# Patient Record
Sex: MALE | Race: WHITE | NOT HISPANIC OR LATINO | Employment: FULL TIME | URBAN - METROPOLITAN AREA
[De-identification: names, ages, dates, MRNs, and addresses within clinical notes are randomized per-mention and may not be internally consistent; named-entity substitution may affect disease eponyms.]

---

## 2017-02-20 ENCOUNTER — MYC MEDICAL ADVICE (OUTPATIENT)
Dept: FAMILY MEDICINE | Facility: CLINIC | Age: 42
End: 2017-02-20

## 2017-02-20 DIAGNOSIS — J45.20 INTERMITTENT ASTHMA, UNCOMPLICATED: ICD-10-CM

## 2017-02-20 RX ORDER — ALBUTEROL SULFATE 90 UG/1
2 AEROSOL, METERED RESPIRATORY (INHALATION) EVERY 4 HOURS PRN
Qty: 3 INHALER | Refills: 3 | Status: SHIPPED | OUTPATIENT
Start: 2017-02-20 | End: 2017-02-27

## 2017-02-20 NOTE — TELEPHONE ENCOUNTER
albuterol (PROAIR HFA, PROVENTIL HFA, VENTOLIN HFA) 108 (90 BASE) MCG/ACT inhaler       Last Written Prescription Date: 6-10-17  Last Fill Quantity: 3, # refills: 3    Last Office Visit with [facility], [facility] or [facility] prescribing provider:  6-10-16   Future Office Visit:   none    Date of Last Asthma Action Plan Letter:   Asthma Action Plan Q1 Year    Topic Date Due     Asthma Action Plan - yearly  06/09/2016      Asthma Control Test:   ACT Total Scores 6/9/2015   ACT TOTAL SCORE 14   ASTHMA ER VISITS 0 = None   ASTHMA HOSPITALIZATIONS 0 = None       Date of Last Spirometry Test:   No results found for this or any previous visit.

## 2017-02-27 ENCOUNTER — MYC MEDICAL ADVICE (OUTPATIENT)
Dept: FAMILY MEDICINE | Facility: CLINIC | Age: 42
End: 2017-02-27

## 2017-02-27 RX ORDER — ALBUTEROL SULFATE 90 UG/1
2 AEROSOL, METERED RESPIRATORY (INHALATION) EVERY 4 HOURS PRN
Qty: 3 INHALER | Refills: 3 | Status: SHIPPED | OUTPATIENT
Start: 2017-02-27 | End: 2018-05-01

## 2017-02-27 NOTE — TELEPHONE ENCOUNTER
Please see MyChart message below, pharmacy will be contacting us with paperwork.    Routed to PCP for FYI.    Ratna Nelson RN  [facility]

## 2017-02-27 NOTE — TELEPHONE ENCOUNTER
Rx for the albuterol inhaler was sent to [facility] pharmacy. Patient is needing a paper copy of it mailed to his home address instead. He will then be sending it to a mail order pharmacy.    Routed to provider for paper copy of Albuterol Rx.  Stephanie Esquivel RN  [facility] [place]

## 2017-02-27 NOTE — TELEPHONE ENCOUNTER
Reason for Call:  Other prescription    Detailed comments: patient is calling back wondering where script is?    Phone Number Patient can be reached at: Home number on file [phone] (home)    Best Time: any    Can we leave a detailed message on this number? YES    Call taken on 2/27/2017 at 10:22 AM by Sanaz Decker

## 2017-04-18 ENCOUNTER — OFFICE VISIT (OUTPATIENT)
Dept: FAMILY MEDICINE | Facility: CLINIC | Age: 42
End: 2017-04-18
Payer: COMMERCIAL

## 2017-04-18 VITALS
SYSTOLIC BLOOD PRESSURE: 138 MMHG | WEIGHT: 192 LBS | HEART RATE: 66 BPM | DIASTOLIC BLOOD PRESSURE: 85 MMHG | HEIGHT: 73 IN | OXYGEN SATURATION: 98 % | TEMPERATURE: 98.7 F | BODY MASS INDEX: 25.45 KG/M2

## 2017-04-18 DIAGNOSIS — J45.20 INTERMITTENT ASTHMA, UNCOMPLICATED: ICD-10-CM

## 2017-04-18 DIAGNOSIS — E78.5 HYPERLIPIDEMIA LDL GOAL <100: ICD-10-CM

## 2017-04-18 DIAGNOSIS — Z91.09 ENVIRONMENTAL ALLERGIES: ICD-10-CM

## 2017-04-18 DIAGNOSIS — Z00.00 ROUTINE GENERAL MEDICAL EXAMINATION AT A HEALTH CARE FACILITY: Primary | ICD-10-CM

## 2017-04-18 DIAGNOSIS — R06.83 SNORING: ICD-10-CM

## 2017-04-18 DIAGNOSIS — R53.83 FATIGUE, UNSPECIFIED TYPE: ICD-10-CM

## 2017-04-18 LAB
ALBUMIN SERPL-MCNC: 4.4 G/DL (ref 3.4–5)
ALP SERPL-CCNC: 82 U/L (ref 40–150)
ALT SERPL W P-5'-P-CCNC: 44 U/L (ref 0–70)
ANION GAP SERPL CALCULATED.3IONS-SCNC: 7 MMOL/L (ref 3–14)
AST SERPL W P-5'-P-CCNC: 23 U/L (ref 0–45)
BASOPHILS # BLD AUTO: 0 10E9/L (ref 0–0.2)
BASOPHILS NFR BLD AUTO: 0.8 %
BILIRUB SERPL-MCNC: 0.8 MG/DL (ref 0.2–1.3)
BUN SERPL-MCNC: 10 MG/DL (ref 7–30)
CALCIUM SERPL-MCNC: 9.2 MG/DL (ref 8.5–10.1)
CHLORIDE SERPL-SCNC: 105 MMOL/L (ref 94–109)
CHOLEST SERPL-MCNC: 250 MG/DL
CO2 SERPL-SCNC: 28 MMOL/L (ref 20–32)
CREAT SERPL-MCNC: 0.92 MG/DL (ref 0.66–1.25)
DIFFERENTIAL METHOD BLD: ABNORMAL
EOSINOPHIL # BLD AUTO: 0.8 10E9/L (ref 0–0.7)
EOSINOPHIL NFR BLD AUTO: 15.6 %
ERYTHROCYTE [DISTWIDTH] IN BLOOD BY AUTOMATED COUNT: 11.9 % (ref 10–15)
GFR SERPL CREATININE-BSD FRML MDRD: NORMAL ML/MIN/1.7M2
GLUCOSE SERPL-MCNC: 93 MG/DL (ref 70–99)
HCT VFR BLD AUTO: 46.5 % (ref 40–53)
HDLC SERPL-MCNC: 61 MG/DL
HGB BLD-MCNC: 16.5 G/DL (ref 13.3–17.7)
LDLC SERPL CALC-MCNC: 160 MG/DL
LYMPHOCYTES # BLD AUTO: 1.3 10E9/L (ref 0.8–5.3)
LYMPHOCYTES NFR BLD AUTO: 26.4 %
MCH RBC QN AUTO: 33.7 PG (ref 26.5–33)
MCHC RBC AUTO-ENTMCNC: 35.5 G/DL (ref 31.5–36.5)
MCV RBC AUTO: 95 FL (ref 78–100)
MONOCYTES # BLD AUTO: 0.5 10E9/L (ref 0–1.3)
MONOCYTES NFR BLD AUTO: 10.4 %
NEUTROPHILS # BLD AUTO: 2.3 10E9/L (ref 1.6–8.3)
NEUTROPHILS NFR BLD AUTO: 46.8 %
NONHDLC SERPL-MCNC: 189 MG/DL
PLATELET # BLD AUTO: 278 10E9/L (ref 150–450)
POTASSIUM SERPL-SCNC: 4.1 MMOL/L (ref 3.4–5.3)
PROT SERPL-MCNC: 7.7 G/DL (ref 6.8–8.8)
RBC # BLD AUTO: 4.9 10E12/L (ref 4.4–5.9)
SODIUM SERPL-SCNC: 140 MMOL/L (ref 133–144)
TRIGL SERPL-MCNC: 145 MG/DL
TSH SERPL DL<=0.005 MIU/L-ACNC: 1.68 MU/L (ref 0.4–4)
WBC # BLD AUTO: 5 10E9/L (ref 4–11)

## 2017-04-18 PROCEDURE — 99396 PREV VISIT EST AGE 40-64: CPT | Performed by: FAMILY MEDICINE

## 2017-04-18 PROCEDURE — 36415 COLL VENOUS BLD VENIPUNCTURE: CPT | Performed by: FAMILY MEDICINE

## 2017-04-18 PROCEDURE — 80061 LIPID PANEL: CPT | Performed by: FAMILY MEDICINE

## 2017-04-18 PROCEDURE — 80050 GENERAL HEALTH PANEL: CPT | Performed by: FAMILY MEDICINE

## 2017-04-18 RX ORDER — MONTELUKAST SODIUM 10 MG/1
10 TABLET ORAL AT BEDTIME
Qty: 90 TABLET | Refills: 3 | Status: SHIPPED | OUTPATIENT
Start: 2017-04-18 | End: 2018-05-01

## 2017-04-18 ASSESSMENT — PAIN SCALES - GENERAL: PAINLEVEL: NO PAIN (0)

## 2017-04-18 NOTE — NURSING NOTE
"Chief Complaint   Patient presents with     Physical     Health Maintenance     ACT       Initial BP (!) 138/94 (BP Location: Left arm, Patient Position: Chair, Cuff Size: Adult Regular)  Pulse 66  Temp 98.7  F (37.1  C) (Oral)  Ht 6' 0.75\" (1.848 m)  Wt 192 lb (87.1 kg)  SpO2 98%  BMI 25.51 kg/m2 Estimated body mass index is 25.51 kg/(m^2) as calculated from the following:    Height as of this encounter: 6' 0.75\" (1.848 m).    Weight as of this encounter: 192 lb (87.1 kg).  Medication Reconciliation: complete.  Roseann Garber CMA      "

## 2017-04-18 NOTE — LETTER
My Asthma Action Plan  Name: Reji Carpenter   YOB: 1975  Date: 4/18/2017   My doctor: Wili Kaplan MD   My clinic: [facility]        My Control Medicine: Qvar  My Rescue Medicine: albuterol   My Asthma Severity: intermittent  Avoid your asthma triggers: see list               GREEN ZONE     Good Control    I feel good    No cough or wheeze    Can work, sleep and play without asthma symptoms       Take your asthma control medicine every day.     1. If exercise triggers your asthma, take your rescue medication    15 minutes before exercise or sports, and    During exercise if you have asthma symptoms  2. Spacer to use with inhaler: If you have a spacer, make sure to use it with your inhaler             YELLOW ZONE     Getting Worse  I have ANY of these:    I do not feel good    Cough or wheeze    Chest feels tight    Wake up at night   1. Keep taking your Green Zone medications  2. Start taking your rescue medicine:    every 20 minutes for up to 1 hour. Then every 4 hours for 24-48 hours.  3. If you stay in the Yellow Zone for more than 12-24 hours, contact your doctor.  4. If you do not return to the Green Zone in 12-24 hours or you get worse, start taking your oral steroid medicine if prescribed by your provider.           RED ZONE     Medical Alert - Get Help  I have ANY of these:    I feel awful    Medicine is not helping    Breathing getting harder    Trouble walking or talking    Nose opens wide to breathe       1. Take your rescue medicine NOW  2. If your provider has prescribed an oral steroid medicine, start taking it NOW  3. Call your doctor NOW  4. If you are still in the Red Zone after 20 minutes and you have not reached your doctor:    Take your rescue medicine again and    Call 911 or go to the emergency room right away    See your regular doctor within 2 weeks of an Emergency Room or Urgent Care visit for follow-up treatment.        Electronically signed  by: Wili Kaplan, April 18, 2017    Annual Reminders:  Meet with Asthma Educator,  Flu Shot in the Fall, consider Pneumonia Vaccination for patients with asthma (aged 19 and older).    Pharmacy:    [facility] - [place], [place] - [address] [place] [address]/[facility]ARMACY #[id number] - [place], [place] - [address]  [facility] - [place], [place] - [address]  WRITTEN PRESCRIPTION REQUESTED                    Asthma Triggers  How To Control Things That Make Your Asthma Worse    Triggers are things that make your asthma worse.  Look at the list below to help you find your triggers and what you can do about them.  You can help prevent asthma flare-ups by staying away from your triggers.      Trigger                                                          What you can do   Cigarette Smoke  Tobacco smoke can make asthma worse. Do not allow smoking in your home, car or around you.  Be sure no one smokes at a child s day care or school.  If you smoke, ask your health care provider for ways to help you quit.  Ask family members to quit too.  Ask your health care provider for a referral to Quit Plan to help you quit smoking, or call 4-584-285-PLAN.     Colds, Flu, Bronchitis  These are common triggers of asthma. Wash your hands often.  Don t touch your eyes, nose or mouth.  Get a flu shot every year.     Dust Mites  These are tiny bugs that live in cloth or carpet. They are too small to see. Wash sheets and blankets in hot water every week.   Encase pillows and mattress in dust mite proof covers.  Avoid having carpet if you can. If you have carpet, vacuum weekly.   Use a dust mask and HEPA vacuum.   Pollen and Outdoor Mold  Some people are allergic to trees, grass, or weed pollen, or molds. Try to keep your windows closed.  Limit time out doors when pollen count is high.   Ask you health care provider about taking medicine during allergy season.     Animal  Dander  Some people are allergic to skin flakes, urine or saliva from pets with fur or feathers. Keep pets with fur or feathers out of your home.    If you can t keep the pet outdoors, then keep the pet out of your bedroom.  Keep the bedroom door closed.  Keep pets off cloth furniture and away from stuffed toys.     Mice, Rats, and Cockroaches  Some people are allergic to the waste from these pests.   Cover food and garbage.  Clean up spills and food crumbs.  Store grease in the refrigerator.   Keep food out of the bedroom.   Indoor Mold  This can be a trigger if your home has high moisture. Fix leaking faucets, pipes, or other sources of water.   Clean moldy surfaces.  Dehumidify basement if it is damp and smelly.   Smoke, Strong Odors, and Sprays  These can reduce air quality. Stay away from strong odors and sprays, such as perfume, powder, hair spray, paints, smoke incense, paint, cleaning products, candles and new carpet.   Exercise or Sports  Some people with asthma have this trigger. Be active!  Ask your doctor about taking medicine before sports or exercise to prevent symptoms.    Warm up for 5-10 minutes before and after sports or exercise.     Other Triggers of Asthma  Cold air:  Cover your nose and mouth with a scarf.  Sometimes laughing or crying can be a trigger.  Some medicines and food can trigger asthma.

## 2017-04-18 NOTE — PROGRESS NOTES
SUBJECTIVE:     CC: Reji Carpenter is an 41 year old male who presents for preventative health visit.     Healthy Habits:    Do you get at least three servings of calcium containing foods daily (dairy, green leafy vegetables, etc.)? No    Amount of exercise or daily activities, outside of work: 1 day(s) per week    Problems taking medications regularly No    Medication side effects: No    Have you had an eye exam in the past two years? no    Do you see a dentist twice per year? yes    Do you have sleep apnea, excessive snoring or daytime drowsiness?yes snoring        Allergies and asthma    Lots of problems    Especially with sleeping     Wheezing, coughing     Snoring a lot worse    Stops breathing sometimes    Worse for 6 months or so    Sinus procedure in past    Hard to breathe through nose at night    Using nasonex    neti pot    Distilled water    Using albuterol at least 3x per day    No fever/ chills     Has dogs        Today's PHQ-2 Score:   PHQ-2 ( 1999 Pfizer) 4/18/2017 6/10/2016   Q1: Little interest or pleasure in doing things 0 0   Q2: Feeling down, depressed or hopeless 0 0   PHQ-2 Score 0 0   Little interest or pleasure in doing things - -   Feeling down, depressed or hopeless - -   PHQ-2 Score - -       Abuse: Current or Past(Physical, Sexual or Emotional)- No  Do you feel safe in your environment - Yes    Social History   Substance Use Topics     Smoking status: Never Smoker     Smokeless tobacco: Never Used     Alcohol use No      Comment: occasional     The patient does not drink >3 drinks per day nor >7 drinks per week.    Last PSA:   PSA   Date Value Ref Range Status   06/10/2016 1.31 0 - 4 ug/L Final       Recent Labs   Lab Test  06/10/16   1115  06/09/15   1001  04/09/14   1609   CHOL  223*  198  217*   HDL  53  59  49   LDL  154*  127  150*   TRIG  82  62  88   CHOLHDLRATIO   --   3.4  4.4   NHDL  170*   --    --        Reviewed orders with patient. Reviewed health maintenance and  "updated orders accordingly - Yes    Reviewed and updated as needed this visit by clinical staff  Tobacco  Allergies  Meds  Problems  Med Hx  Surg Hx  Fam Hx  Soc Hx          Reviewed and updated as needed this visit by Provider            ROS:  C: NEGATIVE for fever, chills, change in weight  I: NEGATIVE for worrisome rashes, moles or lesions  E: NEGATIVE for vision changes or irritation  ENT: NEGATIVE for ear, mouth and throat problems  RESP:see above; some wheezing.  White phlegm  CV: NEGATIVE for chest pain, palpitations or peripheral edema  GI: NEGATIVE for nausea, abdominal pain, heartburn, or change in bowel habits   male: negative for dysuria, hematuria, decreased urinary stream, erectile dysfunction, urethral discharge  M: NEGATIVE for significant arthralgias or myalgia  N: NEGATIVE for weakness, dizziness or paresthesias  P: NEGATIVE for changes in mood or affect    Boxing on heavy bag once per week  Uses inhaler before that     A little tired during day but not real bad           OBJECTIVE:     BP (!) 138/94 (BP Location: Left arm, Patient Position: Chair, Cuff Size: Adult Regular)  Pulse 66  Temp 98.7  F (37.1  C) (Oral)  Ht 6' 0.75\" (1.848 m)  Wt 192 lb (87.1 kg)  SpO2 98%  BMI 25.51 kg/m2  EXAM:  GENERAL: healthy, alert and no distress  HENT: ear canals and TM's normal, nose and mouth without ulcers or lesions  NECK: no adenopathy, no asymmetry, masses, or scars and thyroid normal to palpation  RESP: lungs clear to auscultation - no rales, rhonchi or wheezes  CV: regular rate and rhythm, normal S1 S2, no S3 or S4, no murmur, click or rub, no peripheral edema and peripheral pulses strong  ABDOMEN: soft, nontender, no hepatosplenomegaly, no masses and bowel sounds normal   (male): normal male genitalia without lesions or urethral discharge, no hernia  MS: no gross musculoskeletal defects noted, no edema  SKIN: no suspicious lesions or rashes  NEURO: Normal strength and tone, mentation " intact and speech normal  PSYCH: mentation appears normal, affect normal/bright    ASSESSMENT/PLAN:     Reji was seen today for physical and health maintenance.    Diagnoses and all orders for this visit:    Routine general medical examination at a health care facility    Intermittent asthma, uncomplicated  -     beclomethasone (QVAR) 80 MCG/ACT Inhaler; Inhale 2 puffs into the lungs 2 times daily  -     montelukast (SINGULAIR) 10 MG tablet; Take 1 tablet (10 mg) by mouth At Bedtime  -     ALLERGY/ASTHMA ADULT REFERRAL    Environmental allergies  -     montelukast (SINGULAIR) 10 MG tablet; Take 1 tablet (10 mg) by mouth At Bedtime  -     ALLERGY/ASTHMA ADULT REFERRAL    Snoring  -     SLEEP EVALUATION & MANAGEMENT REFERRAL - ADULT; Future    Fatigue, unspecified type  -     SLEEP EVALUATION & MANAGEMENT REFERRAL - ADULT; Future  -     CBC with platelets differential  -     Comprehensive metabolic panel  -     TSH with free T4 reflex    Hyperlipidemia LDL goal <100  -     Lipid panel reflex to direct LDL    Discussed multiple issues with patient.  His wife was here for 1st part of visit also  Possible witnessed apnea at night, along with bad snoring.  Prudent to have sleep specialist consult. He will schedule.   Add inhaled steroid for asthma.  Hopefully this will help reduce need for albuterol.    Continue other allergy/ asthma meds.    Prudent to have him see allergist also.  Possible allergy testing?  They agreed with plan.  Check labs.  Increase exercise.   No std concern.      COUNSELING:  Reviewed preventive health counseling, as reflected in patient instructions       Regular exercise       Healthy diet/nutrition       Vision screening       Family planning       Safe sex practices/STD prevention       Prostate cancer screening    BP Screening:   Last 3 BP Readings:    BP Readings from Last 3 Encounters:   04/18/17 (!) 138/94   06/10/16 119/75   06/09/15 132/86         The following was recommended to the  "patient:  Re-screen within 4 weeks and recommend lifestyle modifications     reports that he has never smoked. He has never used smokeless tobacco.    Estimated body mass index is 25.51 kg/(m^2) as calculated from the following:    Height as of this encounter: 6' 0.75\" (1.848 m).    Weight as of this encounter: 192 lb (87.1 kg).   Weight management plan: Discussed healthy diet and exercise guidelines and patient will follow up in 12 months in clinic to re-evaluate.patient't bmi only 25.5.  Not worrisome.    Counseling Resources:  ATP IV Guidelines  Pooled Cohorts Equation Calculator  FRAX Risk Assessment  ICSI Preventive Guidelines  Dietary Guidelines for Americans, 2010  USDA's MyPlate  ASA Prophylaxis  Lung CA Screening    Wili Kaplan MD  [facility]  "

## 2017-04-18 NOTE — PATIENT INSTRUCTIONS
Preventive Health Recommendations  Male Ages 40 to 49    Yearly exam:             See your health care provider every year in order to  o   Review health changes.   o   Discuss preventive care.    o   Review your medicines if your doctor has prescribed any.    You should be tested each year for STDs (sexually transmitted diseases) if you re at risk.     Have a cholesterol test every 5 years.     Have a colonoscopy (test for colon cancer) if someone in your family has had colon cancer or polyps before age 50.     After age 45, have a diabetes test (fasting glucose). If you are at risk for diabetes, you should have this test every 3 years.      Talk with your health care provider about whether or not a prostate cancer screening test (PSA) is right for you.    Shots: Get a flu shot each year. Get a tetanus shot every 10 years.     Nutrition:    Eat at least 5 servings of fruits and vegetables daily.     Eat whole-grain bread, whole-wheat pasta and brown rice instead of white grains and rice.     Talk to your provider about Calcium and Vitamin D.     Lifestyle    Exercise for at least 150 minutes a week (30 minutes a day, 5 days a week). This will help you control your weight and prevent disease.     Limit alcohol to one drink per day.     No smoking.     Wear sunscreen to prevent skin cancer.     See your dentist every six months for an exam and cleaning.        Increase exercise frequency    We will send you lab results    Schedule consult with sleep specialist    Allergy consult ( hold claritin for a week prior )    Start qvar ( inhaled steroid )     Continue other allergy meds

## 2017-04-18 NOTE — MR AVS SNAPSHOT
After Visit Summary   4/18/2017    Reji Carpenter    MRN: [id number]           Patient Information     Date Of Birth          1975        Visit Information        Provider Department      4/18/2017 8:20 AM Wili Kaplan MD [facility]        Today's Diagnoses     Intermittent asthma, uncomplicated    -  1    Environmental allergies        Snoring        Fatigue, unspecified type        Hyperlipidemia LDL goal <100          Care Instructions      Preventive Health Recommendations  Male Ages 40 to 49    Yearly exam:             See your health care provider every year in order to  o   Review health changes.   o   Discuss preventive care.    o   Review your medicines if your doctor has prescribed any.    You should be tested each year for STDs (sexually transmitted diseases) if you re at risk.     Have a cholesterol test every 5 years.     Have a colonoscopy (test for colon cancer) if someone in your family has had colon cancer or polyps before age 50.     After age 45, have a diabetes test (fasting glucose). If you are at risk for diabetes, you should have this test every 3 years.      Talk with your health care provider about whether or not a prostate cancer screening test (PSA) is right for you.    Shots: Get a flu shot each year. Get a tetanus shot every 10 years.     Nutrition:    Eat at least 5 servings of fruits and vegetables daily.     Eat whole-grain bread, whole-wheat pasta and brown rice instead of white grains and rice.     Talk to your provider about Calcium and Vitamin D.     Lifestyle    Exercise for at least 150 minutes a week (30 minutes a day, 5 days a week). This will help you control your weight and prevent disease.     Limit alcohol to one drink per day.     No smoking.     Wear sunscreen to prevent skin cancer.     See your dentist every six months for an exam and cleaning.        Increase exercise frequency    We will send you lab  results    Schedule consult with sleep specialist    Allergy consult ( hold claritin for a week prior )    Start qvar ( inhaled steroid )     Continue other allergy meds         Follow-ups after your visit        Additional Services     ALLERGY/ASTHMA ADULT REFERRAL       Your provider has referred you to: [facility]: [facility] [phone]  [email or web address]    Please be aware that coverage of these services is subject to the terms and limitations of your health insurance plan.  Call member services at your health plan with any benefit or coverage questions.      Please bring the following with you to your appointment:    (1) Any X-Rays, CTs or MRIs which have been performed.  Contact the facility where they were done to arrange for  prior to your scheduled appointment.    (2) List of current medications  (3) This referral request   (4) Any documents/labs given to you for this referral            SLEEP EVALUATION & MANAGEMENT REFERRAL - ADULT       Please be aware that coverage of these services is subject to the terms and limitations of your health insurance plan.  Call member services at your health plan with any benefit or coverage questions.      Please bring the following to your appointment:    >>   List of current medications   >>   This referral request   >>   Any documents/labs given to you for this referral    [facility] - [employer] [facility] [phone] (Age 15 and up)                  Future tests that were ordered for you today     Open Future Orders        Priority Expected Expires Ordered    SLEEP EVALUATION & MANAGEMENT REFERRAL - ADULT Routine  4/18/2018 4/18/2017            Who to contact     If you have questions or need follow up information about today's clinic visit or your schedule please contact [facility] directly at [phone].  Normal or non-critical lab and imaging results will be communicated to you by  "MyChart, letter or phone within 4 business days after the clinic has received the results. If you do not hear from us within 7 days, please contact the clinic through [employer] or phone. If you have a critical or abnormal lab result, we will notify you by phone as soon as possible.  Submit refill requests through [employer] or call your pharmacy and they will forward the refill request to us. Please allow 3 business days for your refill to be completed.          Additional Information About Your Visit        [employer] Information     [employer] gives you secure access to your electronic health record. If you see a primary care provider, you can also send messages to your care team and make appointments. If you have questions, please call your primary care clinic.  If you do not have a primary care provider, please call [phone] and they will assist you.        Care EveryWhere ID     This is your Care EveryWhere ID. This could be used by other organizations to access your [facility] medical records  LID-[id number]        Your Vitals Were     Pulse Temperature Height Pulse Oximetry BMI (Body Mass Index)       66 98.7  F (37.1  C) (Oral) 6' 0.75\" (1.848 m) 98% 25.51 kg/m2        Blood Pressure from Last 3 Encounters:   04/18/17 138/85   06/10/16 119/75   06/09/15 132/86    Weight from Last 3 Encounters:   04/18/17 192 lb (87.1 kg)   06/10/16 183 lb (83 kg)   06/09/15 184 lb (83.5 kg)              We Performed the Following     ALLERGY/ASTHMA ADULT REFERRAL     Asthma Action Plan (AAP)     CBC with platelets differential     Comprehensive metabolic panel     Lipid panel reflex to direct LDL     TSH with free T4 reflex          Today's Medication Changes          These changes are accurate as of: 4/18/17  9:10 AM.  If you have any questions, ask your nurse or doctor.               Start taking these medicines.        Dose/Directions    beclomethasone 80 MCG/ACT Inhaler   Commonly known as:  QVAR   Used for:  Intermittent asthma, " uncomplicated   Started by:  Wili Kaplan MD        Dose:  2 puff   Inhale 2 puffs into the lungs 2 times daily   Quantity:  1 Inhaler   Refills:  11         Stop taking these medicines if you haven't already. Please contact your care team if you have questions.     doxycycline 50 MG capsule   Commonly known as:  VIBRAMYCIN   Stopped by:  Wili Kaplan MD                Where to get your medicines      These medications were sent to [facility]/pharmacy #[id number] - [facility] [address]  [address] [facility] [address]     Phone:  [phone]     beclomethasone 80 MCG/ACT Inhaler    montelukast 10 MG tablet                Primary Care Provider Office Phone # Fax #    Wili Kaplan [phone]       [facility] [address] [facility] [address]        Thank you!     Thank you for choosing [facility]  for your care. Our goal is always to provide you with excellent care. Hearing back from our patients is one way we can continue to improve our services. Please take a few minutes to complete the written survey that you may receive in the mail after your visit with us. Thank you!             Your Updated Medication List - Protect others around you: Learn how to safely use, store and throw away your medicines at www.disposemymeds.org.          This list is accurate as of: 4/18/17  9:10 AM.  Always use your most recent med list.                   Brand Name Dispense Instructions for use    albuterol 108 (90 BASE) MCG/ACT Inhaler    PROAIR HFA/PROVENTIL HFA/VENTOLIN HFA    3 Inhaler    Inhale 2 puffs into the lungs every 4 hours as needed for shortness of breath / dyspnea       beclomethasone 80 MCG/ACT Inhaler    QVAR    1 Inhaler    Inhale 2 puffs into the lungs 2 times daily       fluticasone 50 MCG/ACT spray    FLONASE         loratadine 10 MG tablet    CLARITIN    90 tablet    Take 1 tablet (10 mg) by mouth daily        montelukast 10 MG tablet    SINGULAIR    90 tablet    Take 1 tablet (10 mg) by mouth At Bedtime

## 2017-04-19 ENCOUNTER — MYC MEDICAL ADVICE (OUTPATIENT)
Dept: FAMILY MEDICINE | Facility: CLINIC | Age: 42
End: 2017-04-19

## 2017-04-19 DIAGNOSIS — J45.20 INTERMITTENT ASTHMA, UNCOMPLICATED: ICD-10-CM

## 2017-04-19 ASSESSMENT — ASTHMA QUESTIONNAIRES: ACT_TOTALSCORE: 12

## 2017-04-20 NOTE — PROGRESS NOTES
Your cholesterol is high.  Keep working on healthy diet/exercise.    Other labs are all fine.    See the specialists as we discussed.    Wili Kaplan MD

## 2017-04-20 NOTE — TELEPHONE ENCOUNTER
Prescription of beclomethasone (QVAR) 80 MCG/ACT Inhaler was mailed to patient.  Informed patient via [employer].  Stephanie WHITEHEAD

## 2017-05-02 ENCOUNTER — OFFICE VISIT (OUTPATIENT)
Dept: ALLERGY | Facility: CLINIC | Age: 42
End: 2017-05-02
Payer: COMMERCIAL

## 2017-05-02 VITALS
DIASTOLIC BLOOD PRESSURE: 94 MMHG | SYSTOLIC BLOOD PRESSURE: 151 MMHG | BODY MASS INDEX: 25.84 KG/M2 | OXYGEN SATURATION: 99 % | HEART RATE: 65 BPM | HEIGHT: 73 IN | WEIGHT: 195 LBS

## 2017-05-02 DIAGNOSIS — R09.82 POST-NASAL DRAINAGE: ICD-10-CM

## 2017-05-02 DIAGNOSIS — J45.30 MILD PERSISTENT ASTHMA WITHOUT COMPLICATION: Primary | ICD-10-CM

## 2017-05-02 DIAGNOSIS — J34.89 RHINORRHEA: ICD-10-CM

## 2017-05-02 DIAGNOSIS — R09.81 NASAL CONGESTION: ICD-10-CM

## 2017-05-02 DIAGNOSIS — J33.9 NASAL POLYP: ICD-10-CM

## 2017-05-02 LAB
FEF 25/75: NORMAL
FEV-1: NORMAL
FEV1/FVC: NORMAL
FVC: NORMAL

## 2017-05-02 PROCEDURE — A4627 SPACER BAG/RESERVOIR: HCPCS | Performed by: ALLERGY & IMMUNOLOGY

## 2017-05-02 PROCEDURE — 99000 SPECIMEN HANDLING OFFICE-LAB: CPT | Performed by: ALLERGY & IMMUNOLOGY

## 2017-05-02 PROCEDURE — 94010 BREATHING CAPACITY TEST: CPT | Performed by: ALLERGY & IMMUNOLOGY

## 2017-05-02 PROCEDURE — 86003 ALLG SPEC IGE CRUDE XTRC EA: CPT | Performed by: ALLERGY & IMMUNOLOGY

## 2017-05-02 PROCEDURE — 99244 OFF/OP CNSLTJ NEW/EST MOD 40: CPT | Mod: 25 | Performed by: ALLERGY & IMMUNOLOGY

## 2017-05-02 PROCEDURE — 36415 COLL VENOUS BLD VENIPUNCTURE: CPT | Performed by: ALLERGY & IMMUNOLOGY

## 2017-05-02 NOTE — PATIENT INSTRUCTIONS
If you have any questions regarding your allergies, asthma, or what we discussed during your visit today please call the allergy clinic or contact us via [employer].    [facility]: [phone]      Follow-up in 1 month    You may increase the claritin (loratadine) to one tablet twice daily    Continue to use the singulair (montelukast) and nasacort daily.      Using an Inhaler with a Spacer  To control asthma, you need to use your medications the right way. Some medications are inhaled using a device called a metered-dose inhaler (MDI). Metered-dose inhalers deliver medication with a fine spray. You may be asked to use a spacer (holding tube) with your inhaler. The spacer helps make sure all the medication you need goes into your lungs.     Steps for Using an Inhaler with a Spacer  Step 1:    Remove the caps from the inhaler and spacer.    Shake the inhaler well and attach the spacer. If the inhaler is being used for the first time or has not been used for a while, prime it as directed by the .  Step 2:    Breathe out normally.    Put the spacer between your teeth. Close your lips tightly around it.    Keep your chin up.  Step 3:    Spray 1 puff into the spacer by pressing down on the inhaler.    Then breathe in through your mouth as slowly and deeply as you can. This should take about 5-10 seconds. (If you breathe too quickly, you may hear a whistling sound in the spacer.)  Step 4:    Take the spacer out of your mouth.    Hold your breath for a count of 10.    Then hold your lips together and slowly breathe out through your mouth.          If you re prescribed more than 1 puff of medication at a time, wait at least 30 seconds between puffs. This number may be different for different medications. Shake the inhaler again. Then repeat steps 2 to 4.     7415-9867 [employer] [address] [place] [address], [place] [address]. All rights reserved. This information is not intended as a  substitute for professional medical care. Always follow your healthcare professional's instructions.

## 2017-05-02 NOTE — PROGRESS NOTES
Dear Wili Kaplan MD    Thank you for referring your patient Reji Carpenter to the Allergy/Immunology Clinic. Reji Carpenter was seen in the Allergy Clinic at [facility]. The following are my recommendations regarding his Mild Persistent Asthma, Nasal Polyps, Nasal Congestion, Post-Nasal Drainage and Rhinorrhea    1. Will obtain in vitro IgE testing to seasonal and perennial aeroallergens  2. Increase loratadine to 10mg twice daily  3. Continue montelukast 10mg daily  4. Consider allergen immunotherapy treatment pending lab results  5. Continue nasacort nasal spray, 2 sprays in each nostril daily  6. Continue Qvar 80mcg, 2 puffs twice daily  7. Continue albuterol HFA, 2-4 puffs every 4 hours as needed and take 2 puffs 15 minutes prior to exercise or activity  8. Optichamber given in clinic and appropriate inhaler and spacer technique reviewed  9. Follow-up in 1 month    Reji Carpenter is a 41 year old White male being seen today in consultation for possible allergies. He states he has chronic post-nasal drainage, throat clearing, and cough productive of clear to white sputum. Reji also reports some nasal congestion but this mainly occurs at night. He has occasional itchy and watery eyes but his ocular symptoms are not as bothersome as his nasal symptoms. Reji states that his symptoms are present throughout the year but seem to worsen with seasonal changes. Over the last year he feels his symptoms have been getting worse. His current medication regimen consists of loratadine 10mg daily, montelukast 10mg daily, and nasacort nasal spray - 2 sprays in each nostril daily. He also uses a neti pot once daily. Reji reports that he underwent sinus surgery in 2012 for chronic sinus disease and nasal polyps. He feels the surgery was helpful but he is hoping to avoid another surgery in the future. He denies any recent changes in his sense of taste or smell. Reji has never been tested for  allergies but feels that he does have allergies to cats and dogs and may also have seasonal allergies as well. He has not had a cat in his home for a few years but does have pet dogs however they are kept out of the bedroom.    Reji has a history of asthma and was diagnosed around age 28. At that time he was prescribed Flovent but did not use it consistently. He has been managing his symptoms with albuterol only. Over the last year his asthma has seemed to worsen and he had been using his albuterol inhaler at least daily and usually about 3 times per day. He was often waking up in the middle of the night and wheezing. Reji has never been hospitalized or to the ED for acute respiratory symptoms and does not recall ever being on prednisone. He reports triggers for asthma symptoms including cats, mowing the lawn or raking leaves, exercise, and respiratory illness. Reji was evaluated by his PCP 2 weeks ago and started on Qvar. He states that he has noticed significant improvement in his symptoms since beginning this medication.    Reji denies any prior history of reactions to aspirin or other NSAIDs or alcohol. He takes ibuprofen about twice per week and has never experienced flushing, congestion, rhinorrhea, or difficulty breathing after taking this medication. He drinks alcohol about 3 times per week and has never noticed any symptoms after drinking.      Past Medical History:   Diagnosis Date     NONSPECIFIC MEDICAL HISTORY 06/06    ruptured achilles tendon repaired     Family History   Problem Relation Age of Onset     Lipids Father      Hypertension Mother      CANCER Maternal Grandmother      brain     CANCER Paternal Grandmother      DIABETES No family hx of      C.A.D. No family hx of      Past Surgical History:   Procedure Laterality Date     C REPAIR CRUCIATE LIGAMENT,KNEE  about 2002     HC REPAIR ACHILLES TENDON,PRIMARY  06/06     OPTICAL TRACKING SYSTEM ENDOSCOPIC SINUS SURGERY  11/29/2012    sinus  surgery     VASECTOMY  about 2006       ENVIRONMENTAL HISTORY: [place]. The home is heated with a forced air. They does have central air conditioning. The patient's bedroom is furnished with carpeting in bedroom, allergen mattress cover, allergen pillowcase cover and fabric window coverings.  Pets inside the house include 3 dog(s). There is not history of cockroach or mice infestation. There is/are 0 smokers in the house.  The house does not have a damp basement.     SOCIAL HISTORY:   Reji is employed as . He has missed 0 days of school/work . He lives with his spouse.  His spouse works as a Nurse     REVIEW OF SYSTEMS:  General: negative for weight gain. negative for weight loss. positive  for changes in sleep.   Eyes: positive  for itching. positive  for redness. positive  for tearing/watering.  Ears: negative for fullness. negative for hearing loss. negative for dizziness.   Nose: negative for snoring.negative for changes in smell. positive  for drainage.   Throat: negative for hoarseness. negative for sore throat. negative for trouble swallowing.   Lungs: positive  for shortness of breath.negative for wheezing. positive  for sputum production.   Cardiovascular: negative for chest pain. negative for swelling of ankles. negative for fast or irregular heartbeat.   Gastrointestinal: negative for nausea. negative for heartburn. negative for acid reflux.   Musculoskeletal: negative for joint pain. negative for joint stiffness. negative for joint swelling.   Neurologic: negative for seizures. negative for fainting. negative for weakness.   Psychiatric: negative for changes in mood. negative for anxiety.   Endocrine: negative for cold intolerance. negative for heat intolerance. negative for tremors.   Hematologic: negative for easy bruising. negative for easy bleeding.  Integumentary: negative for rash. negative for scaling. negative for nail changes.  "      Current Outpatient Prescriptions:      beclomethasone (QVAR) 80 MCG/ACT Inhaler, Inhale 2 puffs into the lungs 2 times daily, Disp: 3 Inhaler, Rfl: 3     montelukast (SINGULAIR) 10 MG tablet, Take 1 tablet (10 mg) by mouth At Bedtime, Disp: 90 tablet, Rfl: 3     albuterol (PROAIR HFA/PROVENTIL HFA/VENTOLIN HFA) 108 (90 BASE) MCG/ACT Inhaler, Inhale 2 puffs into the lungs every 4 hours as needed for shortness of breath / dyspnea, Disp: 3 Inhaler, Rfl: 3     fluticasone (FLONASE) 50 MCG/ACT nasal spray, , Disp: , Rfl: 10     loratadine (CLARITIN) 10 MG tablet, Take 1 tablet (10 mg) by mouth daily, Disp: 90 tablet, Rfl: 2  Immunization History   Administered Date(s) Administered     TDAP Vaccine (Adacel) 11/19/2009     Allergies   Allergen Reactions     Animal Dander      Seasonal Allergies          EXAM:   BP (!) 151/94  Pulse 65  Ht 1.848 m (6' 0.75\")  Wt 88.5 kg (195 lb)  SpO2 99%  BMI 25.9 kg/m2  GENERAL APPEARANCE: alert, cooperative and not in distress  SKIN: no rashes, no lesions  HEAD: atraumatic, normocephalic  EYES: lids and lashes normal, conjunctivae and sclerae clear, pupils equal, round, reactive to light, EOM full and intact  ENT: no scars or lesions, nasal exam showed clear rhinorrhea and mucosal edema, otoscopy showed external auditory canals clear, tympanic membranes normal, tongue midline and normal, soft palate, uvula, and tonsils normal  NECK: no asymmetry, masses, or scars, supple without significant adenopathy  LUNGS: unlabored respirations, no intercostal retractions or accessory muscle use, clear to auscultation without rales or wheezes  HEART: regular rate and rhythm without murmurs and normal S1 and S2  MUSCULOSKELETAL: no musculoskeletal defects are noted  NEURO: no focal deficits noted, mental status intact  PSYCH: does not appear depressed or anxious and short and long term memory appears intact    WORKUP: Spirometry  SPIROMETRY       FVC 6.68L (116% of predicted).     FEV1 " 5.10L (112% of predicted).     FEV1/FVC 76%     FEF 25%-75%  4.36L/s (104% of predicted).    These values are consistent with normal lung function without evidence of airflow obstruction.    Asthma Control Test (ACT) total score: 11     ASSESSMENT/PLAN:  Reji Carpenter is a 41 year old male here for evaluation of allergies and asthma. Skin prick testing could not be performed today due to recent antihistamine use and further evaluation will be done with in vitro IgE testing. Reji's asthma was previously poorly controlled and he had frequent use of albuterol to manage his symptoms. In the last 2 weeks he has noted significant improvement since beginning the Qvar. He has a prior history of nasal polyps with regrowth of polyps noted on nasal endoscopy from 2014. Rjei has no history of adverse reactions to NSAIDs or alcohol. His lung function is currently normal. We discussed the importance of maintaining control of his asthma symptoms as well as treatments to help prevent regrowth of polyps and manage his rhinitis symptoms.    1. Will obtain in vitro IgE testing to seasonal and perennial aeroallergens  2. Increase loratadine to 10mg twice daily  3. Continue montelukast 10mg daily  4. Consider allergen immunotherapy treatment pending lab results  5. Continue nasacort nasal spray, 2 sprays in each nostril daily  6. Continue Qvar 80mcg, 2 puffs twice daily  7. Continue albuterol HFA, 2-4 puffs every 4 hours as needed and take 2 puffs 15 minutes prior to exercise or activity  8. Optichamber given in clinic and appropriate inhaler and spacer technique reviewed  9. Follow-up in 1 month      Rosemarie Posada MD  Allergy/Immunology  [facility] and [place], [place]      Chart documentation done in part with Dragon Voice Recognition Software. Although reviewed after completion, some word and grammatical errors may remain.

## 2017-05-02 NOTE — NURSING NOTE
"Chief Complaint   Patient presents with     Consult     all year allergies. pt is wondering what else he can do. Drainage and runny nose all the time.        Initial BP (!) 151/94  Pulse 65  Ht 1.848 m (6' 0.75\")  Wt 88.5 kg (195 lb)  SpO2 99%  BMI 25.9 kg/m2 Estimated body mass index is 25.9 kg/(m^2) as calculated from the following:    Height as of this encounter: 1.848 m (6' 0.75\").    Weight as of this encounter: 88.5 kg (195 lb).  Medication Reconciliation: complete   Milka Amador MA      "

## 2017-05-02 NOTE — MR AVS SNAPSHOT
After Visit Summary   5/2/2017    Reji Carpenter    MRN: [id number]           Patient Information     Date Of Birth          1975        Visit Information        Provider Department      5/2/2017 9:00 AM Rosemarie Posada MD [facility]        Today's Diagnoses     Mild persistent asthma without complication    -  1    Nasal polyp        Post-nasal drainage        Nasal congestion        Rhinorrhea          Care Instructions    If you have any questions regarding your allergies, asthma, or what we discussed during your visit today please call the allergy clinic or contact us via [employer].    [facility]: [phone]      Follow-up in 1 month    You may increase the claritin (loratadine) to one tablet twice daily    Continue to use the singulair (montelukast) and nasacort daily.      Using an Inhaler with a Spacer  To control asthma, you need to use your medications the right way. Some medications are inhaled using a device called a metered-dose inhaler (MDI). Metered-dose inhalers deliver medication with a fine spray. You may be asked to use a spacer (holding tube) with your inhaler. The spacer helps make sure all the medication you need goes into your lungs.     Steps for Using an Inhaler with a Spacer  Step 1:    Remove the caps from the inhaler and spacer.    Shake the inhaler well and attach the spacer. If the inhaler is being used for the first time or has not been used for a while, prime it as directed by the .  Step 2:    Breathe out normally.    Put the spacer between your teeth. Close your lips tightly around it.    Keep your chin up.  Step 3:    Spray 1 puff into the spacer by pressing down on the inhaler.    Then breathe in through your mouth as slowly and deeply as you can. This should take about 5-10 seconds. (If you breathe too quickly, you may hear a whistling sound in the spacer.)  Step 4:    Take the spacer out of your mouth.    Hold your  breath for a count of 10.    Then hold your lips together and slowly breathe out through your mouth.          If you re prescribed more than 1 puff of medication at a time, wait at least 30 seconds between puffs. This number may be different for different medications. Shake the inhaler again. Then repeat steps 2 to 4.     0119-0466 [employer]. [address] [place] [address]. All rights reserved. This information is not intended as a substitute for professional medical care. Always follow your healthcare professional's instructions.              Follow-ups after your visit        Your next 10 appointments already scheduled     May 04, 2017  9:30 AM CDT   New Sleep Patient with [facility] [place]   [facility] ([facility]    [address] [place] [id number] [phone]              Who to contact     If you have questions or need follow up information about today's clinic visit or your schedule please contact [facility] directly at [phone].  Normal or non-critical lab and imaging results will be communicated to you by [employer], letter or phone within 4 business days after the clinic has received the results. If you do not hear from us within 7 days, please contact the clinic through "[employer]t or phone. If you have a critical or abnormal lab result, we will notify you by phone as soon as possible.  Submit refill requests through [employer] or call your pharmacy and they will forward the refill request to us. Please allow 3 business days for your refill to be completed.          Additional Information About Your Visit        [employer] Information     [employer] gives you secure access to your electronic health record. If you see a primary care provider, you can also send messages to your care team and make appointments. If you have questions, please call your primary care clinic.  If you do not have a primary  "care provider, please call [phone] and they will assist you.        Care EveryWhere ID     This is your Care EveryWhere ID. This could be used by other organizations to access your [place] medical records  [id number]        Your Vitals Were     Pulse Height Pulse Oximetry BMI (Body Mass Index)          65 1.848 m (6' 0.75\") 99% 25.9 kg/m2         Blood Pressure from Last 3 Encounters:   05/02/17 (!) 151/94   04/18/17 138/85   06/10/16 119/75    Weight from Last 3 Encounters:   05/02/17 88.5 kg (195 lb)   04/18/17 87.1 kg (192 lb)   06/10/16 83 kg (183 lb)              We Performed the Following     Allergen alternaria alternata IgE     Allergen sabas white IgE     Allergen aspergillus fumigatus IgE     Allergen cat epithellium IgE     Allergen Cedar IgE     Allergen cladosporium herbarum IgE     Allergen cottonwood IgE     Allergen D farinae IgE     Allergen D pteronyssinus IgE     Allergen dog epithelium IgE     Allergen elm IgE     Allergen English plantain IgE     Allergen Epicoccum purpurascens IgE     Allergen giant ragweed IgE     Allergen Lester grass IgE     Allergen lamb's quarter IgE     Allergen maple box elder IgE     Allergen Mugwort IgE     Allergen Chester White     Allergen oak white IgE     Allergen orchard grass IgE     Allergen penicillium notatum IgE     Allergen ragweed short IgE     Allergen Red Chester IgE     Allergen Sagebrush Wormwood IgE     Allergen Sheep Sorrel IgE     Allergen silver  birch IgE     Allergen thistle Russian IgE     Allergen crystal IgE     Allergen Marquette Tree     Allergen Weed Nettle IgE     Allergen white pine IgE     Allergen, Kochia/Firebush     Spirometry, Breathing Capacity          Today's Medication Changes          These changes are accurate as of: 5/2/17  9:44 AM.  If you have any questions, ask your nurse or doctor.               Stop taking these medicines if you haven't already. Please contact your care team if you have questions.     fluticasone 50 " MCG/ACT spray   Commonly known as:  FLONASE   Stopped by:  Rosemarie Posada MD                    Primary Care Provider Office Phone # Fax #    Wili Kaplan [phone]       [facility] [address] [facility] [address]        Thank you!     Thank you for choosing [facility]  for your care. Our goal is always to provide you with excellent care. Hearing back from our patients is one way we can continue to improve our services. Please take a few minutes to complete the written survey that you may receive in the mail after your visit with us. Thank you!             Your Updated Medication List - Protect others around you: Learn how to safely use, store and throw away your medicines at www.disposemymeds.org.          This list is accurate as of: 5/2/17  9:44 AM.  Always use your most recent med list.                   Brand Name Dispense Instructions for use    albuterol 108 (90 BASE) MCG/ACT Inhaler    PROAIR HFA/PROVENTIL HFA/VENTOLIN HFA    3 Inhaler    Inhale 2 puffs into the lungs every 4 hours as needed for shortness of breath / dyspnea       beclomethasone 80 MCG/ACT Inhaler    QVAR    3 Inhaler    Inhale 2 puffs into the lungs 2 times daily       loratadine 10 MG tablet    CLARITIN    90 tablet    Take 1 tablet (10 mg) by mouth daily       montelukast 10 MG tablet    SINGULAIR    90 tablet    Take 1 tablet (10 mg) by mouth At Bedtime

## 2017-05-03 LAB
A ALTERNATA IGE QN: 0.73 KU(A)/L
A FUMIGATUS IGE QN: NORMAL KU(A)/L
C HERBARUM IGE QN: NORMAL KU(A)/L
CAT DANDER IGG QN: 13.6 KU(A)/L
CEDAR IGE QN: NORMAL KU(A)/L
COCKSFOOT IGE QN: NORMAL KU(A)/L
COMMON RAGWEED IGE QN: 0.56 KU(A)/L
COTTONWOOD IGE QN: 0.11 KU(A)/L
D FARINAE IGE QN: NORMAL KU(A)/L
D PTERONYSS IGE QN: NORMAL KU(A)/L
DOG DANDER+EPITH IGE QN: 96.4 KU(A)/L
E PURPURASCENS IGE QN: NORMAL KU(A)/L
EAST WHITE PINE IGE QN: NORMAL KU(A)/L
ENGL PLANTAIN IGE QN: 0.11 KU(A)/L
FIREBUSH IGE QN: NORMAL KU(A)/L
GIANT RAGWEED IGE QN: 0.31 KU(A)/L
GOOSEFOOT IGE QN: 0.18 KU(A)/L
JOHNSON GRASS IGE QN: NORMAL KU(A)/L
MAPLE IGE QN: 2.66 KU(A)/L
MUGWORT IGE QN: 0.16 KU(A)/L
NETTLE IGE QN: NORMAL KU(A)/L
P NOTATUM IGE QN: NORMAL KU(A)/L
RED MULBERRY IGE QN: NORMAL KU(A)/L
SALTWORT IGE QN: NORMAL KU(A)/L
SHEEP SORREL IGE QN: NORMAL KU(A)/L
SILVER BIRCH IGE QN: NORMAL KU(A)/L
TIMOTHY IGE QN: NORMAL KU(A)/L
WHITE ASH IGE QN: 0.16 KU(A)/L
WHITE OAK IGE QN: NORMAL KU(A)/L
WORMWOOD IGE QN: 0.22 KU(A)/L

## 2017-05-03 ASSESSMENT — ASTHMA QUESTIONNAIRES: ACT_TOTALSCORE: 11

## 2017-05-04 ENCOUNTER — OFFICE VISIT (OUTPATIENT)
Dept: SLEEP MEDICINE | Facility: CLINIC | Age: 42
End: 2017-05-04
Payer: COMMERCIAL

## 2017-05-04 VITALS
WEIGHT: 191 LBS | BODY MASS INDEX: 26.74 KG/M2 | SYSTOLIC BLOOD PRESSURE: 147 MMHG | HEART RATE: 70 BPM | DIASTOLIC BLOOD PRESSURE: 98 MMHG | HEIGHT: 71 IN | OXYGEN SATURATION: 97 %

## 2017-05-04 DIAGNOSIS — R53.81 MALAISE AND FATIGUE: ICD-10-CM

## 2017-05-04 DIAGNOSIS — R53.83 FATIGUE, UNSPECIFIED TYPE: ICD-10-CM

## 2017-05-04 DIAGNOSIS — R06.83 SNORING: ICD-10-CM

## 2017-05-04 DIAGNOSIS — R06.89 DYSPNEA AND RESPIRATORY ABNORMALITY: Primary | ICD-10-CM

## 2017-05-04 DIAGNOSIS — R06.00 DYSPNEA AND RESPIRATORY ABNORMALITY: Primary | ICD-10-CM

## 2017-05-04 DIAGNOSIS — G47.30 SLEEP APNEA, UNSPECIFIED TYPE: ICD-10-CM

## 2017-05-04 DIAGNOSIS — Z72.820 LACK OF ADEQUATE SLEEP: ICD-10-CM

## 2017-05-04 DIAGNOSIS — R53.83 MALAISE AND FATIGUE: ICD-10-CM

## 2017-05-04 PROBLEM — R03.0 ELEVATED BLOOD PRESSURE (NOT HYPERTENSION): Status: ACTIVE | Noted: 2017-05-04

## 2017-05-04 PROBLEM — E78.5 HYPERLIPIDEMIA LDL GOAL <100: Chronic | Status: ACTIVE | Noted: 2017-04-18

## 2017-05-04 PROCEDURE — 99244 OFF/OP CNSLTJ NEW/EST MOD 40: CPT | Performed by: PHYSICIAN ASSISTANT

## 2017-05-04 NOTE — PATIENT INSTRUCTIONS
Your BMI is Body mass index is 26.64 kg/(m^2).  Weight management is a personal decision.  If you are interested in exploring weight loss strategies, the following discussion covers the approaches that may be successful. Body mass index (BMI) is one way to tell whether you are at a healthy weight, overweight, or obese. It measures your weight in relation to your height.  A BMI of 18.5 to 24.9 is in the healthy range. A person with a BMI of 25 to 29.9 is considered overweight, and someone with a BMI of 30 or greater is considered obese. More than two-thirds of [place] adults are considered overweight or obese.  Being overweight or obese increases the risk for further weight gain. Excess weight may lead to heart disease and diabetes.  Creating and following plans for healthy eating and physical activity may help you improve your health.  Weight control is part of healthy lifestyle and includes exercise, emotional health, and healthy eating habits. Careful eating habits lifelong are the mainstay of weight control. Though there are significant health benefits from weight loss, long-term weight loss with diet alone may be very difficult to achieve- studies show long-term success with dietary management in less than 10% of people. Attaining a healthy weight may be especially difficult to achieve in those with severe obesity. In some cases, medications, devices and surgical management might be considered.  What can you do?  If you are overweight or obese and are interested in methods for weight loss, you should discuss this with your provider.     Consider reducing daily calorie intake by 500 calories.     Keep a food journal.     Avoiding skipping meals, consider cutting portions instead.    Diet combined with exercise helps maintain muscle while optimizing fat loss. Strength training is particularly important for building and maintaining muscle mass. Exercise helps reduce stress, increase energy, and improves fitness.  "Increasing exercise without diet control, however, may not burn enough calories to loose weight.       Start walking three days a week 10-20 minutes at a time    Work towards walking thirty minutes five days a week     Eventually, increase the speed of your walking for 1-2 minutes at time    In addition, we recommend that you review healthy lifestyles and methods for weight loss available through the National Institutes of Health patient information sites:  http://win.niddk.nih.gov/publications/index.htm    And look into health and wellness programs that may be available through your health insurance provider, employer, local community center, or enrike club.    Weight management plan: Patient was referred to their PCP to discuss a diet and exercise plan.   Provider : Vero Gonsalves  Contact Information: [facility] [phone]    Goss Points:  1. What is Obstructive Sleep Apnea (GUILLERMO)? GUILLERMO is the most common type of sleep apnea. Apnea literally means, \"without breath.\" It is characterized by repetitive pauses in breathing, despite continued effort to breathe, and is usually associated with a reduction in blood oxygen saturation. Apneas can last 10 to over 60 seconds. It is caused by narrowing or collapse of the upper airway as muscles relax during sleep. A number of things can make apnea worse, including: sleeping on your back, having alcohol in the evening, smoking, asthma, allergies, nasal congestion, and weight gain.  2. What are the consequences of GUILLERMO? Symptoms include: daytime sleepiness- possibly increasing the risk of falling asleep while driving, unrefreshing/restless sleep, snoring, insomnia, waking frequently to urinate, waking with heartburn or reflux, reduced concentration and memory, and morning headaches. Other health consequences may include development of high blood pressure. Untreated GUILLERMO also can contribute to heart disease, stroke and diabetes.   3. What are " the treatment options? In most situations, sleep apnea is a lifelong disease that must be managed with daily therapy. Continuous Positive Airway (CPAP) is the most reliable treatment. A mouthguard to hold your jaw forward is usually the next most reliable option. Other options include postioning devices (to keep you off your back), nasal valves, tongue retaining device, weight loss, surgery. There is more detail about these options toward the end of this document.  4. What are the most important things to remember about using CPAP?     WHERE CAN I FIND MORE INFORMATION?    American Academy of Sleep Medicine Patient information on sleep disorders:  http://yoursleep.aasmnet.org    CPAP-  WHY AND HOW?                                 Continuous positive airway pressure, or CPAP, is the most effective treatment for obstructive sleep apnea. It works by using air pressure to hold your throat open. A decision to use CPAP is a major step forward in the pursuit of a healthier life. The successful use of CPAP will help you breathe easier, sleep better and live healthier. Using CPAP can be a positive experience if you keep these polanco points in mind:  1. Commitment  CPAP is not a quick fix for your problem. It involves a long-term commitment to improve your sleep and your health.    2. Communication  Stay in close communication with both your sleep doctor and your CPAP supplier. Ask lots of questions and seek help when you need it.    3. Consistency  Use CPAP all night, every night and for every nap. You will receive the maximum health benefits from CPAP when you use it every time that you sleep. This will also make it easier for your body to adjust to the treatment.    4. Correction  The first machine and mask that you try may not be the best ones for you. Work with your sleep doctor and your CPAP supplier to make corrections to your equipment selection. Ask about trying a different type of machine or mask if you have ongoing  "problems. Make sure that your mask is a good fit and learn to use your equipment properly.    5. Challenge  Tell a family member or close friend to ask you each morning if you used your CPAP the previous night. Have someone to challenge you to give it your best effort.    6. Connection   Your adjustment to CPAP will be easier if you are able to connect with others who use the same treatment. Ask your sleep doctor if there is a support group in your area for people who have sleep apnea, or look for one on the Internet.  7. Comfort   Increase your level of comfort by using a saline spray, decongestant or heated humidifier if CPAP irritates your nose, mouth or throat. Use your unit's \"ramp\" setting to slowly get used to the air pressure level. There may be soft pads you can buy that will fit over your mask straps. Look on www.CPAP.com for accessories such as these straps, a pillow contoured for side-sleeping with CPAP, longer hoses, hose covers to reduce condensation, or stands to keep the hose out of your way.                   8. Cleaning   Clean your mask, tubing and headgear on a regular basis. Put this time in your schedule so that you don't forget to do it. Check and replace the filters for your CPAP unit and humidifier.    9. Completion   Although you are never finished with CPAP therapy, you should reward yourself by celebrating the completion of your first month of treatment. Expect this first month to be your hardest period of adjustment. It will involve some trial and error as you find the machine, mask and pressure settings that are right for you.    10. Continuation  After your first month of treatment, continue to make a daily commitment to use your CPAP all night, every night and for every nap.    CPAP-Tips to starting with success:  Begin using your CPAP for short periods of time during the day while you watch TV or read. This eliminates the pressure of trying to fall asleep with it when it is still a " new sensation.    Use CPAP every night and for every nap. Using it less often reduces the health benefits and makes it harder for your body to get used to it.    Newer CPAP models are virtually silent; however, if you find the sound of your CPAP machine to be bothersome, place the unit under your bed to dampen the sound.     Make small adjustments to your mask, tubing, straps and headgear until you get the right fit. Tightening the mask may actually worsen the leak.  If it leaves significant marks on your face or irritates the bridge of your nose, it may not be the best mask for you.  Speak with the person who supplied the mask and consider trying other masks. Insurances will allow you to try different masks during the first month of starting CPAP.  Insurance also covers a new mask, hose and filter about every 3-6 months.    Use a saline nasal spray to ease mild nasal congestion. Neti-Pot or saline nasal rinses may also help. Nasal gel sprays can help reduce nasal dryness.  Biotene mouthwash can be helpful to protect your teeth if you experience frequent dry mouth.  Dry mouth may be a sign of air escaping out of your mouth or out of the mask in the case of a full face mask.  Speak with your provider if you expect that is the case.     Take a nasal decongestant to relieve more severe nasal or sinus congestion.  Do not use Afrin (oxymetazoline) nasal spray more than 3 days in a row.  Speak with your sleep doctor if your nasal congestion is chronic.    Use a heated humidifier that fits your CPAP model to enhance your breathing comfort. Adjust the heat setting up if you get a dry nose or throat, down if you get condensation in the hose or mask.  Position the CPAP lower than you so that any condensation in the hose drains back into the machine rather than towards the mask.    Try a system that uses nasal pillows if traditional masks give you problems.    Clean your mask, tubing and headgear once a week. Make sure the  equipment dries fully.    Regularly check and replace the filters for your CPAP unit and humidifier.    Work closely with your sleep doctor and your CPAP supplier to make sure that you have the machine, mask and air pressure setting that works best for you.    BESIDES CPAP, WHAT OTHER THERAPIES ARE THERE?    Postioning devices if you only have the snoring or apnea while on your back    Dental devices if your condition is mild    Nasal valves may be effective though experience is limited    Weight loss if you are overweight    Surgery in limited cases where devices are not acceptable or there are problems with structures in the nose and throat  If treated with one of these alternative options, further evaluation is necessary to ensure that the therapy is effective. This may require some form of repeat testing.    Healthy Lifestyle:  Healthy diet, exercise and limit alcohol: Not only will excessive alcohol increase your weight over time, but it irritates the throat tissues and make them swell, shrinking the airway and causing snoring. Drinking alcohol should be limited and stopped within 3-4 hours before going to bed.   Stop smoking: (Red swollen throat, heat, nicotine), also irritates and swells the airway, among numerous other negative health consequences.  Positioning Device  This example shows a pillow that straps around the waist. It may be appropriate for those whose sleep study shows milder sleep apnea that occurs primarily when lying flat on one's back. Preliminary studies have shown benefit but effectiveness at home should be verified.                      Oral Appliance  These are examples of two of many custom-made devices that are more likely to work in mild sleep apnea                                                Oral appliances are dental mouth pieces that fit very much like a sports mouth guards or removable orthodontic retainers. They are used to treat snoring and obstructive sleep apnea . The device  prevents the airway from collapsing by either holding the tongue or supporting the jaw in a forward position. Since oral appliances are non-invasive and easy to use, they may be considered as an early treatment option. Oral appliance therapy (OAT) involves the customization, selection, fabrication, fitting, adjustments and long-term follow-up care of specially designed oral devices, worn during sleep, which reposition the lower jaw and tongue base forward to maintain an open airway.  Custom made oral appliances are proven to be more effective than over-the-counter devices. Therefore, the over-the-counter devices are recommended not to be used as a screening tool nor as a therapeutic option.     Who gets a dental device?  Oral appliance therapy can be used as an alternative to CPAP therapy for the treatment of mild to moderate sleep apnea and for those patients who prefer OAT to CPAP. Oral appliance therapy is a first line therapy for the treatment of primary snoring. Additionally, OAT is an option for those that cannot tolerate CPAP as therapy or who have experienced insufficient surgical results.                 Possible side effects?  Frequent but minor side effects include: excessive salivation, dry mouth, discomfort of teeth and jaw and temporary changes in the patient s bite.  Potential complications include: jaw pain, permanent occlusal changes and TMJ symptoms.  The above mentioned side effects and complications can be recognized and managed by dentists trained in dental sleep medicine.   Finding a dentist that practices dental sleep medicine  Specific training is available through the American Academy of Dental Sleep Medicine for dentists interested in working in the field of sleep. To find a dentist who is educated in the field of sleep and the use of oral appliances, near you, visit the Web site of the American Academy of Dental Sleep Medicine; also see  http://www.accpstorage.org/newOrganization/patients/oralAppliances.pdf   To search for a dentist certified in these practices:  Http://aadsm.org/FindADentist.aspx?1  Http://www.accpstorage.org/newOrganization/patients/oralAppliances.pdf    Weight Loss:    Some patients may experience reduction or elimination of sleep apnea with weight loss.  Though there are significant health benefits from weight loss, long-term weight loss is very difficult to achieve- studies show success with dietary management in less that 10% of people.     If you are interested in dietary weight loss, you should review the options discussed at the National Institutes of Health patient information site:     Http:/www.health.nih.gov/topic/WeightLossDieting    Bariatric programs offer counseling in all methods of weight loss:    Http:/www.[facility].org/Specialties/WeightLossSurgeryandMedicalMgmt/htm    Surgery:  There are a number of surgeries that have been attempted to treat apnea. In general, surgical options are usually reserved for cases in which there is a physical abnormality contributing to obstruction or other treatment options are ineffective or not tolerated. Most surgical options are either unreliable or quite invasive. One of the more common procedures is:  Uvulopalatopharyngoplasty: In this procedure, the uvula (the finger-like tissue that hangs in the back of the throat), part of the soft palate (the tissue that the uvula is attached to), and sometimes the tonsils or adenoids are removed. The efficacy of this surgery is around 30-50% .  After surgery, complications may include:  Sleepiness and sleep apnea related to post-surgery medication   Swelling, infection and bleeding   A sore throat and/or difficulty swallowing   Drainage of secretions into the nose and a nasal quality to the voice. English language speech does not seem to be affected by this surgery.   Narrowing of the airway in the nose and throat (hence  "constricting breathing) snoring and even iatrogenically caused sleep apnea. By cutting the tissues, excess scar tissue can \"tighten\" the airway and make it even smaller than it was before UPPP.  Patients who have had the uvula removed will become unable to correctly speak [place] or any other language that has a uvular 'r' phoneme.    Surgeries to help resolve nasal congestion may help reduce the severity of apnea slightly. Nasal congestion does not cause apnea on its own, so these surgeries are usually not performed just for GUILLERMO.  They may be worth considering if the nasal congestion is significantly bothersome independent of apnea.        "

## 2017-05-04 NOTE — MR AVS SNAPSHOT
After Visit Summary   5/4/2017    Reji Carpenter    MRN: [id number]           Patient Information     Date Of Birth          1975        Visit Information        Provider Department      5/4/2017 9:30 AM Vero Gonsalves PA [facility]        Today's Diagnoses     Dyspnea and respiratory abnormality    -  1    Snoring        Fatigue, unspecified type        Lack of adequate sleep        Sleep apnea, unspecified type        Malaise and fatigue          Care Instructions      Your BMI is Body mass index is 26.64 kg/(m^2).  Weight management is a personal decision.  If you are interested in exploring weight loss strategies, the following discussion covers the approaches that may be successful. Body mass index (BMI) is one way to tell whether you are at a healthy weight, overweight, or obese. It measures your weight in relation to your height.  A BMI of 18.5 to 24.9 is in the healthy range. A person with a BMI of 25 to 29.9 is considered overweight, and someone with a BMI of 30 or greater is considered obese. More than two-thirds of American adults are considered overweight or obese.  Being overweight or obese increases the risk for further weight gain. Excess weight may lead to heart disease and diabetes.  Creating and following plans for healthy eating and physical activity may help you improve your health.  Weight control is part of healthy lifestyle and includes exercise, emotional health, and healthy eating habits. Careful eating habits lifelong are the mainstay of weight control. Though there are significant health benefits from weight loss, long-term weight loss with diet alone may be very difficult to achieve- studies show long-term success with dietary management in less than 10% of people. Attaining a healthy weight may be especially difficult to achieve in those with severe obesity. In some cases, medications, devices and surgical management might be considered.  What  "can you do?  If you are overweight or obese and are interested in methods for weight loss, you should discuss this with your provider.     Consider reducing daily calorie intake by 500 calories.     Keep a food journal.     Avoiding skipping meals, consider cutting portions instead.    Diet combined with exercise helps maintain muscle while optimizing fat loss. Strength training is particularly important for building and maintaining muscle mass. Exercise helps reduce stress, increase energy, and improves fitness. Increasing exercise without diet control, however, may not burn enough calories to loose weight.       Start walking three days a week 10-20 minutes at a time    Work towards walking thirty minutes five days a week     Eventually, increase the speed of your walking for 1-2 minutes at time    In addition, we recommend that you review healthy lifestyles and methods for weight loss available through the National Institutes of Health patient information sites:  http://win.niddk.nih.gov/publications/index.htm    And look into health and wellness programs that may be available through your health insurance provider, employer, local community center, or enrike club.    Weight management plan: Patient was referred to their PCP to discuss a diet and exercise plan.   Provider : Vero Gonsalves  Contact Information: [facility] [phone]    Goss Points:  1. What is Obstructive Sleep Apnea (GUILLERMO)? GUILLERMO is the most common type of sleep apnea. Apnea literally means, \"without breath.\" It is characterized by repetitive pauses in breathing, despite continued effort to breathe, and is usually associated with a reduction in blood oxygen saturation. Apneas can last 10 to over 60 seconds. It is caused by narrowing or collapse of the upper airway as muscles relax during sleep. A number of things can make apnea worse, including: sleeping on your back, having alcohol in the evening, smoking, " asthma, allergies, nasal congestion, and weight gain.  2. What are the consequences of GUILLERMO? Symptoms include: daytime sleepiness- possibly increasing the risk of falling asleep while driving, unrefreshing/restless sleep, snoring, insomnia, waking frequently to urinate, waking with heartburn or reflux, reduced concentration and memory, and morning headaches. Other health consequences may include development of high blood pressure. Untreated GUILLERMO also can contribute to heart disease, stroke and diabetes.   3. What are the treatment options? In most situations, sleep apnea is a lifelong disease that must be managed with daily therapy. Continuous Positive Airway (CPAP) is the most reliable treatment. A mouthguard to hold your jaw forward is usually the next most reliable option. Other options include postioning devices (to keep you off your back), nasal valves, tongue retaining device, weight loss, surgery. There is more detail about these options toward the end of this document.  4. What are the most important things to remember about using CPAP?     WHERE CAN I FIND MORE INFORMATION?    American Academy of Sleep Medicine Patient information on sleep disorders:  http://yoursleep.aasmnet.org    CPAP-  WHY AND HOW?                                 Continuous positive airway pressure, or CPAP, is the most effective treatment for obstructive sleep apnea. It works by using air pressure to hold your throat open. A decision to use CPAP is a major step forward in the pursuit of a healthier life. The successful use of CPAP will help you breathe easier, sleep better and live healthier. Using CPAP can be a positive experience if you keep these polanco points in mind:  1. Commitment  CPAP is not a quick fix for your problem. It involves a long-term commitment to improve your sleep and your health.    2. Communication  Stay in close communication with both your sleep doctor and your CPAP supplier. Ask lots of questions and seek help when  "you need it.    3. Consistency  Use CPAP all night, every night and for every nap. You will receive the maximum health benefits from CPAP when you use it every time that you sleep. This will also make it easier for your body to adjust to the treatment.    4. Correction  The first machine and mask that you try may not be the best ones for you. Work with your sleep doctor and your CPAP supplier to make corrections to your equipment selection. Ask about trying a different type of machine or mask if you have ongoing problems. Make sure that your mask is a good fit and learn to use your equipment properly.    5. Challenge  Tell a family member or close friend to ask you each morning if you used your CPAP the previous night. Have someone to challenge you to give it your best effort.    6. Connection   Your adjustment to CPAP will be easier if you are able to connect with others who use the same treatment. Ask your sleep doctor if there is a support group in your area for people who have sleep apnea, or look for one on the Internet.  7. Comfort   Increase your level of comfort by using a saline spray, decongestant or heated humidifier if CPAP irritates your nose, mouth or throat. Use your unit's \"ramp\" setting to slowly get used to the air pressure level. There may be soft pads you can buy that will fit over your mask straps. Look on www.CPAP.com for accessories such as these straps, a pillow contoured for side-sleeping with CPAP, longer hoses, hose covers to reduce condensation, or stands to keep the hose out of your way.                   8. Cleaning   Clean your mask, tubing and headgear on a regular basis. Put this time in your schedule so that you don't forget to do it. Check and replace the filters for your CPAP unit and humidifier.    9. Completion   Although you are never finished with CPAP therapy, you should reward yourself by celebrating the completion of your first month of treatment. Expect this first month to " be your hardest period of adjustment. It will involve some trial and error as you find the machine, mask and pressure settings that are right for you.    10. Continuation  After your first month of treatment, continue to make a daily commitment to use your CPAP all night, every night and for every nap.    CPAP-Tips to starting with success:  Begin using your CPAP for short periods of time during the day while you watch TV or read. This eliminates the pressure of trying to fall asleep with it when it is still a new sensation.    Use CPAP every night and for every nap. Using it less often reduces the health benefits and makes it harder for your body to get used to it.    Newer CPAP models are virtually silent; however, if you find the sound of your CPAP machine to be bothersome, place the unit under your bed to dampen the sound.     Make small adjustments to your mask, tubing, straps and headgear until you get the right fit. Tightening the mask may actually worsen the leak.  If it leaves significant marks on your face or irritates the bridge of your nose, it may not be the best mask for you.  Speak with the person who supplied the mask and consider trying other masks. Insurances will allow you to try different masks during the first month of starting CPAP.  Insurance also covers a new mask, hose and filter about every 3-6 months.    Use a saline nasal spray to ease mild nasal congestion. Neti-Pot or saline nasal rinses may also help. Nasal gel sprays can help reduce nasal dryness.  Biotene mouthwash can be helpful to protect your teeth if you experience frequent dry mouth.  Dry mouth may be a sign of air escaping out of your mouth or out of the mask in the case of a full face mask.  Speak with your provider if you expect that is the case.     Take a nasal decongestant to relieve more severe nasal or sinus congestion.  Do not use Afrin (oxymetazoline) nasal spray more than 3 days in a row.  Speak with your sleep doctor  if your nasal congestion is chronic.    Use a heated humidifier that fits your CPAP model to enhance your breathing comfort. Adjust the heat setting up if you get a dry nose or throat, down if you get condensation in the hose or mask.  Position the CPAP lower than you so that any condensation in the hose drains back into the machine rather than towards the mask.    Try a system that uses nasal pillows if traditional masks give you problems.    Clean your mask, tubing and headgear once a week. Make sure the equipment dries fully.    Regularly check and replace the filters for your CPAP unit and humidifier.    Work closely with your sleep doctor and your CPAP supplier to make sure that you have the machine, mask and air pressure setting that works best for you.    BESIDES CPAP, WHAT OTHER THERAPIES ARE THERE?    Postioning devices if you only have the snoring or apnea while on your back    Dental devices if your condition is mild    Nasal valves may be effective though experience is limited    Weight loss if you are overweight    Surgery in limited cases where devices are not acceptable or there are problems with structures in the nose and throat  If treated with one of these alternative options, further evaluation is necessary to ensure that the therapy is effective. This may require some form of repeat testing.    Healthy Lifestyle:  Healthy diet, exercise and limit alcohol: Not only will excessive alcohol increase your weight over time, but it irritates the throat tissues and make them swell, shrinking the airway and causing snoring. Drinking alcohol should be limited and stopped within 3-4 hours before going to bed.   Stop smoking: (Red swollen throat, heat, nicotine), also irritates and swells the airway, among numerous other negative health consequences.  Positioning Device  This example shows a pillow that straps around the waist. It may be appropriate for those whose sleep study shows milder sleep apnea that  occurs primarily when lying flat on one's back. Preliminary studies have shown benefit but effectiveness at home should be verified.                      Oral Appliance  These are examples of two of many custom-made devices that are more likely to work in mild sleep apnea                                                Oral appliances are dental mouth pieces that fit very much like a sports mouth guards or removable orthodontic retainers. They are used to treat snoring and obstructive sleep apnea . The device prevents the airway from collapsing by either holding the tongue or supporting the jaw in a forward position. Since oral appliances are non-invasive and easy to use, they may be considered as an early treatment option. Oral appliance therapy (OAT) involves the customization, selection, fabrication, fitting, adjustments and long-term follow-up care of specially designed oral devices, worn during sleep, which reposition the lower jaw and tongue base forward to maintain an open airway.  Custom made oral appliances are proven to be more effective than over-the-counter devices. Therefore, the over-the-counter devices are recommended not to be used as a screening tool nor as a therapeutic option.     Who gets a dental device?  Oral appliance therapy can be used as an alternative to CPAP therapy for the treatment of mild to moderate sleep apnea and for those patients who prefer OAT to CPAP. Oral appliance therapy is a first line therapy for the treatment of primary snoring. Additionally, OAT is an option for those that cannot tolerate CPAP as therapy or who have experienced insufficient surgical results.                 Possible side effects?  Frequent but minor side effects include: excessive salivation, dry mouth, discomfort of teeth and jaw and temporary changes in the patient s bite.  Potential complications include: jaw pain, permanent occlusal changes and TMJ symptoms.  The above mentioned side effects and  complications can be recognized and managed by dentists trained in dental sleep medicine.   Finding a dentist that practices dental sleep medicine  Specific training is available through the American Academy of Dental Sleep Medicine for dentists interested in working in the field of sleep. To find a dentist who is educated in the field of sleep and the use of oral appliances, near you, visit the Web site of the American Academy of Dental Sleep Medicine; also see http://www.accpstorage.org/newOrganization/patients/oralAppliances.pdf   To search for a dentist certified in these practices:  Http://aadsm.org/FindADentist.aspx?1  Http://www.accpstorage.org/newOrganization/patients/oralAppliances.pdf    Weight Loss:    Some patients may experience reduction or elimination of sleep apnea with weight loss.  Though there are significant health benefits from weight loss, long-term weight loss is very difficult to achieve- studies show success with dietary management in less that 10% of people.     If you are interested in dietary weight loss, you should review the options discussed at the National Institutes of Health patient information site:     Http:/www.health.nih.gov/topic/WeightLossDieting    Bariatric programs offer counseling in all methods of weight loss:    Http:/www.uofedicalcenter.org/Specialties/WeightLossSurgeryandMedicalMgmt/htm    Surgery:  There are a number of surgeries that have been attempted to treat apnea. In general, surgical options are usually reserved for cases in which there is a physical abnormality contributing to obstruction or other treatment options are ineffective or not tolerated. Most surgical options are either unreliable or quite invasive. One of the more common procedures is:  Uvulopalatopharyngoplasty: In this procedure, the uvula (the finger-like tissue that hangs in the back of the throat), part of the soft palate (the tissue that the uvula is attached to), and sometimes the tonsils  "or adenoids are removed. The efficacy of this surgery is around 30-50% .  After surgery, complications may include:  Sleepiness and sleep apnea related to post-surgery medication   Swelling, infection and bleeding   A sore throat and/or difficulty swallowing   Drainage of secretions into the nose and a nasal quality to the voice. English language speech does not seem to be affected by this surgery.   Narrowing of the airway in the nose and throat (hence constricting breathing) snoring and even iatrogenically caused sleep apnea. By cutting the tissues, excess scar tissue can \"tighten\" the airway and make it even smaller than it was before UPPP.  Patients who have had the uvula removed will become unable to correctly speak [place] or any other language that has a uvular 'r' phoneme.    Surgeries to help resolve nasal congestion may help reduce the severity of apnea slightly. Nasal congestion does not cause apnea on its own, so these surgeries are usually not performed just for GUILLERMO.  They may be worth considering if the nasal congestion is significantly bothersome independent of apnea.              Follow-ups after your visit        Your next 10 appointments already scheduled     May 24, 2017  9:00 AM CDT   HST  with BK BED 5   [facility] ([facility])    [address] [place] [id number]   [phone]            May 25, 2017  9:00 AM CDT   HST Drop Off with JUNIOR CARDOZA   [facility] ([facility])    [address] [place] [address]   [phone]            May 25, 2017  9:30 AM CDT   Return Sleep Patient with SERGE Carolina   [facility] ([facility])    [address] [place] [address]   [phone]            Jun 06, 2017  9:00 AM CDT   Return Visit with Rosemarie Posada MD   [facility] [place] " "([facility])    [address]  [place] [address] [phone]              Future tests that were ordered for you today     Open Future Orders        Priority Expected Expires Ordered    HST-Home Sleep Apnea Test Routine  11/3/2017 5/4/2017            Who to contact     If you have questions or need follow up information about today's clinic visit or your schedule please contact [facility] directly at [phone].  Normal or non-critical lab and imaging results will be communicated to you by [employer], letter or phone within 4 business days after the clinic has received the results. If you do not hear from us within 7 days, please contact the clinic through Vimblyt or phone. If you have a critical or abnormal lab result, we will notify you by phone as soon as possible.  Submit refill requests through Savioke or call your pharmacy and they will forward the refill request to us. Please allow 3 business days for your refill to be completed.          Additional Information About Your Visit        Savioke Information     [employer] gives you secure access to your electronic health record. If you see a primary care provider, you can also send messages to your care team and make appointments. If you have questions, please call your primary care clinic.  If you do not have a primary care provider, please call [phone] and they will assist you.        Care EveryWhere ID     This is your Care EveryWhere ID. This could be used by other organizations to access your [facility] medical records  [id number]        Your Vitals Were     Pulse Height Pulse Oximetry BMI (Body Mass Index)          70 1.803 m (5' 11\") 97% 26.64 kg/m2         Blood Pressure from Last 3 Encounters:   05/04/17 (!) 149/100   05/02/17 (!) 151/94   04/18/17 138/85    Weight from Last 3 Encounters:   05/04/17 86.6 kg (191 lb)   05/02/17 88.5 kg (195 lb)   04/18/17 87.1 kg (192 lb)              We Performed the " Following     SLEEP EVALUATION & MANAGEMENT REFERRAL - ADULT        Primary Care Provider Office Phone # Fax #    Wili Kaplan [phone]       [address] [address]        Thank you!     Thank you for choosing [facility]  for your care. Our goal is always to provide you with excellent care. Hearing back from our patients is one way we can continue to improve our services. Please take a few minutes to complete the written survey that you may receive in the mail after your visit with us. Thank you!             Your Updated Medication List - Protect others around you: Learn how to safely use, store and throw away your medicines at www.disposemymeds.org.          This list is accurate as of: 5/4/17 10:06 AM.  Always use your most recent med list.                   Brand Name Dispense Instructions for use    albuterol 108 (90 BASE) MCG/ACT Inhaler    PROAIR HFA/PROVENTIL HFA/VENTOLIN HFA    3 Inhaler    Inhale 2 puffs into the lungs every 4 hours as needed for shortness of breath / dyspnea       beclomethasone 80 MCG/ACT Inhaler    QVAR    3 Inhaler    Inhale 2 puffs into the lungs 2 times daily       loratadine 10 MG tablet    CLARITIN    90 tablet    Take 1 tablet (10 mg) by mouth daily       montelukast 10 MG tablet    SINGULAIR    90 tablet    Take 1 tablet (10 mg) by mouth At Bedtime

## 2017-05-04 NOTE — NURSING NOTE
"Chief Complaint   Patient presents with     Consult       Initial There were no vitals taken for this visit. Estimated body mass index is 25.9 kg/(m^2) as calculated from the following:    Height as of 5/2/17: 1.848 m (6' 0.75\").    Weight as of 5/2/17: 88.5 kg (195 lb).  Medication Reconciliation: complete   Neck circumference: 15.75 inches/40 cm      "

## 2017-05-04 NOTE — PROGRESS NOTES
Sleep Consultation:    Date on this visit: 5/4/2017    Reji Carpenter is sent by Wili Kaplan for a sleep consultation regarding sleep disordered breathing.    Primary Physician: Wili Kaplan     Chief Complaint   Patient presents with     Consult     Just recently started snoring. Wife is concerned that he stops breathing at night as well.        Reji goes to sleep at 10:30 PM during the week. He wakes up at 7:00 AM without an alarm. He falls asleep in 5 minutes.  Reji denies difficulty falling asleep.  He wakes up ~5 times a night for 5 minutes before falling back to sleep.  Reji wakes up to breathing broblems and uncertain reasons.  On weekends, Reji goes to sleep at 1:00 AM.  He wakes up at 10:00 AM without an alarm. He falls asleep in 5 minutes.  Patient gets an average of 7 hours of sleep per night. He does not feel refreshed.     Patient does use electronics in bed and does not watch TV in bed and read in bed.     Reji does not do shift work.      Reji does snore frequently and snoring is loud. Patient does have a regular bed partner. There is report of snoring.  He does have witnessed apneas. They occasionally sleep separately.  Patient sleeps on his back and side. He denies morning dry mouth, morning headaches, morning confusion and restless legs. Reji denies any bruxism, sleep walking, sleep talking, dream enactment, sleep paralysis, cataplexy and hypnogogic/hypnopompic hallucinations.    Reji has some difficulty breathing through his nose, denies claustrophobia and reflux at night.      Reji has gained 0-5 pounds in the last year.  Patient describes themself as a night person.  He would prefer to go to sleep at 11:00 PM and wake up at 7:00 AM.  Patient's [place] Sleepiness score 3/24 inconsistent with excessive  daytime sleepiness.  He has fatigue.     Reji does not nap.  He takes some inadvertant naps.  He denies falling asleep while driving.   Patient was counseled on the  importance of driving while alert, to pull over if drowsy, or nap before getting into the vehicle if sleepy.  He uses 1-2 cups/day of coffee. Last caffeine intake is usually before 2 pm.    Allergies:    Allergies   Allergen Reactions     Animal Dander      Seasonal Allergies        Medications:    Current Outpatient Prescriptions   Medication Sig Dispense Refill     beclomethasone (QVAR) 80 MCG/ACT Inhaler Inhale 2 puffs into the lungs 2 times daily 3 Inhaler 3     montelukast (SINGULAIR) 10 MG tablet Take 1 tablet (10 mg) by mouth At Bedtime 90 tablet 3     albuterol (PROAIR HFA/PROVENTIL HFA/VENTOLIN HFA) 108 (90 BASE) MCG/ACT Inhaler Inhale 2 puffs into the lungs every 4 hours as needed for shortness of breath / dyspnea 3 Inhaler 3     loratadine (CLARITIN) 10 MG tablet Take 1 tablet (10 mg) by mouth daily 90 tablet 2       Problem List:  Patient Active Problem List    Diagnosis Date Noted     Hyperlipidemia LDL goal <100 04/18/2017     Priority: Medium     Intermittent asthma, uncomplicated 03/17/2016     Priority: Medium     Advanced directives, counseling/discussion 12/05/2012     Priority: Medium     Advance Directive received and scanned. Click on Code in the patient header to view. 12/5/2012          Chronic rhinitis 08/16/2012     Priority: Medium        Past Medical/Surgical History:  Past Medical History:   Diagnosis Date     NONSPECIFIC MEDICAL HISTORY 06/06    ruptured achilles tendon repaired     Past Surgical History:   Procedure Laterality Date     C REPAIR CRUCIATE LIGAMENT,KNEE  about 2002     HC REPAIR ACHILLES TENDON,PRIMARY  06/06     OPTICAL TRACKING SYSTEM ENDOSCOPIC SINUS SURGERY  11/29/2012    sinus surgery     VASECTOMY  about 2006       Social History:  Social History     Social History     Marital status:      Spouse name: N/A     Number of children: 0     Years of education: N/A     Occupational History     finance [employer]     Social History Main Topics      Smoking status: Never Smoker     Smokeless tobacco: Never Used     Alcohol use No      Comment: occasional     Drug use: No     Sexual activity: Yes     Partners: Female     Other Topics Concern     Parent/Sibling W/ Cabg, Mi Or Angioplasty Before 65f 55m? No     Social History Narrative       Family History:  Family History   Problem Relation Age of Onset     Lipids Father      Hypertension Mother      CANCER Maternal Grandmother      brain     CANCER Paternal Grandmother      DIABETES No family hx of      C.A.D. No family hx of        Review of Systems:  A complete review of systems reviewed by me is negative with the exeption of what has been mentioned in the history of present illness.  CONSTITUTIONAL: NEGATIVE for weight gain/loss, fever, chills, sweats or night sweats, drug allergies.  EYES: NEGATIVE for changes in vision, blind spots, double vision.  ENT: NEGATIVE for ear pain, sore throat, sinus pain, post-nasal drip, runny nose, bloody nose  CARDIAC: NEGATIVE for fast heartbeats or fluttering in chest, chest pain or pressure, breathlessness when lying flat, swollen legs or swollen feet.  NEUROLOGIC: NEGATIVE headaches, weakness or numbness in the arms or legs.  DERMATOLOGIC: NEGATIVE for rashes, new moles or change in mole(s)  PULMONARY: NEGATIVE SOB at rest, SOB with activity, dry cough, productive cough, coughing up blood, wheezing or whistling when breathing.    GASTROINTESTINAL: NEGATIVE for nausea or vomitting, loose or watery stools, fat or grease in stools, constipation, abdominal pain, bowel movements black in color or blood noted.  GENITOURINARY: NEGATIVE for pain during urination, blood in urine, urinating more frequently than usual, irregular menstrual periods.  MUSCULOSKELETAL: NEGATIVE for muscle pain, bone or joint pain, swollen joints.  ENDOCRINE: NEGATIVE for increased thirst or urination, diabetes.  LYMPHATIC: NEGATIVE for swollen lymph nodes, lumps or bumps in the breasts or nipple  "discharge.    Physical Examination:  Vitals: BP (!) 147/98  Pulse 70  Ht 1.803 m (5' 11\")  Wt 86.6 kg (191 lb)  SpO2 97%  BMI 26.64 kg/m2  BMI= Body mass index is 26.64 kg/(m^2).         [place] Total Score 5/4/2017   Total score - [place] 3       GENERAL APPEARANCE: alert and no distress  EYES: Eyes grossly normal to inspection, PERRL and conjunctivae and sclerae normal  HENT: ear canals and TM's normal, nose and mouth without ulcers or lesions and oropharynx crowded  NECK: no asymmetry, masses, or scars  RESP: lungs clear to auscultation - no rales, rhonchi or wheezes  CV: normal S1 S2, no S3 or S4  MS: extremities normal- no gross deformities noted  NEURO: Normal strength and tone, mentation intact and speech normal  PSYCH: mentation appears normal and affect normal/bright  Mallampati Class: III.  Tonsillar Stage: 1  hidden by pillars.    Last Basic Metabolic Panel:  Lab Results   Component Value Date     04/18/2017      Lab Results   Component Value Date    POTASSIUM 4.1 04/18/2017     Lab Results   Component Value Date    CHLORIDE 105 04/18/2017     Lab Results   Component Value Date    JUDSON 9.2 04/18/2017     Lab Results   Component Value Date    CO2 28 04/18/2017     Lab Results   Component Value Date    BUN 10 04/18/2017     Lab Results   Component Value Date    CR 0.92 04/18/2017     Lab Results   Component Value Date    GLC 93 04/18/2017     TSH   Date Value Ref Range Status   04/18/2017 1.68 0.40 - 4.00 mU/L Final   ]    Impression:  Patient has features and risk factors for possible obstructive sleep apnea including: loud snoring, witnessed apnea, non-refreshing sleep, daytime fatigue, difficulty maintaining sleep and crowded oropharynx. The STOP-BANG score is 4/8.     Plan:    1. Schedule a Home Sleep Apnea Testing to evaluate for obstructive sleep apnea.    2. Advised him against drowsy driving.    3. Recommend weight management.       Literature provided regarding sleep apnea and sleep " hygiene.      He will follow up with me in approximately one day after his sleep study has been competed to review the results and discuss plan of care.       Polysomnography reviewed.  Obstructive sleep apnea reviewed.  Complications of untreated sleep apnea were reviewed.    Vero Gonsalves PA-C    CC: Wili Kaplan

## 2017-05-05 LAB — WHITE ELM IGE QN: 0.23 KU(A)/L

## 2017-05-05 RX ORDER — TRIAMCINOLONE ACETONIDE 55 UG/1
2 SPRAY, METERED NASAL DAILY
Qty: 1 BOTTLE | Refills: 0 | COMMUNITY
Start: 2017-05-05 | End: 2017-09-25

## 2017-05-07 LAB
CALIF WALNUT IGE QN: NORMAL
DEPRECATED MISC ALLERGEN IGE RAST QL: NORMAL
WHITE MULBERRY IGE QN: NORMAL

## 2017-06-06 ENCOUNTER — OFFICE VISIT (OUTPATIENT)
Dept: ALLERGY | Facility: CLINIC | Age: 42
End: 2017-06-06
Payer: COMMERCIAL

## 2017-06-06 VITALS
WEIGHT: 195.2 LBS | HEART RATE: 67 BPM | SYSTOLIC BLOOD PRESSURE: 146 MMHG | DIASTOLIC BLOOD PRESSURE: 97 MMHG | OXYGEN SATURATION: 99 % | HEIGHT: 71 IN | BODY MASS INDEX: 27.33 KG/M2

## 2017-06-06 DIAGNOSIS — J30.1 SEASONAL ALLERGIC RHINITIS DUE TO POLLEN: ICD-10-CM

## 2017-06-06 DIAGNOSIS — J30.81 NON-SEASONAL ALLERGIC RHINITIS DUE TO ANIMAL HAIR AND DANDER: Primary | ICD-10-CM

## 2017-06-06 DIAGNOSIS — J45.30 MILD PERSISTENT ASTHMA WITHOUT COMPLICATION: Primary | ICD-10-CM

## 2017-06-06 DIAGNOSIS — J30.89 ALLERGIC RHINITIS DUE TO MOLD: ICD-10-CM

## 2017-06-06 DIAGNOSIS — Z51.6 NEED FOR DESENSITIZATION TO ALLERGENS: ICD-10-CM

## 2017-06-06 DIAGNOSIS — J30.81 NON-SEASONAL ALLERGIC RHINITIS DUE TO ANIMAL HAIR AND DANDER: ICD-10-CM

## 2017-06-06 LAB
FEF 25/75: NORMAL
FEV-1: NORMAL
FEV1/FVC: NORMAL
FVC: NORMAL

## 2017-06-06 PROCEDURE — 94010 BREATHING CAPACITY TEST: CPT | Performed by: ALLERGY & IMMUNOLOGY

## 2017-06-06 PROCEDURE — 99213 OFFICE O/P EST LOW 20 MIN: CPT | Mod: 25 | Performed by: ALLERGY & IMMUNOLOGY

## 2017-06-06 RX ORDER — EPINEPHRINE 0.3 MG/.3ML
0.3 INJECTION SUBCUTANEOUS PRN
Qty: 2 ML | Refills: 2 | Status: SHIPPED | OUTPATIENT
Start: 2017-06-06 | End: 2018-07-12

## 2017-06-06 NOTE — PATIENT INSTRUCTIONS
If you have any questions regarding your allergies, asthma, or what we discussed during your visit today please call the allergy clinic or contact us via "[employer]    [facility]: [phone]    You can talk with your insurance company to see if any of the following medications may be cheaper for you: Flovent, Pulmicort, Asmanex, and Arnuity Ellipta    There is also a coupon for the arnuity ellipta that you can find online which may make the medication cheaper. Go to www.arnuity.com      Continue to take your asthma and allergy medications daily. Take an extra claritin about 60 minutes before your scheduled allergy shots

## 2017-06-06 NOTE — PROGRESS NOTES
ALLERGY SOLUTION NEW REQUEST    Reji Carpenter 1975 MRN: [id number]    DATE NEEDED:  1-2 weeks  Vial Color   Content   Top Dose         Vial Size  Green 1:1,000, Blue 1:100, Yellow 1:10 and Red 1:1  Cat, Dog   Red 1:1 0.5   5mL  Green 1:1,000, Blue 1:100, Yellow 1:10 and Red 1:1  Molds    Red 1:1 0.5   5mL  Green 1:1,000, Blue 1:100, Yellow 1:10 and Red 1:1  Trees, Weeds   Red 1:1 0.5   5mL        Shot Clinic Location:  [place]  Ship to Location: [place]  Special Instructions:  Please call patient when serum is ready so he may schedule appointments      Updated Prescription Needed: No      Requester Signature  Rosemarie Posada MD

## 2017-06-06 NOTE — MR AVS SNAPSHOT
After Visit Summary   6/6/2017    Reji Carpenter    MRN: [id number]           Patient Information     Date Of Birth          1975        Visit Information        Provider Department      6/6/2017 9:00 AM Rosemarie Posada MD [facility]        Today's Diagnoses     Mild persistent asthma with acute exacerbation    -  1      Care Instructions    If you have any questions regarding your allergies, asthma, or what we discussed during your visit today please call the allergy clinic or contact us via [employer].    [facility]: [phone]    You can talk with your insurance company to see if any of the following medications may be cheaper for you: Flovent, Pulmicort, Asmanex, and Arnuity Ellipta    There is also a coupon for the arnuity ellipta that you can find online which may make the medication cheaper. Go to www.arnuity.com      Continue to take your asthma and allergy medications daily. Take an extra claritin about 60 minutes before your scheduled allergy shots          Follow-ups after your visit        Who to contact     If you have questions or need follow up information about today's clinic visit or your schedule please contact [facility] directly at [phone].  Normal or non-critical lab and imaging results will be communicated to you by Boutirhart, letter or phone within 4 business days after the clinic has received the results. If you do not hear from us within 7 days, please contact the clinic through [employer] or phone. If you have a critical or abnormal lab result, we will notify you by phone as soon as possible.  Submit refill requests through [employer] or call your pharmacy and they will forward the refill request to us. Please allow 3 business days for your refill to be completed.          Additional Information About Your Visit        Boutirhart Information     [employer] gives you secure access to your electronic health record. If you see a primary  "care provider, you can also send messages to your care team and make appointments. If you have questions, please call your primary care clinic.  If you do not have a primary care provider, please call [phone] and they will assist you.        Care EveryWhere ID     This is your Care EveryWhere ID. This could be used by other organizations to access your [facility] medical records  [id number]        Your Vitals Were     Pulse Height Pulse Oximetry BMI (Body Mass Index)          67 1.803 m (5' 11\") 99% 27.22 kg/m2         Blood Pressure from Last 3 Encounters:   06/06/17 (!) 146/97   05/04/17 (!) 147/98   05/02/17 (!) 151/94    Weight from Last 3 Encounters:   06/06/17 88.5 kg (195 lb 3.2 oz)   05/04/17 86.6 kg (191 lb)   05/02/17 88.5 kg (195 lb)              We Performed the Following     Spirometry, Breathing Capacity        Primary Care Provider Office Phone # Fax #    Wili Kaplan [phone]       [facility] [address] [facility] [address]        Thank you!     Thank you for choosing [facility]  for your care. Our goal is always to provide you with excellent care. Hearing back from our patients is one way we can continue to improve our services. Please take a few minutes to complete the written survey that you may receive in the mail after your visit with us. Thank you!             Your Updated Medication List - Protect others around you: Learn how to safely use, store and throw away your medicines at www.disposemymeds.org.          This list is accurate as of: 6/6/17  9:33 AM.  Always use your most recent med list.                   Brand Name Dispense Instructions for use    albuterol 108 (90 BASE) MCG/ACT Inhaler    PROAIR HFA/PROVENTIL HFA/VENTOLIN HFA    3 Inhaler    Inhale 2 puffs into the lungs every 4 hours as needed for shortness of breath / dyspnea       beclomethasone 80 MCG/ACT Inhaler    QVAR    3 Inhaler    Inhale 2 puffs into the " lungs 2 times daily       loratadine 10 MG tablet    CLARITIN    90 tablet    Take 1 tablet (10 mg) by mouth daily       montelukast 10 MG tablet    SINGULAIR    90 tablet    Take 1 tablet (10 mg) by mouth At Bedtime       NASACORT AQ 55 MCG/ACT Inhaler   Generic drug:  triamcinolone     1 Bottle    Spray 2 sprays into both nostrils daily

## 2017-06-06 NOTE — NURSING NOTE
"Chief Complaint   Patient presents with     RECHECK     1mo follow up. pt states breathing has been much better with new inhaler       Initial BP (!) 146/97  Pulse 67  Ht 1.803 m (5' 11\")  Wt 88.5 kg (195 lb 3.2 oz)  SpO2 99%  BMI 27.22 kg/m2 Estimated body mass index is 27.22 kg/(m^2) as calculated from the following:    Height as of this encounter: 1.803 m (5' 11\").    Weight as of this encounter: 88.5 kg (195 lb 3.2 oz).  Medication Reconciliation: complete   Milka Amador MA.... 9:06 AM....6/6/2017      "

## 2017-06-06 NOTE — PROGRESS NOTES
Reji Carpenter was seen in the Allergy Clinic at [facility]. The following are my recommendations regarding his Mild Persistent Asthma, Allergic Rhinitis Due to Animals, Allergic Rhinitis Due to Pollen and Allergic Rhinitis Due to Mold    1. Plan to initiate allergen immunotherapy treatment - consent form signed in clinic today  2. Epinephrine auto-injector required per injection clinic protocol  3. Continue loratadine 10mg twice daily  4. Continue triamcinolone nasal spray, 2 sprays in each nostril daily  5. Continue Qvar 80mcg, 2 puffs twice daily  6. Continue montelukast 10mg daily  7. Continue albuterol HFA, 2-4 puffs every 4 hours as needed  8. Follow-up in 3 months      Reji Carpenter is a 41 year old American male who is seen today for follow-up of asthma and allergic rhinitis. He states that he has been doing well since he was seen 1 month ago. He continues to take qvar 2 puffs twice daily and singulair daily. He has only needed albuterol about twice per week and is no longer having nocturnal asthma symptoms.    Reji continues to take claritin and nasacort daily for his allergy symptoms. He is interested in considering allergen immunotherapy for long-term management of his allergy and asthma symptoms and would like to discuss this today.      REVIEW OF SYSTEMS:  General: negative for weight gain. negative for weight loss. negative for changes in sleep.   Eyes: negative for itching. negative for redness. negative for tearing/watering.  Ears: negative for fullness. negative for hearing loss. negative for dizziness.   Nose: negative for snoring.negative for changes in smell. negative for drainage.   Throat: negative for hoarseness. negative for sore throat. negative for trouble swallowing.   Lungs: negative for shortness of breath.negative for wheezing. negative for sputum production.   Cardiovascular: negative for chest pain. negative for swelling of ankles. negative for fast or  "irregular heartbeat.   Gastrointestinal: negative for nausea. negative for heartburn. negative for acid reflux.   Musculoskeletal: negative for joint pain. negative for joint stiffness. negative for joint swelling.   Neurologic: negative for seizures. negative for fainting. negative for weakness.   Psychiatric: negative for changes in mood. negative for anxiety.   Endocrine: negative for cold intolerance. negative for heat intolerance. negative for tremors.   Hematologic: negative for easy bruising. negative for easy bleeding.  Integumentary: negative for rash. negative for scaling. negative for nail changes.       Current Outpatient Prescriptions:      triamcinolone (NASACORT AQ) 55 MCG/ACT Inhaler, Spray 2 sprays into both nostrils daily, Disp: 1 Bottle, Rfl: 0     beclomethasone (QVAR) 80 MCG/ACT Inhaler, Inhale 2 puffs into the lungs 2 times daily, Disp: 3 Inhaler, Rfl: 3     montelukast (SINGULAIR) 10 MG tablet, Take 1 tablet (10 mg) by mouth At Bedtime, Disp: 90 tablet, Rfl: 3     albuterol (PROAIR HFA/PROVENTIL HFA/VENTOLIN HFA) 108 (90 BASE) MCG/ACT Inhaler, Inhale 2 puffs into the lungs every 4 hours as needed for shortness of breath / dyspnea, Disp: 3 Inhaler, Rfl: 3     loratadine (CLARITIN) 10 MG tablet, Take 1 tablet (10 mg) by mouth daily, Disp: 90 tablet, Rfl: 2    EXAM:   BP (!) 146/97  Pulse 67  Ht 1.803 m (5' 11\")  Wt 88.5 kg (195 lb 3.2 oz)  SpO2 99%  BMI 27.22 kg/m2  GENERAL APPEARANCE: alert, cooperative and not in distress  SKIN: no rashes, no lesions  HEAD: atraumatic, normocephalic  ENT: no scars or lesions, tongue midline and normal, soft palate, uvula, and tonsils normal  NECK: no asymmetry, masses, or scars, supple without significant adenopathy  LUNGS: unlabored respirations, no intercostal retractions or accessory muscle use, clear to auscultation without rales or wheezes  HEART: regular rate and rhythm without murmurs and normal S1 and S2  MUSCULOSKELETAL: no musculoskeletal " defects are noted  NEURO: no focal deficits noted, mental status intact  PSYCH: does not appear depressed or anxious and short and long term memory appears intact      WORKUP:  Spirometry  SPIROMETRY       FVC 6.09L (112% of predicted).     FEV1 4.22L (98% of predicted).     FEV1/FVC 69%     FEF 25%-75%  3.06L/s (76% of predicted).    These values are consistent with mild airflow obstruction. There has been a decrease in values when compared to those obtained on 5/2/17.    Asthma Control Test (ACT) total score: 21     ASSESSMENT/PLAN:  Reji Carpenter is a 41 year old male here for follow-up of asthma and allergic rhinitis. His asthma control has significantly improved since initiating therapy with inhaled corticosteroids and montelukast. He reports concerns about the cost of the Qvar and we discussed that alternative medications could be tried based on his insurance coverage. He would like to contact his insurance company to see what alternatives may be more cost-effective before making any changes. Reji was counseled today regarding the risks, benefits, and anticipated duration of allergen immunotherapy treatment. He was interested in pursuing this treatment option for management of his allergies.    1. Plan to initiate allergen immunotherapy treatment - consent form signed in clinic today  2. Epinephrine auto-injector required per injection clinic protocol  3. Continue loratadine 10mg twice daily  4. Continue triamcinolone nasal spray, 2 sprays in each nostril daily  5. Continue Qvar 80mcg, 2 puffs twice daily  6. Continue montelukast 10mg daily  7. Continue albuterol HFA, 2-4 puffs every 4 hours as needed  8. Follow-up in 3 months      Rosemarie Posada MD  Allergy/Immunology  [facility] [place], [place]      Chart documentation done in part with Dragon Voice Recognition Software. Although reviewed after completion, some word and grammatical errors may remain.

## 2017-06-07 ASSESSMENT — ASTHMA QUESTIONNAIRES: ACT_TOTALSCORE: 21

## 2017-06-12 ENCOUNTER — TELEPHONE (OUTPATIENT)
Dept: ALLERGY | Facility: CLINIC | Age: 42
End: 2017-06-12

## 2017-06-12 NOTE — TELEPHONE ENCOUNTER
RN called and left detailed message for patient.(ok per patient). Let him know we have not yet received his allergy serums and that we would give him a call as soon as they come in. Did inform patient that they can take up to two weeks to arrive. Let him know to call back sooner with questions.    Iwona Edwards RN

## 2017-06-12 NOTE — TELEPHONE ENCOUNTER
Patient calling into clinic regarding setting up allergy shots. Patient informed computers are down and would look into this.    Iwona Edwards RN

## 2017-06-14 ENCOUNTER — TELEPHONE (OUTPATIENT)
Dept: ALLERGY | Facility: CLINIC | Age: 42
End: 2017-06-14

## 2017-06-14 DIAGNOSIS — J30.2 SEASONAL ALLERGIC RHINITIS: Primary | ICD-10-CM

## 2017-06-14 PROCEDURE — 95165 ANTIGEN THERAPY SERVICES: CPT | Performed by: ALLERGY & IMMUNOLOGY

## 2017-06-14 NOTE — TELEPHONE ENCOUNTER
Patients allergy serums have arrived at the [facility]. Left message that patient may schedule his allergy injection appointments, and that he must bring his epinephrine auto-injector to his appointments. Provided main line for [facility], and [facility] shot room phone number with hours for today if patient were to have questions. Will leave encounter open until patient has scheduled. Rosa Maria Pena RN ............   6/14/2017...3:17 PM

## 2017-06-14 NOTE — PROGRESS NOTES
Allergy serums received at [facility].     Vials received below:    Vial Color Content                      Vial Size Expiration Date  Green 1:1,000 Cat, Dog 5 mL  12/12/2017  Blue 1:100 Cat, Dog 5 mL  6/13/2018  Yellow 1:10 Cat, Dog 5 mL  6/13/2018  Red 1:1 Cat, Dog 5 mL  6/13/2018    Green 1:1,000 Trees, Weeds 5 mL  12/12/2017  Blue 1:100 Trees, Weeds 5 mL  6/13/2018  Yellow 1:10 Trees, Weeds 5 mL  6/13/2018  Red 1:1 Trees, Weeds 5 mL  6/13/2018    Green 1:1,000 Molds 5 mL  12/12/2017  Blue 1:100 Molds 5 mL  6/13/2018  Yellow 1:10 Molds 5 mL  6/13/2018  Red 1:1 Molds 5 mL  6/13/2018      Raegan Pena RN ............   6/14/2017...3:05 PM

## 2017-06-14 NOTE — LETTER
June 16, 2017      Reji Carpenter  [address] [place] [id number]              Dear Reji Carpenter,    Allergy serum has been received at the [facility]. Please call [phone] to schedule allergy injection appointments for every 3 to 14 days. Please bring Epipen to every allergy injection appointment also. Please call [phone] with any further questions.    Sincerely,    Your [facility] Care Team

## 2017-06-14 NOTE — PROGRESS NOTES
Allergy serums billed at [facility].     Vials received below:    Vial Color Content                      Vial Size Expiration Date  Green 1:1,000 Cat, Dog 5 mL  12/12/2017  Blue 1:100 Cat, Dog 5 mL  6/13/2018  Yellow 1:10 Cat, Dog 5 mL  6/13/2018  Red 1:1 Cat, Dog 5 mL  6/13/2018    Green 1:1,000 Trees, Weeds 5 mL  12/12/2017  Blue 1:100 Trees, Weeds 5 mL  6/13/2018  Yellow 1:10 Trees, Weeds 5 mL  6/13/2018  Red 1:1 Trees, Weeds 5 mL  6/13/2018    Green 1:1,000 Molds 5 mL  12/12/2017  Blue 1:100 Molds 5 mL  6/13/2018  Yellow 1:10 Molds 5 mL  6/13/2018  Red 1:1 Molds 5 mL  6/13/2018    Original Refill encounter date: 6/6/2017      Signature  Rosa Maria Pena RN ............   6/14/2017...3:07 PM

## 2017-06-16 NOTE — TELEPHONE ENCOUNTER
L/M for pt stating that allergy serum has been received at the [facility]. Reminded pt to bring epipen with to every allergy injection appointment and advised to call [phone] to schedule appointments for every 3 to 14 days. Letter will be sent to pt also. Closing encounter.    Brandy Burciaga CMA ....... 6/16/2017 9:14 AM

## 2017-06-22 ENCOUNTER — ALLIED HEALTH/NURSE VISIT (OUTPATIENT)
Dept: ALLERGY | Facility: CLINIC | Age: 42
End: 2017-06-22
Payer: COMMERCIAL

## 2017-06-22 DIAGNOSIS — J30.9 ALLERGIC RHINITIS, UNSPECIFIED: Primary | ICD-10-CM

## 2017-06-22 PROCEDURE — 99207 ZZC NO CHARGE LOS: CPT

## 2017-06-22 PROCEDURE — 95117 IMMUNOTHERAPY INJECTIONS: CPT

## 2017-06-22 NOTE — PROGRESS NOTES
Patient started allergy injections today. Reviewed new patient instructions including:     Prior to visit patient should:   - Bring epinephrine auto-injector to every appointment, this is for patient safety  - We reviewed how to use your epinephrine auto-injector if needed   -  If directed by your provider, take an over the counter anti-histamine at least 60 minutes prior to appointment (allegra, claritin, zyrtec are all options)   - This can be in addition to medications patient is already taking for allergy  symptoms    At time of visit:   -Patient will be asked a series of questions every time, reviewed these questions and implications.   -Patient must present epinephrine auto-injector  -Patient must wait 30 minutes post injections, reviewed this is for patient safety. Patient should watch the time/and return to injection room for assessment of sites before leaving.  -If patient were to experience signs of a systemic reaction (reviewed what these are), patient should present to injection room and notify nursing staff immediately   -Patient may not go to other appointments/activities within the clinic during the 30 minute wait time.     Post Visit:   -Reviewed signs of systemic reaction/anaphylaxis and what to do if these were to occur  -Reviewed signs of a normal local reaction and when to call the clinic  -Reviewed dosing schedule, and assisted with/or encouraged patient to schedule additional appointments  -Avoid avid vigorous activity from right before each injection until 2 hours after the injection     *Patient handout given with this information.     Tomeka Felix RN

## 2017-06-22 NOTE — PROGRESS NOTES
Patient presented after waiting 30 minutes with no reaction to allergy injections. Discharged from clinic.    Tomeka Felix RN ....... 6/22/2017 9:28 AM

## 2017-06-22 NOTE — PATIENT INSTRUCTIONS
Patient started allergy injections today. Reviewed new patient instructions including:     Prior to visit patient should:   - Bring epinephrine auto-injector to every appointment, this is for patient safety  - We reviewed how to use your epinephrine auto-injector if needed   -  If directed by your provider, take an over the counter anti-histamine at least 60 minutes prior to appointment (allegra, claritin, zyrtec are all options)  - This can be in addition to medications patient is already taking for allergy  symptoms    At time of visit:   -Patient will be asked a series of questions every time, reviewed these questions and implications.   -Patient must present epinephrine auto-injector  -Patient must wait 30 minutes post injections, reviewed this is for patient safety. Patient should watch the time/and return to injection room for assessment of sites before leaving.  -If patient were to experience signs of a systemic reaction (reviewed what these are), patient should present to injection room and notify nursing staff immediately   -Patient may not go to other appointments/activities within the clinic during the 30 minute wait time.     Post Visit:   -Reviewed signs of systemic reaction/anaphylaxis and what to do if these were to occur  -Reviewed signs of a normal local reaction and when to call the clinic  -Reviewed dosing schedule, and assisted with/or encouraged patient to schedule additional appointments  -Avoid avid vigorous activity from right before each injection until 2 hours after the injection       Anaphylaxis Action Plan for Immunotherapy Patients    There is risk of systemic reactions when receiving immunotherapy injections. Local reactions such as a wheal (hive) smaller than a quarter, redness, swelling, and soreness are common. If the wheal is greater than the size of a half dollar (3.4 cm) please contact our clinic (numbers below), as we will need to adjust the dose that you receive at your next  injection. Waiting until the next appointment to inform us of the reaction could increase the wait time that you experience, because your allergist will need to be contacted for new orders prior to giving the injection.  If you have symptoms not localized to the injection site, please follow the anaphylaxis plan, and contact our office to update after you have received emergency medical treatment. Please ask to speak to an Allergy RN with any questions or updates.     [facility]: [phone]  [facility] ([facility]): [phone]  [facility]: [phone]  [place]: [phone]  [phone]

## 2017-06-22 NOTE — MR AVS SNAPSHOT
After Visit Summary   6/22/2017    Reji Carpenter    MRN: [id number]           Patient Information     Date Of Birth          1975        Visit Information        Provider Department      6/22/2017 8:45 AM  ALLERGY SHOTS [facility] Instructions    Patient started allergy injections today. Reviewed new patient instructions including:     Prior to visit patient should:   - Bring epinephrine auto-injector to every appointment, this is for patient safety  - We reviewed how to use your epinephrine auto-injector if needed   -  If directed by your provider, take an over the counter anti-histamine at least 60 minutes prior to appointment (allegra, claritin, zyrtec are all options)  - This can be in addition to medications patient is already taking for allergy  symptoms    At time of visit:   -Patient will be asked a series of questions every time, reviewed these questions and implications.   -Patient must present epinephrine auto-injector  -Patient must wait 30 minutes post injections, reviewed this is for patient safety. Patient should watch the time/and return to injection room for assessment of sites before leaving.  -If patient were to experience signs of a systemic reaction (reviewed what these are), patient should present to injection room and notify nursing staff immediately   -Patient may not go to other appointments/activities within the clinic during the 30 minute wait time.     Post Visit:   -Reviewed signs of systemic reaction/anaphylaxis and what to do if these were to occur  -Reviewed signs of a normal local reaction and when to call the clinic  -Reviewed dosing schedule, and assisted with/or encouraged patient to schedule additional appointments  -Avoid avid vigorous activity from right before each injection until 2 hours after the injection       Anaphylaxis Action Plan for Immunotherapy Patients    There is risk of systemic reactions when receiving  immunotherapy injections. Local reactions such as a wheal (hive) smaller than a quarter, redness, swelling, and soreness are common. If the wheal is greater than the size of a half dollar (3.4 cm) please contact our clinic (numbers below), as we will need to adjust the dose that you receive at your next injection. Waiting until the next appointment to inform us of the reaction could increase the wait time that you experience, because your allergist will need to be contacted for new orders prior to giving the injection.  If you have symptoms not localized to the injection site, please follow the anaphylaxis plan, and contact our office to update after you have received emergency medical treatment. Please ask to speak to an Allergy RN with any questions or updates.     [facility]: [phone]  [facility]: [phone]  [facility]: [phone]  [place]: [phone]  [phone]                  Follow-ups after your visit        Your next 10 appointments already scheduled     2017  8:45 AM CDT   Nurse Only with FZ ALLERGY SHOTS   [facility] ([facility])    [address] [facility] [address] [address] [phone]            2017  8:15 AM CDT   Nurse Only with FZ ALLERGY SHOTS   [facility] ([facility])    [address] [place] [address] [phone]              Who to contact     If you have questions or need follow up information about today's clinic visit or your schedule please contact [facility] directly at [phone].  Normal or non-critical lab and imaging results will be communicated to you by MyChart, letter or phone within 4 business days after the clinic has received the results. If you do not hear from us within 7 days, please contact the clinic through MyChart or phone. If you have a critical or abnormal lab result, we will notify you by phone as soon as possible.  Submit  refill requests through [employer] or call your pharmacy and they will forward the refill request to us. Please allow 3 business days for your refill to be completed.          Additional Information About Your Visit        [employer] Information     [employer] gives you secure access to your electronic health record. If you see a primary care provider, you can also send messages to your care team and make appointments. If you have questions, please call your primary care clinic.  If you do not have a primary care provider, please call [phone] and they will assist you.        Care EveryWhere ID     This is your Care EveryWhere ID. This could be used by other organizations to access your [place] medical records  [id number]         Blood Pressure from Last 3 Encounters:   06/06/17 (!) 146/97   05/04/17 (!) 147/98   05/02/17 (!) 151/94    Weight from Last 3 Encounters:   06/06/17 88.5 kg (195 lb 3.2 oz)   05/04/17 86.6 kg (191 lb)   05/02/17 88.5 kg (195 lb)              Today, you had the following     No orders found for display       Primary Care Provider Office Phone # Fax #    Wili Kaplan [phone]       [facility] [address] [facility] [address]        Equal Access to Services     DENITA JAVIER : Hadii keily ku hadasho Soomaali, waaxda luqadaha, qaybta kaalmada adeegyada, waxay idiin haymerissan thierno may. So [facility] [place] [phone].    ATENCIÓN: Si habla español, tiene a bolton disposición servicios gratuitos de asistencia lingüística. Llame al [phone].    We comply with applicable federal civil rights laws and [place] laws. We do not discriminate on the basis of race, color, national origin, age, disability sex, sexual orientation or gender identity.            Thank you!     Thank you for choosing [facility]  for your care. Our goal is always to provide you with excellent care. Hearing back from our patients is one way we can continue to  improve our services. Please take a few minutes to complete the written survey that you may receive in the mail after your visit with us. Thank you!             Your Updated Medication List - Protect others around you: Learn how to safely use, store and throw away your medicines at www.disposemymeds.org.          This list is accurate as of: 6/22/17  8:44 AM.  Always use your most recent med list.                   Brand Name Dispense Instructions for use Diagnosis    albuterol 108 (90 BASE) MCG/ACT Inhaler    PROAIR HFA/PROVENTIL HFA/VENTOLIN HFA    3 Inhaler    Inhale 2 puffs into the lungs every 4 hours as needed for shortness of breath / dyspnea    Intermittent asthma, uncomplicated       * ALLERGEN IMMUNOTHERAPY PRESCRIPTION     5 mL    Name of Mix: Mix #1  Cat, Dog Cat Hair, Standardized 10,000 BAU/mL, ALK  2.0 ml Dog Hair Dander, A. P.  1:100 w/v, HS  1.0 ml  Diluent: HSA qs to 5ml    Non-seasonal allergic rhinitis due to animal hair and dander, Seasonal allergic rhinitis due to pollen, Allergic rhinitis due to mold       * ALLERGEN IMMUNOTHERAPY PRESCRIPTION     5 mL    Name of Mix: Mix #2  Mold Alternaria Tenuis GLY 1:10 w/v, HS  0.5 ml Diluent: HSA qs to 5ml    Non-seasonal allergic rhinitis due to animal hair and dander, Seasonal allergic rhinitis due to pollen, Allergic rhinitis due to mold       * ALLERGEN IMMUNOTHERAPY PRESCRIPTION     5 mL    Name of Mix: Mix #3  Tree , Weeds Narayan, White  GLY 1:20 w/v, HS  0.5 ml Boxelder-Maple  Mix BHR (Boxelder Hard Red) 1:20 w/v, HS  0.5 ml [place], Common GLY 1:20 w/v, HS  0.5 ml Elm, American GLY 1:20 w/v, HS  0.5 ml Lamb's Quarters GLY 1:20 w/v, HS 0.5 ml Plantain, English GLY 1:20 w/v, HS 0.5 ml Ragweed Mixed 1:20 w/v ALK  0.5 ml Sagebrush, Mugwort GLY 1:20 w/v, HS 0.5 ml Diluent: HSA qs to 5ml    Non-seasonal allergic rhinitis due to animal hair and dander, Seasonal allergic rhinitis due to pollen, Allergic rhinitis due to mold       beclomethasone 80  MCG/ACT Inhaler    QVAR    3 Inhaler    Inhale 2 puffs into the lungs 2 times daily    Intermittent asthma, uncomplicated       EPINEPHrine 0.3 MG/0.3ML injection    AUVI-Q    2 mL    Inject 0.3 mLs (0.3 mg) into the muscle as needed for anaphylaxis    Need for desensitization to allergens       loratadine 10 MG tablet    CLARITIN    90 tablet    Take 1 tablet (10 mg) by mouth daily    Chronic rhinitis       montelukast 10 MG tablet    SINGULAIR    90 tablet    Take 1 tablet (10 mg) by mouth At Bedtime    Environmental allergies, Intermittent asthma, uncomplicated       NASACORT AQ 55 MCG/ACT Inhaler   Generic drug:  triamcinolone     1 Bottle    Spray 2 sprays into both nostrils daily        * Notice:  This list has 3 medication(s) that are the same as other medications prescribed for you. Read the directions carefully, and ask your doctor or other care provider to review them with you.

## 2017-06-29 ENCOUNTER — ALLIED HEALTH/NURSE VISIT (OUTPATIENT)
Dept: ALLERGY | Facility: CLINIC | Age: 42
End: 2017-06-29
Payer: COMMERCIAL

## 2017-06-29 DIAGNOSIS — J30.9 ALLERGIC RHINITIS, UNSPECIFIED: Primary | ICD-10-CM

## 2017-06-29 PROCEDURE — 99207 ZZC NO CHARGE LOS: CPT

## 2017-06-29 PROCEDURE — 95117 IMMUNOTHERAPY INJECTIONS: CPT

## 2017-06-29 NOTE — PROGRESS NOTES
Patient presented after waiting 30 minutes with no reaction to allergy injections. Discharged from clinic.    Tomeka Felix RN ....... 6/29/2017 8:49 AM

## 2017-06-29 NOTE — MR AVS SNAPSHOT
After Visit Summary   6/29/2017    Reji Carpenter    MRN: [id number]           Patient Information     Date Of Birth          1975        Visit Information        Provider Department      6/29/2017 8:15 AM FZ ALLERGY SHOTS [facility] [place]        Today's Diagnoses     Allergic rhinitis, unspecified    -  1       Follow-ups after your visit        Your next 10 appointments already scheduled     Jul 13, 2017  8:30 AM CDT   Nurse Only with FZ ALLERGY SHOTS   [facility] [place] ([facility] [place])    [address] [facility]  [place] [address]   [phone]            Jul 20, 2017  8:30 AM CDT   Nurse Only with FZ ALLERGY SHOTS   [facility] [place] ([facility] [place])    [address] [facility]  [place] [address]   [phone]            Jul 27, 2017  8:30 AM CDT   Nurse Only with FZ ALLERGY SHOTS   [facility] [place] ([facility] [place])    [address] [facility]  [place] [address]   [phone]            Aug 03, 2017  8:30 AM CDT   Nurse Only with FZ ALLERGY SHOTS   [facility] [place] ([facility] [place])    [address] [facility]  [place] [address]   [phone]            Aug 10, 2017  8:30 AM CDT   Nurse Only with FZ ALLERGY SHOTS   [facility] [place] ([facility] [place])    [address] [facility]  [place] [address]   [phone]            Aug 17, 2017  8:30 AM CDT   Nurse Only with FZ ALLERGY SHOTS   [facility] [place] ([facility] [place])    [address] [facility]  [place] [address]   [phone]            Aug 24, 2017  8:30 AM CDT   Nurse Only with FZ ALLERGY SHOTS   [facility] [place] ([facility] [place])    [address] [facility]  [place] [address]   [phone]            Aug 31, 2017  8:30 AM CDT   Nurse Only with FZ ALLERGY SHOTS   [facility] [place] ([facility] [place])    [address] [facility]  [place] [address]   [phone]              Who  to contact     If you have questions or need follow up information about today's clinic visit or your schedule please contact [facility] directly at [phone].  Normal or non-critical lab and imaging results will be communicated to you by MyChart, letter or phone within 4 business days after the clinic has received the results. If you do not hear from us within 7 days, please contact the clinic through MyChart or phone. If you have a critical or abnormal lab result, we will notify you by phone as soon as possible.  Submit refill requests through [employer] or call your pharmacy and they will forward the refill request to us. Please allow 3 business days for your refill to be completed.          Additional Information About Your Visit        [employer] Information     [employer] gives you secure access to your electronic health record. If you see a primary care provider, you can also send messages to your care team and make appointments. If you have questions, please call your primary care clinic.  If you do not have a primary care provider, please call [phone] and they will assist you.        Care EveryWhere ID     This is your Care EveryWhere ID. This could be used by other organizations to access your [place] medical records  [id number]         Blood Pressure from Last 3 Encounters:   06/06/17 (!) 146/97   05/04/17 (!) 147/98   05/02/17 (!) 151/94    Weight from Last 3 Encounters:   06/06/17 88.5 kg (195 lb 3.2 oz)   05/04/17 86.6 kg (191 lb)   05/02/17 88.5 kg (195 lb)              We Performed the Following     Allergy Shot: Two or more injections        Primary Care Provider Office Phone # Fax #    Wili Kaplan [phone]       [facility] [address]  [facility] [address]        Equal Access to Services     DENITA JAVIER : Lois Servin, karen rodriguez, murray porter. So [facility]  [phone].    ATENCIÓN: Si marii vazquez, tiene a bolton disposición servicios gratuitos de asistencia lingüística. Candice hansen [phone].    We comply with applicable federal civil rights laws and [place] laws. We do not discriminate on the basis of race, color, national origin, age, disability sex, sexual orientation or gender identity.            Thank you!     Thank you for choosing [facility]  for your care. Our goal is always to provide you with excellent care. Hearing back from our patients is one way we can continue to improve our services. Please take a few minutes to complete the written survey that you may receive in the mail after your visit with us. Thank you!             Your Updated Medication List - Protect others around you: Learn how to safely use, store and throw away your medicines at www.disposemymeds.org.          This list is accurate as of: 6/29/17  8:50 AM.  Always use your most recent med list.                   Brand Name Dispense Instructions for use Diagnosis    albuterol 108 (90 BASE) MCG/ACT Inhaler    PROAIR HFA/PROVENTIL HFA/VENTOLIN HFA    3 Inhaler    Inhale 2 puffs into the lungs every 4 hours as needed for shortness of breath / dyspnea    Intermittent asthma, uncomplicated       * ALLERGEN IMMUNOTHERAPY PRESCRIPTION     5 mL    Name of Mix: Mix #1  Cat, Dog Cat Hair, Standardized 10,000 BAU/mL, ALK  2.0 ml Dog Hair Dander, A. P.  1:100 w/v, HS  1.0 ml  Diluent: HSA qs to 5ml    Non-seasonal allergic rhinitis due to animal hair and dander, Seasonal allergic rhinitis due to pollen, Allergic rhinitis due to mold       * ALLERGEN IMMUNOTHERAPY PRESCRIPTION     5 mL    Name of Mix: Mix #2  Mold Alternaria Tenuis GLY 1:10 w/v, HS  0.5 ml Diluent: HSA qs to 5ml    Non-seasonal allergic rhinitis due to animal hair and dander, Seasonal allergic rhinitis due to pollen, Allergic rhinitis due to mold       * ALLERGEN IMMUNOTHERAPY PRESCRIPTION     5 mL    Name of Mix: Mix #3   Tree , Weeds Narayan, White  GLY 1:20 w/v, HS  0.5 ml Boxelder-Maple  Mix BHR (Boxelder Hard Red) 1:20 w/v, HS  0.5 ml [place], Common GLY 1:20 w/v, HS  0.5 ml Elm, American GLY 1:20 w/v, HS  0.5 ml Lamb's Quarters GLY 1:20 w/v, HS 0.5 ml Plantain, English GLY 1:20 w/v, HS 0.5 ml Ragweed Mixed 1:20 w/v ALK  0.5 ml Sagebrush, Mugwort GLY 1:20 w/v, HS 0.5 ml Diluent: HSA qs to 5ml    Non-seasonal allergic rhinitis due to animal hair and dander, Seasonal allergic rhinitis due to pollen, Allergic rhinitis due to mold       beclomethasone 80 MCG/ACT Inhaler    QVAR    3 Inhaler    Inhale 2 puffs into the lungs 2 times daily    Intermittent asthma, uncomplicated       EPINEPHrine 0.3 MG/0.3ML injection    AUVI-Q    2 mL    Inject 0.3 mLs (0.3 mg) into the muscle as needed for anaphylaxis    Need for desensitization to allergens       loratadine 10 MG tablet    CLARITIN    90 tablet    Take 1 tablet (10 mg) by mouth daily    Chronic rhinitis       montelukast 10 MG tablet    SINGULAIR    90 tablet    Take 1 tablet (10 mg) by mouth At Bedtime    Environmental allergies, Intermittent asthma, uncomplicated       NASACORT AQ 55 MCG/ACT Inhaler   Generic drug:  triamcinolone     1 Bottle    Spray 2 sprays into both nostrils daily        * Notice:  This list has 3 medication(s) that are the same as other medications prescribed for you. Read the directions carefully, and ask your doctor or other care provider to review them with you.

## 2017-06-30 ENCOUNTER — MYC MEDICAL ADVICE (OUTPATIENT)
Dept: ALLERGY | Facility: CLINIC | Age: 42
End: 2017-06-30

## 2017-06-30 DIAGNOSIS — J45.30 MILD PERSISTENT ASTHMA WITHOUT COMPLICATION: Primary | ICD-10-CM

## 2017-07-11 ENCOUNTER — OFFICE VISIT (OUTPATIENT)
Dept: FAMILY MEDICINE | Facility: CLINIC | Age: 42
End: 2017-07-11
Payer: COMMERCIAL

## 2017-07-11 VITALS
TEMPERATURE: 97.6 F | HEART RATE: 62 BPM | WEIGHT: 195.5 LBS | DIASTOLIC BLOOD PRESSURE: 88 MMHG | SYSTOLIC BLOOD PRESSURE: 145 MMHG | BODY MASS INDEX: 27.27 KG/M2

## 2017-07-11 DIAGNOSIS — Z91.09 ENVIRONMENTAL ALLERGIES: ICD-10-CM

## 2017-07-11 DIAGNOSIS — R06.83 SNORING: ICD-10-CM

## 2017-07-11 DIAGNOSIS — R03.0 ELEVATED BLOOD PRESSURE READING WITHOUT DIAGNOSIS OF HYPERTENSION: ICD-10-CM

## 2017-07-11 DIAGNOSIS — S39.012A LOW BACK STRAIN, INITIAL ENCOUNTER: Primary | ICD-10-CM

## 2017-07-11 PROCEDURE — 99213 OFFICE O/P EST LOW 20 MIN: CPT | Performed by: FAMILY MEDICINE

## 2017-07-11 ASSESSMENT — PAIN SCALES - GENERAL: PAINLEVEL: SEVERE PAIN (6)

## 2017-07-11 NOTE — PROGRESS NOTES
SUBJECTIVE:                                                    Reji Carpenter is a 42 year old male who presents to clinic today for the following health issues:       He was trying to pull start a generator a few weeks ago and hurt his lower back    none    Problem list and histories reviewed & adjusted, as indicated.  Additional history: as documented         Reviewed and updated as needed this visit by clinical staff       Reviewed and updated as needed this visit by Provider          using mouthpiece at night, helping snoring    Allergy testing done, started shots    Just started 2-3 weeks ago      Trying to pull start a generator, strained back    About 2 weeks ago, really started hurting about 1 1/2 weeks ago    Did not miss work    Hard to sit initially    No history of back problems in past    Exam    Full physical not done     Mentation and affect are fine    No tremor of speech or extremity    Range of motion of back is good    Patient points to left lower lumbosacrral area; mild tenderness  No swelling    Sensation and strength are normal in both lower extremities.  Negative straight leg raising test bilaterally.  Able get up on heels and toes normally.  No pain on axial loading.     ASSESSMENT / PLAN:  (S39.012A) Low back strain, initial encounter  (primary encounter diagnosis)  Comment: discussed in detail.  Overall exam is reassuring.  Symptoms getting better.  Plan: see AVS.  Follow up prn     (Z91.09) Environmental allergies  Comment: patient now on allergy shots   Plan: per allergist     (R03.0) Elevated blood pressure reading without diagnosis of hypertension  Comment: discussed in detail   Plan: monitor at home. If trending up, then patient will contact us to consider medication     (R06.83) Snoring  Comment: has mouthpiece to wear   Plan: as above       I reviewed the patient's medications, allergies, medical history, family history, and social history.    Wili Kaplan MD

## 2017-07-11 NOTE — MR AVS SNAPSHOT
After Visit Summary   7/11/2017    Reji Carpenter    MRN: [id number]           Patient Information     Date Of Birth          1975        Visit Information        Provider Department      7/11/2017 10:40 AM Wili Kaplan MD [facility]        Care Instructions    Return to normal activities    Long term, stretching / strengthening advised    Call if symptoms not resolving    Over the counter meds as needed    Cold / heat as is helpful              Follow-ups after your visit        Your next 10 appointments already scheduled     Jul 12, 2017  3:45 PM CDT   Nurse Only with FZ ALLERGY SHOTS   [facility] [place] [facility] ([facility] [place]    [address]  [place] [address]   [phone]            Jul 20, 2017  8:30 AM CDT   Nurse Only with FZ ALLERGY SHOTS   [facility] [place] ([facility] [place])    [address]  [place] [address]   [phone]            Jul 27, 2017  8:30 AM CDT   Nurse Only with FZ ALLERGY SHOTS   [facility] [place] ([facility] [place])    [address]  [place] [address]   [phone]            Aug 03, 2017  8:30 AM CDT   Nurse Only with FZ ALLERGY SHOTS   [facility] [place] ([facility] [place])    [address]  [place] [address]   [phone]            Aug 10, 2017  8:30 AM CDT   Nurse Only with FZ ALLERGY SHOTS   [facility] [place] ([facility] [place])    [address]  [place] [address]   [phone]            Aug 17, 2017  8:30 AM CDT   Nurse Only with FZ ALLERGY SHOTS   [facility] [place] ([facility] [place])    [address]  [place] [address]   [phone]            Aug 24, 2017  8:30 AM CDT   Nurse Only with FZ ALLERGY SHOTS   [facility] [place] ([facility] [place])    [address]  [place] [address]   [phone]            Aug 31, 2017  8:30 AM CDT   Nurse Only  with FZ ALLERGY SHOTS   [facility] ([facility])    [address]  [place] [address] [phone]              Who to contact     If you have questions or need follow up information about today's clinic visit or your schedule please contact [facility] directly at [phone].  Normal or non-critical lab and imaging results will be communicated to you by MyChart, letter or phone within 4 business days after the clinic has received the results. If you do not hear from us within 7 days, please contact the clinic through MyChart or phone. If you have a critical or abnormal lab result, we will notify you by phone as soon as possible.  Submit refill requests through [email or web address] or call your pharmacy and they will forward the refill request to us. Please allow 3 business days for your refill to be completed.          Additional Information About Your Visit        BountyJobshart Information     [email or web address] gives you secure access to your electronic health record. If you see a primary care provider, you can also send messages to your care team and make appointments. If you have questions, please call your primary care clinic.  If you do not have a primary care provider, please call [phone] and they will assist you.        Care EveryWhere ID     This is your Care EveryWhere ID. This could be used by other organizations to access your [place] medical records  [id number]        Your Vitals Were     Pulse Temperature BMI (Body Mass Index)             62 97.6  F (36.4  C) (Oral) 27.27 kg/m2          Blood Pressure from Last 3 Encounters:   07/11/17 145/88   06/06/17 (!) 146/97   05/04/17 (!) 147/98    Weight from Last 3 Encounters:   07/11/17 195 lb 8 oz (88.7 kg)   06/06/17 195 lb 3.2 oz (88.5 kg)   05/04/17 191 lb (86.6 kg)              Today, you had the following     No orders found for display       Primary Care Provider Office Phone # Fax #    Wili Kaplan MD  [phone]       [facility] [address]  [facility] [address]        Equal Access to Services     DENITA JAVIER : Hadii aad ku hadmelanie Servin, wawalterda luqadaha, willista karylieda jes, murray tamezanthony may. So [facility] [phone].    ATENCIÓN: Si habla español, tiene a bolton disposición servicios gratuitos de asistencia lingüística. Llame al [phone].    We comply with applicable federal civil rights laws and [place] laws. We do not discriminate on the basis of race, color, national origin, age, disability sex, sexual orientation or gender identity.            Thank you!     Thank you for choosing [facility]  for your care. Our goal is always to provide you with excellent care. Hearing back from our patients is one way we can continue to improve our services. Please take a few minutes to complete the written survey that you may receive in the mail after your visit with us. Thank you!             Your Updated Medication List - Protect others around you: Learn how to safely use, store and throw away your medicines at www.disposemymeds.org.          This list is accurate as of: 7/11/17 11:11 AM.  Always use your most recent med list.                   Brand Name Dispense Instructions for use Diagnosis    albuterol 108 (90 BASE) MCG/ACT Inhaler    PROAIR HFA/PROVENTIL HFA/VENTOLIN HFA    3 Inhaler    Inhale 2 puffs into the lungs every 4 hours as needed for shortness of breath / dyspnea    Intermittent asthma, uncomplicated       * ALLERGEN IMMUNOTHERAPY PRESCRIPTION     5 mL    Name of Mix: Mix #1  Cat, Dog Cat Hair, Standardized 10,000 BAU/mL, ALK  2.0 ml Dog Hair Dander, A. P.  1:100 w/v, HS  1.0 ml  Diluent: HSA qs to 5ml    Non-seasonal allergic rhinitis due to animal hair and dander, Seasonal allergic rhinitis due to pollen, Allergic rhinitis due to mold       * ALLERGEN IMMUNOTHERAPY PRESCRIPTION     5 mL    Name of Mix:  Mix #2  Mold Alternaria Tenuis GLY 1:10 w/v, HS  0.5 ml Diluent: HSA qs to 5ml    Non-seasonal allergic rhinitis due to animal hair and dander, Seasonal allergic rhinitis due to pollen, Allergic rhinitis due to mold       * ALLERGEN IMMUNOTHERAPY PRESCRIPTION     5 mL    Name of Mix: Mix #3  Tree , Weeds Narayan, White  GLY 1:20 w/v, HS  0.5 ml Boxelder-Maple  Mix BHR (Boxelder Hard Red) 1:20 w/v, HS  0.5 ml [place], Common GLY 1:20 w/v, HS  0.5 ml Elm, American GLY 1:20 w/v, HS  0.5 ml Lamb's Quarters GLY 1:20 w/v, HS 0.5 ml Plantain, English GLY 1:20 w/v, HS 0.5 ml Ragweed Mixed 1:20 w/v ALK  0.5 ml Sagebrush, Mugwort GLY 1:20 w/v, HS 0.5 ml Diluent: HSA qs to 5ml    Non-seasonal allergic rhinitis due to animal hair and dander, Seasonal allergic rhinitis due to pollen, Allergic rhinitis due to mold       EPINEPHrine 0.3 MG/0.3ML injection    AUVI-Q    2 mL    Inject 0.3 mLs (0.3 mg) into the muscle as needed for anaphylaxis    Need for desensitization to allergens       fluticasone furoate 200 MCG/ACT inhalation powder    ARNUITY ELLIPTA    1 Inhaler    Inhale 1 puff into the lungs daily    Mild persistent asthma without complication       loratadine 10 MG tablet    CLARITIN    90 tablet    Take 1 tablet (10 mg) by mouth daily    Chronic rhinitis       montelukast 10 MG tablet    SINGULAIR    90 tablet    Take 1 tablet (10 mg) by mouth At Bedtime    Environmental allergies, Intermittent asthma, uncomplicated       NASACORT AQ 55 MCG/ACT Inhaler   Generic drug:  triamcinolone     1 Bottle    Spray 2 sprays into both nostrils daily        * Notice:  This list has 3 medication(s) that are the same as other medications prescribed for you. Read the directions carefully, and ask your doctor or other care provider to review them with you.

## 2017-07-11 NOTE — NURSING NOTE
"Chief Complaint   Patient presents with     Back Pain       Initial BP (!) 148/96 (BP Location: Left arm, Patient Position: Chair, Cuff Size: Adult Regular)  Pulse 62  Temp 97.6  F (36.4  C) (Oral)  Wt 195 lb 8 oz (88.7 kg)  BMI 27.27 kg/m2 Estimated body mass index is 27.27 kg/(m^2) as calculated from the following:    Height as of 6/6/17: 5' 11\" (1.803 m).    Weight as of this encounter: 195 lb 8 oz (88.7 kg).  Medication Reconciliation: complete   Roseann Garber CMA      "

## 2017-07-12 ENCOUNTER — ALLIED HEALTH/NURSE VISIT (OUTPATIENT)
Dept: ALLERGY | Facility: CLINIC | Age: 42
End: 2017-07-12
Payer: COMMERCIAL

## 2017-07-12 DIAGNOSIS — J30.9 ALLERGIC RHINITIS, UNSPECIFIED: Primary | ICD-10-CM

## 2017-07-12 PROCEDURE — 95117 IMMUNOTHERAPY INJECTIONS: CPT

## 2017-07-12 NOTE — MR AVS SNAPSHOT
After Visit Summary   7/12/2017    Reji Carpenter    MRN: [id number]           Patient Information     Date Of Birth          1975        Visit Information        Provider Department      7/12/2017 3:45 PM FZ ALLERGY SHOTS [facility] [place]        Today's Diagnoses     Allergic rhinitis, unspecified    -  1       Follow-ups after your visit        Your next 10 appointments already scheduled     Jul 20, 2017  8:30 AM CDT   Nurse Only with FZ ALLERGY SHOTS   [facility] [place] [facility] ([facility])    [address]  [place] [address]   [phone]            Jul 27, 2017  8:30 AM CDT   Nurse Only with FZ ALLERGY SHOTS   [facility] [place] [facility] ([facility] [place])    [address]  [place] [address]   [phone]            Aug 03, 2017  8:30 AM CDT   Nurse Only with FZ ALLERGY SHOTS   [facility] [place] ([facility] [facility])    [address]  [place] [address]   [phone]            Aug 10, 2017  8:30 AM CDT   Nurse Only with FZ ALLERGY SHOTS   [facility] [place] ([facility] [place])    [address]  [place] [address]   [phone]            Aug 17, 2017  8:30 AM CDT   Nurse Only with FZ ALLERGY SHOTS   [facility] [place] [facility] ([facility])    [address]  [place] [address]   [phone]            Aug 24, 2017  8:30 AM CDT   Nurse Only with FZ ALLERGY SHOTS   [facility] [place] ([facility] [place])    [address]  [place] [address]   [phone]            Aug 31, 2017  8:30 AM CDT   Nurse Only with FZ ALLERGY SHOTS   [facility] [place] ([facility] [facility])    [address]  [place] [address]   [phone]              Who to contact     If you have questions or need follow up information about today's clinic visit or your schedule please contact [facility] directly at [phone].  Normal or  non-critical lab and imaging results will be communicated to you by MyChart, letter or phone within 4 business days after the clinic has received the results. If you do not hear from us within 7 days, please contact the clinic through [employer] or phone. If you have a critical or abnormal lab result, we will notify you by phone as soon as possible.  Submit refill requests through Sudiksha or call your pharmacy and they will forward the refill request to us. Please allow 3 business days for your refill to be completed.          Additional Information About Your Visit        [email or web address] Information     Sud[employer]ha gives you secure access to your electronic health record. If you see a primary care provider, you can also send messages to your care team and make appointments. If you have questions, please call your primary care clinic.  If you do not have a primary care provider, please call [phone] and they will assist you.        Care EveryWhere ID     This is your Care EveryWhere ID. This could be used by other organizations to access your [place] medical records  [id number]         Blood Pressure from Last 3 Encounters:   07/11/17 145/88   06/06/17 (!) 146/97   05/04/17 (!) 147/98    Weight from Last 3 Encounters:   07/11/17 88.7 kg (195 lb 8 oz)   06/06/17 88.5 kg (195 lb 3.2 oz)   05/04/17 86.6 kg (191 lb)              Today, you had the following     No orders found for display       Primary Care Provider Office Phone # Fax #    Wili Kaplan [phone]       [facility] [address] [facility] [address]        Equal Access to Services     [facility]: Hadii aad ku hadasho Soomaali, waaxda luqadaha, qaybta kaalmada adeegyatabitha, murray flores . So [facility] [phone].    ATENCIÓN: Si habla español, tiene a bolton disposición servicios gratuitos de asistencia lingüística. Llame al [phone].    We comply with applicable federal civil rights laws  and [place] laws. We do not discriminate on the basis of race, color, national origin, age, disability sex, sexual orientation or gender identity.            Thank you!     Thank you for choosing [facility]  for your care. Our goal is always to provide you with excellent care. Hearing back from our patients is one way we can continue to improve our services. Please take a few minutes to complete the written survey that you may receive in the mail after your visit with us. Thank you!             Your Updated Medication List - Protect others around you: Learn how to safely use, store and throw away your medicines at www.disposemymeds.org.          This list is accurate as of: 7/12/17  4:40 PM.  Always use your most recent med list.                   Brand Name Dispense Instructions for use Diagnosis    albuterol 108 (90 BASE) MCG/ACT Inhaler    PROAIR HFA/PROVENTIL HFA/VENTOLIN HFA    3 Inhaler    Inhale 2 puffs into the lungs every 4 hours as needed for shortness of breath / dyspnea    Intermittent asthma, uncomplicated       * ALLERGEN IMMUNOTHERAPY PRESCRIPTION     5 mL    Name of Mix: Mix #1  Cat, Dog Cat Hair, Standardized 10,000 BAU/mL, ALK  2.0 ml Dog Hair Dander, A. P.  1:100 w/v, HS  1.0 ml  Diluent: HSA qs to 5ml    Non-seasonal allergic rhinitis due to animal hair and dander, Seasonal allergic rhinitis due to pollen, Allergic rhinitis due to mold       * ALLERGEN IMMUNOTHERAPY PRESCRIPTION     5 mL    Name of Mix: Mix #2  Mold Alternaria Tenuis GLY 1:10 w/v, HS  0.5 ml Diluent: HSA qs to 5ml    Non-seasonal allergic rhinitis due to animal hair and dander, Seasonal allergic rhinitis due to pollen, Allergic rhinitis due to mold       * ALLERGEN IMMUNOTHERAPY PRESCRIPTION     5 mL    Name of Mix: Mix #3  Tree , Weeds Narayan, White  GLY 1:20 w/v, HS  0.5 ml Boxelder-Maple  Mix BHR (Boxelder Hard Red) 1:20 w/v, HS  0.5 ml [place], Common GLY 1:20 w/v, HS  0.5 ml Elm, American GLY 1:20 w/v, HS   0.5 ml Hawthorne's Quarters GLY 1:20 w/v, HS 0.5 ml Plantain, English GLY 1:20 w/v, HS 0.5 ml Ragweed Mixed 1:20 w/v ALK  0.5 ml Sagebrush, Mugwort GLY 1:20 w/v, HS 0.5 ml Diluent: HSA qs to 5ml    Non-seasonal allergic rhinitis due to animal hair and dander, Seasonal allergic rhinitis due to pollen, Allergic rhinitis due to mold       EPINEPHrine 0.3 MG/0.3ML injection    AUVI-Q    2 mL    Inject 0.3 mLs (0.3 mg) into the muscle as needed for anaphylaxis    Need for desensitization to allergens       fluticasone furoate 200 MCG/ACT inhalation powder    ARNUITY ELLIPTA    1 Inhaler    Inhale 1 puff into the lungs daily    Mild persistent asthma without complication       loratadine 10 MG tablet    CLARITIN    90 tablet    Take 1 tablet (10 mg) by mouth daily    Chronic rhinitis       montelukast 10 MG tablet    SINGULAIR    90 tablet    Take 1 tablet (10 mg) by mouth At Bedtime    Environmental allergies, Intermittent asthma, uncomplicated       NASACORT AQ 55 MCG/ACT Inhaler   Generic drug:  triamcinolone     1 Bottle    Spray 2 sprays into both nostrils daily        * Notice:  This list has 3 medication(s) that are the same as other medications prescribed for you. Read the directions carefully, and ask your doctor or other care provider to review them with you.

## 2017-07-12 NOTE — PROGRESS NOTES
Patient presented after waiting 30 minutes with no reaction to allergy injections. Discharged from clinic.    Tomeka Felix RN ....... 7/12/2017 4:40 PM

## 2017-07-20 ENCOUNTER — ALLIED HEALTH/NURSE VISIT (OUTPATIENT)
Dept: ALLERGY | Facility: CLINIC | Age: 42
End: 2017-07-20
Payer: COMMERCIAL

## 2017-07-20 DIAGNOSIS — J30.9 ALLERGIC RHINITIS, UNSPECIFIED: Primary | ICD-10-CM

## 2017-07-20 PROCEDURE — 95117 IMMUNOTHERAPY INJECTIONS: CPT

## 2017-07-20 PROCEDURE — 99207 ZZC NO CHARGE LOS: CPT

## 2017-07-20 NOTE — PROGRESS NOTES
Patient presented after waiting 30 minutes with no reaction to allergy injections. Discharged from clinic.    Iwona Edwards RN

## 2017-07-20 NOTE — MR AVS SNAPSHOT
After Visit Summary   7/20/2017    Reji Carpenter    MRN: [id number]           Patient Information     Date Of Birth          1975        Visit Information        Provider Department      7/20/2017 8:30 AM FZ ALLERGY SHOTS [facility] [place]        Today's Diagnoses     Allergic rhinitis, unspecified    -  1       Follow-ups after your visit        Your next 10 appointments already scheduled     Jul 27, 2017  8:00 AM CDT   Nurse Only with FZ ALLERGY SHOTS   [facility] [place] ([facility] [place])    [address]  [place] [address]   [phone]            Aug 03, 2017  8:30 AM CDT   Nurse Only with FZ ALLERGY SHOTS   [facility] [place] ([facility] [place])    [address]  [place] [address]   [phone]            Aug 10, 2017  8:30 AM CDT   Nurse Only with FZ ALLERGY SHOTS   [facility] [place] ([facility] [place])    [address]  [place] [address]   [phone]            Aug 17, 2017  8:30 AM CDT   Nurse Only with FZ ALLERGY SHOTS   [facility] [place] ([facility] [place])    [address]  [place] [address]   [phone]            Aug 24, 2017  8:30 AM CDT   Nurse Only with FZ ALLERGY SHOTS   [facility] [place] ([facility] [place])    [address]  [place] [address]   [phone]            Aug 31, 2017  8:30 AM CDT   Nurse Only with FZ ALLERGY SHOTS   [facility] [place] ([facility] [place])    [address]  [place] [address]   [phone]              Who to contact     If you have questions or need follow up information about today's clinic visit or your schedule please contact [facility] directly at [phone].  Normal or non-critical lab and imaging results will be communicated to you by MyChart, letter or phone within 4 business days after the clinic has received the results. If you do not hear from us  within 7 days, please contact the clinic through [employer] or phone. If you have a critical or abnormal lab result, we will notify you by phone as soon as possible.  Submit refill requests through [employer] or call your pharmacy and they will forward the refill request to us. Please allow 3 business days for your refill to be completed.          Additional Information About Your Visit        GardenStoryhart Information     [employer] gives you secure access to your electronic health record. If you see a primary care provider, you can also send messages to your care team and make appointments. If you have questions, please call your primary care clinic.  If you do not have a primary care provider, please call [phone] and they will assist you.        Care EveryWhere ID     This is your Care EveryWhere ID. This could be used by other organizations to access your [place] medical records  [id number]         Blood Pressure from Last 3 Encounters:   07/11/17 145/88   06/06/17 (!) 146/97   05/04/17 (!) 147/98    Weight from Last 3 Encounters:   07/11/17 88.7 kg (195 lb 8 oz)   06/06/17 88.5 kg (195 lb 3.2 oz)   05/04/17 86.6 kg (191 lb)              We Performed the Following     Allergy Shot: Two or more injections        Primary Care Provider Office Phone # Fax #    Wili Kaplan [phone]       [facility] [address] [facility] [address]        Equal Access to Services     [facility]: Hadii aad ku hadasho Soomaali, waaxda luqadaha, qaybta kaalmada adeegyada, murray alexanderin hayaan thierno flores . So [facility] [phone].    ATENCIÓN: Si habla español, tiene a bolton disposición servicios gratuitos de asistencia lingüística. Llame al [phone].    We comply with applicable federal civil rights laws and [place] laws. We do not discriminate on the basis of race, color, national origin, age, disability sex, sexual orientation or gender identity.            Thank you!     Thank  you for choosing [facility]  for your care. Our goal is always to provide you with excellent care. Hearing back from our patients is one way we can continue to improve our services. Please take a few minutes to complete the written survey that you may receive in the mail after your visit with us. Thank you!             Your Updated Medication List - Protect others around you: Learn how to safely use, store and throw away your medicines at www.disposemymeds.org.          This list is accurate as of: 7/20/17  9:14 AM.  Always use your most recent med list.                   Brand Name Dispense Instructions for use Diagnosis    albuterol 108 (90 BASE) MCG/ACT Inhaler    PROAIR HFA/PROVENTIL HFA/VENTOLIN HFA    3 Inhaler    Inhale 2 puffs into the lungs every 4 hours as needed for shortness of breath / dyspnea    Intermittent asthma, uncomplicated       * ALLERGEN IMMUNOTHERAPY PRESCRIPTION     5 mL    Name of Mix: Mix #1  Cat, Dog Cat Hair, Standardized 10,000 BAU/mL, ALK  2.0 ml Dog Hair Dander, A. P.  1:100 w/v, HS  1.0 ml  Diluent: HSA qs to 5ml    Non-seasonal allergic rhinitis due to animal hair and dander, Seasonal allergic rhinitis due to pollen, Allergic rhinitis due to mold       * ALLERGEN IMMUNOTHERAPY PRESCRIPTION     5 mL    Name of Mix: Mix #2  Mold Alternaria Tenuis GLY 1:10 w/v, HS  0.5 ml Diluent: HSA qs to 5ml    Non-seasonal allergic rhinitis due to animal hair and dander, Seasonal allergic rhinitis due to pollen, Allergic rhinitis due to mold       * ALLERGEN IMMUNOTHERAPY PRESCRIPTION     5 mL    Name of Mix: Mix #3  Tree , Weeds Narayan, White  GLY 1:20 w/v, HS  0.5 ml Boxelder-Maple  Mix BHR (Boxelder Hard Red) 1:20 w/v, HS  0.5 ml [place], Common GLY 1:20 w/v, HS  0.5 ml Elm, American GLY 1:20 w/v, HS  0.5 ml Lamb's Quarters GLY 1:20 w/v, HS 0.5 ml Plantain, English GLY 1:20 w/v, HS 0.5 ml Ragweed Mixed 1:20 w/v ALK  0.5 ml Sagebrush, Mugwort GLY 1:20 w/v, HS 0.5 ml Diluent: HSA qs  to 5ml    Non-seasonal allergic rhinitis due to animal hair and dander, Seasonal allergic rhinitis due to pollen, Allergic rhinitis due to mold       EPINEPHrine 0.3 MG/0.3ML injection 2-pack    AUVI-Q    2 mL    Inject 0.3 mLs (0.3 mg) into the muscle as needed for anaphylaxis    Need for desensitization to allergens       fluticasone furoate 200 MCG/ACT inhalation powder    ARNUITY ELLIPTA    1 Inhaler    Inhale 1 puff into the lungs daily    Mild persistent asthma without complication       loratadine 10 MG tablet    CLARITIN    90 tablet    Take 1 tablet (10 mg) by mouth daily    Chronic rhinitis       montelukast 10 MG tablet    SINGULAIR    90 tablet    Take 1 tablet (10 mg) by mouth At Bedtime    Environmental allergies, Intermittent asthma, uncomplicated       NASACORT AQ 55 MCG/ACT Inhaler   Generic drug:  triamcinolone     1 Bottle    Spray 2 sprays into both nostrils daily        * Notice:  This list has 3 medication(s) that are the same as other medications prescribed for you. Read the directions carefully, and ask your doctor or other care provider to review them with you.

## 2017-07-27 ENCOUNTER — ALLIED HEALTH/NURSE VISIT (OUTPATIENT)
Dept: ALLERGY | Facility: CLINIC | Age: 42
End: 2017-07-27
Payer: COMMERCIAL

## 2017-07-27 DIAGNOSIS — J30.9 ALLERGIC RHINITIS, UNSPECIFIED: Primary | ICD-10-CM

## 2017-07-27 PROCEDURE — 95117 IMMUNOTHERAPY INJECTIONS: CPT

## 2017-07-27 PROCEDURE — 99207 ZZC NO CHARGE LOS: CPT

## 2017-07-27 NOTE — PROGRESS NOTES
Patient presented after waiting 30 minutes with no reaction to allergy injections. Discharged from clinic.    Tomeka Felix RN ....... 7/27/2017 8:55 AM

## 2017-07-27 NOTE — MR AVS SNAPSHOT
After Visit Summary   7/27/2017    Reji Carpenter    MRN: [id number]           Patient Information     Date Of Birth          1975        Visit Information        Provider Department      7/27/2017 8:00 AM FZ ALLERGY SHOTS [facility]        Today's Diagnoses     Allergic rhinitis, unspecified    -  1       Follow-ups after your visit        Your next 10 appointments already scheduled     Aug 03, 2017  8:30 AM CDT   Nurse Only with FZ ALLERGY SHOTS   [facility]    [address]  [place] [address]   [phone]            Aug 10, 2017  8:30 AM CDT   Nurse Only with FZ ALLERGY SHOTS   [facility]    [address]  [place] [address]   [phone]            Aug 17, 2017  8:30 AM CDT   Nurse Only with FZ ALLERGY SHOTS   [facility]    [address]  [place] [address]   [phone]            Aug 24, 2017  8:30 AM CDT   Nurse Only with FZ ALLERGY SHOTS   [facility]    [address]  [place] [address]   [phone]            Aug 31, 2017  8:30 AM CDT   Nurse Only with FZ ALLERGY SHOTS   [facility]    [address]  [place] [address]   [phone]              Who to contact     If you have questions or need follow up information about today's clinic visit or your schedule please contact [facility] directly at [phone].  Normal or non-critical lab and imaging results will be communicated to you by MyChart, letter or phone within 4 business days after the clinic has received the results. If you do not hear from us within 7 days, please contact the clinic through MyChart or phone. If you have a critical or abnormal lab result, we will notify you by phone as soon as possible.  Submit refill requests through  [employer] or call your pharmacy and they will forward the refill request to us. Please allow 3 business days for your refill to be completed.          Additional Information About Your Visit        MyChart Information     [employer] gives you secure access to your electronic health record. If you see a primary care provider, you can also send messages to your care team and make appointments. If you have questions, please call your primary care clinic.  If you do not have a primary care provider, please call [phone] and they will assist you.        Care EveryWhere ID     This is your Care EveryWhere ID. This could be used by other organizations to access your [facility] medical records  [id number]         Blood Pressure from Last 3 Encounters:   07/11/17 145/88   06/06/17 (!) 146/97   05/04/17 (!) 147/98    Weight from Last 3 Encounters:   07/11/17 88.7 kg (195 lb 8 oz)   06/06/17 88.5 kg (195 lb 3.2 oz)   05/04/17 86.6 kg (191 lb)              We Performed the Following     Allergy Shot: Two or more injections        Primary Care Provider Office Phone # Fax #    Wili Kaplan [phone]       [facility] [address] [facility] [address]        Equal Access to Services     DENITA JAVIER : Hadii aad ku hadasho Soomaali, waaxda luqadaha, qaybta kaalmada adeegyada, waxay idiin haymerissan thierno figueroa lacarmelina may. So [facility] [phone].    ATENCIÓN: Si habla español, tiene a bolton disposición servicios gratuitos de asistencia lingüística. Llame al [phone].    We comply with applicable federal civil rights laws and [place] laws. We do not discriminate on the basis of race, color, national origin, age, disability sex, sexual orientation or gender identity.            Thank you!     Thank you for choosing [facility]  for your care. Our goal is always to provide you with excellent care. Hearing back from our patients is one way we can continue to improve our  services. Please take a few minutes to complete the written survey that you may receive in the mail after your visit with us. Thank you!             Your Updated Medication List - Protect others around you: Learn how to safely use, store and throw away your medicines at www.disposemymeds.org.          This list is accurate as of: 7/27/17  8:55 AM.  Always use your most recent med list.                   Brand Name Dispense Instructions for use Diagnosis    albuterol 108 (90 BASE) MCG/ACT Inhaler    PROAIR HFA/PROVENTIL HFA/VENTOLIN HFA    3 Inhaler    Inhale 2 puffs into the lungs every 4 hours as needed for shortness of breath / dyspnea    Intermittent asthma, uncomplicated       * ALLERGEN IMMUNOTHERAPY PRESCRIPTION     5 mL    Name of Mix: Mix #1  Cat, Dog Cat Hair, Standardized 10,000 BAU/mL, ALK  2.0 ml Dog Hair Dander, A. P.  1:100 w/v, HS  1.0 ml  Diluent: HSA qs to 5ml    Non-seasonal allergic rhinitis due to animal hair and dander, Seasonal allergic rhinitis due to pollen, Allergic rhinitis due to mold       * ALLERGEN IMMUNOTHERAPY PRESCRIPTION     5 mL    Name of Mix: Mix #2  Mold Alternaria Tenuis GLY 1:10 w/v, HS  0.5 ml Diluent: HSA qs to 5ml    Non-seasonal allergic rhinitis due to animal hair and dander, Seasonal allergic rhinitis due to pollen, Allergic rhinitis due to mold       * ALLERGEN IMMUNOTHERAPY PRESCRIPTION     5 mL    Name of Mix: Mix #3  Tree , Weeds Narayan, White  GLY 1:20 w/v, HS  0.5 ml Boxelder-Maple  Mix BHR (Boxelder Hard Red) 1:20 w/v, HS  0.5 ml [place], Common GLY 1:20 w/v, HS  0.5 ml Elm, American GLY 1:20 w/v, HS  0.5 ml Lamb's Quarters GLY 1:20 w/v, HS 0.5 ml Plantain, English GLY 1:20 w/v, HS 0.5 ml Ragweed Mixed 1:20 w/v ALK  0.5 ml Sagebrush, Mugwort GLY 1:20 w/v, HS 0.5 ml Diluent: HSA qs to 5ml    Non-seasonal allergic rhinitis due to animal hair and dander, Seasonal allergic rhinitis due to pollen, Allergic rhinitis due to mold       EPINEPHrine 0.3 MG/0.3ML injection  2-pack    AUVI-Q    2 mL    Inject 0.3 mLs (0.3 mg) into the muscle as needed for anaphylaxis    Need for desensitization to allergens       fluticasone furoate 200 MCG/ACT inhalation powder    ARNUITY ELLIPTA    1 Inhaler    Inhale 1 puff into the lungs daily    Mild persistent asthma without complication       loratadine 10 MG tablet    CLARITIN    90 tablet    Take 1 tablet (10 mg) by mouth daily    Chronic rhinitis       montelukast 10 MG tablet    SINGULAIR    90 tablet    Take 1 tablet (10 mg) by mouth At Bedtime    Environmental allergies, Intermittent asthma, uncomplicated       NASACORT AQ 55 MCG/ACT Inhaler   Generic drug:  triamcinolone     1 Bottle    Spray 2 sprays into both nostrils daily        * Notice:  This list has 3 medication(s) that are the same as other medications prescribed for you. Read the directions carefully, and ask your doctor or other care provider to review them with you.

## 2017-08-03 ENCOUNTER — ALLIED HEALTH/NURSE VISIT (OUTPATIENT)
Dept: ALLERGY | Facility: CLINIC | Age: 42
End: 2017-08-03
Payer: COMMERCIAL

## 2017-08-03 DIAGNOSIS — J30.9 ALLERGIC RHINITIS, UNSPECIFIED: Primary | ICD-10-CM

## 2017-08-03 PROCEDURE — 99207 ZZC NO CHARGE LOS: CPT

## 2017-08-03 PROCEDURE — 95117 IMMUNOTHERAPY INJECTIONS: CPT

## 2017-08-03 NOTE — PROGRESS NOTES
Patient presented after waiting 30 minutes with no reaction to allergy injections. Discharged from clinic.    Tomeka Felix RN ....... 8/3/2017 9:28 AM

## 2017-08-03 NOTE — MR AVS SNAPSHOT
After Visit Summary   8/3/2017    Reji Carpenter    MRN: [id number]           Patient Information     Date Of Birth          1975        Visit Information        Provider Department      8/3/2017 8:30 AM FZ ALLERGY SHOTS [facility]        Today's Diagnoses     Allergic rhinitis, unspecified    -  1       Follow-ups after your visit        Your next 10 appointments already scheduled     Aug 10, 2017  8:30 AM CDT   Nurse Only with FZ ALLERGY SHOTS   [facility] [place] ([facility] [place]    [address]  [place] [address]   [phone]            Aug 17, 2017  8:30 AM CDT   Nurse Only with FZ ALLERGY SHOTS   [facility] [place] ([facility] [place])    [address]  [place] [address]   [phone]            Aug 24, 2017  8:30 AM CDT   Nurse Only with FZ ALLERGY SHOTS   [facility] [place] ([facility] [place]    [address]  [place] [address]   [phone]            Aug 31, 2017  8:30 AM CDT   Nurse Only with FZ ALLERGY SHOTS   [facility] [place] [facility] [place]    [address]  [place] [address]   [phone]            Sep 14, 2017  8:30 AM CDT   Nurse Only with FZ ALLERGY SHOTS   [facility] [place] [facility] [place]    [address]  [place] [address]   [phone]            Sep 21, 2017  8:30 AM CDT   Nurse Only with FZ ALLERGY SHOTS   [facility] [place] ([facility] [place]    [address]  [place] [address]   [phone]            Sep 28, 2017  8:30 AM CDT   Nurse Only with FZ ALLERGY SHOTS   [facility] [place] ([facility] [place]    [address]  [place] [address]   [phone]            Oct 05, 2017  8:30 AM CDT   Nurse Only with FZ ALLERGY SHOTS   [facility] [place] ([facility] [place]    [address]  [place] [address]   [phone]            Oct 12,  2017  8:30 AM CDT   Nurse Only with FZ ALLERGY SHOTS   [facility]    [address]  [place] [address] [phone]            Oct 19, 2017  8:30 AM CDT   Nurse Only with FZ ALLERGY SHOTS   [facility] ([facility]    [address]  [place] [address] [phone]              Who to contact     If you have questions or need follow up information about today's clinic visit or your schedule please contact [facility] directly at [phone].  Normal or non-critical lab and imaging results will be communicated to you by [employer], letter or phone within 4 business days after the clinic has received the results. If you do not hear from us within 7 days, please contact the clinic through [employer] or phone. If you have a critical or abnormal lab result, we will notify you by phone as soon as possible.  Submit refill requests through [employer] or call your pharmacy and they will forward the refill request to us. Please allow 3 business days for your refill to be completed.          Additional Information About Your Visit        Get[employer]hart Information     [employer] gives you secure access to your electronic health record. If you see a primary care provider, you can also send messages to your care team and make appointments. If you have questions, please call your primary care clinic.  If you do not have a primary care provider, please call [phone] and they will assist you.        Care EveryWhere ID     This is your Care EveryWhere ID. This could be used by other organizations to access your [facility] medical records  [id number]         Blood Pressure from Last 3 Encounters:   07/11/17 145/88   06/06/17 (!) 146/97   05/04/17 (!) 147/98    Weight from Last 3 Encounters:   07/11/17 88.7 kg (195 lb 8 oz)   06/06/17 88.5 kg (195 lb 3.2 oz)   05/04/17 86.6 kg (191 lb)              We Performed the Following     Allergy Shot:  Two or more injections        Primary Care Provider Office Phone # Fax #    Wili Kaplan [phone]       [facility] [address] [facility] [address]        Equal Access to Services     DENITA JAVIER : Hadii keily ku hadsabaso Sokingali, waaxda luqadaha, qaybta kaalmada adeegyada, murray maryn thierno figueroa sandra may. So [facility] [phone].    ATENCIÓN: Si habla español, tiene a bolton disposición servicios gratuitos de asistencia lingüística. Llame al [phone].    We comply with applicable federal civil rights laws and [place] laws. We do not discriminate on the basis of race, color, national origin, age, disability sex, sexual orientation or gender identity.            Thank you!     Thank you for choosing [facility]  for your care. Our goal is always to provide you with excellent care. Hearing back from our patients is one way we can continue to improve our services. Please take a few minutes to complete the written survey that you may receive in the mail after your visit with us. Thank you!             Your Updated Medication List - Protect others around you: Learn how to safely use, store and throw away your medicines at www.disposemymeds.org.          This list is accurate as of: 8/3/17  9:28 AM.  Always use your most recent med list.                   Brand Name Dispense Instructions for use Diagnosis    albuterol 108 (90 BASE) MCG/ACT Inhaler    PROAIR HFA/PROVENTIL HFA/VENTOLIN HFA    3 Inhaler    Inhale 2 puffs into the lungs every 4 hours as needed for shortness of breath / dyspnea    Intermittent asthma, uncomplicated       * ALLERGEN IMMUNOTHERAPY PRESCRIPTION     5 mL    Name of Mix: Mix #1  Cat, Dog Cat Hair, Standardized 10,000 BAU/mL, ALK  2.0 ml Dog Hair Dander, A. P.  1:100 w/v, HS  1.0 ml  Diluent: HSA qs to 5ml    Non-seasonal allergic rhinitis due to animal hair and dander, Seasonal allergic rhinitis due to pollen, Allergic  rhinitis due to mold       * ALLERGEN IMMUNOTHERAPY PRESCRIPTION     5 mL    Name of Mix: Mix #2  Mold Alternaria Tenuis GLY 1:10 w/v, HS  0.5 ml Diluent: HSA qs to 5ml    Non-seasonal allergic rhinitis due to animal hair and dander, Seasonal allergic rhinitis due to pollen, Allergic rhinitis due to mold       * ALLERGEN IMMUNOTHERAPY PRESCRIPTION     5 mL    Name of Mix: Mix #3  Tree , Weeds Narayan, White  GLY 1:20 w/v, HS  0.5 ml Boxelder-Maple  Mix BHR (Boxelder Hard Red) 1:20 w/v, HS  0.5 ml [place], Common GLY 1:20 w/v, HS  0.5 ml Elm, American GLY 1:20 w/v, HS  0.5 ml Lamb's Quarters GLY 1:20 w/v, HS 0.5 ml Plantain, English GLY 1:20 w/v, HS 0.5 ml Ragweed Mixed 1:20 w/v ALK  0.5 ml Sagebrush, Mugwort GLY 1:20 w/v, HS 0.5 ml Diluent: HSA qs to 5ml    Non-seasonal allergic rhinitis due to animal hair and dander, Seasonal allergic rhinitis due to pollen, Allergic rhinitis due to mold       EPINEPHrine 0.3 MG/0.3ML injection 2-pack    AUVI-Q    2 mL    Inject 0.3 mLs (0.3 mg) into the muscle as needed for anaphylaxis    Need for desensitization to allergens       fluticasone furoate 200 MCG/ACT inhalation powder    ARNUITY ELLIPTA    1 Inhaler    Inhale 1 puff into the lungs daily    Mild persistent asthma without complication       loratadine 10 MG tablet    CLARITIN    90 tablet    Take 1 tablet (10 mg) by mouth daily    Chronic rhinitis       montelukast 10 MG tablet    SINGULAIR    90 tablet    Take 1 tablet (10 mg) by mouth At Bedtime    Environmental allergies, Intermittent asthma, uncomplicated       NASACORT AQ 55 MCG/ACT Inhaler   Generic drug:  triamcinolone     1 Bottle    Spray 2 sprays into both nostrils daily        * Notice:  This list has 3 medication(s) that are the same as other medications prescribed for you. Read the directions carefully, and ask your doctor or other care provider to review them with you.

## 2017-08-10 ENCOUNTER — ALLIED HEALTH/NURSE VISIT (OUTPATIENT)
Dept: ALLERGY | Facility: CLINIC | Age: 42
End: 2017-08-10
Payer: COMMERCIAL

## 2017-08-10 DIAGNOSIS — J30.9 ALLERGIC RHINITIS, UNSPECIFIED: Primary | ICD-10-CM

## 2017-08-10 PROCEDURE — 99207 ZZC NO CHARGE LOS: CPT

## 2017-08-10 PROCEDURE — 95117 IMMUNOTHERAPY INJECTIONS: CPT

## 2017-08-10 NOTE — MR AVS SNAPSHOT
After Visit Summary   8/10/2017    Reji Carpenter    MRN: [id number]           Patient Information     Date Of Birth          1975        Visit Information        Provider Department      8/10/2017 8:30 AM FZ ALLERGY SHOTS [facility]        Today's Diagnoses     Allergic rhinitis, unspecified    -  1       Follow-ups after your visit        Your next 10 appointments already scheduled     Aug 17, 2017  8:30 AM CDT   Nurse Only with FZ ALLERGY SHOTS   [facility]    [address]  [place] [address]   [phone]            Aug 24, 2017  8:30 AM CDT   Nurse Only with FZ ALLERGY SHOTS   [facility] ([facility]    [address]  [place] [address]   [phone]            Aug 31, 2017  8:30 AM CDT   Nurse Only with FZ ALLERGY SHOTS   [facility]    [address]  [place] [address]   [phone]            Sep 14, 2017  8:30 AM CDT   Nurse Only with FZ ALLERGY SHOTS   [facility]    [address]  [place] [address]   [phone]            Sep 21, 2017  8:30 AM CDT   Nurse Only with FZ ALLERGY SHOTS   [facility]    [address]  [place] [address]   [phone]            Sep 28, 2017  8:30 AM CDT   Nurse Only with FZ ALLERGY SHOTS   [facility] [place]    [address]  [place] [address]   [phone]            Oct 05, 2017  8:30 AM CDT   Nurse Only with FZ ALLERGY SHOTS   [facility] [place] [place]    [address]  [place] [address]   [phone]            Oct 12, 2017  8:30 AM CDT   Nurse Only with FZ ALLERGY SHOTS   [facility] [place] [facility] [place]    [address]  [place] [address]   [phone]            Oct 19,  2017  8:30 AM CDT   Nurse Only with FZ ALLERGY SHOTS   [facility]    [address]  [place] [address] [phone]            Oct 26, 2017  8:30 AM CDT   Nurse Only with FZ ALLERGY SHOTS   [facility]    [address]  [place] [address] [phone]              Who to contact     If you have questions or need follow up information about today's clinic visit or your schedule please contact [facility] directly at [phone].  Normal or non-critical lab and imaging results will be communicated to you by [employer], letter or phone within 4 business days after the clinic has received the results. If you do not hear from us within 7 days, please contact the clinic through [employer] or phone. If you have a critical or abnormal lab result, we will notify you by phone as soon as possible.  Submit refill requests through [employer] or call your pharmacy and they will forward the refill request to us. Please allow 3 business days for your refill to be completed.          Additional Information About Your Visit        [employer] Information     [employer] gives you secure access to your electronic health record. If you see a primary care provider, you can also send messages to your care team and make appointments. If you have questions, please call your primary care clinic.  If you do not have a primary care provider, please call [phone] and they will assist you.        Care EveryWhere ID     This is your Care EveryWhere ID. This could be used by other organizations to access your [facility] medical records  [id number]         Blood Pressure from Last 3 Encounters:   07/11/17 145/88   06/06/17 (!) 146/97   05/04/17 (!) 147/98    Weight from Last 3 Encounters:   07/11/17 88.7 kg (195 lb 8 oz)   06/06/17 88.5 kg (195 lb 3.2 oz)   05/04/17 86.6 kg (191 lb)              We Performed the Following     Allergy Shot:  Two or more injections        Primary Care Provider Office Phone # Fax #    Wili Kaplan [phone]       [address] [address]        Equal Access to Services     DENITA JAVIER : Hadii aad ku haio Sokingali, waaxda luqadaha, qaybta kaalmada adeviktoryada, murray rodríguezemily may. So [facility] [phone].    ATENCIÓN: Si habla español, tiene a bolton disposición servicios gratuitos de asistencia lingüística. Llame al [phone].    We comply with applicable federal civil rights laws and [place] laws. We do not discriminate on the basis of race, color, national origin, age, disability sex, sexual orientation or gender identity.            Thank you!     Thank you for choosing [facility]  for your care. Our goal is always to provide you with excellent care. Hearing back from our patients is one way we can continue to improve our services. Please take a few minutes to complete the written survey that you may receive in the mail after your visit with us. Thank you!             Your Updated Medication List - Protect others around you: Learn how to safely use, store and throw away your medicines at www.disposemymeds.org.          This list is accurate as of: 8/10/17  9:12 AM.  Always use your most recent med list.                   Brand Name Dispense Instructions for use Diagnosis    albuterol 108 (90 BASE) MCG/ACT Inhaler    PROAIR HFA/PROVENTIL HFA/VENTOLIN HFA    3 Inhaler    Inhale 2 puffs into the lungs every 4 hours as needed for shortness of breath / dyspnea    Intermittent asthma, uncomplicated       * ALLERGEN IMMUNOTHERAPY PRESCRIPTION     5 mL    Name of Mix: Mix #1  Cat, Dog Cat Hair, Standardized 10,000 BAU/mL, ALK  2.0 ml Dog Hair Dander, A. P.  1:100 w/v, HS  1.0 ml  Diluent: HSA qs to 5ml    Non-seasonal allergic rhinitis due to animal hair and dander, Seasonal allergic rhinitis due to pollen, Allergic rhinitis due to mold       *  ALLERGEN IMMUNOTHERAPY PRESCRIPTION     5 mL    Name of Mix: Mix #2  Mold Alternaria Tenuis GLY 1:10 w/v, HS  0.5 ml Diluent: HSA qs to 5ml    Non-seasonal allergic rhinitis due to animal hair and dander, Seasonal allergic rhinitis due to pollen, Allergic rhinitis due to mold       * ALLERGEN IMMUNOTHERAPY PRESCRIPTION     5 mL    Name of Mix: Mix #3  Tree , Weeds Narayan, White  GLY 1:20 w/v, HS  0.5 ml Boxelder-Maple  Mix BHR (Boxelder Hard Red) 1:20 w/v, HS  0.5 ml [place], Common GLY 1:20 w/v, HS  0.5 ml Elm, American GLY 1:20 w/v, HS  0.5 ml Lamb's Quarters GLY 1:20 w/v, HS 0.5 ml Plantain, English GLY 1:20 w/v, HS 0.5 ml Ragweed Mixed 1:20 w/v ALK  0.5 ml Sagebrush, Mugwort GLY 1:20 w/v, HS 0.5 ml Diluent: HSA qs to 5ml    Non-seasonal allergic rhinitis due to animal hair and dander, Seasonal allergic rhinitis due to pollen, Allergic rhinitis due to mold       EPINEPHrine 0.3 MG/0.3ML injection 2-pack    AUVI-Q    2 mL    Inject 0.3 mLs (0.3 mg) into the muscle as needed for anaphylaxis    Need for desensitization to allergens       fluticasone furoate 200 MCG/ACT inhalation powder    ARNUITY ELLIPTA    1 Inhaler    Inhale 1 puff into the lungs daily    Mild persistent asthma without complication       loratadine 10 MG tablet    CLARITIN    90 tablet    Take 1 tablet (10 mg) by mouth daily    Chronic rhinitis       montelukast 10 MG tablet    SINGULAIR    90 tablet    Take 1 tablet (10 mg) by mouth At Bedtime    Environmental allergies, Intermittent asthma, uncomplicated       NASACORT AQ 55 MCG/ACT Inhaler   Generic drug:  triamcinolone     1 Bottle    Spray 2 sprays into both nostrils daily        * Notice:  This list has 3 medication(s) that are the same as other medications prescribed for you. Read the directions carefully, and ask your doctor or other care provider to review them with you.

## 2017-08-10 NOTE — PROGRESS NOTES
Patient presented after waiting 30 minutes with no reaction to allergy injections. Discharged from clinic.    Whitney Damon RN

## 2017-08-17 ENCOUNTER — ALLIED HEALTH/NURSE VISIT (OUTPATIENT)
Dept: ALLERGY | Facility: CLINIC | Age: 42
End: 2017-08-17
Payer: COMMERCIAL

## 2017-08-17 DIAGNOSIS — J30.9 ALLERGIC RHINITIS, UNSPECIFIED: Primary | ICD-10-CM

## 2017-08-17 PROCEDURE — 95117 IMMUNOTHERAPY INJECTIONS: CPT

## 2017-08-17 NOTE — MR AVS SNAPSHOT
After Visit Summary   8/17/2017    Reji Carpenter    MRN: [id number]           Patient Information     Date Of Birth          1975        Visit Information        Provider Department      8/17/2017 8:30 AM FZ ALLERGY SHOTS [facility]        Today's Diagnoses     Allergic rhinitis, unspecified    -  1       Follow-ups after your visit        Your next 10 appointments already scheduled     Aug 24, 2017  8:30 AM CDT   Nurse Only with FZ ALLERGY SHOTS   [facility] ([facility])    [address]  [place] [address]   [phone]            Aug 31, 2017  8:30 AM CDT   Nurse Only with FZ ALLERGY SHOTS   [facility] ([facility])    [address]  [place] [address]   [phone]            Sep 14, 2017  8:30 AM CDT   Nurse Only with FZ ALLERGY SHOTS   [facility] ([facility])    [address]  [place] [address]   [phone]            Sep 19, 2017  9:00 AM CDT   MyChart Allergy Care Return with Rosemarie Posada MD   [facility] ([facility])    [address]  [place] [address]   [phone]            Sep 21, 2017  8:30 AM CDT   Nurse Only with FZ ALLERGY SHOTS   [facility] ([facility])    [address]  [place] [address]   [phone]            Sep 28, 2017  8:30 AM CDT   Nurse Only with FZ ALLERGY SHOTS   [facility] ([facility])    [address]  [place] [address]   [phone]            Oct 05, 2017  8:30 AM CDT   Nurse Only with FZ ALLERGY SHOTS   [facility] ([facility])    [address]  [place] [address]   [phone]            Oct 12, 2017  8:30 AM CDT   Nurse Only with FZ ALLERGY SHOTS   [facility] ([facility])    [address]  [place] [address] [phone]             Oct 19, 2017  8:30 AM CDT   Nurse Only with FZ ALLERGY SHOTS   [facility]    [address]  [place] [address] [phone]            Oct 26, 2017  8:30 AM CDT   Nurse Only with FZ ALLERGY SHOTS   [facility] ([facility])    [address]  [place] [address] [phone]              Who to contact     If you have questions or need follow up information about today's clinic visit or your schedule please contact [facility] directly at [phone].  Normal or non-critical lab and imaging results will be communicated to you by Delve Networkshart, letter or phone within 4 business days after the clinic has received the results. If you do not hear from us within 7 days, please contact the clinic through Immunexpresst or phone. If you have a critical or abnormal lab result, we will notify you by phone as soon as possible.  Submit refill requests through PublicRelay or call your pharmacy and they will forward the refill request to us. Please allow 3 business days for your refill to be completed.          Additional Information About Your Visit        [employer] Information     PublicRelay gives you secure access to your electronic health record. If you see a primary care provider, you can also send messages to your care team and make appointments. If you have questions, please call your primary care clinic.  If you do not have a primary care provider, please call [phone] and they will assist you.        Care EveryWhere ID     This is your Care EveryWhere ID. This could be used by other organizations to access your [place] medical records  [id number]         Blood Pressure from Last 3 Encounters:   07/11/17 145/88   06/06/17 (!) 146/97   05/04/17 (!) 147/98    Weight from Last 3 Encounters:   07/11/17 88.7 kg (195 lb 8 oz)   06/06/17 88.5 kg (195 lb 3.2 oz)   05/04/17 86.6 kg (191 lb)              We Performed the Following      Allergy Shot: Two or more injections        Primary Care Provider Office Phone # Fax #    Wili Kaplan [phone]       [address] [facility] [address]        Equal Access to Services     DENITA JAVIER : Hadii keily ku haio Sokingali, waaxda luqadaha, qaybta kaalmada adeegyada, murray figueroa laMichaelemily may. So [facility] [phone].    ATENCIÓN: Si habla español, tiene a bolton disposición servicios gratuitos de asistencia lingüística. Llame al [phone].    We comply with applicable federal civil rights laws and [place] laws. We do not discriminate on the basis of race, color, national origin, age, disability sex, sexual orientation or gender identity.            Thank you!     Thank you for choosing [facility]  for your care. Our goal is always to provide you with excellent care. Hearing back from our patients is one way we can continue to improve our services. Please take a few minutes to complete the written survey that you may receive in the mail after your visit with us. Thank you!             Your Updated Medication List - Protect others around you: Learn how to safely use, store and throw away your medicines at www.disposemymeds.org.          This list is accurate as of: 8/17/17  9:14 AM.  Always use your most recent med list.                   Brand Name Dispense Instructions for use Diagnosis    albuterol 108 (90 BASE) MCG/ACT Inhaler    PROAIR HFA/PROVENTIL HFA/VENTOLIN HFA    3 Inhaler    Inhale 2 puffs into the lungs every 4 hours as needed for shortness of breath / dyspnea    Intermittent asthma, uncomplicated       * ALLERGEN IMMUNOTHERAPY PRESCRIPTION     5 mL    Name of Mix: Mix #1  Cat, Dog Cat Hair, Standardized 10,000 BAU/mL, ALK  2.0 ml Dog Hair Dander, A. P.  1:100 w/v, HS  1.0 ml  Diluent: HSA qs to 5ml    Non-seasonal allergic rhinitis due to animal hair and dander, Seasonal allergic rhinitis due to pollen, Allergic rhinitis due to  mold       * ALLERGEN IMMUNOTHERAPY PRESCRIPTION     5 mL    Name of Mix: Mix #2  Mold Alternaria Tenuis GLY 1:10 w/v, HS  0.5 ml Diluent: HSA qs to 5ml    Non-seasonal allergic rhinitis due to animal hair and dander, Seasonal allergic rhinitis due to pollen, Allergic rhinitis due to mold       * ALLERGEN IMMUNOTHERAPY PRESCRIPTION     5 mL    Name of Mix: Mix #3  Tree , Weeds Narayan, White  GLY 1:20 w/v, HS  0.5 ml Boxelder-Maple  Mix BHR (Boxelder Hard Red) 1:20 w/v, HS  0.5 ml [place], Common GLY 1:20 w/v, HS  0.5 ml Elm, American GLY 1:20 w/v, HS  0.5 ml Lamb's Quarters GLY 1:20 w/v, HS 0.5 ml Plantain, English GLY 1:20 w/v, HS 0.5 ml Ragweed Mixed 1:20 w/v ALK  0.5 ml Sagebrush, Mugwort GLY 1:20 w/v, HS 0.5 ml Diluent: HSA qs to 5ml    Non-seasonal allergic rhinitis due to animal hair and dander, Seasonal allergic rhinitis due to pollen, Allergic rhinitis due to mold       EPINEPHrine 0.3 MG/0.3ML injection 2-pack    AUVI-Q    2 mL    Inject 0.3 mLs (0.3 mg) into the muscle as needed for anaphylaxis    Need for desensitization to allergens       fluticasone furoate 200 MCG/ACT inhalation powder    ARNUITY ELLIPTA    1 Inhaler    Inhale 1 puff into the lungs daily    Mild persistent asthma without complication       loratadine 10 MG tablet    CLARITIN    90 tablet    Take 1 tablet (10 mg) by mouth daily    Chronic rhinitis       montelukast 10 MG tablet    SINGULAIR    90 tablet    Take 1 tablet (10 mg) by mouth At Bedtime    Environmental allergies, Intermittent asthma, uncomplicated       NASACORT AQ 55 MCG/ACT Inhaler   Generic drug:  triamcinolone     1 Bottle    Spray 2 sprays into both nostrils daily        * Notice:  This list has 3 medication(s) that are the same as other medications prescribed for you. Read the directions carefully, and ask your doctor or other care provider to review them with you.

## 2017-08-17 NOTE — PROGRESS NOTES
Patient presented after waiting 30 minutes with no reaction to allergy injections. Discharged from clinic.    Tomeka Felix RN ....... 8/17/2017 9:13 AM

## 2017-08-24 ENCOUNTER — ALLIED HEALTH/NURSE VISIT (OUTPATIENT)
Dept: ALLERGY | Facility: CLINIC | Age: 42
End: 2017-08-24
Payer: COMMERCIAL

## 2017-08-24 DIAGNOSIS — J30.9 ALLERGIC RHINITIS, UNSPECIFIED: Primary | ICD-10-CM

## 2017-08-24 PROCEDURE — 95117 IMMUNOTHERAPY INJECTIONS: CPT

## 2017-08-24 NOTE — MR AVS SNAPSHOT
After Visit Summary   8/24/2017    Reji Carpenter    MRN: [id number]           Patient Information     Date Of Birth          1975        Visit Information        Provider Department      8/24/2017 8:30 AM FZ ALLERGY SHOTS [facility]        Today's Diagnoses     Allergic rhinitis, unspecified    -  1       Follow-ups after your visit        Your next 10 appointments already scheduled     Aug 31, 2017  8:30 AM CDT   Nurse Only with FZ ALLERGY SHOTS   [facility] ([facility])    [address]  [place] [address]   [phone]            Sep 14, 2017  8:30 AM CDT   Nurse Only with FZ ALLERGY SHOTS   [facility] ([facility])    [address]  [place] [address]   [phone]            Sep 19, 2017  9:00 AM CDT   MyChart Allergy Care Return with Rosemarie Posada MD   [facility] ([facility])    [address]  [place] [address]   [phone]            Sep 21, 2017  8:30 AM CDT   Nurse Only with FZ ALLERGY SHOTS   [facility] ([facility])    [address]  [place] [address]   [phone]            Sep 28, 2017  8:30 AM CDT   Nurse Only with FZ ALLERGY SHOTS   [facility] ([facility])    [address]  [place] [address]   [phone]            Oct 05, 2017  8:30 AM CDT   Nurse Only with FZ ALLERGY SHOTS   [facility] ([facility])    [address]  [place] [address]   [phone]            Oct 12, 2017  8:30 AM CDT   Nurse Only with FZ ALLERGY SHOTS   [facility] ([facility])    [address]  [place] [address]   [phone]            Oct 19, 2017  8:30 AM CDT   Nurse Only with FZ ALLERGY SHOTS   [facility] ([facility])    [address]  [place] [address] [phone]             Oct 26, 2017  8:30 AM CDT   Nurse Only with FZ ALLERGY SHOTS   [facility] [place] ([facility])    [address] [address] [phone]              Who to contact     If you have questions or need follow up information about today's clinic visit or your schedule please contact [facility] directly at [phone].  Normal or non-critical lab and imaging results will be communicated to you by [employer] Ecof[employer]concepthart, letter or phone within 4 business days after the clinic has received the results. If you do not hear from us within 7 days, please contact the clinic through ThisClickst or phone. If you have a critical or abnormal lab result, we will notify you by phone as soon as possible.  Submit refill requests through [employer] or call your pharmacy and they will forward the refill request to us. Please allow 3 business days for your refill to be completed.          Additional Information About Your Visit        [employer] Information     [employer] gives you secure access to your electronic health record. If you see a primary care provider, you can also send messages to your care team and make appointments. If you have questions, please call your primary care clinic.  If you do not have a primary care provider, please call [phone] and they will assist you.        Care EveryWhere ID     This is your Care EveryWhere ID. This could be used by other organizations to access your [place] medical records  [id number]         Blood Pressure from Last 3 Encounters:   07/11/17 145/88   06/06/17 (!) 146/97   05/04/17 (!) 147/98    Weight from Last 3 Encounters:   07/11/17 88.7 kg (195 lb 8 oz)   06/06/17 88.5 kg (195 lb 3.2 oz)   05/04/17 86.6 kg (191 lb)              We Performed the Following     Allergy Shot: Two or more injections        Primary Care Provider Office Phone # Fax #    Wili Kaplan [phone] [address]  [address]        Equal Access to Services     [facility]: Hadii aad ku hadsabaso Sodennys, waaxda luqadaha, qaybta kaalmada jes, murray joesph poerickaniket flores . So [facility] [phone].    ATENCIÓN: Si habla español, tiene a bolton disposición servicios gratuitos de asistencia lingüística. Halleame al [phone].    We comply with applicable federal civil rights laws and [place] laws. We do not discriminate on the basis of race, color, national origin, age, disability sex, sexual orientation or gender identity.            Thank you!     Thank you for choosing [facility]  for your care. Our goal is always to provide you with excellent care. Hearing back from our patients is one way we can continue to improve our services. Please take a few minutes to complete the written survey that you may receive in the mail after your visit with us. Thank you!             Your Updated Medication List - Protect others around you: Learn how to safely use, store and throw away your medicines at www.disposemymeds.org.          This list is accurate as of: 8/24/17  9:18 AM.  Always use your most recent med list.                   Brand Name Dispense Instructions for use Diagnosis    albuterol 108 (90 BASE) MCG/ACT Inhaler    PROAIR HFA/PROVENTIL HFA/VENTOLIN HFA    3 Inhaler    Inhale 2 puffs into the lungs every 4 hours as needed for shortness of breath / dyspnea    Intermittent asthma, uncomplicated       * ALLERGEN IMMUNOTHERAPY PRESCRIPTION     5 mL    Name of Mix: Mix #1  Cat, Dog Cat Hair, Standardized 10,000 BAU/mL, ALK  2.0 ml Dog Hair Dander, A. P.  1:100 w/v, HS  1.0 ml  Diluent: HSA qs to 5ml    Non-seasonal allergic rhinitis due to animal hair and dander, Seasonal allergic rhinitis due to pollen, Allergic rhinitis due to mold       * ALLERGEN IMMUNOTHERAPY PRESCRIPTION     5 mL    Name of Mix: Mix #2  Mold Alternaria Tenuis GLY 1:10 w/v, HS  0.5 ml Diluent: HSA qs to 5ml    Non-seasonal allergic  rhinitis due to animal hair and dander, Seasonal allergic rhinitis due to pollen, Allergic rhinitis due to mold       * ALLERGEN IMMUNOTHERAPY PRESCRIPTION     5 mL    Name of Mix: Mix #3  Tree , Weeds Narayan, White  GLY 1:20 w/v, HS  0.5 ml Boxelder-Maple  Mix BHR (Boxelder Hard Red) 1:20 w/v, HS  0.5 ml [place], Common GLY 1:20 w/v, HS  0.5 ml Elm, American GLY 1:20 w/v, HS  0.5 ml Lamb's Quarters GLY 1:20 w/v, HS 0.5 ml Plantain, English GLY 1:20 w/v, HS 0.5 ml Ragweed Mixed 1:20 w/v ALK  0.5 ml Sagebrush, Mugwort GLY 1:20 w/v, HS 0.5 ml Diluent: HSA qs to 5ml    Non-seasonal allergic rhinitis due to animal hair and dander, Seasonal allergic rhinitis due to pollen, Allergic rhinitis due to mold       EPINEPHrine 0.3 MG/0.3ML injection 2-pack    AUVI-Q    2 mL    Inject 0.3 mLs (0.3 mg) into the muscle as needed for anaphylaxis    Need for desensitization to allergens       fluticasone furoate 200 MCG/ACT inhalation powder    ARNUITY ELLIPTA    1 Inhaler    Inhale 1 puff into the lungs daily    Mild persistent asthma without complication       loratadine 10 MG tablet    CLARITIN    90 tablet    Take 1 tablet (10 mg) by mouth daily    Chronic rhinitis       montelukast 10 MG tablet    SINGULAIR    90 tablet    Take 1 tablet (10 mg) by mouth At Bedtime    Environmental allergies, Intermittent asthma, uncomplicated       NASACORT AQ 55 MCG/ACT Inhaler   Generic drug:  triamcinolone     1 Bottle    Spray 2 sprays into both nostrils daily        * Notice:  This list has 3 medication(s) that are the same as other medications prescribed for you. Read the directions carefully, and ask your doctor or other care provider to review them with you.

## 2017-08-24 NOTE — PROGRESS NOTES
Patient presented after waiting 30 minutes with no reaction to allergy injections. Discharged from clinic.    Tomeka Felix RN ....... 8/24/2017 9:18 AM

## 2017-08-31 ENCOUNTER — ALLIED HEALTH/NURSE VISIT (OUTPATIENT)
Dept: ALLERGY | Facility: CLINIC | Age: 42
End: 2017-08-31
Payer: COMMERCIAL

## 2017-08-31 DIAGNOSIS — J30.9 ALLERGIC RHINITIS, UNSPECIFIED: Primary | ICD-10-CM

## 2017-08-31 PROCEDURE — 95117 IMMUNOTHERAPY INJECTIONS: CPT

## 2017-08-31 NOTE — MR AVS SNAPSHOT
After Visit Summary   8/31/2017    Reji Carpenter    MRN: [id number]           Patient Information     Date Of Birth          1975        Visit Information        Provider Department      8/31/2017 8:30 AM FZ ALLERGY SHOTS [facility]        Today's Diagnoses     Allergic rhinitis, unspecified    -  1       Follow-ups after your visit        Your next 10 appointments already scheduled     Sep 14, 2017  8:30 AM CDT   Nurse Only with FZ ALLERGY SHOTS   [facility] ([facility])    [address]  [place] [address]   [phone]            Sep 19, 2017  9:00 AM CDT   MyChart Allergy Care Return with Rosemarie Posada MD   [facility] ([facility])    [address]  [place] [address]   [phone]            Sep 21, 2017  8:30 AM CDT   Nurse Only with FZ ALLERGY SHOTS   [facility] ([facility])    [address]  [place] [address]   [phone]            Sep 28, 2017  8:30 AM CDT   Nurse Only with FZ ALLERGY SHOTS   [facility] ([facility])    [address]  [place] [address]   [phone]            Oct 05, 2017  8:30 AM CDT   Nurse Only with FZ ALLERGY SHOTS   [facility] ([facility])    [address]  [place] [address]   [phone]            Oct 12, 2017  8:30 AM CDT   Nurse Only with FZ ALLERGY SHOTS   [facility] ([facility])    [address]  [place] [address]   [phone]            Oct 19, 2017  8:30 AM CDT   Nurse Only with FZ ALLERGY SHOTS   [facility] ([facility])    [address]  [place] [address]   [phone]            Oct 26, 2017  8:30 AM CDT   Nurse Only with FZ ALLERGY SHOTS   [facility] ([facility])    [address]  [place] [address] [phone]               Who to contact     If you have questions or need follow up information about today's clinic visit or your schedule please contact [facility] directly at [phone].  Normal or non-critical lab and imaging results will be communicated to you by MyChart, letter or phone within 4 business days after the clinic has received the results. If you do not hear from us within 7 days, please contact the clinic through MyChart or phone. If you have a critical or abnormal lab result, we will notify you by phone as soon as possible.  Submit refill requests through [employer] or call your pharmacy and they will forward the refill request to us. Please allow 3 business days for your refill to be completed.          Additional Information About Your Visit        [employer] Information     [employer] gives you secure access to your electronic health record. If you see a primary care provider, you can also send messages to your care team and make appointments. If you have questions, please call your primary care clinic.  If you do not have a primary care provider, please call [phone] and they will assist you.        Care EveryWhere ID     This is your Care EveryWhere ID. This could be used by other organizations to access your [place] medical records  [id number]         Blood Pressure from Last 3 Encounters:   07/11/17 145/88   06/06/17 (!) 146/97   05/04/17 (!) 147/98    Weight from Last 3 Encounters:   07/11/17 88.7 kg (195 lb 8 oz)   06/06/17 88.5 kg (195 lb 3.2 oz)   05/04/17 86.6 kg (191 lb)              We Performed the Following     Allergy Shot: Two or more injections        Primary Care Provider Office Phone # Fax #    Wili Kaplan [phone]       [address] [place], [place]. \[facility].\"" [id number]        Equal Access to Services     DENITA JAVIER : Lois Servin, karen rodriguez, murray porter. So [employer]  [phone].    ATENCIÓN: Si marii vazquez, tiene a bolton disposición servicios gratuitos de asistencia lingüística. Candice hansen [phone].    We comply with applicable federal civil rights laws and [place] laws. We do not discriminate on the basis of race, color, national origin, age, disability sex, sexual orientation or gender identity.            Thank you!     Thank you for choosing [facility]  for your care. Our goal is always to provide you with excellent care. Hearing back from our patients is one way we can continue to improve our services. Please take a few minutes to complete the written survey that you may receive in the mail after your visit with us. Thank you!             Your Updated Medication List - Protect others around you: Learn how to safely use, store and throw away your medicines at www.disposemymeds.org.          This list is accurate as of: 8/31/17  9:24 AM.  Always use your most recent med list.                   Brand Name Dispense Instructions for use Diagnosis    albuterol 108 (90 BASE) MCG/ACT Inhaler    PROAIR HFA/PROVENTIL HFA/VENTOLIN HFA    3 Inhaler    Inhale 2 puffs into the lungs every 4 hours as needed for shortness of breath / dyspnea    Intermittent asthma, uncomplicated       * ALLERGEN IMMUNOTHERAPY PRESCRIPTION     5 mL    Name of Mix: Mix #1  Cat, Dog Cat Hair, Standardized 10,000 BAU/mL, ALK  2.0 ml Dog Hair Dander, A. P.  1:100 w/v, HS  1.0 ml  Diluent: HSA qs to 5ml    Non-seasonal allergic rhinitis due to animal hair and dander, Seasonal allergic rhinitis due to pollen, Allergic rhinitis due to mold       * ALLERGEN IMMUNOTHERAPY PRESCRIPTION     5 mL    Name of Mix: Mix #2  Mold Alternaria Tenuis GLY 1:10 w/v, HS  0.5 ml Diluent: HSA qs to 5ml    Non-seasonal allergic rhinitis due to animal hair and dander, Seasonal allergic rhinitis due to pollen, Allergic rhinitis due to mold       * ALLERGEN IMMUNOTHERAPY PRESCRIPTION     5 mL    Name of Mix: Mix #3   Tree , Weeds Narayan, White  GLY 1:20 w/v, HS  0.5 ml Boxelder-Maple  Mix BHR (Boxelder Hard Red) 1:20 w/v, HS  0.5 ml [place], Common GLY 1:20 w/v, HS  0.5 ml Elm, American GLY 1:20 w/v, HS  0.5 ml Lamb's Quarters GLY 1:20 w/v, HS 0.5 ml Plantain, English GLY 1:20 w/v, HS 0.5 ml Ragweed Mixed 1:20 w/v ALK  0.5 ml Sagebrush, Mugwort GLY 1:20 w/v, HS 0.5 ml Diluent: HSA qs to 5ml    Non-seasonal allergic rhinitis due to animal hair and dander, Seasonal allergic rhinitis due to pollen, Allergic rhinitis due to mold       EPINEPHrine 0.3 MG/0.3ML injection 2-pack    AUVI-Q    2 mL    Inject 0.3 mLs (0.3 mg) into the muscle as needed for anaphylaxis    Need for desensitization to allergens       fluticasone furoate 200 MCG/ACT inhalation powder    ARNUITY ELLIPTA    1 Inhaler    Inhale 1 puff into the lungs daily    Mild persistent asthma without complication       loratadine 10 MG tablet    CLARITIN    90 tablet    Take 1 tablet (10 mg) by mouth daily    Chronic rhinitis       montelukast 10 MG tablet    SINGULAIR    90 tablet    Take 1 tablet (10 mg) by mouth At Bedtime    Environmental allergies, Intermittent asthma, uncomplicated       NASACORT AQ 55 MCG/ACT Inhaler   Generic drug:  triamcinolone     1 Bottle    Spray 2 sprays into both nostrils daily        * Notice:  This list has 3 medication(s) that are the same as other medications prescribed for you. Read the directions carefully, and ask your doctor or other care provider to review them with you.

## 2017-09-14 ENCOUNTER — ALLIED HEALTH/NURSE VISIT (OUTPATIENT)
Dept: ALLERGY | Facility: CLINIC | Age: 42
End: 2017-09-14
Payer: COMMERCIAL

## 2017-09-14 DIAGNOSIS — J30.9 ALLERGIC RHINITIS, UNSPECIFIED: Primary | ICD-10-CM

## 2017-09-14 PROCEDURE — 95117 IMMUNOTHERAPY INJECTIONS: CPT

## 2017-09-14 NOTE — MR AVS SNAPSHOT
After Visit Summary   9/14/2017    Reji Carpenter    MRN: [id number]           Patient Information     Date Of Birth          1975        Visit Information        Provider Department      9/14/2017 8:30 AM [facility]        Today's Diagnoses     Allergic rhinitis, unspecified    -  1       Follow-ups after your visit        Your next 10 appointments already scheduled     Sep 19, 2017  9:00 AM CDT   MyChart Allergy Care Return with Rosemarie Posada MD   [facility] ([facility])    [address]  [place] [address]   [phone]            Sep 21, 2017  8:30 AM CDT   Nurse Only with FZ ALLERGY SHOTS   [facility] ([facility]    [address]  [place] [address]   [phone]            Sep 28, 2017  8:30 AM CDT   Nurse Only with FZ ALLERGY SHOTS   [facility] ([facility])    [address]  [place] [address]   [phone]            Oct 05, 2017  8:30 AM CDT   Nurse Only with FZ ALLERGY SHOTS   [facility] ([facility])    [address]  [place] [address]   [phone]            Oct 12, 2017  8:30 AM CDT   Nurse Only with FZ ALLERGY SHOTS   [facility] ([facility])    [address]  [place] [address]   [phone]            Oct 19, 2017  8:30 AM CDT   Nurse Only with FZ ALLERGY SHOTS   [facility] ([facility]    [address]  [place] [address]   [phone]            Oct 26, 2017  8:30 AM CDT   Nurse Only with FZ ALLERGY SHOTS   [facility] ([facility])    [address]  [place] [address]   [phone]              Who to contact     If you have questions or need follow up information about today's clinic visit or your schedule please contact [facility] [place] directly at  [phone].  Normal or non-critical lab and imaging results will be communicated to you by MyChart, letter or phone within 4 business days after the clinic has received the results. If you do not hear from us within 7 days, please contact the clinic through [employer] or phone. If you have a critical or abnormal lab result, we will notify you by phone as soon as possible.  Submit refill requests through [employer] or call your pharmacy and they will forward the refill request to us. Please allow 3 business days for your refill to be completed.          Additional Information About Your Visit        [employer] Information     [employer] gives you secure access to your electronic health record. If you see a primary care provider, you can also send messages to your care team and make appointments. If you have questions, please call your primary care clinic.  If you do not have a primary care provider, please call [phone] and they will assist you.        Care EveryWhere ID     This is your Care EveryWhere ID. This could be used by other organizations to access your [place] medical records  [id number]         Blood Pressure from Last 3 Encounters:   07/11/17 145/88   06/06/17 (!) 146/97   05/04/17 (!) 147/98    Weight from Last 3 Encounters:   07/11/17 88.7 kg (195 lb 8 oz)   06/06/17 88.5 kg (195 lb 3.2 oz)   05/04/17 86.6 kg (191 lb)              We Performed the Following     Allergy Shot: Two or more injections        Primary Care Provider Office Phone # Fax #    Wili Kaplan [phone]       [address] [facility] [address]        Equal Access to Services     [facility]: Hadii aad ku hadasho Soomaali, waaxda luqadaha, qaybta kaalmada murray andre. So [facility] [phone].    ATENCIÓN: Si habla español, tiene a bolton disposición servicios gratuitos de asistencia lingüística. Llame al [phone].    We comply with applicable federal civil rights  laws and [place] laws. We do not discriminate on the basis of race, color, national origin, age, disability sex, sexual orientation or gender identity.            Thank you!     Thank you for choosing [facility]  for your care. Our goal is always to provide you with excellent care. Hearing back from our patients is one way we can continue to improve our services. Please take a few minutes to complete the written survey that you may receive in the mail after your visit with us. Thank you!             Your Updated Medication List - Protect others around you: Learn how to safely use, store and throw away your medicines at www.disposemymeds.org.          This list is accurate as of: 9/14/17  9:28 AM.  Always use your most recent med list.                   Brand Name Dispense Instructions for use Diagnosis    albuterol 108 (90 BASE) MCG/ACT Inhaler    PROAIR HFA/PROVENTIL HFA/VENTOLIN HFA    3 Inhaler    Inhale 2 puffs into the lungs every 4 hours as needed for shortness of breath / dyspnea    Intermittent asthma, uncomplicated       * ALLERGEN IMMUNOTHERAPY PRESCRIPTION     5 mL    Name of Mix: Mix #1  Cat, Dog Cat Hair, Standardized 10,000 BAU/mL, ALK  2.0 ml Dog Hair Dander, A. P.  1:100 w/v, HS  1.0 ml  Diluent: HSA qs to 5ml    Non-seasonal allergic rhinitis due to animal hair and dander, Seasonal allergic rhinitis due to pollen, Allergic rhinitis due to mold       * ALLERGEN IMMUNOTHERAPY PRESCRIPTION     5 mL    Name of Mix: Mix #2  Mold Alternaria Tenuis GLY 1:10 w/v, HS  0.5 ml Diluent: HSA qs to 5ml    Non-seasonal allergic rhinitis due to animal hair and dander, Seasonal allergic rhinitis due to pollen, Allergic rhinitis due to mold       * ALLERGEN IMMUNOTHERAPY PRESCRIPTION     5 mL    Name of Mix: Mix #3  Tree , Weeds Narayan, White  GLY 1:20 w/v, HS  0.5 ml Boxelder-Maple  Mix BHR (Boxelder Hard Red) 1:20 w/v, HS  0.5 ml Jay, Common GLY 1:20 w/v, HS  0.5 ml Elm, American GLY 1:20 w/v,  HS  0.5 ml Lamb's Quarters GLY 1:20 w/v, HS 0.5 ml Plantain, English GLY 1:20 w/v, HS 0.5 ml Ragweed Mixed 1:20 w/v ALK  0.5 ml Sagebrush, Mugwort GLY 1:20 w/v, HS 0.5 ml Diluent: HSA qs to 5ml    Non-seasonal allergic rhinitis due to animal hair and dander, Seasonal allergic rhinitis due to pollen, Allergic rhinitis due to mold       EPINEPHrine 0.3 MG/0.3ML injection 2-pack    AUVI-Q    2 mL    Inject 0.3 mLs (0.3 mg) into the muscle as needed for anaphylaxis    Need for desensitization to allergens       fluticasone furoate 200 MCG/ACT inhalation powder    ARNUITY ELLIPTA    1 Inhaler    Inhale 1 puff into the lungs daily    Mild persistent asthma without complication       loratadine 10 MG tablet    CLARITIN    90 tablet    Take 1 tablet (10 mg) by mouth daily    Chronic rhinitis       montelukast 10 MG tablet    SINGULAIR    90 tablet    Take 1 tablet (10 mg) by mouth At Bedtime    Environmental allergies, Intermittent asthma, uncomplicated       NASACORT AQ 55 MCG/ACT Inhaler   Generic drug:  triamcinolone     1 Bottle    Spray 2 sprays into both nostrils daily        * Notice:  This list has 3 medication(s) that are the same as other medications prescribed for you. Read the directions carefully, and ask your doctor or other care provider to review them with you.

## 2017-09-14 NOTE — PROGRESS NOTES
Patient presented after waiting 30 minutes with no reaction to allergy injections. Discharged from clinic.    Tomeka Felix RN ....... 9/14/2017 9:27 AM

## 2017-09-19 ENCOUNTER — OFFICE VISIT (OUTPATIENT)
Dept: ALLERGY | Facility: CLINIC | Age: 42
End: 2017-09-19
Payer: COMMERCIAL

## 2017-09-19 VITALS
HEART RATE: 71 BPM | OXYGEN SATURATION: 98 % | DIASTOLIC BLOOD PRESSURE: 95 MMHG | SYSTOLIC BLOOD PRESSURE: 149 MMHG | WEIGHT: 197.2 LBS | HEIGHT: 71 IN | BODY MASS INDEX: 27.61 KG/M2

## 2017-09-19 DIAGNOSIS — J30.89 ALLERGIC RHINITIS DUE TO MOLD: ICD-10-CM

## 2017-09-19 DIAGNOSIS — J45.30 MILD PERSISTENT ASTHMA WITHOUT COMPLICATION: Primary | ICD-10-CM

## 2017-09-19 DIAGNOSIS — J30.1 CHRONIC SEASONAL ALLERGIC RHINITIS DUE TO POLLEN: ICD-10-CM

## 2017-09-19 DIAGNOSIS — J30.81 NON-SEASONAL ALLERGIC RHINITIS DUE TO ANIMAL HAIR AND DANDER: ICD-10-CM

## 2017-09-19 LAB
FEF 25/75: NORMAL
FEV-1: NORMAL
FEV1/FVC: NORMAL
FVC: NORMAL

## 2017-09-19 PROCEDURE — 99213 OFFICE O/P EST LOW 20 MIN: CPT | Mod: 25 | Performed by: ALLERGY & IMMUNOLOGY

## 2017-09-19 PROCEDURE — 94010 BREATHING CAPACITY TEST: CPT | Performed by: ALLERGY & IMMUNOLOGY

## 2017-09-19 NOTE — PROGRESS NOTES
Reji Carpenter was seen in the Allergy Clinic at [facility]. The following are my recommendations regarding his Mild Persistent Asthma, Allergic Rhinitis Due to Animals, Allergic Rhinitis Due to Pollen and Allergic Rhinitis Due to Mold    1. Continue Qvar 80mcg, 2 puffs twice daily  2. Continue montelukast 10mg daily  3. Continue albuterol HFA, 2-4 puffs every 4 hours as needed  4. Continue loratadine 10mg daily  5. Follow-up in 6 months      Reji Carpenter is a 42 year old American male who is seen today for follow-up of allergen immunotherapy. He states that he has tolerated he injections well and has not had any systemic reactions to treatment. Reji continues to take claritin and singulair daily.    Reji reports that his asthma has been well controlled. He continues to take qvar 2 puffs twice daily and reports no need for albuterol. He denies nocturnal asthma symptoms or limitations in his activity.      REVIEW OF SYSTEMS:  General: negative for weight gain. negative for weight loss. negative for changes in sleep.   Eyes: negative for itching. negative for redness. negative for tearing/watering.  Ears: negative for fullness. negative for hearing loss. negative for dizziness.   Nose: negative for snoring.negative for changes in smell. negative for drainage.   Throat: negative for hoarseness. negative for sore throat. negative for trouble swallowing.   Lungs: negative for shortness of breath.negative for wheezing. negative for sputum production.   Cardiovascular: negative for chest pain. negative for swelling of ankles. negative for fast or irregular heartbeat.   Gastrointestinal: negative for nausea. negative for heartburn. negative for acid reflux.   Musculoskeletal: negative for joint pain. negative for joint stiffness. negative for joint swelling.   Neurologic: negative for seizures. negative for fainting. negative for weakness.   Psychiatric: negative for changes in mood. negative  for anxiety.   Endocrine: negative for cold intolerance. negative for heat intolerance. negative for tremors.   Hematologic: negative for easy bruising. negative for easy bleeding.  Integumentary: negative for rash. negative for scaling. negative for nail changes.       Current Outpatient Prescriptions:      QVAR 80 MCG/ACT Inhaler, , Disp: , Rfl:      fluticasone furoate (ARNUITY ELLIPTA) 200 MCG/ACT inhalation powder, Inhale 1 puff into the lungs daily, Disp: 1 Inhaler, Rfl: 2     EPINEPHrine (AUVI-Q) 0.3 MG/0.3ML injection, Inject 0.3 mLs (0.3 mg) into the muscle as needed for anaphylaxis, Disp: 2 mL, Rfl: 2     ORDER FOR ALLERGEN IMMUNOTHERAPY, Name of Mix: Mix #1  Cat, Dog Cat Hair, Standardized 10,000 BAU/mL, ALK  2.0 ml Dog Hair Dander, A. P.  1:100 w/v, HS  1.0 ml  Diluent: HSA qs to 5ml, Disp: 5 mL, Rfl: PRN     ORDER FOR ALLERGEN IMMUNOTHERAPY, Name of Mix: Mix #2  Mold Alternaria Tenuis GLY 1:10 w/v, HS  0.5 ml Diluent: HSA qs to 5ml, Disp: 5 mL, Rfl: PRN     ORDER FOR ALLERGEN IMMUNOTHERAPY, Name of Mix: Mix #3  Tree , Weeds Narayan, White  GLY 1:20 w/v, HS  0.5 ml Boxelder-Maple  Mix BHR (Boxelder Hard Red) 1:20 w/v, HS  0.5 ml [place], Common GLY 1:20 w/v, HS  0.5 ml Elm, American GLY 1:20 w/v, HS  0.5 ml Lamb's Quarters GLY 1:20 w/v, HS 0.5 ml Plantain, English GLY 1:20 w/v, HS 0.5 ml Ragweed Mixed 1:20 w/v ALK  0.5 ml Sagebrush, Mugwort GLY 1:20 w/v, HS 0.5 ml Diluent: HSA qs to 5ml, Disp: 5 mL, Rfl: PRN     triamcinolone (NASACORT AQ) 55 MCG/ACT Inhaler, Spray 2 sprays into both nostrils daily, Disp: 1 Bottle, Rfl: 0     montelukast (SINGULAIR) 10 MG tablet, Take 1 tablet (10 mg) by mouth At Bedtime, Disp: 90 tablet, Rfl: 3     albuterol (PROAIR HFA/PROVENTIL HFA/VENTOLIN HFA) 108 (90 BASE) MCG/ACT Inhaler, Inhale 2 puffs into the lungs every 4 hours as needed for shortness of breath / dyspnea, Disp: 3 Inhaler, Rfl: 3     loratadine (CLARITIN) 10 MG tablet, Take 1 tablet (10 mg) by mouth daily, Disp:  "90 tablet, Rfl: 2    EXAM:   BP (!) 149/95  Pulse 71  Ht 1.803 m (5' 11\")  Wt 89.4 kg (197 lb 3.2 oz)  SpO2 98%  BMI 27.5 kg/m2  GENERAL APPEARANCE: alert, cooperative and not in distress  SKIN: no rashes, no lesions  HEAD: atraumatic, normocephalic  ENT: no scars or lesions, nasal exam showed no discharge, swelling or lesions noted, tongue midline and normal, soft palate, uvula, and tonsils normal  NECK: no asymmetry, masses, or scars, supple without significant adenopathy  LUNGS: unlabored respirations, no intercostal retractions or accessory muscle use, clear to auscultation without rales or wheezes  HEART: regular rate and rhythm without murmurs and normal S1 and S2  MUSCULOSKELETAL: no musculoskeletal defects are noted  NEURO: no focal deficits noted  PSYCH: does not appear depressed or anxious      WORKUP:  Spirometry  SPIROMETRY       FVC 6.49L (120% of predicted).     FEV1 5.15L (120% of predicted).     FEV1/FVC 79%     FEF 25%-75%  4.72L/s (119% of predicted).    These values are consistent with normal lung function without evidence of airflow obstruction.    Asthma Control Test (ACT) total score: 21     ASSESSMENT/PLAN:  Reji Carpenter is a 42 year old male here for follow-up of asthma and allergic rhinitis. His asthma has been well controlled with Qvar. Reji has been doing well with immunotherapy and has not had any reactions to injections.    1. Continue Qvar 80mcg, 2 puffs twice daily  2. Continue montelukast 10mg daily  3. Continue albuterol HFA, 2-4 puffs every 4 hours as needed  4. Continue loratadine 10mg daily  5. Follow-up in 6 months      Rosemarie Posada MD  Allergy/Immunology  [place], [place]      Chart documentation done in part with Dragon Voice Recognition Software. Although reviewed after completion, some word and grammatical errors may remain.  "

## 2017-09-19 NOTE — MR AVS SNAPSHOT
After Visit Summary   9/19/2017    Reji Carpenter    MRN: [id number]           Patient Information     Date Of Birth          1975        Visit Information        Provider Department      9/19/2017 9:00 AM Rosemarie Posada MD [facility]        Today's Diagnoses     Mild persistent asthma without complication    -  1      Care Instructions    If you have any questions regarding your allergies, asthma, or what we discussed during your visit today please call the allergy clinic or contact us via [employer].    [facility]: [phone]      Follow-up in 6 months          Follow-ups after your visit        Follow-up notes from your care team     Return in about 6 months (around 3/19/2018).      Your next 10 appointments already scheduled     Sep 21, 2017  8:30 AM CDT   Nurse Only with FZ ALLERGY SHOTS   [facility] ([facility] [place]    [address]  [place] [address]   [phone]            Sep 28, 2017  8:30 AM CDT   Nurse Only with FZ ALLERGY SHOTS   [facility] ([facility] [place])    [address]  [place] [address]   [phone]            Oct 05, 2017  8:30 AM CDT   Nurse Only with FZ ALLERGY SHOTS   [facility] [place] ([facility] [place])    [address]  [place] [address]   [phone]            Oct 12, 2017  8:30 AM CDT   Nurse Only with FZ ALLERGY SHOTS   [facility] [place] ([facility] [place])    [address]  [place] [address]   [phone]            Oct 19, 2017  8:30 AM CDT   Nurse Only with FZ ALLERGY SHOTS   [facility] [place] ([facility] [place])    [address]  [place] [address]   [phone]            Oct 26, 2017  8:30 AM CDT   Nurse Only with FZ ALLERGY SHOTS   [facility] [place] [facility] ([facility] [place] [facility])    [address]  [place] [address]   [phone]              Who to contact      "If you have questions or need follow up information about today's clinic visit or your schedule please contact [facility] directly at [phone].  Normal or non-critical lab and imaging results will be communicated to you by MyChart, letter or phone within 4 business days after the clinic has received the results. If you do not hear from us within 7 days, please contact the clinic through [employer] or phone. If you have a critical or abnormal lab result, we will notify you by phone as soon as possible.  Submit refill requests through [employer] or call your pharmacy and they will forward the refill request to us. Please allow 3 business days for your refill to be completed.          Additional Information About Your Visit        [employer] Information     [employer] gives you secure access to your electronic health record. If you see a primary care provider, you can also send messages to your care team and make appointments. If you have questions, please call your primary care clinic.  If you do not have a primary care provider, please call [phone] and they will assist you.        Care EveryWhere ID     This is your Care EveryWhere ID. This could be used by other organizations to access your [place] medical records  [id number]        Your Vitals Were     Pulse Height Pulse Oximetry BMI (Body Mass Index)          71 1.803 m (5' 11\") 98% 27.5 kg/m2         Blood Pressure from Last 3 Encounters:   09/19/17 (!) 149/95   07/11/17 145/88   06/06/17 (!) 146/97    Weight from Last 3 Encounters:   09/19/17 89.4 kg (197 lb 3.2 oz)   07/11/17 88.7 kg (195 lb 8 oz)   06/06/17 88.5 kg (195 lb 3.2 oz)              We Performed the Following     Spirometry, Breathing Capacity          Today's Medication Changes          These changes are accurate as of: 9/19/17  9:20 AM.  If you have any questions, ask your nurse or doctor.               These medicines have changed or have updated prescriptions.        " Dose/Directions    beclomethasone 80 MCG/ACT Inhaler   Commonly known as:  QVAR   This may have changed:    - how much to take  - how to take this  - when to take this   Used for:  Mild persistent asthma without complication   Changed by:  Rosemarie Posada MD        Dose:  2 puff   Inhale 2 puffs into the lungs 2 times daily   Quantity:  3 Inhaler   Refills:  1         Stop taking these medicines if you haven't already. Please contact your care team if you have questions.     fluticasone furoate 200 MCG/ACT inhalation powder   Commonly known as:  ARNUITY ELLIPTA   Stopped by:  Rosemarie Posada MD                Where to get your medicines      Some of these will need a paper prescription and others can be bought over the counter.  Ask your nurse if you have questions.     Bring a paper prescription for each of these medications     beclomethasone 80 MCG/ACT Inhaler                Primary Care Provider Office Phone # Fax #    Wili Kaplan [phone]       [address] [facility] [address]        Equal Access to Services     [facility]: Hadii aad ku hadasho Soomaali, waaxda luqadaha, qaybta kaalmada adeegyada, waxay idiin hayaan thierno flores . So [facility] [phone].    ATENCIÓN: Si habla español, tiene a bolton disposición servicios gratuitos de asistencia lingüística. [facility] [phone].    We comply with applicable federal civil rights laws and [place] laws. We do not discriminate on the basis of race, color, national origin, age, disability sex, sexual orientation or gender identity.            Thank you!     Thank you for choosing [facility]  for your care. Our goal is always to provide you with excellent care. Hearing back from our patients is one way we can continue to improve our services. Please take a few minutes to complete the written survey that you may receive in the mail after your visit with us. Thank you!             Your Updated Medication List -  Protect others around you: Learn how to safely use, store and throw away your medicines at www.disposemymeds.org.          This list is accurate as of: 9/19/17  9:20 AM.  Always use your most recent med list.                   Brand Name Dispense Instructions for use Diagnosis    albuterol 108 (90 BASE) MCG/ACT Inhaler    PROAIR HFA/PROVENTIL HFA/VENTOLIN HFA    3 Inhaler    Inhale 2 puffs into the lungs every 4 hours as needed for shortness of breath / dyspnea    Intermittent asthma, uncomplicated       * ALLERGEN IMMUNOTHERAPY PRESCRIPTION     5 mL    Name of Mix: Mix #1  Cat, Dog Cat Hair, Standardized 10,000 BAU/mL, ALK  2.0 ml Dog Hair Dander, A. P.  1:100 w/v, HS  1.0 ml  Diluent: HSA qs to 5ml    Non-seasonal allergic rhinitis due to animal hair and dander, Seasonal allergic rhinitis due to pollen, Allergic rhinitis due to mold       * ALLERGEN IMMUNOTHERAPY PRESCRIPTION     5 mL    Name of Mix: Mix #2  Mold Alternaria Tenuis GLY 1:10 w/v, HS  0.5 ml Diluent: HSA qs to 5ml    Non-seasonal allergic rhinitis due to animal hair and dander, Seasonal allergic rhinitis due to pollen, Allergic rhinitis due to mold       * ALLERGEN IMMUNOTHERAPY PRESCRIPTION     5 mL    Name of Mix: Mix #3  Tree , Weeds Narayan, White  GLY 1:20 w/v, HS  0.5 ml Boxelder-Maple  Mix BHR (Boxelder Hard Red) 1:20 w/v, HS  0.5 ml [place], Common GLY 1:20 w/v, HS  0.5 ml Elm, American GLY 1:20 w/v, HS  0.5 ml Lamb's Quarters GLY 1:20 w/v, HS 0.5 ml Plantain, English GLY 1:20 w/v, HS 0.5 ml Ragweed Mixed 1:20 w/v ALK  0.5 ml Sagebrush, Mugwort GLY 1:20 w/v, HS 0.5 ml Diluent: HSA qs to 5ml    Non-seasonal allergic rhinitis due to animal hair and dander, Seasonal allergic rhinitis due to pollen, Allergic rhinitis due to mold       beclomethasone 80 MCG/ACT Inhaler    QVAR    3 Inhaler    Inhale 2 puffs into the lungs 2 times daily    Mild persistent asthma without complication       EPINEPHrine 0.3 MG/0.3ML injection 2-pack    AUVI-Q    2 mL     Inject 0.3 mLs (0.3 mg) into the muscle as needed for anaphylaxis    Need for desensitization to allergens       loratadine 10 MG tablet    CLARITIN    90 tablet    Take 1 tablet (10 mg) by mouth daily    Chronic rhinitis       montelukast 10 MG tablet    SINGULAIR    90 tablet    Take 1 tablet (10 mg) by mouth At Bedtime    Environmental allergies, Intermittent asthma, uncomplicated       NASACORT AQ 55 MCG/ACT Inhaler   Generic drug:  triamcinolone     1 Bottle    Spray 2 sprays into both nostrils daily        * Notice:  This list has 3 medication(s) that are the same as other medications prescribed for you. Read the directions carefully, and ask your doctor or other care provider to review them with you.

## 2017-09-19 NOTE — NURSING NOTE
"Chief Complaint   Patient presents with     RECHECK     follow up on allergy injections       Initial BP (!) 149/95  Pulse 71  Ht 1.803 m (5' 11\")  Wt 89.4 kg (197 lb 3.2 oz)  SpO2 98%  BMI 27.5 kg/m2 Estimated body mass index is 27.5 kg/(m^2) as calculated from the following:    Height as of this encounter: 1.803 m (5' 11\").    Weight as of this encounter: 89.4 kg (197 lb 3.2 oz).  Medication Reconciliation: complete   Milka Amador MA.... 9:06 AM....9/19/2017      "

## 2017-09-19 NOTE — PATIENT INSTRUCTIONS
If you have any questions regarding your allergies, asthma, or what we discussed during your visit today please call the allergy clinic or contact us via [email or web address].    [facility]: [phone]      Follow-up in 6 months

## 2017-09-20 ASSESSMENT — ASTHMA QUESTIONNAIRES: ACT_TOTALSCORE: 21

## 2017-09-21 ENCOUNTER — ALLIED HEALTH/NURSE VISIT (OUTPATIENT)
Dept: ALLERGY | Facility: CLINIC | Age: 42
End: 2017-09-21
Payer: COMMERCIAL

## 2017-09-21 DIAGNOSIS — J30.9 ALLERGIC RHINITIS, UNSPECIFIED: Primary | ICD-10-CM

## 2017-09-21 PROCEDURE — 95117 IMMUNOTHERAPY INJECTIONS: CPT

## 2017-09-21 NOTE — MR AVS SNAPSHOT
After Visit Summary   9/21/2017    Reji Carpenter    MRN: [id number]           Patient Information     Date Of Birth          1975        Visit Information        Provider Department      9/21/2017 8:30 AM FZ ALLERGY SHOTS [facility]        Today's Diagnoses     Allergic rhinitis, unspecified    -  1       Follow-ups after your visit        Your next 10 appointments already scheduled     Sep 25, 2017  8:00 AM CDT   (Arrive by 7:45 AM)   New Patient Visit with Alhaji Sesay MD   [facility] ([facility])    [address] [place] [address]  [address] [facility] [address]   [phone]            Sep 28, 2017  8:30 AM CDT   Nurse Only with FZ ALLERGY SHOTS   [facility] [place] ([facility] [place]    [address]  [place] [address]   [phone]            Oct 05, 2017  8:30 AM CDT   Nurse Only with FZ ALLERGY SHOTS   [facility] [place] ([facility] [place])    [address]  [place] [address]   [phone]            Oct 12, 2017  8:30 AM CDT   Nurse Only with FZ ALLERGY SHOTS   [facility] [place] ([facility] [place])    [address]  [place] [address]   [phone]            Oct 19, 2017  8:30 AM CDT   Nurse Only with FZ ALLERGY SHOTS   [facility] [place] ([facility] [place])    [address]  [place] [address]   [phone]            Oct 26, 2017  8:30 AM CDT   Nurse Only with FZ ALLERGY SHOTS   [facility] [place] ([facility] [place])    [address]  [place] [address]   [phone]              Who to contact     If you have questions or need follow up information about today's clinic visit or your schedule please contact [facility] [place] directly at [phone].  Normal or non-critical lab and imaging results will be communicated to you by MyChart, letter or phone within 4 business days after the  clinic has received the results. If you do not hear from us within 7 days, please contact the clinic through PushCall or phone. If you have a critical or abnormal lab result, we will notify you by phone as soon as possible.  Submit refill requests through PushCall or call your pharmacy and they will forward the refill request to us. Please allow 3 business days for your refill to be completed.          Additional Information About Your Visit        [employer] Information     [employer] gives you secure access to your electronic health record. If you see a primary care provider, you can also send messages to your care team and make appointments. If you have questions, please call your primary care clinic.  If you do not have a primary care provider, please call [phone] and they will assist you.        Care EveryWhere ID     This is your Care EveryWhere ID. This could be used by other organizations to access your [place] medical records  [id number]         Blood Pressure from Last 3 Encounters:   09/19/17 (!) 149/95   07/11/17 145/88   06/06/17 (!) 146/97    Weight from Last 3 Encounters:   09/19/17 89.4 kg (197 lb 3.2 oz)   07/11/17 88.7 kg (195 lb 8 oz)   06/06/17 88.5 kg (195 lb 3.2 oz)              We Performed the Following     Allergy Shot: Two or more injections        Primary Care Provider Office Phone # Fax #    Wili Kaplan [phone]       [address] [facility] [address]        Equal Access to Services     [facility] : Hadii aad ku hadasho Soomaali, waaxda luqadaha, qaybta kaalmada adeegyada, murray flores . So [facility] [phone].    ATENCIÓN: Si habla taylor, tiene a bolton disposición servicios gratuitos de asistencia lingüística. Llame al [phone].    We comply with applicable federal civil rights laws and [place] laws. We do not discriminate on the basis of race, color, national origin, age, disability sex, sexual orientation or gender  identity.            Thank you!     Thank you for choosing [facility]  for your care. Our goal is always to provide you with excellent care. Hearing back from our patients is one way we can continue to improve our services. Please take a few minutes to complete the written survey that you may receive in the mail after your visit with us. Thank you!             Your Updated Medication List - Protect others around you: Learn how to safely use, store and throw away your medicines at www.disposemymeds.org.          This list is accurate as of: 9/21/17 10:24 AM.  Always use your most recent med list.                   Brand Name Dispense Instructions for use Diagnosis    albuterol 108 (90 BASE) MCG/ACT Inhaler    PROAIR HFA/PROVENTIL HFA/VENTOLIN HFA    3 Inhaler    Inhale 2 puffs into the lungs every 4 hours as needed for shortness of breath / dyspnea    Intermittent asthma, uncomplicated       * ALLERGEN IMMUNOTHERAPY PRESCRIPTION     5 mL    Name of Mix: Mix #1  Cat, Dog Cat Hair, Standardized 10,000 BAU/mL, ALK  2.0 ml Dog Hair Dander, A. P.  1:100 w/v, HS  1.0 ml  Diluent: HSA qs to 5ml    Non-seasonal allergic rhinitis due to animal hair and dander, Seasonal allergic rhinitis due to pollen, Allergic rhinitis due to mold       * ALLERGEN IMMUNOTHERAPY PRESCRIPTION     5 mL    Name of Mix: Mix #2  Mold Alternaria Tenuis GLY 1:10 w/v, HS  0.5 ml Diluent: HSA qs to 5ml    Non-seasonal allergic rhinitis due to animal hair and dander, Seasonal allergic rhinitis due to pollen, Allergic rhinitis due to mold       * ALLERGEN IMMUNOTHERAPY PRESCRIPTION     5 mL    Name of Mix: Mix #3  Tree , Weeds Narayan, White  GLY 1:20 w/v, HS  0.5 ml Boxelder-Maple  Mix BHR (Boxelder Hard Red) 1:20 w/v, HS  0.5 ml [place], Common GLY 1:20 w/v, HS  0.5 ml Elm, American GLY 1:20 w/v, HS  0.5 ml Lamb's Quarters GLY 1:20 w/v, HS 0.5 ml Plantain, English GLY 1:20 w/v, HS 0.5 ml Ragweed Mixed 1:20 w/v ALK  0.5 ml Sagebrush,  Mugwort GLY 1:20 w/v, HS 0.5 ml Diluent: HSA qs to 5ml    Non-seasonal allergic rhinitis due to animal hair and dander, Seasonal allergic rhinitis due to pollen, Allergic rhinitis due to mold       beclomethasone 80 MCG/ACT Inhaler    QVAR    3 Inhaler    Inhale 2 puffs into the lungs 2 times daily    Mild persistent asthma without complication       EPINEPHrine 0.3 MG/0.3ML injection 2-pack    AUVI-Q    2 mL    Inject 0.3 mLs (0.3 mg) into the muscle as needed for anaphylaxis    Need for desensitization to allergens       loratadine 10 MG tablet    CLARITIN    90 tablet    Take 1 tablet (10 mg) by mouth daily    Chronic rhinitis       montelukast 10 MG tablet    SINGULAIR    90 tablet    Take 1 tablet (10 mg) by mouth At Bedtime    Environmental allergies, Intermittent asthma, uncomplicated       NASACORT AQ 55 MCG/ACT Inhaler   Generic drug:  triamcinolone     1 Bottle    Spray 2 sprays into both nostrils daily        * Notice:  This list has 3 medication(s) that are the same as other medications prescribed for you. Read the directions carefully, and ask your doctor or other care provider to review them with you.

## 2017-09-21 NOTE — PROGRESS NOTES
Patient presented after waiting 30 minutes with no reaction to allergy injections. Discharged from clinic.    Tomeka Felix RN ....... 9/21/2017 10:24 AM

## 2017-09-25 ENCOUNTER — OFFICE VISIT (OUTPATIENT)
Dept: ORTHOPEDICS | Facility: CLINIC | Age: 42
End: 2017-09-25

## 2017-09-25 VITALS
WEIGHT: 198.9 LBS | DIASTOLIC BLOOD PRESSURE: 96 MMHG | OXYGEN SATURATION: 97 % | HEIGHT: 72 IN | SYSTOLIC BLOOD PRESSURE: 165 MMHG | BODY MASS INDEX: 26.94 KG/M2 | HEART RATE: 80 BPM

## 2017-09-25 DIAGNOSIS — M54.50 ACUTE LEFT-SIDED LOW BACK PAIN WITHOUT SCIATICA: Primary | ICD-10-CM

## 2017-09-25 NOTE — PROGRESS NOTES
Subjective:   Reji Carpenter is a 42 year old male who is here for low back pain   Low back and butt pain for 4 months. Radiates to L glut. Not Improving. Some pain with coughing, no numbness tingling or radiating sx.    Works in office, likes to box for fitness.    Background:   Date of injury: 4 months ago    Duration of symptoms: 4 months  Mechanism of Injury: Acute; Activity Related Pull start a generator   Intensity: 2/10 at rest, 6/10 with activity-at the end of the night   Aggravating factors: Sitting or activity   Relieving Factors: ice and NSAIDs  Prior Evaluation: None    Back pain in the past has pulled a few muscles that improves  Sits for work which causes pain and walks the dogs.  Takes a few tylenol in the am.    His wife feels symptoms are worsening.    Hx L ACL tear  L achilles    PAST MEDICAL, SOCIAL, SURGICAL AND FAMILY HISTORY: He  has a past medical history of NONSPECIFIC MEDICAL HISTORY (06/06).  He  has a past surgical history that includes REPAIR CRUCIATE LIGAMENT,KNEE (about 2002); REPAIR ACHILLES TENDON,PRIMARY (06/06); Optical tracking system endoscopic sinus surgery (11/29/2012); and vasectomy (about 2006).  His family history includes CANCER in his maternal grandmother and paternal grandmother; Hypertension in his mother; Lipids in his father. There is no history of DIABETES or C.A.D..  He reports that he has never smoked. He has never used smokeless tobacco. He reports that he does not drink alcohol or use illicit drugs.      ALLERGIES: He has No Known Allergies.    CURRENT MEDICATIONS: He has a current medication list which includes the following prescription(s): beclomethasone, montelukast, albuterol, loratadine, epinephrine, ORDER FOR ALLERGEN IMMUNOTHERAPY, ORDER FOR ALLERGEN IMMUNOTHERAPY, and ORDER FOR ALLERGEN IMMUNOTHERAPY.     REVIEW OF SYSTEMS: 3 point review of systems is negative except as noted above.     Exam:   BP (!) 165/96 (BP Location: Right arm, Patient  Position: Chair, Cuff Size: Adult Regular)  Pulse 80  Ht 6' (1.829 m)  Wt 198 lb 14.4 oz (90.2 kg)  SpO2 97%  BMI 26.98 kg/m2     CONSTITUTIONAL: healthy, alert and no distress  HEAD: Normocephalic. No masses, lesions, tenderness or abnormalities  SKIN: no suspicious lesions or rashes  GAIT: normal  NEUROLOGIC: Non-focal  PSYCHIATRIC: affect normal/bright and mentation appears normal.    MUSCULOSKELETAL:     Back Exam  Gait nl    Nl inspection, Nl posture    Active ROM: painful Flexion, Nl Extension, Nl side bends, Nl twisting.    Nl LE Strength and tone    LE reflexes: R patellar1+,L patellar 1+, R achilles tr, L achilles tr,PLantar Reflexes: flexor bilaterally    Pain to buttock with L sided SLR    No evidence of malingering.    XR lumbar spine without evidence of cause of disease   Assessment/Plan:   Low Back Pain 4 months  --acute onset with pulling a generator cord, worsening and aggravated with sitting, coughing, FF. Suspect disk herniation through differential is wider.    Plan  Acetaminophen 500-1000 mg every 6 hours as needed pain  Ibuprofen 600 mg every 6 hours as needed pain  PT referral-trial of traction and strengthening, consider tens trial at PT  referred for Lumbar MRI, I will call with results    Alhaji Sesay MD CAQ

## 2017-09-25 NOTE — PATIENT INSTRUCTIONS
Acetaminophen 500-1000 mg every 6 hours as needed pain  Ibuprofen 600 mg every 6 hours as needed pain  PT referral-trial of traction and strengthening, consider tens trial  Imaging referred for MRI, I will call with results    Alhaji Sesay MD CAQ

## 2017-09-25 NOTE — MR AVS SNAPSHOT
After Visit Summary   9/25/2017    Reji Carpenter    MRN: [id number]           Patient Information     Date Of Birth          1975        Visit Information        Provider Department      9/25/2017 8:00 AM Alhaji Sesay MD [facility]        Today's Diagnoses     Acute left-sided low back pain without sciatica    -  1      Care Instructions    Acetaminophen 500-1000 mg every 6 hours as needed pain  Ibuprofen 600 mg every 6 hours as needed pain  PT referral-trial of traction and strengthening, consider tens trial  Imaging referred for MRI, I will call with results    Alhaji Sesay MD CAQ            Follow-ups after your visit        Additional Services     Physical Therapy Adult IP Consult           Physical Therapy Adult IP Consult       Trial tens                  Your next 10 appointments already scheduled     Sep 27, 2017  8:30 AM CDT   (Arrive by 8:15 AM)   MR LUMBAR SPINE W/O CONTRAST with [id number]   [facility] ([facility])    [address] [address] [phone]           Take your medicines as usual, unless your doctor tells you not to. Bring a list of your current medicines to your exam (including vitamins, minerals and over-the-counter drugs). Also bring the results of similar scans you may have had.  Please remove any body piercings and hair extensions before you arrive.  Follow your doctor s orders. If you do not, we may have to postpone your exam.  You will not have contrast for this exam. You do not need to do anything special to prepare.  The MRI machine uses a strong magnet. Please wear clothes without metal (snaps, zippers). A sweatsuit works well, or we may give you a hospital gown.   **IMPORTANT** THE INSTRUCTIONS BELOW ARE ONLY FOR THOSE PATIENTS WHO HAVE BEEN TOLD THEY WILL RECEIVE SEDATION OR GENERAL ANESTHESIA DURING THEIR MRI PROCEDURE:  IF YOU WILL RECEIVE SEDATION  (take medicine to help you relax during your exam):   You must get the medicine from your doctor before you arrive. Bring the medicine to the exam. Do not take it at home.   Arrive one hour early. Bring someone who can take you home after the test. Your medicine will make you sleepy. After the exam, you may not drive, take a bus or take a taxi by yourself.   No eating 8 hours before your exam. You may have clear liquids up until 4 hours before your exam. (Clear liquids include water, clear tea, black coffee and fruit juice without pulp.)  IF YOU WILL RECEIVE ANESTHESIA (be asleep for your exam):   Arrive 1 1/2 hours early. Bring someone who can take you home after the test. You may not drive, take a bus or take a taxi by yourself.   No eating 8 hours before your exam. You may have clear liquids up until 4 hours before your exam. (Clear liquids include water, clear tea, black coffee and fruit juice without pulp.)   You will spend four to five hours in the recovery room.  Please call the Imaging Department at your exam site with any questions.            Sep 28, 2017  8:30 AM CDT   Nurse Only with FZ ALLERGY SHOTS   [facility]    [address] [address]   [phone]            Oct 02, 2017  8:00 AM CDT   (Arrive by 7:45 AM)   [facility]EX Spine with [facility]    [address] [place] [address]   [phone]            Oct 05, 2017  8:30 AM CDT   Nurse Only with FZ ALLERGY SHOTS   [facility] ([facility])    [address] [address]   [phone]            Oct 09, 2017  8:40 AM CDT   ALEX Spine with [facility]    [address] [place] [address]   [phone]            Oct 12, 2017  8:30 AM  CDT   Nurse Only with FZ ALLERGY SHOTS   [facility] ([facility])    [address] [facility] [address] [facility]  [place] [address]   [phone]            Oct 16, 2017  8:40 AM CDT   ALEX Spine with Sushila Herrera PT   [facility] ([facility])    [address] [facility] [address] [phone]            Oct 19, 2017  8:30 AM CDT   Nurse Only with FZ ALLERGY SHOTS   [facility] ([facility])    [address] [facility] [address] [facility]  [place] [address]   [phone]            Oct 26, 2017  8:30 AM CDT   Nurse Only with FZ ALLERGY SHOTS   [facility] ([facility])    [address] [facility]  [place] [address]   [phone]              Future tests that were ordered for you today     Open Future Orders        Priority Expected Expires Ordered    MRI Lumbar spine w/o contrast Routine  9/25/2018 9/25/2017            Who to contact     Please call your clinic at [phone] to:    Ask questions about your health    Make or cancel appointments    Discuss your medicines    Learn about your test results    Speak to your doctor   If you have compliments or concerns about an experience at your clinic, or if you wish to file a complaint, please contact [facility] Patient Relations at [phone] or email us at [email or web address]         Additional Information About Your Visit        [employer] Information     [employer] gives you secure access to your electronic health record. If you see a primary care provider, you can also send messages to your care team and make appointments. If you have questions, please call your primary care clinic.  If you do not have a primary care provider, please call [phone] and they will assist you.      [employer] is an electronic gateway that provides easy, online access to your medical records. With [employer], you can request a  clinic appointment, read your test results, renew a prescription or communicate with your care team.     To access your existing account, please contact your [facility] or call [phone] for assistance.        Care EveryWhere ID     This is your Care EveryWhere ID. This could be used by other organizations to access your [place] medical records  [id number]        Your Vitals Were     Pulse Height Pulse Oximetry BMI (Body Mass Index)          80 6' (1.829 m) 97% 26.98 kg/m2         Blood Pressure from Last 3 Encounters:   09/25/17 (!) 165/96   09/19/17 (!) 149/95   07/11/17 145/88    Weight from Last 3 Encounters:   09/25/17 198 lb 14.4 oz (90.2 kg)   09/19/17 197 lb 3.2 oz (89.4 kg)   07/11/17 195 lb 8 oz (88.7 kg)              We Performed the Following     Physical Therapy Adult IP Consult     Physical Therapy Adult IP Consult        Primary Care Provider Office Phone # Fax #    Wili Kaplan [phone]       [address] [address]        Equal Access to Services     [facility] : Hadii aad ku hadasho Soomaali, waaxda luqadaha, qaybta kaalmada adeegyada, murray flores . So [facility] [phone].    ATENCIÓN: Si habla español, tiene a bolton disposición servicios gratuitos de asistencia lingüística. Llame al [phone].    We comply with applicable federal civil rights laws and [place] laws. We do not discriminate on the basis of race, color, national origin, age, disability sex, sexual orientation or gender identity.            Thank you!     Thank you for choosing [facility]  for your care. Our goal is always to provide you with excellent care. Hearing back from our patients is one way we can continue to improve our services. Please take a few minutes to complete the written survey that you may receive in the mail after your visit with us. Thank you!             Your Updated Medication List -  Protect others around you: Learn how to safely use, store and throw away your medicines at www.disposemymeds.org.          This list is accurate as of: 9/25/17  3:04 PM.  Always use your most recent med list.                   Brand Name Dispense Instructions for use Diagnosis    albuterol 108 (90 BASE) MCG/ACT Inhaler    PROAIR HFA/PROVENTIL HFA/VENTOLIN HFA    3 Inhaler    Inhale 2 puffs into the lungs every 4 hours as needed for shortness of breath / dyspnea    Intermittent asthma, uncomplicated       * ALLERGEN IMMUNOTHERAPY PRESCRIPTION     5 mL    Name of Mix: Mix #1  Cat, Dog Cat Hair, Standardized 10,000 BAU/mL, ALK  2.0 ml Dog Hair Dander, A. P.  1:100 w/v, HS  1.0 ml  Diluent: HSA qs to 5ml    Non-seasonal allergic rhinitis due to animal hair and dander, Seasonal allergic rhinitis due to pollen, Allergic rhinitis due to mold       * ALLERGEN IMMUNOTHERAPY PRESCRIPTION     5 mL    Name of Mix: Mix #2  Mold Alternaria Tenuis GLY 1:10 w/v, HS  0.5 ml Diluent: HSA qs to 5ml    Non-seasonal allergic rhinitis due to animal hair and dander, Seasonal allergic rhinitis due to pollen, Allergic rhinitis due to mold       * ALLERGEN IMMUNOTHERAPY PRESCRIPTION     5 mL    Name of Mix: Mix #3  Tree , Weeds Narayan, White  GLY 1:20 w/v, HS  0.5 ml Boxelder-Maple  Mix BHR (Boxelder Hard Red) 1:20 w/v, HS  0.5 ml [place], Common GLY 1:20 w/v, HS  0.5 ml Elm, American GLY 1:20 w/v, HS  0.5 ml Lamb's Quarters GLY 1:20 w/v, HS 0.5 ml Plantain, English GLY 1:20 w/v, HS 0.5 ml Ragweed Mixed 1:20 w/v ALK  0.5 ml Sagebrush, Mugwort GLY 1:20 w/v, HS 0.5 ml Diluent: HSA qs to 5ml    Non-seasonal allergic rhinitis due to animal hair and dander, Seasonal allergic rhinitis due to pollen, Allergic rhinitis due to mold       beclomethasone 80 MCG/ACT Inhaler    QVAR    3 Inhaler    Inhale 2 puffs into the lungs 2 times daily    Mild persistent asthma without complication       EPINEPHrine 0.3 MG/0.3ML injection 2-pack    AUVI-Q    2 mL     Inject 0.3 mLs (0.3 mg) into the muscle as needed for anaphylaxis    Need for desensitization to allergens       loratadine 10 MG tablet    CLARITIN    90 tablet    Take 1 tablet (10 mg) by mouth daily    Chronic rhinitis       montelukast 10 MG tablet    SINGULAIR    90 tablet    Take 1 tablet (10 mg) by mouth At Bedtime    Environmental allergies, Intermittent asthma, uncomplicated       * Notice:  This list has 3 medication(s) that are the same as other medications prescribed for you. Read the directions carefully, and ask your doctor or other care provider to review them with you.

## 2017-09-25 NOTE — LETTER
9/25/2017      RE: Reji Carpenter  [address] [facility] [address]        Subjective:   Reji Carpenter is a 42 year old male who is here for low back pain   Low back and butt pain for 4 months. Radiates to L glut. Not Improving. Some pain with coughing, no numbness tingling or radiating sx.    Works in office, likes to box for fitness.    Background:   Date of injury: 4 months ago    Duration of symptoms: 4 months  Mechanism of Injury: Acute; Activity Related Pull start a generator   Intensity: 2/10 at rest, 6/10 with activity-at the end of the night   Aggravating factors: Sitting or activity   Relieving Factors: ice and NSAIDs  Prior Evaluation: None    Back pain in the past has pulled a few muscles that improves  Sits for work which causes pain and walks the dogs.  Takes a few tylenol in the am.    His wife feels symptoms are worsening.    Hx L ACL tear  L achilles    PAST MEDICAL, SOCIAL, SURGICAL AND FAMILY HISTORY: He  has a past medical history of NONSPECIFIC MEDICAL HISTORY (06/06).  He  has a past surgical history that includes REPAIR CRUCIATE LIGAMENT,KNEE (about 2002); REPAIR ACHILLES TENDON,PRIMARY (06/06); Optical tracking system endoscopic sinus surgery (11/29/2012); and vasectomy (about 2006).  His family history includes CANCER in his maternal grandmother and paternal grandmother; Hypertension in his mother; Lipids in his father. There is no history of DIABETES or C.A.D..  He reports that he has never smoked. He has never used smokeless tobacco. He reports that he does not drink alcohol or use illicit drugs.      ALLERGIES: He has No Known Allergies.    CURRENT MEDICATIONS: He has a current medication list which includes the following prescription(s): beclomethasone, montelukast, albuterol, loratadine, epinephrine, ORDER FOR ALLERGEN IMMUNOTHERAPY, ORDER FOR ALLERGEN IMMUNOTHERAPY, and ORDER FOR ALLERGEN IMMUNOTHERAPY.     REVIEW OF SYSTEMS: 3 point review of systems is  negative except as noted above.     Exam:   BP (!) 165/96 (BP Location: Right arm, Patient Position: Chair, Cuff Size: Adult Regular)  Pulse 80  Ht 6' (1.829 m)  Wt 198 lb 14.4 oz (90.2 kg)  SpO2 97%  BMI 26.98 kg/m2     CONSTITUTIONAL: healthy, alert and no distress  HEAD: Normocephalic. No masses, lesions, tenderness or abnormalities  SKIN: no suspicious lesions or rashes  GAIT: normal  NEUROLOGIC: Non-focal  PSYCHIATRIC: affect normal/bright and mentation appears normal.    MUSCULOSKELETAL:     Back Exam  Gait nl    Nl inspection, Nl posture    Active ROM: painful Flexion, Nl Extension, Nl side bends, Nl twisting.    Nl LE Strength and tone    LE reflexes: R patellar1+,L patellar 1+, R achilles tr, L achilles tr,PLantar Reflexes: flexor bilaterally    Pain to buttock with L sided SLR    No evidence of malingering.    XR lumbar spine without evidence of cause of disease   Assessment/Plan:   Low Back Pain 4 months  --acute onset with pulling a generator cord, worsening and aggravated with sitting, coughing, FF. Suspect disk herniation through differential is wider.    Plan  Acetaminophen 500-1000 mg every 6 hours as needed pain  Ibuprofen 600 mg every 6 hours as needed pain  PT referral-trial of traction and strengthening, consider tens trial at PT  referred for Lumbar MRI, I will call with results    Alhaji Sesay MD CAQ

## 2017-09-28 ENCOUNTER — ALLIED HEALTH/NURSE VISIT (OUTPATIENT)
Dept: ALLERGY | Facility: CLINIC | Age: 42
End: 2017-09-28
Payer: COMMERCIAL

## 2017-09-28 DIAGNOSIS — J30.9 ALLERGIC RHINITIS, UNSPECIFIED: Primary | ICD-10-CM

## 2017-09-28 PROCEDURE — 99207 ZZC NO CHARGE LOS: CPT

## 2017-09-28 PROCEDURE — 95117 IMMUNOTHERAPY INJECTIONS: CPT

## 2017-09-28 NOTE — PROGRESS NOTES
Patient presented after waiting 30 minutes with no reaction to allergy injections. Discharged from clinic.    Uyen Sanchez RN

## 2017-09-28 NOTE — MR AVS SNAPSHOT
After Visit Summary   9/28/2017    Reji Carpenter    MRN: [id number]           Patient Information     Date Of Birth          1975        Visit Information        Provider Department      9/28/2017 8:30 AM [facility]        Today's Diagnoses     Allergic rhinitis, unspecified    -  1       Follow-ups after your visit        Your next 10 appointments already scheduled     Oct 05, 2017  8:30 AM CDT   Nurse Only with FZ ALLERGY SHOTS   [facility]    [address]  [place] [address]   [phone]            Oct 09, 2017  8:40 AM CDT   (Arrive by 8:25 AM)   ALEX Spine with Sushila Herrera PT   [facility] ([facility])    [address] [place] [id number] [phone]            Oct 12, 2017  8:30 AM CDT   Nurse Only with FZ ALLERGY SHOTS   [facility] [place] ([facility]    [address]  [place] [address]   [phone]            Oct 16, 2017  8:40 AM CDT   ALEX Spine with JOHN De Los Santos [facility] ([facility])    [address] [place] [address] [phone]            Oct 19, 2017  8:30 AM CDT   Nurse Only with FZ ALLERGY SHOTS   [facility] [place] ([facility] [place])    [address]  [place] [address]   [phone]            Oct 26, 2017  8:30 AM CDT   Nurse Only with FZ ALLERGY SHOTS   [facility] ([facility] [place])    [address]  [place] [address]   [phone]              Who to contact     If you have questions or need follow up information about today's clinic visit or your schedule please contact [facility] [place] directly at [phone].  Normal or non-critical lab and imaging results will be communicated to you by MyChart, letter or  phone within 4 business days after the clinic has received the results. If you do not hear from us within 7 days, please contact the clinic through [employer] or phone. If you have a critical or abnormal lab result, we will notify you by phone as soon as possible.  Submit refill requests through [employer] or call your pharmacy and they will forward the refill request to us. Please allow 3 business days for your refill to be completed.          Additional Information About Your Visit        [employer] Information     [employer] gives you secure access to your electronic health record. If you see a primary care provider, you can also send messages to your care team and make appointments. If you have questions, please call your primary care clinic.  If you do not have a primary care provider, please call [phone] and they will assist you.        Care EveryWhere ID     This is your Care EveryWhere ID. This could be used by other organizations to access your [place] medical records  [id number]         Blood Pressure from Last 3 Encounters:   09/25/17 (!) 165/96   09/19/17 (!) 149/95   07/11/17 145/88    Weight from Last 3 Encounters:   09/25/17 90.2 kg (198 lb 14.4 oz)   09/19/17 89.4 kg (197 lb 3.2 oz)   07/11/17 88.7 kg (195 lb 8 oz)              We Performed the Following     Allergy Shot: Two or more injections        Primary Care Provider Office Phone # Fax #    Wili Kaplan [phone]       [address] [address]        Equal Access to Services     [facility] : Hadii aad ku hadasho Soomaali, waaxda luqadaha, qaybta kaalmada adeegyada, murray alexanderin nik flores . So [facility] [phone].    ATENCIÓN: Si habla español, tiene a bolton disposición servicios gratuitos de asistencia lingüística. Llame al [phone].    We comply with applicable federal civil rights laws and [place] laws. We do not discriminate on the basis of race, color, national origin, age,  disability sex, sexual orientation or gender identity.            Thank you!     Thank you for choosing [facility]  for your care. Our goal is always to provide you with excellent care. Hearing back from our patients is one way we can continue to improve our services. Please take a few minutes to complete the written survey that you may receive in the mail after your visit with us. Thank you!             Your Updated Medication List - Protect others around you: Learn how to safely use, store and throw away your medicines at www.disposemymeds.org.          This list is accurate as of: 9/28/17  9:37 AM.  Always use your most recent med list.                   Brand Name Dispense Instructions for use Diagnosis    albuterol 108 (90 BASE) MCG/ACT Inhaler    PROAIR HFA/PROVENTIL HFA/VENTOLIN HFA    3 Inhaler    Inhale 2 puffs into the lungs every 4 hours as needed for shortness of breath / dyspnea    Intermittent asthma, uncomplicated       * ALLERGEN IMMUNOTHERAPY PRESCRIPTION     5 mL    Name of Mix: Mix #1  Cat, Dog Cat Hair, Standardized 10,000 BAU/mL, ALK  2.0 ml Dog Hair Dander, A. P.  1:100 w/v, HS  1.0 ml  Diluent: HSA qs to 5ml    Non-seasonal allergic rhinitis due to animal hair and dander, Seasonal allergic rhinitis due to pollen, Allergic rhinitis due to mold       * ALLERGEN IMMUNOTHERAPY PRESCRIPTION     5 mL    Name of Mix: Mix #2  Mold Alternaria Tenuis GLY 1:10 w/v, HS  0.5 ml Diluent: HSA qs to 5ml    Non-seasonal allergic rhinitis due to animal hair and dander, Seasonal allergic rhinitis due to pollen, Allergic rhinitis due to mold       * ALLERGEN IMMUNOTHERAPY PRESCRIPTION     5 mL    Name of Mix: Mix #3  Tree , Weeds Narayan, White  GLY 1:20 w/v, HS  0.5 ml Boxelder-Maple  Mix BHR (Boxelder Hard Red) 1:20 w/v, HS  0.5 ml [place], Common GLY 1:20 w/v, HS  0.5 ml Elm, American GLY 1:20 w/v, HS  0.5 ml Lamb's Quarters GLY 1:20 w/v, HS 0.5 ml Plantain, English GLY 1:20 w/v, HS 0.5 ml Ragweed  Mixed 1:20 w/v ALK  0.5 ml Sagebrush, Mugwort GLY 1:20 w/v, HS 0.5 ml Diluent: HSA qs to 5ml    Non-seasonal allergic rhinitis due to animal hair and dander, Seasonal allergic rhinitis due to pollen, Allergic rhinitis due to mold       beclomethasone 80 MCG/ACT Inhaler    QVAR    3 Inhaler    Inhale 2 puffs into the lungs 2 times daily    Mild persistent asthma without complication       EPINEPHrine 0.3 MG/0.3ML injection 2-pack    AUVI-Q    2 mL    Inject 0.3 mLs (0.3 mg) into the muscle as needed for anaphylaxis    Need for desensitization to allergens       loratadine 10 MG tablet    CLARITIN    90 tablet    Take 1 tablet (10 mg) by mouth daily    Chronic rhinitis       montelukast 10 MG tablet    SINGULAIR    90 tablet    Take 1 tablet (10 mg) by mouth At Bedtime    Environmental allergies, Intermittent asthma, uncomplicated       * Notice:  This list has 3 medication(s) that are the same as other medications prescribed for you. Read the directions carefully, and ask your doctor or other care provider to review them with you.

## 2017-10-05 ENCOUNTER — ALLIED HEALTH/NURSE VISIT (OUTPATIENT)
Dept: ALLERGY | Facility: CLINIC | Age: 42
End: 2017-10-05
Payer: COMMERCIAL

## 2017-10-05 DIAGNOSIS — J30.1 ALLERGIC RHINITIS DUE TO POLLEN: Primary | ICD-10-CM

## 2017-10-05 PROCEDURE — 99207 ZZC NO CHARGE LOS: CPT

## 2017-10-05 PROCEDURE — 95117 IMMUNOTHERAPY INJECTIONS: CPT

## 2017-10-05 NOTE — MR AVS SNAPSHOT
After Visit Summary   10/5/2017    Reji Carpenter    MRN: [id number]           Patient Information     Date Of Birth          1975        Visit Information        Provider Department      10/5/2017 8:30 AM FZ ALLERGY SHOTS [facility] [place]        Today's Diagnoses     Allergic rhinitis due to pollen    -  1       Follow-ups after your visit        Your next 10 appointments already scheduled     Oct 09, 2017  8:40 AM CDT   (Arrive by 8:25 AM)   ALEX Spine with JOHN De Los Santos [facility] ([facility])    [address] [place] [address]   [phone]            Oct 12, 2017  8:30 AM CDT   Nurse Only with FZ ALLERGY SHOTS   [facility] [place] ([facility])    [address]  [place] [address]   [phone]            Oct 16, 2017  8:40 AM CDT   ALEX Spine with JOHN De Los Santos [facility] ([facility])    [address] [place] [address]   [phone]            Oct 19, 2017  8:30 AM CDT   Nurse Only with FZ ALLERGY SHOTS   [facility] [place] [facility])    [address]  [place] [address]   [phone]            Oct 26, 2017  8:30 AM CDT   Nurse Only with FZ ALLERGY SHOTS   [facility])    [address] [address]   [phone]              Who to contact     If you have questions or need follow up information about today's clinic visit or your schedule please contact [facility] directly at [phone].  Normal or non-critical lab and imaging results will be communicated to you by MyChart, letter or phone within 4 business days after the clinic has received the results. If you do not hear from us within 7 days, please contact the clinic through MyChart or phone. If you have a critical  or abnormal lab result, we will notify you by phone as soon as possible.  Submit refill requests through [employer] or call your pharmacy and they will forward the refill request to us. Please allow 3 business days for your refill to be completed.          Additional Information About Your Visit        Lingdong[employer]comhart Information     [employer] gives you secure access to your electronic health record. If you see a primary care provider, you can also send messages to your care team and make appointments. If you have questions, please call your primary care clinic.  If you do not have a primary care provider, please call [phone] and they will assist you.        Care EveryWhere ID     This is your Care EveryWhere ID. This could be used by other organizations to access your [place] medical records  [id number]         Blood Pressure from Last 3 Encounters:   09/25/17 (!) 165/96   09/19/17 (!) 149/95   07/11/17 145/88    Weight from Last 3 Encounters:   09/25/17 90.2 kg (198 lb 14.4 oz)   09/19/17 89.4 kg (197 lb 3.2 oz)   07/11/17 88.7 kg (195 lb 8 oz)              We Performed the Following     Allergy Shot: Two or more injections        Primary Care Provider Office Phone # Fax #    Wili Kaplan [phone]       [address] [facility] [address]        Equal Access to Services     [facility] : Hadii aad ku hadasho Soomaali, waaxda luqadaha, qaybta kaalmada adeegyada, murray may. So [facility] [phone].    ATENCIÓN: Si habla español, tiene a bolton disposición servicios gratuitos de asistencia lingüística. Candice al [phone].    We comply with applicable federal civil rights laws and [place] laws. We do not discriminate on the basis of race, color, national origin, age, disability, sex, sexual orientation, or gender identity.            Thank you!     Thank you for choosing [facility]  for your care. Our goal is always to provide you with  excellent care. Hearing back from our patients is one way we can continue to improve our services. Please take a few minutes to complete the written survey that you may receive in the mail after your visit with us. Thank you!             Your Updated Medication List - Protect others around you: Learn how to safely use, store and throw away your medicines at www.disposemymeds.org.          This list is accurate as of: 10/5/17  9:41 AM.  Always use your most recent med list.                   Brand Name Dispense Instructions for use Diagnosis    albuterol 108 (90 BASE) MCG/ACT Inhaler    PROAIR HFA/PROVENTIL HFA/VENTOLIN HFA    3 Inhaler    Inhale 2 puffs into the lungs every 4 hours as needed for shortness of breath / dyspnea    Intermittent asthma, uncomplicated       * ALLERGEN IMMUNOTHERAPY PRESCRIPTION     5 mL    Name of Mix: Mix #1  Cat, Dog Cat Hair, Standardized 10,000 BAU/mL, ALK  2.0 ml Dog Hair Dander, A. P.  1:100 w/v, HS  1.0 ml  Diluent: HSA qs to 5ml    Non-seasonal allergic rhinitis due to animal hair and dander, Seasonal allergic rhinitis due to pollen, Allergic rhinitis due to mold       * ALLERGEN IMMUNOTHERAPY PRESCRIPTION     5 mL    Name of Mix: Mix #2  Mold Alternaria Tenuis GLY 1:10 w/v, HS  0.5 ml Diluent: HSA qs to 5ml    Non-seasonal allergic rhinitis due to animal hair and dander, Seasonal allergic rhinitis due to pollen, Allergic rhinitis due to mold       * ALLERGEN IMMUNOTHERAPY PRESCRIPTION     5 mL    Name of Mix: Mix #3  Tree , Weeds Narayan, White  GLY 1:20 w/v, HS  0.5 ml Boxelder-Maple  Mix BHR (Boxelder Hard Red) 1:20 w/v, HS  0.5 ml [place], Common GLY 1:20 w/v, HS  0.5 ml Elm, American GLY 1:20 w/v, HS  0.5 ml Lamb's Quarters GLY 1:20 w/v, HS 0.5 ml Plantain, English GLY 1:20 w/v, HS 0.5 ml Ragweed Mixed 1:20 w/v ALK  0.5 ml Sagebrush, Mugwort GLY 1:20 w/v, HS 0.5 ml Diluent: HSA qs to 5ml    Non-seasonal allergic rhinitis due to animal hair and dander, Seasonal allergic  rhinitis due to pollen, Allergic rhinitis due to mold       beclomethasone 80 MCG/ACT Inhaler    QVAR    3 Inhaler    Inhale 2 puffs into the lungs 2 times daily    Mild persistent asthma without complication       EPINEPHrine 0.3 MG/0.3ML injection 2-pack    AUVI-Q    2 mL    Inject 0.3 mLs (0.3 mg) into the muscle as needed for anaphylaxis    Need for desensitization to allergens       loratadine 10 MG tablet    CLARITIN    90 tablet    Take 1 tablet (10 mg) by mouth daily    Chronic rhinitis       montelukast 10 MG tablet    SINGULAIR    90 tablet    Take 1 tablet (10 mg) by mouth At Bedtime    Environmental allergies, Intermittent asthma, uncomplicated       * Notice:  This list has 3 medication(s) that are the same as other medications prescribed for you. Read the directions carefully, and ask your doctor or other care provider to review them with you.

## 2017-10-05 NOTE — PROGRESS NOTES
Patient presented after waiting 30 minutes with no reaction to allergy injections. Discharged from clinic.    Tomeka Felix RN ....... 10/5/2017 9:40 AM

## 2017-10-09 ENCOUNTER — THERAPY VISIT (OUTPATIENT)
Dept: PHYSICAL THERAPY | Facility: CLINIC | Age: 42
End: 2017-10-09
Payer: COMMERCIAL

## 2017-10-09 DIAGNOSIS — M54.16 LUMBAR RADICULOPATHY: Primary | ICD-10-CM

## 2017-10-09 DIAGNOSIS — M54.50 LUMBAGO: Primary | ICD-10-CM

## 2017-10-09 PROCEDURE — 97161 PT EVAL LOW COMPLEX 20 MIN: CPT | Mod: GP | Performed by: PHYSICAL THERAPIST

## 2017-10-09 PROCEDURE — 97110 THERAPEUTIC EXERCISES: CPT | Mod: GP | Performed by: PHYSICAL THERAPIST

## 2017-10-09 PROCEDURE — 97140 MANUAL THERAPY 1/> REGIONS: CPT | Mod: GP | Performed by: PHYSICAL THERAPIST

## 2017-10-09 NOTE — MR AVS SNAPSHOT
After Visit Summary   10/9/2017    Reji Carpenter    MRN: [id number]           Patient Information     Date Of Birth          1975        Visit Information        Provider Department      10/9/2017 8:40 AM Sushila Herrera PT [facility]        Today's Diagnoses     Lumbar radiculopathy    -  1       Follow-ups after your visit        Your next 10 appointments already scheduled     Oct 12, 2017  8:30 AM CDT   Nurse Only with FZ ALLERGY SHOTS   [facility] ([facility])    [address] [address]   [phone]            Oct 16, 2017  8:40 AM CDT   ALEX Spine with [facility] ([facility])    [address] [place] [id number] [phone]            Oct 19, 2017  8:30 AM CDT   Nurse Only with FZ ALLERGY SHOTS   [facility] ([facility])    [address] [address]   [phone]            Oct 26, 2017  8:30 AM CDT   Nurse Only with FZ ALLERGY SHOTS   [facility] ([facility])    [address] [address]   [phone]            Oct 27, 2017  9:00 AM CDT   ALEX Spine with [facility] ([facility])    [address] [place] [id number] [phone]            Oct 30, 2017  8:40 AM CDT   ALEX Spine with J[facility]ON [facility] ([email or web address] [place])    [address] [place] [id number] [phone]            Nov 08, 2017  9:00 AM CST   ALEX Spine with [facility] ([email or web address] [place])    [address] [place] [address] [id number] [phone]              Who to contact     If you have  questions or need follow up information about today's clinic visit or your schedule please contact [facility] directly at [phone].  Normal or non-critical lab and imaging results will be communicated to you by MyChart, letter or phone within 4 business days after the clinic has received the results. If you do not hear from us within 7 days, please contact the clinic through Sonendohart or phone. If you have a critical or abnormal lab result, we will notify you by phone as soon as possible.  Submit refill requests through Discrete Sport or call your pharmacy and they will forward the refill request to us. Please allow 3 business days for your refill to be completed.          Additional Information About Your Visit        SonendoharTwentyFeet Information     Dis[employer] gives you secure access to your electronic health record. If you see a primary care provider, you can also send messages to your care team and make appointments. If you have questions, please call your primary care clinic.  If you do not have a primary care provider, please call [phone] and they will assist you.        Care EveryWhere ID     This is your Care EveryWhere ID. This could be used by other organizations to access your [place] medical records  [id number]         Blood Pressure from Last 3 Encounters:   09/25/17 (!) 165/96   09/19/17 (!) 149/95   07/11/17 145/88    Weight from Last 3 Encounters:   09/25/17 90.2 kg (198 lb 14.4 oz)   09/19/17 89.4 kg (197 lb 3.2 oz)   07/11/17 88.7 kg (195 lb 8 oz)              We Performed the Following     HC PT EVAL, LOW COMPLEXITY     ALEX INITIAL EVAL REPORT     MANUAL THER TECH,1+REGIONS,EA 15 MIN     THERAPEUTIC EXERCISES        Primary Care Provider Office Phone # Fax #    Wili Kaplan [phone]       [address] [facility] [place] [address]        Equal Access to Services     DENITA JAVIER : Lois Servin, wawalterda luqadaha, qaybta kamurray johnson  joesph nik pineda'aan ah. So [facility] [phone].    ATENCIÓN: Si marii vazquez, tiene a bolton disposición servicios gratuitos de asistencia lingüística. Candice al [phone].    We comply with applicable federal civil rights laws and [place] laws. We do not discriminate on the basis of race, color, national origin, age, disability, sex, sexual orientation, or gender identity.            Thank you!     Thank you for choosing [facility] PHYSICAL THERAPY [facility]  for your care. Our goal is always to provide you with excellent care. Hearing back from our patients is one way we can continue to improve our services. Please take a few minutes to complete the written survey that you may receive in the mail after your visit with us. Thank you!             Your Updated Medication List - Protect others around you: Learn how to safely use, store and throw away your medicines at www.disposemymeds.org.          This list is accurate as of: 10/9/17  3:33 PM.  Always use your most recent med list.                   Brand Name Dispense Instructions for use Diagnosis    albuterol 108 (90 BASE) MCG/ACT Inhaler    PROAIR HFA/PROVENTIL HFA/VENTOLIN HFA    3 Inhaler    Inhale 2 puffs into the lungs every 4 hours as needed for shortness of breath / dyspnea    Intermittent asthma, uncomplicated       * ALLERGEN IMMUNOTHERAPY PRESCRIPTION     5 mL    Name of Mix: Mix #1  Cat, Dog Cat Hair, Standardized 10,000 BAU/mL, ALK  2.0 ml Dog Hair Dander, A. P.  1:100 w/v, HS  1.0 ml  Diluent: HSA qs to 5ml    Non-seasonal allergic rhinitis due to animal hair and dander, Seasonal allergic rhinitis due to pollen, Allergic rhinitis due to mold       * ALLERGEN IMMUNOTHERAPY PRESCRIPTION     5 mL    Name of Mix: Mix #2  Mold Alternaria Tenuis GLY 1:10 w/v, HS  0.5 ml Diluent: HSA qs to 5ml    Non-seasonal allergic rhinitis due to animal hair and dander, Seasonal allergic rhinitis due to pollen, Allergic rhinitis due to mold       * ALLERGEN  IMMUNOTHERAPY PRESCRIPTION     5 mL    Name of Mix: Mix #3  Tree , Weeds Narayan, White  GLY 1:20 w/v, HS  0.5 ml Boxelder-Maple  Mix BHR (Boxelder Hard Red) 1:20 w/v, HS  0.5 ml [place], Common GLY 1:20 w/v, HS  0.5 ml Elm, American GLY 1:20 w/v, HS  0.5 ml Lamb's Quarters GLY 1:20 w/v, HS 0.5 ml Plantain, English GLY 1:20 w/v, HS 0.5 ml Ragweed Mixed 1:20 w/v ALK  0.5 ml Sagebrush, Mugwort GLY 1:20 w/v, HS 0.5 ml Diluent: HSA qs to 5ml    Non-seasonal allergic rhinitis due to animal hair and dander, Seasonal allergic rhinitis due to pollen, Allergic rhinitis due to mold       beclomethasone 80 MCG/ACT Inhaler    QVAR    3 Inhaler    Inhale 2 puffs into the lungs 2 times daily    Mild persistent asthma without complication       EPINEPHrine 0.3 MG/0.3ML injection 2-pack    AUVI-Q    2 mL    Inject 0.3 mLs (0.3 mg) into the muscle as needed for anaphylaxis    Need for desensitization to allergens       loratadine 10 MG tablet    CLARITIN    90 tablet    Take 1 tablet (10 mg) by mouth daily    Chronic rhinitis       montelukast 10 MG tablet    SINGULAIR    90 tablet    Take 1 tablet (10 mg) by mouth At Bedtime    Environmental allergies, Intermittent asthma, uncomplicated       * Notice:  This list has 3 medication(s) that are the same as other medications prescribed for you. Read the directions carefully, and ask your doctor or other care provider to review them with you.

## 2017-10-09 NOTE — PROGRESS NOTES
Subjective:    [facility]  Physical Therapy Initial Examination/Evaluation  October 9, 2017    Reji Carpenter is a 42 year old  male referred to physical therapy by Dr. Sesay for treatment of Lumbar Radiculopathy with Precautions/Restrictions/MD instructions none    Subjective:  Referring MD visit date: 10/9/17  DOI/onset: 6/15/17  Mechanism of injury: Patient was starting a generator when he felt the onset of pain.  Initially, very sharp pain, now primarily aching.  No numbness or tingling  DOS n/a  Previous treatment: none  Imaging: MRI -   Chief Complaint:   Pain with sitting, unable to workout (boxing), mowing the lawn   Pain: rest 2 /10, activity 6/10 L lumbar, glute Described as: gnawing ache Alleviated by: ice, ibuprofen Frequency: constant Progression of symptoms since initial onset: staying the same Time of day when pain is worse: not related  Sleeping: wakes occasionally due to pain    Occupation: finance  Job duties:  Sitting, computer work    Current HEP/exercise regimen: limited  Patient's goals are reduce pain, return to boxing    Pertinent PMH: s/p ACL-R, achilles repair   General Health Reported by Patient: Excellent  Return to MD:  PRN                      Objective:      Gait:    Gait Type:  Normal                    Lumbar/SI Evaluation  ROM:    AROM Lumbar:   Flexion:            100% +  Ext:                    100%   Side Bend:        Left:  100%    Right:  100%  Rotation:           Left:  100%    Right:  100%  Side Glide:        Left:     Right:         Strength: Fair contraction of transverse abdominus.  Gluteus medius: 4+/5 B  Lumbar Myotomes:    T12-L3 (Hip Flex):  Left: 5    Right: 5  L2-4 (Quads):  Left:  5    Right:  5  L4 (Ankle DF):  Left:  5    Right:  5  L5 (Great Toe Ext): Left: 5    Right: 5   S1 (Toe Raise):  Left: 5    Right: 5      Lumbar Dermtomes:  normal                Neural Tension/Mobility:    Left side:   SLR positive.  Left side:Slump  negative.     Right side:   SLR  negative.   Lumbar Palpation:    Tenderness present at Left:    Quadratus Lumborum; Erector Spinae and Piriformis      Lumbar Provocation:      Left negative with:  Mobility and PROM hip    Right negative with:  Mobility and PROM hip                                                 General     ROS    Assessment/Plan:      Patient is a 42 year old male with lumbar complaints.    Patient has the following significant findings with corresponding treatment plan.                Diagnosis 1:  Lumbar disc protrusion    Pain -  hot/cold therapy, US, electric stimulation, mechanical traction, manual therapy, splint/taping/bracing/orthotics, self management, education, directional preference exercise and home program  Decreased strength - therapeutic exercise and therapeutic activities  Decreased proprioception - neuro re-education and therapeutic activities  Impaired muscle performance - neuro re-education  Decreased function - therapeutic activities    Therapy Evaluation Codes:   1) History comprised of:   Personal factors that impact the plan of care:      None.    Comorbidity factors that impact the plan of care are:      None.     Medications impacting care: None.  2) Examination of Body Systems comprised of:   Body structures and functions that impact the plan of care:      Lumbar spine.   Activity limitations that impact the plan of care are:      Driving, Reading/Computer work and Sitting.  3) Clinical presentation characteristics are:   Stable/Uncomplicated.  4) Decision-Making    Low complexity using standardized patient assessment instrument and/or measureable assessment of functional outcome.  Cumulative Therapy Evaluation is: Low complexity.    Previous and current functional limitations:  (See Goal Flow Sheet for this information)    Short term and Long term goals: (See Goal Flow Sheet for this information)     Communication ability:  Patient  appears to be able to clearly communicate and understand verbal and written communication and follow directions correctly.  Treatment Explanation - The following has been discussed with the patient:   RX ordered/plan of care  Anticipated outcomes  Possible risks and side effects  This patient would benefit from PT intervention to resume normal activities.   Rehab potential is good.    Frequency:  1 X week, once daily  Duration:  for 8 weeks  Discharge Plan:  Achieve all LTG.  Independent in home treatment program.  Reach maximal therapeutic benefit.    Please refer to the daily flowsheet for treatment today, total treatment time and time spent performing 1:1 timed codes.

## 2017-10-12 ENCOUNTER — ALLIED HEALTH/NURSE VISIT (OUTPATIENT)
Dept: ALLERGY | Facility: CLINIC | Age: 42
End: 2017-10-12
Payer: COMMERCIAL

## 2017-10-12 DIAGNOSIS — J30.1 ALLERGIC RHINITIS DUE TO POLLEN: Primary | ICD-10-CM

## 2017-10-12 PROCEDURE — 99207 ZZC NO CHARGE LOS: CPT

## 2017-10-12 PROCEDURE — 95117 IMMUNOTHERAPY INJECTIONS: CPT

## 2017-10-12 NOTE — PROGRESS NOTES
Patient presented after waiting 30 minutes with no reaction to allergy injections. Discharged from clinic.    Tomeka Felix RN ....... 10/12/2017 9:22 AM

## 2017-10-12 NOTE — MR AVS SNAPSHOT
After Visit Summary   10/12/2017    Reji Carpenter    MRN: [id number]           Patient Information     Date Of Birth          1975        Visit Information        Provider Department      10/12/2017 8:30 AM FZ ALLERGY SHOTS [facility] [place]        Today's Diagnoses     Allergic rhinitis due to pollen    -  1       Follow-ups after your visit        Your next 10 appointments already scheduled     Oct 16, 2017  8:40 AM CDT   ALEX Spine with [facility] ([facility])    [address] [place] [address]   [phone]            Oct 19, 2017  8:30 AM CDT   Nurse Only with FZ ALLERGY SHOTS   [facility] [place] ([facility] [place])    [address]  [place] [address]   [phone]            Oct 26, 2017  8:30 AM CDT   Nurse Only with FZ ALLERGY SHOTS   [facility] [place] ([facility] [place])    [address] [place] [address]   [phone]            Oct 27, 2017  9:00 AM CDT   ALEX Spine with JOHN [facility] ([facility] [place])    [address] [place] [address]   [phone]            Oct 30, 2017  8:40 AM CDT   ALEX Spine with [facility] ([facility])    [address] [place] [address]   [phone]            Nov 02, 2017  8:45 AM CDT   Nurse Only with FZ ALLERGY SHOTS   [facility] [place] ([facility] [place])    [address] [address]   [phone]            Nov 08, 2017  9:00 AM CST   ALEX Spine with [facility] ([facility])    [address] [place] [address]   [phone]            Nov 09, 2017  8:45 AM CST   Nurse  Only with FZ ALLERGY SHOTS   [facility] ([facility]    [address]  [place] [address]   [phone]            Nov 16, 2017  8:30 AM CST   Nurse Only with FZ ALLERGY SHOTS   [facility] ([facility])    [address]  [place] [address]   [phone]            Nov 30, 2017  8:30 AM CST   Nurse Only with FZ ALLERGY SHOTS   [facility] ([facility])    [address]  [place] [address]   [phone]              Who to contact     If you have questions or need follow up information about today's clinic visit or your schedule please contact [facility] directly at [phone].  Normal or non-critical lab and imaging results will be communicated to you by MyChart, letter or phone within 4 business days after the clinic has received the results. If you do not hear from us within 7 days, please contact the clinic through Ingressehart or phone. If you have a critical or abnormal lab result, we will notify you by phone as soon as possible.  Submit refill requests through Pond5 or call your pharmacy and they will forward the refill request to us. Please allow 3 business days for your refill to be completed.          Additional Information About Your Visit        MyChart Information     Pond5 gives you secure access to your electronic health record. If you see a primary care provider, you can also send messages to your care team and make appointments. If you have questions, please call your primary care clinic.  If you do not have a primary care provider, please call [phone] and they will assist you.        Care EveryWhere ID     This is your Care EveryWhere ID. This could be used by other organizations to access your [place] medical records  [id number]         Blood Pressure from Last 3 Encounters:   09/25/17 (!) 165/96   09/19/17 (!) 149/95   07/11/17 145/88    Weight from Last 3  Encounters:   09/25/17 90.2 kg (198 lb 14.4 oz)   09/19/17 89.4 kg (197 lb 3.2 oz)   07/11/17 88.7 kg (195 lb 8 oz)              We Performed the Following     Allergy Shot: Two or more injections        Primary Care Provider Office Phone # Fax #    Wili Kaplan [phone]       [address] [facility] [address]        Equal Access to Services     [facility]: Hadii aad ku hadasho Soomaali, waaxda luqadaha, qaybta kaalmada adeegyada, waxay idiin hayaan adeeg kharash la'aan . So [facility] [phone].    ATENCIÓN: Si michaella español, tiene a bolton disposición servicios gratuitos de asistencia lingüística. [facility] [phone].    We comply with applicable federal civil rights laws and [place] laws. We do not discriminate on the basis of race, color, national origin, age, disability, sex, sexual orientation, or gender identity.            Thank you!     Thank you for choosing [facility]  for your care. Our goal is always to provide you with excellent care. Hearing back from our patients is one way we can continue to improve our services. Please take a few minutes to complete the written survey that you may receive in the mail after your visit with us. Thank you!             Your Updated Medication List - Protect others around you: Learn how to safely use, store and throw away your medicines at www.disposemymeds.org.          This list is accurate as of: 10/12/17  9:23 AM.  Always use your most recent med list.                   Brand Name Dispense Instructions for use Diagnosis    albuterol 108 (90 BASE) MCG/ACT Inhaler    PROAIR HFA/PROVENTIL HFA/VENTOLIN HFA    3 Inhaler    Inhale 2 puffs into the lungs every 4 hours as needed for shortness of breath / dyspnea    Intermittent asthma, uncomplicated       * ALLERGEN IMMUNOTHERAPY PRESCRIPTION     5 mL    Name of Mix: Mix #1  Cat, Dog Cat Hair, Standardized 10,000 BAU/mL, ALK  2.0 ml Dog Hair Dander, A. P.  1:100 w/v, HS   1.0 ml  Diluent: HSA qs to 5ml    Non-seasonal allergic rhinitis due to animal hair and dander, Seasonal allergic rhinitis due to pollen, Allergic rhinitis due to mold       * ALLERGEN IMMUNOTHERAPY PRESCRIPTION     5 mL    Name of Mix: Mix #2  Mold Alternaria Tenuis GLY 1:10 w/v, HS  0.5 ml Diluent: HSA qs to 5ml    Non-seasonal allergic rhinitis due to animal hair and dander, Seasonal allergic rhinitis due to pollen, Allergic rhinitis due to mold       * ALLERGEN IMMUNOTHERAPY PRESCRIPTION     5 mL    Name of Mix: Mix #3  Tree , Weeds Narayan, White  GLY 1:20 w/v, HS  0.5 ml Boxelder-Maple  Mix BHR (Boxelder Hard Red) 1:20 w/v, HS  0.5 ml Gem, Common GLY 1:20 w/v, HS  0.5 ml Elm, American GLY 1:20 w/v, HS  0.5 ml Lamb's Quarters GLY 1:20 w/v, HS 0.5 ml Plantain, English GLY 1:20 w/v, HS 0.5 ml Ragweed Mixed 1:20 w/v ALK  0.5 ml Sagebrush, Mugwort GLY 1:20 w/v, HS 0.5 ml Diluent: HSA qs to 5ml    Non-seasonal allergic rhinitis due to animal hair and dander, Seasonal allergic rhinitis due to pollen, Allergic rhinitis due to mold       beclomethasone 80 MCG/ACT Inhaler    QVAR    3 Inhaler    Inhale 2 puffs into the lungs 2 times daily    Mild persistent asthma without complication       EPINEPHrine 0.3 MG/0.3ML injection 2-pack    AUVI-Q    2 mL    Inject 0.3 mLs (0.3 mg) into the muscle as needed for anaphylaxis    Need for desensitization to allergens       loratadine 10 MG tablet    CLARITIN    90 tablet    Take 1 tablet (10 mg) by mouth daily    Chronic rhinitis       montelukast 10 MG tablet    SINGULAIR    90 tablet    Take 1 tablet (10 mg) by mouth At Bedtime    Environmental allergies, Intermittent asthma, uncomplicated       * Notice:  This list has 3 medication(s) that are the same as other medications prescribed for you. Read the directions carefully, and ask your doctor or other care provider to review them with you.

## 2017-10-16 ENCOUNTER — THERAPY VISIT (OUTPATIENT)
Dept: PHYSICAL THERAPY | Facility: CLINIC | Age: 42
End: 2017-10-16
Payer: COMMERCIAL

## 2017-10-16 DIAGNOSIS — M54.16 LUMBAR RADICULOPATHY: ICD-10-CM

## 2017-10-16 PROCEDURE — 97110 THERAPEUTIC EXERCISES: CPT | Mod: GP | Performed by: PHYSICAL THERAPIST

## 2017-10-16 PROCEDURE — 97112 NEUROMUSCULAR REEDUCATION: CPT | Mod: GP | Performed by: PHYSICAL THERAPIST

## 2017-10-19 ENCOUNTER — ALLIED HEALTH/NURSE VISIT (OUTPATIENT)
Dept: ALLERGY | Facility: CLINIC | Age: 42
End: 2017-10-19
Payer: COMMERCIAL

## 2017-10-19 DIAGNOSIS — J30.1 ALLERGIC RHINITIS DUE TO POLLEN: Primary | ICD-10-CM

## 2017-10-19 PROCEDURE — 99207 ZZC NO CHARGE LOS: CPT

## 2017-10-19 PROCEDURE — 95117 IMMUNOTHERAPY INJECTIONS: CPT

## 2017-10-19 NOTE — MR AVS SNAPSHOT
After Visit Summary   10/19/2017    Reji Carpenter    MRN: [id number]           Patient Information     Date Of Birth          1975        Visit Information        Provider Department      10/19/2017 8:30 AM FZ ALLERGY SHOTS [facility]        Today's Diagnoses     Allergic rhinitis due to pollen    -  1       Follow-ups after your visit        Your next 10 appointments already scheduled     Oct 26, 2017  8:30 AM CDT   Nurse Only with FZ ALLERGY SHOTS   [facility] ([facility])    [address]  [place] [address]   [phone]            Oct 27, 2017  9:00 AM CDT   ALEX Spine with [facility] ([facility])    [address] [place] [address] [phone]            Oct 30, 2017  8:40 AM CDT   ALEX Spine with [facility] ([facility])    [address] [place] [address] [phone]            Nov 02, 2017  8:45 AM CDT   Nurse Only with FZ ALLERGY SHOTS   [facility] ([facility])    [address]  [place] [address]   [phone]            Nov 08, 2017  9:00 AM CST   ALEX Spine with [facility] ([facility])    [address] [place] [address]   [phone]            Nov 09, 2017  8:45 AM CST   Nurse Only with FZ ALLERGY SHOTS   [facility] ([facility])    [address]  [place] [address]   [phone]            Nov 16, 2017  8:30 AM CST   Nurse Only with FZ ALLERGY SHOTS   [facility] ([facility])    [address] [place] [address]   [phone]            Nov 30, 2017  8:30 AM CST   Nurse Only with FZ ALLERGY SHOTS   [facility] ([facility])    [address]  [place] [address] [phone]              Who to contact     If you have questions or need follow up information about today's clinic visit or your schedule please contact [facility] directly at [phone].  Normal or non-critical lab and imaging results will be communicated to you by MyChart, letter or phone within 4 business days after the clinic has received the results. If you do not hear from us within 7 days, please contact the clinic through MyChart or phone. If you have a critical or abnormal lab result, we will notify you by phone as soon as possible.  Submit refill requests through Cit[employer] or call your pharmacy and they will forward the refill request to us. Please allow 3 business days for your refill to be completed.          Additional Information About Your Visit        StarSightingshart Information     Citrus gives you secure access to your electronic health record. If you see a primary care provider, you can also send messages to your care team and make appointments. If you have questions, please call your primary care clinic.  If you do not have a primary care provider, please call [phone] and they will assist you.        Care EveryWhere ID     This is your Care EveryWhere ID. This could be used by other organizations to access your [facility] medical records  [id number]         Blood Pressure from Last 3 Encounters:   09/25/17 (!) 165/96   09/19/17 (!) 149/95   07/11/17 145/88    Weight from Last 3 Encounters:   09/25/17 90.2 kg (198 lb 14.4 oz)   09/19/17 89.4 kg (197 lb 3.2 oz)   07/11/17 88.7 kg (195 lb 8 oz)              We Performed the Following     Allergy Shot: Two or more injections        Primary Care Provider Office Phone # Fax #    Wili Kaplan [phone] [address] [facility] [address]        Equal Access to Services     DENITA JAVIER AH: Lois mercedes  Soomaali, waaxda luqadaha, qaybta kaalmada adechito, murray may. So [facility] [phone].    ATENCIÓN: Si marii vazquez, tiene a bolton disposición servicios gratuitos de asistencia lingüística. Candice al [phone].    We comply with applicable federal civil rights laws and [place] laws. We do not discriminate on the basis of race, color, national origin, age, disability, sex, sexual orientation, or gender identity.            Thank you!     Thank you for choosing [facility]  for your care. Our goal is always to provide you with excellent care. Hearing back from our patients is one way we can continue to improve our services. Please take a few minutes to complete the written survey that you may receive in the mail after your visit with us. Thank you!             Your Updated Medication List - Protect others around you: Learn how to safely use, store and throw away your medicines at www.disposemymeds.org.          This list is accurate as of: 10/19/17  9:15 AM.  Always use your most recent med list.                   Brand Name Dispense Instructions for use Diagnosis    albuterol 108 (90 BASE) MCG/ACT Inhaler    PROAIR HFA/PROVENTIL HFA/VENTOLIN HFA    3 Inhaler    Inhale 2 puffs into the lungs every 4 hours as needed for shortness of breath / dyspnea    Intermittent asthma, uncomplicated       * ALLERGEN IMMUNOTHERAPY PRESCRIPTION     5 mL    Name of Mix: Mix #1  Cat, Dog Cat Hair, Standardized 10,000 BAU/mL, ALK  2.0 ml Dog Hair Dander, A. P.  1:100 w/v, HS  1.0 ml  Diluent: HSA qs to 5ml    Non-seasonal allergic rhinitis due to animal hair and dander, Seasonal allergic rhinitis due to pollen, Allergic rhinitis due to mold       * ALLERGEN IMMUNOTHERAPY PRESCRIPTION     5 mL    Name of Mix: Mix #2  Mold Alternaria Tenuis GLY 1:10 w/v, HS  0.5 ml Diluent: HSA qs to 5ml    Non-seasonal allergic rhinitis due to animal hair and dander, Seasonal allergic rhinitis due to pollen,  Allergic rhinitis due to mold       * ALLERGEN IMMUNOTHERAPY PRESCRIPTION     5 mL    Name of Mix: Mix #3  Tree , Weeds Narayan, White  GLY 1:20 w/v, HS  0.5 ml Boxelder-Maple  Mix BHR (Boxelder Hard Red) 1:20 w/v, HS  0.5 ml [place], Common GLY 1:20 w/v, HS  0.5 ml Elm, American GLY 1:20 w/v, HS  0.5 ml Lamb's Quarters GLY 1:20 w/v, HS 0.5 ml Plantain, English GLY 1:20 w/v, HS 0.5 ml Ragweed Mixed 1:20 w/v ALK  0.5 ml Sagebrush, Mugwort GLY 1:20 w/v, HS 0.5 ml Diluent: HSA qs to 5ml    Non-seasonal allergic rhinitis due to animal hair and dander, Seasonal allergic rhinitis due to pollen, Allergic rhinitis due to mold       beclomethasone 80 MCG/ACT Inhaler    QVAR    3 Inhaler    Inhale 2 puffs into the lungs 2 times daily    Mild persistent asthma without complication       EPINEPHrine 0.3 MG/0.3ML injection 2-pack    AUVI-Q    2 mL    Inject 0.3 mLs (0.3 mg) into the muscle as needed for anaphylaxis    Need for desensitization to allergens       loratadine 10 MG tablet    CLARITIN    90 tablet    Take 1 tablet (10 mg) by mouth daily    Chronic rhinitis       montelukast 10 MG tablet    SINGULAIR    90 tablet    Take 1 tablet (10 mg) by mouth At Bedtime    Environmental allergies, Intermittent asthma, uncomplicated       * Notice:  This list has 3 medication(s) that are the same as other medications prescribed for you. Read the directions carefully, and ask your doctor or other care provider to review them with you.

## 2017-10-19 NOTE — PROGRESS NOTES
Patient presented after waiting 30 minutes with no reaction to allergy injections. Discharged from clinic.    Tomeka Felix RN ....... 10/19/2017 9:15 AM

## 2017-10-26 ENCOUNTER — ALLIED HEALTH/NURSE VISIT (OUTPATIENT)
Dept: ALLERGY | Facility: CLINIC | Age: 42
End: 2017-10-26
Payer: COMMERCIAL

## 2017-10-26 DIAGNOSIS — J30.1 ALLERGIC RHINITIS DUE TO POLLEN: Primary | ICD-10-CM

## 2017-10-26 PROCEDURE — 99207 ZZC NO CHARGE LOS: CPT

## 2017-10-26 PROCEDURE — 95117 IMMUNOTHERAPY INJECTIONS: CPT

## 2017-10-26 NOTE — MR AVS SNAPSHOT
After Visit Summary   10/26/2017    Reji Carpenter    MRN: [id number]           Patient Information     Date Of Birth          1975        Visit Information        Provider Department      10/26/2017 8:30 AM FZ ALLERGY SHOTS [facility]        Today's Diagnoses     Allergic rhinitis due to pollen    -  1       Follow-ups after your visit        Your next 10 appointments already scheduled     Oct 27, 2017  9:00 AM CDT   ALEX Spine with [facility] ([facility])    [address] [place] [address]   [phone]            Oct 30, 2017  8:40 AM CDT   ALEX Spine with [facility] ([facility])    [address] [place] [address]   [phone]            Nov 02, 2017  8:45 AM CDT   Nurse Only with FZ ALLERGY SHOTS   [facility] [place] ([facility] [place])    [address] [facility] [facility] [address]   [phone]            Nov 08, 2017  9:00 AM CST   ALEX Spine with [facility] ([facility])    [address] [place] [address]   [phone]            Nov 09, 2017  8:45 AM CST   Nurse Only with FZ ALLERGY SHOTS   [facility] [place] ([facility] [place])    [address]  [place] [address]   [phone]            Nov 16, 2017  8:30 AM CST   Nurse Only with FZ ALLERGY SHOTS   [facility] [place] ([facility] [place])    [address]  [place] [address]   [phone]            Nov 30, 2017  8:30 AM CST   Nurse Only with FZ ALLERGY SHOTS   [facility] [place] ([facility] [place])    [address]  [place] [address]   [phone]              Who to contact     If you have questions or need follow up information  about today's clinic visit or your schedule please contact [facility] directly at [phone].  Normal or non-critical lab and imaging results will be communicated to you by MyChart, letter or phone within 4 business days after the clinic has received the results. If you do not hear from us within 7 days, please contact the clinic through edjinghart or phone. If you have a critical or abnormal lab result, we will notify you by phone as soon as possible.  Submit refill requests through [employer] or call your pharmacy and they will forward the refill request to us. Please allow 3 business days for your refill to be completed.          Additional Information About Your Visit        edjingharDigitalTangible Information     [employer] gives you secure access to your electronic health record. If you see a primary care provider, you can also send messages to your care team and make appointments. If you have questions, please call your primary care clinic.  If you do not have a primary care provider, please call [phone] and they will assist you.        Care EveryWhere ID     This is your Care EveryWhere ID. This could be used by other organizations to access your [facility] medical records  [id number]         Blood Pressure from Last 3 Encounters:   09/25/17 (!) 165/96   09/19/17 (!) 149/95   07/11/17 145/88    Weight from Last 3 Encounters:   09/25/17 90.2 kg (198 lb 14.4 oz)   09/19/17 89.4 kg (197 lb 3.2 oz)   07/11/17 88.7 kg (195 lb 8 oz)              We Performed the Following     Allergy Shot: Two or more injections        Primary Care Provider Office Phone # Fax #    Wili Kaplan [phone]       [address] [facility] [address]        Equal Access to Services     [facility]: Hadii keily Servin, watasha rodriguez, qaybta kaalmamurray chavez. So [facility] [phone].    ATENCIÓN: Si habla español, tiene a bolton disposición servicios  clarita de asistencia lingüística. Candice hansen [phone].    We comply with applicable federal civil rights laws and [place] laws. We do not discriminate on the basis of race, color, national origin, age, disability, sex, sexual orientation, or gender identity.            Thank you!     Thank you for choosing [facility]  for your care. Our goal is always to provide you with excellent care. Hearing back from our patients is one way we can continue to improve our services. Please take a few minutes to complete the written survey that you may receive in the mail after your visit with us. Thank you!             Your Updated Medication List - Protect others around you: Learn how to safely use, store and throw away your medicines at www.disposemymeds.org.          This list is accurate as of: 10/26/17  9:13 AM.  Always use your most recent med list.                   Brand Name Dispense Instructions for use Diagnosis    albuterol 108 (90 BASE) MCG/ACT Inhaler    PROAIR HFA/PROVENTIL HFA/VENTOLIN HFA    3 Inhaler    Inhale 2 puffs into the lungs every 4 hours as needed for shortness of breath / dyspnea    Intermittent asthma, uncomplicated       * ALLERGEN IMMUNOTHERAPY PRESCRIPTION     5 mL    Name of Mix: Mix #1  Cat, Dog Cat Hair, Standardized 10,000 BAU/mL, ALK  2.0 ml Dog Hair Dander, A. P.  1:100 w/v, HS  1.0 ml  Diluent: HSA qs to 5ml    Non-seasonal allergic rhinitis due to animal hair and dander, Seasonal allergic rhinitis due to pollen, Allergic rhinitis due to mold       * ALLERGEN IMMUNOTHERAPY PRESCRIPTION     5 mL    Name of Mix: Mix #2  Mold Alternaria Tenuis GLY 1:10 w/v, HS  0.5 ml Diluent: HSA qs to 5ml    Non-seasonal allergic rhinitis due to animal hair and dander, Seasonal allergic rhinitis due to pollen, Allergic rhinitis due to mold       * ALLERGEN IMMUNOTHERAPY PRESCRIPTION     5 mL    Name of Mix: Mix #3  Tree , Weeds Narayan, White  GLY 1:20 w/v, HS  0.5 ml Boxelder-Maple  Mix BHR  (Boxelder Hard Red) 1:20 w/v, HS  0.5 ml [place], Common GLY 1:20 w/v, HS  0.5 ml Elm, American GLY 1:20 w/v, HS  0.5 ml Lamb's Quarters GLY 1:20 w/v, HS 0.5 ml Plantain, English GLY 1:20 w/v, HS 0.5 ml Ragweed Mixed 1:20 w/v ALK  0.5 ml Sagebrush, Mugwort GLY 1:20 w/v, HS 0.5 ml Diluent: HSA qs to 5ml    Non-seasonal allergic rhinitis due to animal hair and dander, Seasonal allergic rhinitis due to pollen, Allergic rhinitis due to mold       beclomethasone 80 MCG/ACT Inhaler    QVAR    3 Inhaler    Inhale 2 puffs into the lungs 2 times daily    Mild persistent asthma without complication       EPINEPHrine 0.3 MG/0.3ML injection 2-pack    AUVI-Q    2 mL    Inject 0.3 mLs (0.3 mg) into the muscle as needed for anaphylaxis    Need for desensitization to allergens       loratadine 10 MG tablet    CLARITIN    90 tablet    Take 1 tablet (10 mg) by mouth daily    Chronic rhinitis       montelukast 10 MG tablet    SINGULAIR    90 tablet    Take 1 tablet (10 mg) by mouth At Bedtime    Environmental allergies, Intermittent asthma, uncomplicated       * Notice:  This list has 3 medication(s) that are the same as other medications prescribed for you. Read the directions carefully, and ask your doctor or other care provider to review them with you.

## 2017-10-27 ENCOUNTER — THERAPY VISIT (OUTPATIENT)
Dept: PHYSICAL THERAPY | Facility: CLINIC | Age: 42
End: 2017-10-27
Payer: COMMERCIAL

## 2017-10-27 DIAGNOSIS — M54.16 LUMBAR RADICULOPATHY: ICD-10-CM

## 2017-10-27 PROCEDURE — 97110 THERAPEUTIC EXERCISES: CPT | Mod: GP | Performed by: PHYSICAL THERAPIST

## 2017-10-27 PROCEDURE — 97112 NEUROMUSCULAR REEDUCATION: CPT | Mod: GP | Performed by: PHYSICAL THERAPIST

## 2017-11-02 ENCOUNTER — ALLIED HEALTH/NURSE VISIT (OUTPATIENT)
Dept: ALLERGY | Facility: CLINIC | Age: 42
End: 2017-11-02
Payer: COMMERCIAL

## 2017-11-02 DIAGNOSIS — J30.1 ALLERGIC RHINITIS DUE TO POLLEN: Primary | ICD-10-CM

## 2017-11-02 PROCEDURE — 95117 IMMUNOTHERAPY INJECTIONS: CPT

## 2017-11-02 PROCEDURE — 99207 ZZC NO CHARGE LOS: CPT

## 2017-11-02 NOTE — PROGRESS NOTES
Patient presented after waiting 30 minutes with no reaction to allergy injections. Discharged from clinic.    Tomeka Felix RN ....... 11/2/2017 9:15 AM

## 2017-11-02 NOTE — MR AVS SNAPSHOT
After Visit Summary   11/2/2017    Reji Carpenter    MRN: [id number]           Patient Information     Date Of Birth          1975        Visit Information        Provider Department      11/2/2017 8:45 AM FZ ALLERGY SHOTS [facility]        Today's Diagnoses     Allergic rhinitis due to pollen    -  1       Follow-ups after your visit        Your next 10 appointments already scheduled     Nov 09, 2017  8:45 AM CST   Nurse Only with FZ ALLERGY SHOTS   [facility] ([facility])    [address] [facility] [facility] [address] [facility] [address]   [phone]            Nov 13, 2017  8:40 AM CST   ALEX Spine with Sushila Herrera PT   [facility] ([facility]    [address] [place] [address] [address] [facility] [address] [phone]            Nov 16, 2017  8:30 AM CST   Nurse Only with FZ ALLERGY SHOTS   [facility] ([facility])    [address] [address] [facility] [address]   [phone]            Nov 30, 2017  8:30 AM CST   Nurse Only with FZ ALLERGY SHOTS   [facility] ([facility])    [address] [facility] [address]   [phone]              Who to contact     If you have questions or need follow up information about today's clinic visit or your schedule please contact [facility] directly at [phone].  Normal or non-critical lab and imaging results will be communicated to you by MyChart, letter or phone within 4 business days after the clinic has received the results. If you do not hear from us within 7 days, please contact the clinic through MyChart or phone. If you have a critical or abnormal lab result, we will notify you by phone as soon as possible.  Submit refill requests through [employer] or call your pharmacy and they will forward the refill request to us. Please allow 3 business days for your refill to be completed.           Additional Information About Your Visit        GREE Internationalhart Information     Game[employer] gives you secure access to your electronic health record. If you see a primary care provider, you can also send messages to your care team and make appointments. If you have questions, please call your primary care clinic.  If you do not have a primary care provider, please call [phone] and they will assist you.        Care EveryWhere ID     This is your Care EveryWhere ID. This could be used by other organizations to access your [place] medical records  [id number]         Blood Pressure from Last 3 Encounters:   09/25/17 (!) 165/96   09/19/17 (!) 149/95   07/11/17 145/88    Weight from Last 3 Encounters:   09/25/17 90.2 kg (198 lb 14.4 oz)   09/19/17 89.4 kg (197 lb 3.2 oz)   07/11/17 88.7 kg (195 lb 8 oz)              We Performed the Following     Allergy Shot: Two or more injections        Primary Care Provider Office Phone # Fax #    Wili Kaplan [phone]       [address] [address]        Equal Access to Services     [facility]: Hadii aad ku hadasho Soomaali, waaxda luqadaha, qaybta kaalmada adeegyada, murray flores . So [facility] [phone].    ATENCIÓN: Si habla español, tiene a bolton disposición servicios gratoggos de asistencia lingüística. [facility] [phone].    We comply with applicable federal civil rights laws and [place] laws. We do not discriminate on the basis of race, color, national origin, age, disability, sex, sexual orientation, or gender identity.            Thank you!     Thank you for choosing [facility]  for your care. Our goal is always to provide you with excellent care. Hearing back from our patients is one way we can continue to improve our services. Please take a few minutes to complete the written survey that you may receive in the mail after your visit with us. Thank you!             Your Updated  Medication List - Protect others around you: Learn how to safely use, store and throw away your medicines at www.disposemymeds.org.          This list is accurate as of: 11/2/17  9:15 AM.  Always use your most recent med list.                   Brand Name Dispense Instructions for use Diagnosis    albuterol 108 (90 BASE) MCG/ACT Inhaler    PROAIR HFA/PROVENTIL HFA/VENTOLIN HFA    3 Inhaler    Inhale 2 puffs into the lungs every 4 hours as needed for shortness of breath / dyspnea    Intermittent asthma, uncomplicated       * ALLERGEN IMMUNOTHERAPY PRESCRIPTION     5 mL    Name of Mix: Mix #1  Cat, Dog Cat Hair, Standardized 10,000 BAU/mL, ALK  2.0 ml Dog Hair Dander, A. P.  1:100 w/v, HS  1.0 ml  Diluent: HSA qs to 5ml    Non-seasonal allergic rhinitis due to animal hair and dander, Seasonal allergic rhinitis due to pollen, Allergic rhinitis due to mold       * ALLERGEN IMMUNOTHERAPY PRESCRIPTION     5 mL    Name of Mix: Mix #2  Mold Alternaria Tenuis GLY 1:10 w/v, HS  0.5 ml Diluent: HSA qs to 5ml    Non-seasonal allergic rhinitis due to animal hair and dander, Seasonal allergic rhinitis due to pollen, Allergic rhinitis due to mold       * ALLERGEN IMMUNOTHERAPY PRESCRIPTION     5 mL    Name of Mix: Mix #3  Tree , Weeds Narayan, White  GLY 1:20 w/v, HS  0.5 ml Boxelder-Maple  Mix BHR (Boxelder Hard Red) 1:20 w/v, HS  0.5 ml [place], Common GLY 1:20 w/v, HS  0.5 ml Elm, American GLY 1:20 w/v, HS  0.5 ml Lamb's Quarters GLY 1:20 w/v, HS 0.5 ml Plantain, English GLY 1:20 w/v, HS 0.5 ml Ragweed Mixed 1:20 w/v ALK  0.5 ml Sagebrush, Mugwort GLY 1:20 w/v, HS 0.5 ml Diluent: HSA qs to 5ml    Non-seasonal allergic rhinitis due to animal hair and dander, Seasonal allergic rhinitis due to pollen, Allergic rhinitis due to mold       beclomethasone 80 MCG/ACT Inhaler    QVAR    3 Inhaler    Inhale 2 puffs into the lungs 2 times daily    Mild persistent asthma without complication       EPINEPHrine 0.3 MG/0.3ML injection 2-pack     AUVI-Q    2 mL    Inject 0.3 mLs (0.3 mg) into the muscle as needed for anaphylaxis    Need for desensitization to allergens       loratadine 10 MG tablet    CLARITIN    90 tablet    Take 1 tablet (10 mg) by mouth daily    Chronic rhinitis       montelukast 10 MG tablet    SINGULAIR    90 tablet    Take 1 tablet (10 mg) by mouth At Bedtime    Environmental allergies, Intermittent asthma, uncomplicated       * Notice:  This list has 3 medication(s) that are the same as other medications prescribed for you. Read the directions carefully, and ask your doctor or other care provider to review them with you.

## 2017-11-09 ENCOUNTER — ALLIED HEALTH/NURSE VISIT (OUTPATIENT)
Dept: ALLERGY | Facility: CLINIC | Age: 42
End: 2017-11-09
Payer: COMMERCIAL

## 2017-11-09 DIAGNOSIS — J30.1 ALLERGIC RHINITIS DUE TO POLLEN: Primary | ICD-10-CM

## 2017-11-09 PROCEDURE — 95117 IMMUNOTHERAPY INJECTIONS: CPT

## 2017-11-09 NOTE — MR AVS SNAPSHOT
After Visit Summary   11/9/2017    Reji Carpenter    MRN: [id number]           Patient Information     Date Of Birth          1975        Visit Information        Provider Department      11/9/2017 8:45 AM FZ ALLERGY SHOTS [facility]        Today's Diagnoses     Allergic rhinitis due to pollen    -  1       Follow-ups after your visit        Your next 10 appointments already scheduled     Nov 13, 2017  8:40 AM CST   ALEX Spine with Sushila Herrera PT   [facility] ([facility])    [address] [place] [address] [address] [facility] [address]   [phone]            Nov 16, 2017  8:30 AM CST   Nurse Only with FZ ALLERGY SHOTS   [facility] ([facility])    [address] [facility] [address] [phone]            Nov 30, 2017  8:30 AM CST   Nurse Only with FZ ALLERGY SHOTS   [facility] ([facility])    [address] [facility] [address] [phone]              Who to contact     If you have questions or need follow up information about today's clinic visit or your schedule please contact [facility] directly at [phone].  Normal or non-critical lab and imaging results will be communicated to you by [employer], letter or phone within 4 business days after the clinic has received the results. If you do not hear from us within 7 days, please contact the clinic through [employer] or phone. If you have a critical or abnormal lab result, we will notify you by phone as soon as possible.  Submit refill requests through [employer] or call your pharmacy and they will forward the refill request to us. Please allow 3 business days for your refill to be completed.          Additional Information About Your Visit        [employer] Information     [employer] gives you secure access to your electronic health record. If you see a primary care provider, you can also  send messages to your care team and make appointments. If you have questions, please call your primary care clinic.  If you do not have a primary care provider, please call [phone] and they will assist you.        Care EveryWhere ID     This is your Care EveryWhere ID. This could be used by other organizations to access your [facility] medical records  [id number]         Blood Pressure from Last 3 Encounters:   09/25/17 (!) 165/96   09/19/17 (!) 149/95   07/11/17 145/88    Weight from Last 3 Encounters:   09/25/17 90.2 kg (198 lb 14.4 oz)   09/19/17 89.4 kg (197 lb 3.2 oz)   07/11/17 88.7 kg (195 lb 8 oz)              We Performed the Following     Allergy Shot: Two or more injections        Primary Care Provider Office Phone # Fax #    Wili Kaplan [phone]       [address] [facility] [address]        Equal Access to Services     MICHELLE [facility] Center[facility]ELICA : Hadii aad ku hadasho Soomaali, waaxda luqadaha, qaybta kaalmada adeegyada, waxay idiin hayaan stacieg kharaaniket la'merissan . So [facility] [phone].    ATENCIÓN: Si habla español, tiene a bolton disposición servicios gratuitos de asistencia lingüística. Llame al [phone].    We comply with applicable federal civil rights laws and [place] laws. We do not discriminate on the basis of race, color, national origin, age, disability, sex, sexual orientation, or gender identity.            Thank you!     Thank you for choosing [facility]  for your care. Our goal is always to provide you with excellent care. Hearing back from our patients is one way we can continue to improve our services. Please take a few minutes to complete the written survey that you may receive in the mail after your visit with us. Thank you!             Your Updated Medication List - Protect others around you: Learn how to safely use, store and throw away your medicines at www.disposemymeds.org.          This list is accurate as of: 11/9/17  9:43 AM.  Always  use your most recent med list.                   Brand Name Dispense Instructions for use Diagnosis    albuterol 108 (90 BASE) MCG/ACT Inhaler    PROAIR HFA/PROVENTIL HFA/VENTOLIN HFA    3 Inhaler    Inhale 2 puffs into the lungs every 4 hours as needed for shortness of breath / dyspnea    Intermittent asthma, uncomplicated       * ALLERGEN IMMUNOTHERAPY PRESCRIPTION     5 mL    Name of Mix: Mix #1  Cat, Dog Cat Hair, Standardized 10,000 BAU/mL, ALK  2.0 ml Dog Hair Dander, A. P.  1:100 w/v, HS  1.0 ml  Diluent: HSA qs to 5ml    Non-seasonal allergic rhinitis due to animal hair and dander, Seasonal allergic rhinitis due to pollen, Allergic rhinitis due to mold       * ALLERGEN IMMUNOTHERAPY PRESCRIPTION     5 mL    Name of Mix: Mix #2  Mold Alternaria Tenuis GLY 1:10 w/v, HS  0.5 ml Diluent: HSA qs to 5ml    Non-seasonal allergic rhinitis due to animal hair and dander, Seasonal allergic rhinitis due to pollen, Allergic rhinitis due to mold       * ALLERGEN IMMUNOTHERAPY PRESCRIPTION     5 mL    Name of Mix: Mix #3  Tree , Weeds Narayan, White  GLY 1:20 w/v, HS  0.5 ml Boxelder-Maple  Mix BHR (Boxelder Hard Red) 1:20 w/v, HS  0.5 ml Stanley, Common GLY 1:20 w/v, HS  0.5 ml Elm, American GLY 1:20 w/v, HS  0.5 ml Lamb's Quarters GLY 1:20 w/v, HS 0.5 ml Plantain, English GLY 1:20 w/v, HS 0.5 ml Ragweed Mixed 1:20 w/v ALK  0.5 ml Sagebrush, Mugwort GLY 1:20 w/v, HS 0.5 ml Diluent: HSA qs to 5ml    Non-seasonal allergic rhinitis due to animal hair and dander, Seasonal allergic rhinitis due to pollen, Allergic rhinitis due to mold       beclomethasone 80 MCG/ACT Inhaler    QVAR    3 Inhaler    Inhale 2 puffs into the lungs 2 times daily    Mild persistent asthma without complication       EPINEPHrine 0.3 MG/0.3ML injection 2-pack    AUVI-Q    2 mL    Inject 0.3 mLs (0.3 mg) into the muscle as needed for anaphylaxis    Need for desensitization to allergens       loratadine 10 MG tablet    CLARITIN    90 tablet    Take 1  tablet (10 mg) by mouth daily    Chronic rhinitis       montelukast 10 MG tablet    SINGULAIR    90 tablet    Take 1 tablet (10 mg) by mouth At Bedtime    Environmental allergies, Intermittent asthma, uncomplicated       * Notice:  This list has 3 medication(s) that are the same as other medications prescribed for you. Read the directions carefully, and ask your doctor or other care provider to review them with you.

## 2017-11-13 ENCOUNTER — THERAPY VISIT (OUTPATIENT)
Dept: PHYSICAL THERAPY | Facility: CLINIC | Age: 42
End: 2017-11-13
Payer: COMMERCIAL

## 2017-11-13 DIAGNOSIS — M54.16 LUMBAR RADICULOPATHY: ICD-10-CM

## 2017-11-13 PROCEDURE — 97110 THERAPEUTIC EXERCISES: CPT | Mod: GP | Performed by: PHYSICAL THERAPIST

## 2017-11-13 PROCEDURE — 97112 NEUROMUSCULAR REEDUCATION: CPT | Mod: GP | Performed by: PHYSICAL THERAPIST

## 2017-11-13 NOTE — PROGRESS NOTES
Subjective:    HPI                    Objective:    System    Physical Exam    General     ROS    Assessment/Plan:      DISCHARGE REPORT    Progress reporting period is from 10/9/17 to 11/13/17.       SUBJECTIVE  Subjective: Patient states that his symptoms are 90% of normal.  He continues to work on his home program.  Has not returned to boxing yet.    Current Pain level: 0/10.      Changes in function:  Yes (See Goal flowsheet attached for changes in current functional level)  Adverse reaction to treatment or activity: None    OBJECTIVE  Changes noted in objective findings:  Yes  Objective: Prone plank 15 seconds.  Excellent squat mechanics.  Centralization of sxs noted.     ASSESSMENT/PLAN  Updated problem list and treatment plan: Diagnosis 1:  Disc protrusion  STG/LTGs have been met or progress has been made towards goals:  Yes (See Goal flow sheet completed today.)  Assessment of Progress: The patient's condition is improving.  Self Management Plans:  Patient has been instructed in a home treatment program.  I have re-evaluated this patient and find that the nature, scope, duration and intensity of the therapy is appropriate for the medical condition of the patient.  Reji continues to require the following intervention to meet STG and LTG's:  PT intervention is no longer required to meet STG/LTG.    Recommendations:  This patient is ready to be discharged from therapy and continue their home treatment program.    Please refer to the daily flowsheet for treatment today, total treatment time and time spent performing 1:1 timed codes.

## 2017-11-13 NOTE — MR AVS SNAPSHOT
After Visit Summary   11/13/2017    Reji Carpenter    MRN: [id number]           Patient Information     Date Of Birth          1975        Visit Information        Provider Department      11/13/2017 8:40 AM Sushila Herrera PT [facility]        Today's Diagnoses     Lumbar radiculopathy           Follow-ups after your visit        Your next 10 appointments already scheduled     Nov 16, 2017  8:30 AM CST   Nurse Only with FZ ALLERGY SHOTS   [facility] ([facility]    [address] [facility] [address] [phone]            Nov 30, 2017  8:30 AM CST   Nurse Only with FZ ALLERGY SHOTS   [facility] ([facility])    [address] [facility] [address] [facility] [address] [phone]              Who to contact     If you have questions or need follow up information about today's clinic visit or your schedule please contact [facility] directly at [phone].  Normal or non-critical lab and imaging results will be communicated to you by [employer], letter or phone within 4 business days after the clinic has received the results. If you do not hear from us within 7 days, please contact the clinic through [employer] or phone. If you have a critical or abnormal lab result, we will notify you by phone as soon as possible.  Submit refill requests through [employer] or call your pharmacy and they will forward the refill request to us. Please allow 3 business days for your refill to be completed.          Additional Information About Your Visit        [employer] Information     [employer] gives you secure access to your electronic health record. If you see a primary care provider, you can also send messages to your care team and make appointments. If you have questions, please call your primary care clinic.  If you do not have a primary care provider, please call [phone] and they will assist you.        Care  EveryWhere ID     This is your Care EveryWhere ID. This could be used by other organizations to access your [facility] medical records  [id number]         Blood Pressure from Last 3 Encounters:   09/25/17 (!) 165/96   09/19/17 (!) 149/95   07/11/17 145/88    Weight from Last 3 Encounters:   09/25/17 90.2 kg (198 lb 14.4 oz)   09/19/17 89.4 kg (197 lb 3.2 oz)   07/11/17 88.7 kg (195 lb 8 oz)              We Performed the Following     NEUROMUSCULAR RE-EDUCATION     THERAPEUTIC EXERCISES        Primary Care Provider Office Phone # Fax #    Wili Kaplan [phone]       [address] [facility] [address]        Equal Access to Services     MICHELLE JAVIER : Hadii aad ku hadasho Soomaali, waaxda luqadaha, qaybta kaalmada adeegyada, murray flores . So [facility] [phone].    ATENCIÓN: Si habla español, tiene a bolton disposición servicios gratuitos de asistencia lingüística. Ll[facility] [phone].    We comply with applicable federal civil rights laws and [place] laws. We do not discriminate on the basis of race, color, national origin, age, disability, sex, sexual orientation, or gender identity.            Thank you!     Thank you for choosing [facility]  for your care. Our goal is always to provide you with excellent care. Hearing back from our patients is one way we can continue to improve our services. Please take a few minutes to complete the written survey that you may receive in the mail after your visit with us. Thank you!             Your Updated Medication List - Protect others around you: Learn how to safely use, store and throw away your medicines at www.disposemymeds.org.          This list is accurate as of: 11/13/17  9:10 AM.  Always use your most recent med list.                   Brand Name Dispense Instructions for use Diagnosis    albuterol 108 (90 BASE) MCG/ACT Inhaler    PROAIR HFA/PROVENTIL HFA/VENTOLIN HFA    3 Inhaler     Inhale 2 puffs into the lungs every 4 hours as needed for shortness of breath / dyspnea    Intermittent asthma, uncomplicated       * ALLERGEN IMMUNOTHERAPY PRESCRIPTION     5 mL    Name of Mix: Mix #1  Cat, Dog Cat Hair, Standardized 10,000 BAU/mL, ALK  2.0 ml Dog Hair Dander, A. P.  1:100 w/v, HS  1.0 ml  Diluent: HSA qs to 5ml    Non-seasonal allergic rhinitis due to animal hair and dander, Seasonal allergic rhinitis due to pollen, Allergic rhinitis due to mold       * ALLERGEN IMMUNOTHERAPY PRESCRIPTION     5 mL    Name of Mix: Mix #2  Mold Alternaria Tenuis GLY 1:10 w/v, HS  0.5 ml Diluent: HSA qs to 5ml    Non-seasonal allergic rhinitis due to animal hair and dander, Seasonal allergic rhinitis due to pollen, Allergic rhinitis due to mold       * ALLERGEN IMMUNOTHERAPY PRESCRIPTION     5 mL    Name of Mix: Mix #3  Tree , Weeds Narayan, White  GLY 1:20 w/v, HS  0.5 ml Boxelder-Maple  Mix BHR (Boxelder Hard Red) 1:20 w/v, HS  0.5 ml Osgood, Common GLY 1:20 w/v, HS  0.5 ml Elm, American GLY 1:20 w/v, HS  0.5 ml Lamb's Quarters GLY 1:20 w/v, HS 0.5 ml Plantain, English GLY 1:20 w/v, HS 0.5 ml Ragweed Mixed 1:20 w/v ALK  0.5 ml Sagebrush, Mugwort GLY 1:20 w/v, HS 0.5 ml Diluent: HSA qs to 5ml    Non-seasonal allergic rhinitis due to animal hair and dander, Seasonal allergic rhinitis due to pollen, Allergic rhinitis due to mold       beclomethasone 80 MCG/ACT Inhaler    QVAR    3 Inhaler    Inhale 2 puffs into the lungs 2 times daily    Mild persistent asthma without complication       EPINEPHrine 0.3 MG/0.3ML injection 2-pack    AUVI-Q    2 mL    Inject 0.3 mLs (0.3 mg) into the muscle as needed for anaphylaxis    Need for desensitization to allergens       loratadine 10 MG tablet    CLARITIN    90 tablet    Take 1 tablet (10 mg) by mouth daily    Chronic rhinitis       montelukast 10 MG tablet    SINGULAIR    90 tablet    Take 1 tablet (10 mg) by mouth At Bedtime    Environmental allergies, Intermittent asthma,  uncomplicated       * Notice:  This list has 3 medication(s) that are the same as other medications prescribed for you. Read the directions carefully, and ask your doctor or other care provider to review them with you.

## 2017-11-16 ENCOUNTER — ALLIED HEALTH/NURSE VISIT (OUTPATIENT)
Dept: ALLERGY | Facility: CLINIC | Age: 42
End: 2017-11-16
Payer: COMMERCIAL

## 2017-11-16 DIAGNOSIS — J30.1 ALLERGIC RHINITIS DUE TO POLLEN: Primary | ICD-10-CM

## 2017-11-16 PROCEDURE — 99207 ZZC NO CHARGE LOS: CPT

## 2017-11-16 PROCEDURE — 95117 IMMUNOTHERAPY INJECTIONS: CPT

## 2017-11-16 NOTE — MR AVS SNAPSHOT
After Visit Summary   11/16/2017    Reji Carpenter    MRN: [id number]           Patient Information     Date Of Birth          1975        Visit Information        Provider Department      11/16/2017 8:30 AM FZ ALLERGY SHOTS [facility] [place]        Today's Diagnoses     Allergic rhinitis due to pollen    -  1       Follow-ups after your visit        Your next 10 appointments already scheduled     Nov 30, 2017  8:30 AM CST   Nurse Only with FZ ALLERGY SHOTS   [facility] [place] ([facility] [place])    [address] [facility]  [place] [address]   [phone]            Dec 07, 2017  9:00 AM CST   Nurse Only with FZ ALLERGY SHOTS   [facility] [place] ([facility])    [address]  [place] [address]   [phone]            Dec 14, 2017  9:00 AM CST   Nurse Only with FZ ALLERGY SHOTS   [facility] [place] ([facility] [place])    [address]  [place] [address]   [phone]            Dec 21, 2017  9:00 AM CST   Nurse Only with FZ ALLERGY SHOTS   [facility] [place] ([facility] [place])    [address]  [place] [address]   [phone]            Dec 28, 2017  9:00 AM CST   Nurse Only with FZ ALLERGY SHOTS   [facility] [place] ([facility] [place])    [address]  [place] [address]   [phone]            Jan 04, 2018  9:00 AM CST   Nurse Only with FZ ALLERGY SHOTS   [facility] [place] ([facility] [place])    [address]  [place] [address]   [phone]            Jan 11, 2018  9:00 AM CST   Nurse Only with FZ ALLERGY SHOTS   [facility] [place] ([facility] [place])    [address]  [place] [address]   [phone]            Jan 18, 2018  9:00 AM CST   Nurse Only with FZ ALLERGY SHOTS   [facility] [place] ([facility] [place])    [address]  [place] [address]   [phone]            Feb  01, 2018  9:00 AM CST   Nurse Only with FZ ALLERGY SHOTS   [facility] ([facility]    [address] [facility] [address] [facility]  [place] [address] [phone]            Feb 08, 2018  9:00 AM CST   Nurse Only with FZ ALLERGY SHOTS   [facility] ([facility])    [address] [facility] [address] [facility]  [place] [address] [phone]              Who to contact     If you have questions or need follow up information about today's clinic visit or your schedule please contact [facility] directly at [phone].  Normal or non-critical lab and imaging results will be communicated to you by [employer], letter or phone within 4 business days after the clinic has received the results. If you do not hear from us within 7 days, please contact the clinic through [employer] or phone. If you have a critical or abnormal lab result, we will notify you by phone as soon as possible.  Submit refill requests through [employer] or call your pharmacy and they will forward the refill request to us. Please allow 3 business days for your refill to be completed.          Additional Information About Your Visit        [employer] Information     [employer] gives you secure access to your electronic health record. If you see a primary care provider, you can also send messages to your care team and make appointments. If you have questions, please call your primary care clinic.  If you do not have a primary care provider, please call [phone] and they will assist you.        Care EveryWhere ID     This is your Care EveryWhere ID. This could be used by other organizations to access your [facility] medical records  [id number]         Blood Pressure from Last 3 Encounters:   09/25/17 (!) 165/96   09/19/17 (!) 149/95   07/11/17 145/88    Weight from Last 3 Encounters:   09/25/17 90.2 kg (198 lb 14.4 oz)   09/19/17 89.4 kg (197 lb 3.2 oz)   07/11/17 88.7 kg (195 lb 8 oz)              We Performed the Following      Allergy Shot: Two or more injections        Primary Care Provider Office Phone # Fax #    Wili Kaplan [phone]       [address] [facility] [address]        Equal Access to Services     DENITA JAVIER : Hadii keily ku haio Soomaali, waaxda luqadaha, qaybta kaalmada adeegyada, murray rodríguezemily may. So [facility] [phone].    ATENCIÓN: Si habla español, tiene a bolton disposición servicios gratuitos de asistencia lingüística. Llame al [phone].    We comply with applicable federal civil rights laws and [place] laws. We do not discriminate on the basis of race, color, national origin, age, disability, sex, sexual orientation, or gender identity.            Thank you!     Thank you for choosing [facility]  for your care. Our goal is always to provide you with excellent care. Hearing back from our patients is one way we can continue to improve our services. Please take a few minutes to complete the written survey that you may receive in the mail after your visit with us. Thank you!             Your Updated Medication List - Protect others around you: Learn how to safely use, store and throw away your medicines at www.disposemymeds.org.          This list is accurate as of: 11/16/17  9:13 AM.  Always use your most recent med list.                   Brand Name Dispense Instructions for use Diagnosis    albuterol 108 (90 BASE) MCG/ACT Inhaler    PROAIR HFA/PROVENTIL HFA/VENTOLIN HFA    3 Inhaler    Inhale 2 puffs into the lungs every 4 hours as needed for shortness of breath / dyspnea    Intermittent asthma, uncomplicated       * ALLERGEN IMMUNOTHERAPY PRESCRIPTION     5 mL    Name of Mix: Mix #1  Cat, Dog Cat Hair, Standardized 10,000 BAU/mL, ALK  2.0 ml Dog Hair Dander, A. P.  1:100 w/v, HS  1.0 ml  Diluent: HSA qs to 5ml    Non-seasonal allergic rhinitis due to animal hair and dander, Seasonal allergic rhinitis due to pollen, Allergic rhinitis due  to mold       * ALLERGEN IMMUNOTHERAPY PRESCRIPTION     5 mL    Name of Mix: Mix #2  Mold Alternaria Tenuis GLY 1:10 w/v, HS  0.5 ml Diluent: HSA qs to 5ml    Non-seasonal allergic rhinitis due to animal hair and dander, Seasonal allergic rhinitis due to pollen, Allergic rhinitis due to mold       * ALLERGEN IMMUNOTHERAPY PRESCRIPTION     5 mL    Name of Mix: Mix #3  Tree , Weeds Narayan, White  GLY 1:20 w/v, HS  0.5 ml Boxelder-Maple  Mix BHR (Boxelder Hard Red) 1:20 w/v, HS  0.5 ml [place], Common GLY 1:20 w/v, HS  0.5 ml Elm, American GLY 1:20 w/v, HS  0.5 ml Lamb's Quarters GLY 1:20 w/v, HS 0.5 ml Plantain, English GLY 1:20 w/v, HS 0.5 ml Ragweed Mixed 1:20 w/v ALK  0.5 ml Sagebrush, Mugwort GLY 1:20 w/v, HS 0.5 ml Diluent: HSA qs to 5ml    Non-seasonal allergic rhinitis due to animal hair and dander, Seasonal allergic rhinitis due to pollen, Allergic rhinitis due to mold       beclomethasone 80 MCG/ACT Inhaler    QVAR    3 Inhaler    Inhale 2 puffs into the lungs 2 times daily    Mild persistent asthma without complication       EPINEPHrine 0.3 MG/0.3ML injection 2-pack    AUVI-Q    2 mL    Inject 0.3 mLs (0.3 mg) into the muscle as needed for anaphylaxis    Need for desensitization to allergens       loratadine 10 MG tablet    CLARITIN    90 tablet    Take 1 tablet (10 mg) by mouth daily    Chronic rhinitis       montelukast 10 MG tablet    SINGULAIR    90 tablet    Take 1 tablet (10 mg) by mouth At Bedtime    Environmental allergies, Intermittent asthma, uncomplicated       * Notice:  This list has 3 medication(s) that are the same as other medications prescribed for you. Read the directions carefully, and ask your doctor or other care provider to review them with you.

## 2017-11-30 ENCOUNTER — ALLIED HEALTH/NURSE VISIT (OUTPATIENT)
Dept: ALLERGY | Facility: CLINIC | Age: 42
End: 2017-11-30
Payer: COMMERCIAL

## 2017-11-30 DIAGNOSIS — J30.1 ALLERGIC RHINITIS DUE TO POLLEN: Primary | ICD-10-CM

## 2017-11-30 PROCEDURE — 95117 IMMUNOTHERAPY INJECTIONS: CPT

## 2017-11-30 PROCEDURE — 99207 ZZC NO CHARGE LOS: CPT

## 2017-11-30 NOTE — MR AVS SNAPSHOT
After Visit Summary   11/30/2017    Reji Carpenter    MRN: [id number]           Patient Information     Date Of Birth          1975        Visit Information        Provider Department      11/30/2017 8:30 AM FZ ALLERGY SHOTS [facility] [place]        Today's Diagnoses     Allergic rhinitis due to pollen    -  1       Follow-ups after your visit        Your next 10 appointments already scheduled     Dec 07, 2017  9:00 AM CST   Nurse Only with FZ ALLERGY SHOTS   [facility] [place] ([facility] [place])    [address]  [place] [address]   [phone]            Dec 14, 2017  9:00 AM CST   Nurse Only with FZ ALLERGY SHOTS   [facility] [place] ([facility] [place])    [address]  [place] [address]   [phone]            Dec 21, 2017  9:00 AM CST   Nurse Only with FZ ALLERGY SHOTS   [facility] [place] ([facility] [place])    [address]  [place] [address]   [phone]            Dec 28, 2017  9:00 AM CST   Nurse Only with FZ ALLERGY SHOTS   [facility] [place] ([facility] [place])    [address]  [place] [address]   [phone]            Jan 04, 2018  9:00 AM CST   Nurse Only with FZ ALLERGY SHOTS   [facility] [place] ([facility] [place])    [address]  [place] [address]   [phone]            Jan 11, 2018  9:00 AM CST   Nurse Only with FZ ALLERGY SHOTS   [facility] [place] ([facility] [place])    [address]  [place] [address]   [phone]            Jan 18, 2018  9:00 AM CST   Nurse Only with FZ ALLERGY SHOTS   [facility] [place] ([facility] [place])    [address]  [place] [address]   [phone]            Feb 01, 2018  9:00 AM CST   Nurse Only with FZ ALLERGY SHOTS   [facility] [place] ([facility] [place])    [address]  [place] [address]   [phone]            Feb  08, 2018  9:00 AM CST   Nurse Only with FZ ALLERGY SHOTS   [facility]    [address]  [place] [address] [phone]            Feb 15, 2018  9:00 AM CST   Nurse Only with FZ ALLERGY SHOTS   [facility] ([facility]    [address]  [place] [address] [phone]              Who to contact     If you have questions or need follow up information about today's clinic visit or your schedule please contact [facility] directly at [phone].  Normal or non-critical lab and imaging results will be communicated to you by Greentoehart, letter or phone within 4 business days after the clinic has received the results. If you do not hear from us within 7 days, please contact the clinic through Greentoehart or phone. If you have a critical or abnormal lab result, we will notify you by phone as soon as possible.  Submit refill requests through [employer] or call your pharmacy and they will forward the refill request to us. Please allow 3 business days for your refill to be completed.          Additional Information About Your Visit        Greentoehart Information     [employer] gives you secure access to your electronic health record. If you see a primary care provider, you can also send messages to your care team and make appointments. If you have questions, please call your primary care clinic.  If you do not have a primary care provider, please call [phone] and they will assist you.        Care EveryWhere ID     This is your Care EveryWhere ID. This could be used by other organizations to access your [place] medical records  [id number]         Blood Pressure from Last 3 Encounters:   09/25/17 (!) 165/96   09/19/17 (!) 149/95   07/11/17 145/88    Weight from Last 3 Encounters:   09/25/17 90.2 kg (198 lb 14.4 oz)   09/19/17 89.4 kg (197 lb 3.2 oz)   07/11/17 88.7 kg (195 lb 8 oz)              We Performed the Following      Allergy Shot: Two or more injections        Primary Care Provider Office Phone # Fax #    Wili Kaplan [phone]       [address] [facility] [address]        Equal Access to Services     DENITA JAVIER : Hadii keily ku haio Soomaali, waaxda luqadaha, qaybta kaalmada adeegyada, murray rodríguezemily may. So [facility] [phone].    ATENCIÓN: Si habla español, tiene a bolton disposición servicios gratuitos de asistencia lingüística. Llame al [phone].    We comply with applicable federal civil rights laws and [place] laws. We do not discriminate on the basis of race, color, national origin, age, disability, sex, sexual orientation, or gender identity.            Thank you!     Thank you for choosing [facility]  for your care. Our goal is always to provide you with excellent care. Hearing back from our patients is one way we can continue to improve our services. Please take a few minutes to complete the written survey that you may receive in the mail after your visit with us. Thank you!             Your Updated Medication List - Protect others around you: Learn how to safely use, store and throw away your medicines at www.disposemymeds.org.          This list is accurate as of: 11/30/17  9:37 AM.  Always use your most recent med list.                   Brand Name Dispense Instructions for use Diagnosis    albuterol 108 (90 BASE) MCG/ACT Inhaler    PROAIR HFA/PROVENTIL HFA/VENTOLIN HFA    3 Inhaler    Inhale 2 puffs into the lungs every 4 hours as needed for shortness of breath / dyspnea    Intermittent asthma, uncomplicated       * ALLERGEN IMMUNOTHERAPY PRESCRIPTION     5 mL    Name of Mix: Mix #1  Cat, Dog Cat Hair, Standardized 10,000 BAU/mL, ALK  2.0 ml Dog Hair Dander, A. P.  1:100 w/v, HS  1.0 ml  Diluent: HSA qs to 5ml    Non-seasonal allergic rhinitis due to animal hair and dander, Seasonal allergic rhinitis due to pollen, Allergic rhinitis due  to mold       * ALLERGEN IMMUNOTHERAPY PRESCRIPTION     5 mL    Name of Mix: Mix #2  Mold Alternaria Tenuis GLY 1:10 w/v, HS  0.5 ml Diluent: HSA qs to 5ml    Non-seasonal allergic rhinitis due to animal hair and dander, Seasonal allergic rhinitis due to pollen, Allergic rhinitis due to mold       * ALLERGEN IMMUNOTHERAPY PRESCRIPTION     5 mL    Name of Mix: Mix #3  Tree , Weeds Narayan, White  GLY 1:20 w/v, HS  0.5 ml Boxelder-Maple  Mix BHR (Boxelder Hard Red) 1:20 w/v, HS  0.5 ml Early, Common GLY 1:20 w/v, HS  0.5 ml Elm, American GLY 1:20 w/v, HS  0.5 ml Lamb's Quarters GLY 1:20 w/v, HS 0.5 ml Plantain, English GLY 1:20 w/v, HS 0.5 ml Ragweed Mixed 1:20 w/v ALK  0.5 ml Sagebrush, Mugwort GLY 1:20 w/v, HS 0.5 ml Diluent: HSA qs to 5ml    Non-seasonal allergic rhinitis due to animal hair and dander, Seasonal allergic rhinitis due to pollen, Allergic rhinitis due to mold       beclomethasone 80 MCG/ACT Inhaler    QVAR    3 Inhaler    Inhale 2 puffs into the lungs 2 times daily    Mild persistent asthma without complication       EPINEPHrine 0.3 MG/0.3ML injection 2-pack    AUVI-Q    2 mL    Inject 0.3 mLs (0.3 mg) into the muscle as needed for anaphylaxis    Need for desensitization to allergens       loratadine 10 MG tablet    CLARITIN    90 tablet    Take 1 tablet (10 mg) by mouth daily    Chronic rhinitis       montelukast 10 MG tablet    SINGULAIR    90 tablet    Take 1 tablet (10 mg) by mouth At Bedtime    Environmental allergies, Intermittent asthma, uncomplicated       * Notice:  This list has 3 medication(s) that are the same as other medications prescribed for you. Read the directions carefully, and ask your doctor or other care provider to review them with you.

## 2017-12-07 ENCOUNTER — ALLIED HEALTH/NURSE VISIT (OUTPATIENT)
Dept: ALLERGY | Facility: CLINIC | Age: 42
End: 2017-12-07
Payer: COMMERCIAL

## 2017-12-07 DIAGNOSIS — J30.1 ALLERGIC RHINITIS DUE TO POLLEN: Primary | ICD-10-CM

## 2017-12-07 PROCEDURE — 99207 ZZC NO CHARGE LOS: CPT

## 2017-12-07 PROCEDURE — 95117 IMMUNOTHERAPY INJECTIONS: CPT

## 2017-12-07 NOTE — MR AVS SNAPSHOT
After Visit Summary   12/7/2017    Reji Carpenter    MRN: [id number]           Patient Information     Date Of Birth          1975        Visit Information        Provider Department      12/7/2017 9:00 AM FZ ALLERGY SHOTS [facility] [place]        Today's Diagnoses     Allergic rhinitis due to pollen    -  1       Follow-ups after your visit        Your next 10 appointments already scheduled     Dec 14, 2017  9:00 AM CST   Nurse Only with FZ ALLERGY SHOTS   [facility] [place] ([facility] [place])    [address]  [place] [address]   [phone]            Dec 21, 2017  9:00 AM CST   Nurse Only with FZ ALLERGY SHOTS   [facility] [place] ([facility] [place])    [address]  [place] [address]   [phone]            Dec 28, 2017  9:00 AM CST   Nurse Only with FZ ALLERGY SHOTS   [facility] [place] ([facility] [place])    [address]  [place] [address]   [phone]            Jan 04, 2018  9:00 AM CST   Nurse Only with FZ ALLERGY SHOTS   [facility] [place] ([facility] [place])    [address]  [place] [address]   [phone]            Jan 11, 2018  9:00 AM CST   Nurse Only with FZ ALLERGY SHOTS   [facility] [place] ([facility] [place])    [address]  [place] [address]   [phone]            Jan 18, 2018  9:00 AM CST   Nurse Only with FZ ALLERGY SHOTS   [facility] [place] ([facility] [place])    [address]  [place] [address]   [phone]            Feb 01, 2018  9:00 AM CST   Nurse Only with FZ ALLERGY SHOTS   [facility] [place] ([facility] [place])    [address]  [place] [address]   [phone]            Feb 08, 2018  9:00 AM CST   Nurse Only with FZ ALLERGY SHOTS   [facility] [place] ([facility] [place])    [address]  [place] [address]   [phone]            Feb  15, 2018  9:00 AM CST   Nurse Only with FZ ALLERGY SHOTS   [facility] [place] ([facility])    [address]  [place] [address] [phone]              Who to contact     If you have questions or need follow up information about today's clinic visit or your schedule please contact [facility] directly at [phone].  Normal or non-critical lab and imaging results will be communicated to you by SceneShothart, letter or phone within 4 business days after the clinic has received the results. If you do not hear from us within 7 days, please contact the clinic through Stayfilmt or phone. If you have a critical or abnormal lab result, we will notify you by phone as soon as possible.  Submit refill requests through [employer] or call your pharmacy and they will forward the refill request to us. Please allow 3 business days for your refill to be completed.          Additional Information About Your Visit        Striiv Information     [employer] gives you secure access to your electronic health record. If you see a primary care provider, you can also send messages to your care team and make appointments. If you have questions, please call your primary care clinic.  If you do not have a primary care provider, please call [phone] and they will assist you.        Care EveryWhere ID     This is your Care EveryWhere ID. This could be used by other organizations to access your [facility] medical records  [id number]         Blood Pressure from Last 3 Encounters:   09/25/17 (!) 165/96   09/19/17 (!) 149/95   07/11/17 145/88    Weight from Last 3 Encounters:   09/25/17 90.2 kg (198 lb 14.4 oz)   09/19/17 89.4 kg (197 lb 3.2 oz)   07/11/17 88.7 kg (195 lb 8 oz)              We Performed the Following     Allergy Shot: Two or more injections        Primary Care Provider Office Phone # Fax #    Wili Kaplan [phone] [address] [address]         Equal Access to Services     [facility]: Hadii keily mi hadsabaso Sokingali, waaxda luqadaha, qaybta kaalmada staciyolytabitha, murray poerickaniket flores . So [facility] [phone].    ATENCIÓN: Si michaella taylor, tiene a bolton disposición servicios gratuitos de asistencia lingüística. Halleame al [phone].    We comply with applicable federal civil rights laws and [place] laws. We do not discriminate on the basis of race, color, national origin, age, disability, sex, sexual orientation, or gender identity.            Thank you!     Thank you for choosing [facility]  for your care. Our goal is always to provide you with excellent care. Hearing back from our patients is one way we can continue to improve our services. Please take a few minutes to complete the written survey that you may receive in the mail after your visit with us. Thank you!             Your Updated Medication List - Protect others around you: Learn how to safely use, store and throw away your medicines at www.disposemymeds.org.          This list is accurate as of: 12/7/17  9:36 AM.  Always use your most recent med list.                   Brand Name Dispense Instructions for use Diagnosis    albuterol 108 (90 BASE) MCG/ACT Inhaler    PROAIR HFA/PROVENTIL HFA/VENTOLIN HFA    3 Inhaler    Inhale 2 puffs into the lungs every 4 hours as needed for shortness of breath / dyspnea    Intermittent asthma, uncomplicated       * ALLERGEN IMMUNOTHERAPY PRESCRIPTION     5 mL    Name of Mix: Mix #1  Cat, Dog Cat Hair, Standardized 10,000 BAU/mL, ALK  2.0 ml Dog Hair Dander, A. P.  1:100 w/v, HS  1.0 ml  Diluent: HSA qs to 5ml    Non-seasonal allergic rhinitis due to animal hair and dander, Seasonal allergic rhinitis due to pollen, Allergic rhinitis due to mold       * ALLERGEN IMMUNOTHERAPY PRESCRIPTION     5 mL    Name of Mix: Mix #2  Mold Alternaria Tenuis GLY 1:10 w/v, HS  0.5 ml Diluent: HSA qs to 5ml    Non-seasonal allergic rhinitis due  to animal hair and dander, Seasonal allergic rhinitis due to pollen, Allergic rhinitis due to mold       * ALLERGEN IMMUNOTHERAPY PRESCRIPTION     5 mL    Name of Mix: Mix #3  Tree , Weeds Narayan, White  GLY 1:20 w/v, HS  0.5 ml Boxelder-Maple  Mix BHR (Boxelder Hard Red) 1:20 w/v, HS  0.5 ml Lander, Common GLY 1:20 w/v, HS  0.5 ml Elm, American GLY 1:20 w/v, HS  0.5 ml Lamb's Quarters GLY 1:20 w/v, HS 0.5 ml Plantain, English GLY 1:20 w/v, HS 0.5 ml Ragweed Mixed 1:20 w/v ALK  0.5 ml Sagebrush, Mugwort GLY 1:20 w/v, HS 0.5 ml Diluent: HSA qs to 5ml    Non-seasonal allergic rhinitis due to animal hair and dander, Seasonal allergic rhinitis due to pollen, Allergic rhinitis due to mold       beclomethasone 80 MCG/ACT Inhaler    QVAR    3 Inhaler    Inhale 2 puffs into the lungs 2 times daily    Mild persistent asthma without complication       EPINEPHrine 0.3 MG/0.3ML injection 2-pack    AUVI-Q    2 mL    Inject 0.3 mLs (0.3 mg) into the muscle as needed for anaphylaxis    Need for desensitization to allergens       loratadine 10 MG tablet    CLARITIN    90 tablet    Take 1 tablet (10 mg) by mouth daily    Chronic rhinitis       montelukast 10 MG tablet    SINGULAIR    90 tablet    Take 1 tablet (10 mg) by mouth At Bedtime    Environmental allergies, Intermittent asthma, uncomplicated       * Notice:  This list has 3 medication(s) that are the same as other medications prescribed for you. Read the directions carefully, and ask your doctor or other care provider to review them with you.

## 2017-12-14 ENCOUNTER — ALLIED HEALTH/NURSE VISIT (OUTPATIENT)
Dept: ALLERGY | Facility: CLINIC | Age: 42
End: 2017-12-14
Payer: COMMERCIAL

## 2017-12-14 DIAGNOSIS — J30.1 ALLERGIC RHINITIS DUE TO POLLEN: Primary | ICD-10-CM

## 2017-12-14 PROCEDURE — 95117 IMMUNOTHERAPY INJECTIONS: CPT

## 2017-12-14 PROCEDURE — 99207 ZZC NO CHARGE LOS: CPT

## 2017-12-14 NOTE — MR AVS SNAPSHOT
After Visit Summary   12/14/2017    Reji Carpenter    MRN: [id number]           Patient Information     Date Of Birth          1975        Visit Information        Provider Department      12/14/2017 9:00 AM FZ ALLERGY SHOTS [facility] [place]        Today's Diagnoses     Allergic rhinitis due to pollen    -  1       Follow-ups after your visit        Your next 10 appointments already scheduled     Dec 21, 2017  9:00 AM CST   Nurse Only with FZ ALLERGY SHOTS   [facility] [place] ([facility] [place]    [address]  [place] [address]   [phone]            Dec 28, 2017  9:00 AM CST   Nurse Only with FZ ALLERGY SHOTS   [facility] [place] ([facility] [place]    [address]  [place] [address]   [phone]            Jan 04, 2018  9:00 AM CST   Nurse Only with FZ ALLERGY SHOTS   [facility] [place] ([facility] [place]    [address]  [place] [address]   [phone]            Jan 11, 2018  9:00 AM CST   Nurse Only with FZ ALLERGY SHOTS   [facility] [place] ([facility] [place]    [address]  [place] [address]   [phone]            Jan 18, 2018  9:00 AM CST   Nurse Only with FZ ALLERGY SHOTS   [facility] [place] [facility] [place]    [address]  [place] [address]   [phone]            Feb 01, 2018  9:00 AM CST   Nurse Only with FZ ALLERGY SHOTS   [facility] [place] [facility] [place]    [address]  [place] [address]   [phone]            Feb 08, 2018  9:00 AM CST   Nurse Only with FZ ALLERGY SHOTS   [facility] [place] [facility] [place]    [address]  [place] [address]   [phone]            Feb 15, 2018  9:00 AM CST   Nurse Only with FZ ALLERGY SHOTS   [facility] [place] [facility] [place]    [address]  [place] [address]   [phone]               Who to contact     If you have questions or need follow up information about today's clinic visit or your schedule please contact [facility] directly at [phone].  Normal or non-critical lab and imaging results will be communicated to you by MyChart, letter or phone within 4 business days after the clinic has received the results. If you do not hear from us within 7 days, please contact the clinic through MyChart or phone. If you have a critical or abnormal lab result, we will notify you by phone as soon as possible.  Submit refill requests through [employer] or call your pharmacy and they will forward the refill request to us. Please allow 3 business days for your refill to be completed.          Additional Information About Your Visit        EbixharLiquidText Information     [employer] gives you secure access to your electronic health record. If you see a primary care provider, you can also send messages to your care team and make appointments. If you have questions, please call your primary care clinic.  If you do not have a primary care provider, please call [phone] and they will assist you.        Care EveryWhere ID     This is your Care EveryWhere ID. This could be used by other organizations to access your [place] medical records  [id number]         Blood Pressure from Last 3 Encounters:   09/25/17 (!) 165/96   09/19/17 (!) 149/95   07/11/17 145/88    Weight from Last 3 Encounters:   09/25/17 90.2 kg (198 lb 14.4 oz)   09/19/17 89.4 kg (197 lb 3.2 oz)   07/11/17 88.7 kg (195 lb 8 oz)              We Performed the Following     Allergy Shot: Two or more injections        Primary Care Provider Office Phone # Fax #    Wili Kaplan [phone]       [address] [address]        Equal Access to Services     DENITA JAVIER : Lois Servin, karen rodriguez, murray porter. So [facility]  [phone].    ATENCIÓN: Si marii vazquez, tiene a bolton disposición servicios gratuitos de asistencia lingüística. Candice hansen [phone].    We comply with applicable federal civil rights laws and [place] laws. We do not discriminate on the basis of race, color, national origin, age, disability, sex, sexual orientation, or gender identity.            Thank you!     Thank you for choosing [facility]  for your care. Our goal is always to provide you with excellent care. Hearing back from our patients is one way we can continue to improve our services. Please take a few minutes to complete the written survey that you may receive in the mail after your visit with us. Thank you!             Your Updated Medication List - Protect others around you: Learn how to safely use, store and throw away your medicines at www.disposemymeds.org.          This list is accurate as of: 12/14/17 10:02 AM.  Always use your most recent med list.                   Brand Name Dispense Instructions for use Diagnosis    albuterol 108 (90 BASE) MCG/ACT Inhaler    PROAIR HFA/PROVENTIL HFA/VENTOLIN HFA    3 Inhaler    Inhale 2 puffs into the lungs every 4 hours as needed for shortness of breath / dyspnea    Intermittent asthma, uncomplicated       * ALLERGEN IMMUNOTHERAPY PRESCRIPTION     5 mL    Name of Mix: Mix #1  Cat, Dog Cat Hair, Standardized 10,000 BAU/mL, ALK  2.0 ml Dog Hair Dander, A. P.  1:100 w/v, HS  1.0 ml  Diluent: HSA qs to 5ml    Non-seasonal allergic rhinitis due to animal hair and dander, Seasonal allergic rhinitis due to pollen, Allergic rhinitis due to mold       * ALLERGEN IMMUNOTHERAPY PRESCRIPTION     5 mL    Name of Mix: Mix #2  Mold Alternaria Tenuis GLY 1:10 w/v, HS  0.5 ml Diluent: HSA qs to 5ml    Non-seasonal allergic rhinitis due to animal hair and dander, Seasonal allergic rhinitis due to pollen, Allergic rhinitis due to mold       * ALLERGEN IMMUNOTHERAPY PRESCRIPTION     5 mL    Name of Mix: Mix  #3  Tree , Weeds Narayan, White  GLY 1:20 w/v, HS  0.5 ml Boxelder-Maple  Mix BHR (Boxelder Hard Red) 1:20 w/v, HS  0.5 ml [place], Common GLY 1:20 w/v, HS  0.5 ml Elm, American GLY 1:20 w/v, HS  0.5 ml Lamb's Quarters GLY 1:20 w/v, HS 0.5 ml Plantain, English GLY 1:20 w/v, HS 0.5 ml Ragweed Mixed 1:20 w/v ALK  0.5 ml Sagebrush, Mugwort GLY 1:20 w/v, HS 0.5 ml Diluent: HSA qs to 5ml    Non-seasonal allergic rhinitis due to animal hair and dander, Seasonal allergic rhinitis due to pollen, Allergic rhinitis due to mold       beclomethasone 80 MCG/ACT Inhaler    QVAR    3 Inhaler    Inhale 2 puffs into the lungs 2 times daily    Mild persistent asthma without complication       EPINEPHrine 0.3 MG/0.3ML injection 2-pack    AUVI-Q    2 mL    Inject 0.3 mLs (0.3 mg) into the muscle as needed for anaphylaxis    Need for desensitization to allergens       loratadine 10 MG tablet    CLARITIN    90 tablet    Take 1 tablet (10 mg) by mouth daily    Chronic rhinitis       montelukast 10 MG tablet    SINGULAIR    90 tablet    Take 1 tablet (10 mg) by mouth At Bedtime    Environmental allergies, Intermittent asthma, uncomplicated       * Notice:  This list has 3 medication(s) that are the same as other medications prescribed for you. Read the directions carefully, and ask your doctor or other care provider to review them with you.

## 2017-12-21 ENCOUNTER — ALLIED HEALTH/NURSE VISIT (OUTPATIENT)
Dept: ALLERGY | Facility: CLINIC | Age: 42
End: 2017-12-21
Payer: COMMERCIAL

## 2017-12-21 DIAGNOSIS — J30.1 ALLERGIC RHINITIS DUE TO POLLEN: Primary | ICD-10-CM

## 2017-12-21 PROCEDURE — 95117 IMMUNOTHERAPY INJECTIONS: CPT

## 2017-12-21 PROCEDURE — 99207 ZZC NO CHARGE LOS: CPT

## 2017-12-21 NOTE — MR AVS SNAPSHOT
After Visit Summary   12/21/2017    Reji Carpenter    MRN: [id number]           Patient Information     Date Of Birth          1975        Visit Information        Provider Department      12/21/2017 9:00 AM FZ ALLERGY SHOTS [facility] [place]        Today's Diagnoses     Allergic rhinitis due to pollen    -  1       Follow-ups after your visit        Your next 10 appointments already scheduled     Dec 28, 2017  9:00 AM CST   Nurse Only with FZ ALLERGY SHOTS   [facility] [place] ([facility] [place]    [address]  [place] [address]   [phone]            Jan 04, 2018  9:00 AM CST   Nurse Only with FZ ALLERGY SHOTS   [facility] [place] ([facility] [place]    [address]  [place] [address]   [phone]            Jan 11, 2018  9:00 AM CST   Nurse Only with FZ ALLERGY SHOTS   [facility] [place] ([facility] [place]    [address]  [place] [address]   [phone]            Jan 18, 2018  9:00 AM CST   Nurse Only with FZ ALLERGY SHOTS   [facility] [place] [facility] [place]    [address]  [place] [address]   [phone]            Feb 01, 2018  9:00 AM CST   Nurse Only with FZ ALLERGY SHOTS   [facility] [place] ([facility] [place]    [address]  [place] [address]   [phone]            Feb 08, 2018  9:00 AM CST   Nurse Only with FZ ALLERGY SHOTS   [facility] [place] [facility] [place]    [address]  [place] [address]   [phone]            Feb 15, 2018  9:00 AM CST   Nurse Only with FZ ALLERGY SHOTS   [facility] [place] ([facility] [place]    [address]  [place] [address]   [phone]              Who to contact     If you have questions or need follow up information about today's clinic visit or your schedule please contact [facility] directly at [phone].  Normal or  non-critical lab and imaging results will be communicated to you by MyChart, letter or phone within 4 business days after the clinic has received the results. If you do not hear from us within 7 days, please contact the clinic through Zandohart or phone. If you have a critical or abnormal lab result, we will notify you by phone as soon as possible.  Submit refill requests through ParkVu or call your pharmacy and they will forward the refill request to us. Please allow 3 business days for your refill to be completed.          Additional Information About Your Visit        ZandoharFashism Information     ParkVu gives you secure access to your electronic health record. If you see a primary care provider, you can also send messages to your care team and make appointments. If you have questions, please call your primary care clinic.  If you do not have a primary care provider, please call [phone] and they will assist you.        Care EveryWhere ID     This is your Care EveryWhere ID. This could be used by other organizations to access your [place] medical records  [id number]         Blood Pressure from Last 3 Encounters:   09/25/17 (!) 165/96   09/19/17 (!) 149/95   07/11/17 145/88    Weight from Last 3 Encounters:   09/25/17 90.2 kg (198 lb 14.4 oz)   09/19/17 89.4 kg (197 lb 3.2 oz)   07/11/17 88.7 kg (195 lb 8 oz)              We Performed the Following     Allergy Shot: Two or more injections        Primary Care Provider Office Phone # Fax #    Wili Kaplan [phone]       [address] [address]        Equal Access to Services     [facility]: Hadii aad ku hadasho Soomaali, waaxda luqadaha, qaybta kaalmada adeegshari, murray flores . So [facility] [phone].    ATENCIÓN: Si habla español, tiene a bolton disposición servicios gratuitos de asistencia lingüística. Llame al [phone].    We comply with applicable federal civil rights laws and  [place] laws. We do not discriminate on the basis of race, color, national origin, age, disability, sex, sexual orientation, or gender identity.            Thank you!     Thank you for choosing [facility]  for your care. Our goal is always to provide you with excellent care. Hearing back from our patients is one way we can continue to improve our services. Please take a few minutes to complete the written survey that you may receive in the mail after your visit with us. Thank you!             Your Updated Medication List - Protect others around you: Learn how to safely use, store and throw away your medicines at www.disposemymeds.org.          This list is accurate as of: 12/21/17  9:42 AM.  Always use your most recent med list.                   Brand Name Dispense Instructions for use Diagnosis    albuterol 108 (90 BASE) MCG/ACT Inhaler    PROAIR HFA/PROVENTIL HFA/VENTOLIN HFA    3 Inhaler    Inhale 2 puffs into the lungs every 4 hours as needed for shortness of breath / dyspnea    Intermittent asthma, uncomplicated       * ALLERGEN IMMUNOTHERAPY PRESCRIPTION     5 mL    Name of Mix: Mix #1  Cat, Dog Cat Hair, Standardized 10,000 BAU/mL, ALK  2.0 ml Dog Hair Dander, A. P.  1:100 w/v, HS  1.0 ml  Diluent: HSA qs to 5ml    Non-seasonal allergic rhinitis due to animal hair and dander, Seasonal allergic rhinitis due to pollen, Allergic rhinitis due to mold       * ALLERGEN IMMUNOTHERAPY PRESCRIPTION     5 mL    Name of Mix: Mix #2  Mold Alternaria Tenuis GLY 1:10 w/v, HS  0.5 ml Diluent: HSA qs to 5ml    Non-seasonal allergic rhinitis due to animal hair and dander, Seasonal allergic rhinitis due to pollen, Allergic rhinitis due to mold       * ALLERGEN IMMUNOTHERAPY PRESCRIPTION     5 mL    Name of Mix: Mix #3  Tree , Weeds Narayan, White  GLY 1:20 w/v, HS  0.5 ml Boxelder-Maple  Mix BHR (Boxelder Hard Red) 1:20 w/v, HS  0.5 ml Robbins, Common GLY 1:20 w/v, HS  0.5 ml Elm, American GLY 1:20 w/v, HS  0.5  ml Hawthorne's Quarters GLY 1:20 w/v, HS 0.5 ml Plantain, English GLY 1:20 w/v, HS 0.5 ml Ragweed Mixed 1:20 w/v ALK  0.5 ml Sagebrush, Mugwort GLY 1:20 w/v, HS 0.5 ml Diluent: HSA qs to 5ml    Non-seasonal allergic rhinitis due to animal hair and dander, Seasonal allergic rhinitis due to pollen, Allergic rhinitis due to mold       beclomethasone 80 MCG/ACT Inhaler    QVAR    3 Inhaler    Inhale 2 puffs into the lungs 2 times daily    Mild persistent asthma without complication       EPINEPHrine 0.3 MG/0.3ML injection 2-pack    AUVI-Q    2 mL    Inject 0.3 mLs (0.3 mg) into the muscle as needed for anaphylaxis    Need for desensitization to allergens       loratadine 10 MG tablet    CLARITIN    90 tablet    Take 1 tablet (10 mg) by mouth daily    Chronic rhinitis       montelukast 10 MG tablet    SINGULAIR    90 tablet    Take 1 tablet (10 mg) by mouth At Bedtime    Environmental allergies, Intermittent asthma, uncomplicated       * Notice:  This list has 3 medication(s) that are the same as other medications prescribed for you. Read the directions carefully, and ask your doctor or other care provider to review them with you.

## 2017-12-28 ENCOUNTER — ALLIED HEALTH/NURSE VISIT (OUTPATIENT)
Dept: ALLERGY | Facility: CLINIC | Age: 42
End: 2017-12-28
Payer: COMMERCIAL

## 2017-12-28 DIAGNOSIS — J30.1 ALLERGIC RHINITIS DUE TO POLLEN: Primary | ICD-10-CM

## 2017-12-28 PROCEDURE — 99207 ZZC NO CHARGE LOS: CPT

## 2017-12-28 PROCEDURE — 95117 IMMUNOTHERAPY INJECTIONS: CPT

## 2017-12-28 NOTE — MR AVS SNAPSHOT
After Visit Summary   12/28/2017    Reji Carpenter    MRN: [id number]           Patient Information     Date Of Birth          1975        Visit Information        Provider Department      12/28/2017 9:00 AM FZ ALLERGY SHOTS [facility] [place]        Today's Diagnoses     Allergic rhinitis due to pollen    -  1       Follow-ups after your visit        Your next 10 appointments already scheduled     Jan 04, 2018  9:00 AM CST   Nurse Only with FZ ALLERGY SHOTS   [facility] [place] ([facility] [place])    [address]  [place] [address]   [phone]            Jan 11, 2018  9:00 AM CST   Nurse Only with FZ ALLERGY SHOTS   [facility] [place] ([facility] [place])    [address]  [place] [address]   [phone]            Jan 18, 2018  9:00 AM CST   Nurse Only with FZ ALLERGY SHOTS   [facility] [place] ([facility] [place])    [address]  [place] [address]   [phone]            Feb 01, 2018  9:00 AM CST   Nurse Only with FZ ALLERGY SHOTS   [facility] [place] ([facility] [place])    [address]  [place] [address]   [phone]            Feb 08, 2018  9:00 AM CST   Nurse Only with FZ ALLERGY SHOTS   [facility] [place] ([facility] [place])    [address]  [place] [address]   [phone]            Feb 15, 2018  9:00 AM CST   Nurse Only with FZ ALLERGY SHOTS   [facility] [place] ([facility] [place])    [address]  [place] [address]   [phone]              Who to contact     If you have questions or need follow up information about today's clinic visit or your schedule please contact [facility] directly at [phone].  Normal or non-critical lab and imaging results will be communicated to you by MyChart, letter or phone within 4 business days after the clinic has received the results. If you do not hear from us  within 7 days, please contact the clinic through LETSGROOP or phone. If you have a critical or abnormal lab result, we will notify you by phone as soon as possible.  Submit refill requests through LETSGROOP or call your pharmacy and they will forward the refill request to us. Please allow 3 business days for your refill to be completed.          Additional Information About Your Visit        Pearl's Premiumhart Information     LETSGROOP gives you secure access to your electronic health record. If you see a primary care provider, you can also send messages to your care team and make appointments. If you have questions, please call your primary care clinic.  If you do not have a primary care provider, please call [phone] and they will assist you.        Care EveryWhere ID     This is your Care EveryWhere ID. This could be used by other organizations to access your [place] medical records  [id number]         Blood Pressure from Last 3 Encounters:   09/25/17 (!) 165/96   09/19/17 (!) 149/95   07/11/17 145/88    Weight from Last 3 Encounters:   09/25/17 90.2 kg (198 lb 14.4 oz)   09/19/17 89.4 kg (197 lb 3.2 oz)   07/11/17 88.7 kg (195 lb 8 oz)              We Performed the Following     Allergy Shot: Two or more injections        Primary Care Provider Office Phone # Fax #    Wili Kaplan [phone]       [address] [address]        Equal Access to Services     [facility]: Hadii aad ku hadasho Soomaali, waaxda luqadaha, qaybta kaalmada adeegyada, murray pink haymerissan thierno flores . So [facility] [phone].    ATENCIÓN: Si habla español, tiene a bolton disposición servicios gratuitos de asistencia lingüística. Llame al [phone].    We comply with applicable federal civil rights laws and [place] laws. We do not discriminate on the basis of race, color, national origin, age, disability, sex, sexual orientation, or gender identity.            Thank you!     Thank you for  choosing [facility]  for your care. Our goal is always to provide you with excellent care. Hearing back from our patients is one way we can continue to improve our services. Please take a few minutes to complete the written survey that you may receive in the mail after your visit with us. Thank you!             Your Updated Medication List - Protect others around you: Learn how to safely use, store and throw away your medicines at www.disposemymeds.org.          This list is accurate as of: 12/28/17  9:19 AM.  Always use your most recent med list.                   Brand Name Dispense Instructions for use Diagnosis    albuterol 108 (90 BASE) MCG/ACT Inhaler    PROAIR HFA/PROVENTIL HFA/VENTOLIN HFA    3 Inhaler    Inhale 2 puffs into the lungs every 4 hours as needed for shortness of breath / dyspnea    Intermittent asthma, uncomplicated       * ALLERGEN IMMUNOTHERAPY PRESCRIPTION     5 mL    Name of Mix: Mix #1  Cat, Dog Cat Hair, Standardized 10,000 BAU/mL, ALK  2.0 ml Dog Hair Dander, A. P.  1:100 w/v, HS  1.0 ml  Diluent: HSA qs to 5ml    Non-seasonal allergic rhinitis due to animal hair and dander, Seasonal allergic rhinitis due to pollen, Allergic rhinitis due to mold       * ALLERGEN IMMUNOTHERAPY PRESCRIPTION     5 mL    Name of Mix: Mix #2  Mold Alternaria Tenuis GLY 1:10 w/v, HS  0.5 ml Diluent: HSA qs to 5ml    Non-seasonal allergic rhinitis due to animal hair and dander, Seasonal allergic rhinitis due to pollen, Allergic rhinitis due to mold       * ALLERGEN IMMUNOTHERAPY PRESCRIPTION     5 mL    Name of Mix: Mix #3  Tree , Weeds Narayan, White  GLY 1:20 w/v, HS  0.5 ml Boxelder-Maple  Mix BHR (Boxelder Hard Red) 1:20 w/v, HS  0.5 ml Peck, Common GLY 1:20 w/v, HS  0.5 ml Elm, American GLY 1:20 w/v, HS  0.5 ml Lamb's Quarters GLY 1:20 w/v, HS 0.5 ml Plantain, English GLY 1:20 w/v, HS 0.5 ml Ragweed Mixed 1:20 w/v ALK  0.5 ml Sagebrush, Mugwort GLY 1:20 w/v, HS 0.5 ml Diluent: HSA qs to 5ml     Non-seasonal allergic rhinitis due to animal hair and dander, Seasonal allergic rhinitis due to pollen, Allergic rhinitis due to mold       beclomethasone 80 MCG/ACT Inhaler    QVAR    3 Inhaler    Inhale 2 puffs into the lungs 2 times daily    Mild persistent asthma without complication       EPINEPHrine 0.3 MG/0.3ML injection 2-pack    AUVI-Q    2 mL    Inject 0.3 mLs (0.3 mg) into the muscle as needed for anaphylaxis    Need for desensitization to allergens       loratadine 10 MG tablet    CLARITIN    90 tablet    Take 1 tablet (10 mg) by mouth daily    Chronic rhinitis       montelukast 10 MG tablet    SINGULAIR    90 tablet    Take 1 tablet (10 mg) by mouth At Bedtime    Environmental allergies, Intermittent asthma, uncomplicated       * Notice:  This list has 3 medication(s) that are the same as other medications prescribed for you. Read the directions carefully, and ask your doctor or other care provider to review them with you.

## 2018-01-04 ENCOUNTER — ALLIED HEALTH/NURSE VISIT (OUTPATIENT)
Dept: ALLERGY | Facility: CLINIC | Age: 43
End: 2018-01-04

## 2018-01-04 DIAGNOSIS — J30.1 ALLERGIC RHINITIS DUE TO POLLEN: Primary | ICD-10-CM

## 2018-01-04 PROCEDURE — 95117 IMMUNOTHERAPY INJECTIONS: CPT

## 2018-01-04 PROCEDURE — 99207 ZZC NO CHARGE LOS: CPT

## 2018-01-04 NOTE — MR AVS SNAPSHOT
After Visit Summary   1/4/2018    Reji Carpenter    MRN: [id number]           Patient Information     Date Of Birth          1975        Visit Information        Provider Department      1/4/2018 9:00 AM FZ ALLERGY SHOTS [facility]        Today's Diagnoses     Allergic rhinitis due to pollen    -  1       Follow-ups after your visit        Your next 10 appointments already scheduled     Jan 11, 2018  9:00 AM CST   Nurse Only with FZ ALLERGY SHOTS   [facility] [place] [facility] [place]    [address]  [place] [address]   [phone]            Jan 18, 2018  9:00 AM CST   Nurse Only with FZ ALLERGY SHOTS   [facility] [place] [facility] [place]    [address]  [place] [address]   [phone]            Feb 01, 2018  9:00 AM CST   Nurse Only with FZ ALLERGY SHOTS   [facility] [place] [facility] [place]    [address]  [place] [address]   [phone]            Feb 08, 2018  9:00 AM CST   Nurse Only with FZ ALLERGY SHOTS   [facility] [place] [facility] [place]    [address]  [place] [address]   [phone]            Feb 15, 2018  9:00 AM CST   Nurse Only with FZ ALLERGY SHOTS   [facility] [place] [facility] [place]    [address]  [place] [address]   [phone]              Who to contact     If you have questions or need follow up information about today's clinic visit or your schedule please contact [facility] directly at [phone].  Normal or non-critical lab and imaging results will be communicated to you by MyChart, letter or phone within 4 business days after the clinic has received the results. If you do not hear from us within 7 days, please contact the clinic through MyChart or phone. If you have a critical or abnormal lab result, we will notify you by phone as soon as possible.  Submit refill requests through  [email or web address] or call your pharmacy and they will forward the refill request to us. Please allow 3 business days for your refill to be completed.          Additional Information About Your Visit        MyChart Information     [email or web address] gives you secure access to your electronic health record. If you see a primary care provider, you can also send messages to your care team and make appointments. If you have questions, please call your primary care clinic.  If you do not have a primary care provider, please call [phone] and they will assist you.        Care EveryWhere ID     This is your Care EveryWhere ID. This could be used by other organizations to access your [place] medical records  [id number]         Blood Pressure from Last 3 Encounters:   09/25/17 (!) 165/96   09/19/17 (!) 149/95   07/11/17 145/88    Weight from Last 3 Encounters:   09/25/17 90.2 kg (198 lb 14.4 oz)   09/19/17 89.4 kg (197 lb 3.2 oz)   07/11/17 88.7 kg (195 lb 8 oz)              We Performed the Following     Allergy Shot: Two or more injections        Primary Care Provider Office Phone # Fax #    Wili Kaplan [phone]       [address] [facility] [address]        Equal Access to Services     [facility] : Hadii aad ku hadasho Soomaali, waaxda luqadaha, qaybta kaalmada adeegyada, waxay joesph haymerissan thierno figueroa lacarmelina . So [facility] [phone].    ATENCIÓN: Si habla español, tiene a bolton disposición servicios gratuitos de asistencia lingüística. Llame al [phone].    We comply with applicable federal civil rights laws and [place] laws. We do not discriminate on the basis of race, color, national origin, age, disability, sex, sexual orientation, or gender identity.            Thank you!     Thank you for choosing [facility]  for your care. Our goal is always to provide you with excellent care. Hearing back from our patients is one way we can continue to improve our services. Please take a  few minutes to complete the written survey that you may receive in the mail after your visit with us. Thank you!             Your Updated Medication List - Protect others around you: Learn how to safely use, store and throw away your medicines at www.disposemymeds.org.          This list is accurate as of: 1/4/18  9:36 AM.  Always use your most recent med list.                   Brand Name Dispense Instructions for use Diagnosis    albuterol 108 (90 BASE) MCG/ACT Inhaler    PROAIR HFA/PROVENTIL HFA/VENTOLIN HFA    3 Inhaler    Inhale 2 puffs into the lungs every 4 hours as needed for shortness of breath / dyspnea    Intermittent asthma, uncomplicated       * ALLERGEN IMMUNOTHERAPY PRESCRIPTION     5 mL    Name of Mix: Mix #1  Cat, Dog Cat Hair, Standardized 10,000 BAU/mL, ALK  2.0 ml Dog Hair Dander, A. P.  1:100 w/v, HS  1.0 ml  Diluent: HSA qs to 5ml    Non-seasonal allergic rhinitis due to animal hair and dander, Seasonal allergic rhinitis due to pollen, Allergic rhinitis due to mold       * ALLERGEN IMMUNOTHERAPY PRESCRIPTION     5 mL    Name of Mix: Mix #2  Mold Alternaria Tenuis GLY 1:10 w/v, HS  0.5 ml Diluent: HSA qs to 5ml    Non-seasonal allergic rhinitis due to animal hair and dander, Seasonal allergic rhinitis due to pollen, Allergic rhinitis due to mold       * ALLERGEN IMMUNOTHERAPY PRESCRIPTION     5 mL    Name of Mix: Mix #3  Tree , Weeds Narayan, White  GLY 1:20 w/v, HS  0.5 ml Boxelder-Maple  Mix BHR (Boxelder Hard Red) 1:20 w/v, HS  0.5 ml [place], Common GLY 1:20 w/v, HS  0.5 ml Elm, American GLY 1:20 w/v, HS  0.5 ml Lamb's Quarters GLY 1:20 w/v, HS 0.5 ml Plantain, English GLY 1:20 w/v, HS 0.5 ml Ragweed Mixed 1:20 w/v ALK  0.5 ml Sagebrush, Mugwort GLY 1:20 w/v, HS 0.5 ml Diluent: HSA qs to 5ml    Non-seasonal allergic rhinitis due to animal hair and dander, Seasonal allergic rhinitis due to pollen, Allergic rhinitis due to mold       beclomethasone 80 MCG/ACT Inhaler    QVAR    3 Inhaler     Inhale 2 puffs into the lungs 2 times daily    Mild persistent asthma without complication       EPINEPHrine 0.3 MG/0.3ML injection 2-pack    AUVI-Q    2 mL    Inject 0.3 mLs (0.3 mg) into the muscle as needed for anaphylaxis    Need for desensitization to allergens       loratadine 10 MG tablet    CLARITIN    90 tablet    Take 1 tablet (10 mg) by mouth daily    Chronic rhinitis       montelukast 10 MG tablet    SINGULAIR    90 tablet    Take 1 tablet (10 mg) by mouth At Bedtime    Environmental allergies, Intermittent asthma, uncomplicated       * Notice:  This list has 3 medication(s) that are the same as other medications prescribed for you. Read the directions carefully, and ask your doctor or other care provider to review them with you.

## 2018-01-11 ENCOUNTER — ALLIED HEALTH/NURSE VISIT (OUTPATIENT)
Dept: ALLERGY | Facility: CLINIC | Age: 43
End: 2018-01-11

## 2018-01-11 DIAGNOSIS — J30.1 ALLERGIC RHINITIS DUE TO POLLEN: Primary | ICD-10-CM

## 2018-01-11 PROCEDURE — 99207 ZZC NO CHARGE LOS: CPT

## 2018-01-11 PROCEDURE — 95117 IMMUNOTHERAPY INJECTIONS: CPT

## 2018-01-11 NOTE — MR AVS SNAPSHOT
After Visit Summary   1/11/2018    Reji Carpenter    MRN: [id number]           Patient Information     Date Of Birth          1975        Visit Information        Provider Department      1/11/2018 9:00 AM FZ ALLERGY SHOTS [facility]        Today's Diagnoses     Allergic rhinitis due to pollen    -  1       Follow-ups after your visit        Your next 10 appointments already scheduled     Jan 18, 2018  9:00 AM CST   Nurse Only with FZ ALLERGY SHOTS   [facility] ([facility])    [address] [address]   [phone]            Feb 01, 2018  9:00 AM CST   Nurse Only with FZ ALLERGY SHOTS   [facility] ([facility])    [address] [facility] [address]   [phone]            Feb 08, 2018  9:00 AM CST   Nurse Only with FZ ALLERGY SHOTS   [facility] ([facility])    [address]  [place] [address]   [phone]            Feb 15, 2018  9:00 AM CST   Nurse Only with FZ ALLERGY SHOTS   [facility] ([facility])    [address] [facility] [address] [facility] [address]   [phone]              Who to contact     If you have questions or need follow up information about today's clinic visit or your schedule please contact [facility] directly at [phone].  Normal or non-critical lab and imaging results will be communicated to you by MyChart, letter or phone within 4 business days after the clinic has received the results. If you do not hear from us within 7 days, please contact the clinic through MyChart or phone. If you have a critical or abnormal lab result, we will notify you by phone as soon as possible.  Submit refill requests through Glance or call your pharmacy and they will forward the refill request to us. Please allow 3 business days for your refill to be completed.          Additional Information About Your  Visit        Asuragenhart Information     [employer] gives you secure access to your electronic health record. If you see a primary care provider, you can also send messages to your care team and make appointments. If you have questions, please call your primary care clinic.  If you do not have a primary care provider, please call [phone] and they will assist you.        Care EveryWhere ID     This is your Care EveryWhere ID. This could be used by other organizations to access your [place] medical records  [id number]         Blood Pressure from Last 3 Encounters:   09/25/17 (!) 165/96   09/19/17 (!) 149/95   07/11/17 145/88    Weight from Last 3 Encounters:   09/25/17 90.2 kg (198 lb 14.4 oz)   09/19/17 89.4 kg (197 lb 3.2 oz)   07/11/17 88.7 kg (195 lb 8 oz)              We Performed the Following     Allergy Shot: Two or more injections        Primary Care Provider Office Phone # Fax #    Wili Kaplan [phone]       [address] [address]        Equal Access to Services     [facility]: Hadii aad ku hadasho Sodennys, waaxda luqadaha, qaybta kaalmada jes, murray flores . So [facility] [phone].    ATENCIÓN: Si habla español, tiene a bolton disposición servicios gratuitos de asistencia lingüística. [facility] [phone].    We comply with applicable federal civil rights laws and [place] laws. We do not discriminate on the basis of race, color, national origin, age, disability, sex, sexual orientation, or gender identity.            Thank you!     Thank you for choosing [facility]  for your care. Our goal is always to provide you with excellent care. Hearing back from our patients is one way we can continue to improve our services. Please take a few minutes to complete the written survey that you may receive in the mail after your visit with us. Thank you!             Your Updated Medication List - Protect others around you:  Learn how to safely use, store and throw away your medicines at www.disposemymeds.org.          This list is accurate as of: 1/11/18  9:40 AM.  Always use your most recent med list.                   Brand Name Dispense Instructions for use Diagnosis    albuterol 108 (90 BASE) MCG/ACT Inhaler    PROAIR HFA/PROVENTIL HFA/VENTOLIN HFA    3 Inhaler    Inhale 2 puffs into the lungs every 4 hours as needed for shortness of breath / dyspnea    Intermittent asthma, uncomplicated       * ALLERGEN IMMUNOTHERAPY PRESCRIPTION     5 mL    Name of Mix: Mix #1  Cat, Dog Cat Hair, Standardized 10,000 BAU/mL, ALK  2.0 ml Dog Hair Dander, A. P.  1:100 w/v, HS  1.0 ml  Diluent: HSA qs to 5ml    Non-seasonal allergic rhinitis due to animal hair and dander, Seasonal allergic rhinitis due to pollen, Allergic rhinitis due to mold       * ALLERGEN IMMUNOTHERAPY PRESCRIPTION     5 mL    Name of Mix: Mix #2  Mold Alternaria Tenuis GLY 1:10 w/v, HS  0.5 ml Diluent: HSA qs to 5ml    Non-seasonal allergic rhinitis due to animal hair and dander, Seasonal allergic rhinitis due to pollen, Allergic rhinitis due to mold       * ALLERGEN IMMUNOTHERAPY PRESCRIPTION     5 mL    Name of Mix: Mix #3  Tree , Weeds Narayan, White  GLY 1:20 w/v, HS  0.5 ml Boxelder-Maple  Mix BHR (Boxelder Hard Red) 1:20 w/v, HS  0.5 ml [place], Common GLY 1:20 w/v, HS  0.5 ml Elm, American GLY 1:20 w/v, HS  0.5 ml Lamb's Quarters GLY 1:20 w/v, HS 0.5 ml Plantain, English GLY 1:20 w/v, HS 0.5 ml Ragweed Mixed 1:20 w/v ALK  0.5 ml Sagebrush, Mugwort GLY 1:20 w/v, HS 0.5 ml Diluent: HSA qs to 5ml    Non-seasonal allergic rhinitis due to animal hair and dander, Seasonal allergic rhinitis due to pollen, Allergic rhinitis due to mold       beclomethasone 80 MCG/ACT Inhaler    QVAR    3 Inhaler    Inhale 2 puffs into the lungs 2 times daily    Mild persistent asthma without complication       EPINEPHrine 0.3 MG/0.3ML injection 2-pack    AUVI-Q    2 mL    Inject 0.3 mLs (0.3 mg)  into the muscle as needed for anaphylaxis    Need for desensitization to allergens       loratadine 10 MG tablet    CLARITIN    90 tablet    Take 1 tablet (10 mg) by mouth daily    Chronic rhinitis       montelukast 10 MG tablet    SINGULAIR    90 tablet    Take 1 tablet (10 mg) by mouth At Bedtime    Environmental allergies, Intermittent asthma, uncomplicated       * Notice:  This list has 3 medication(s) that are the same as other medications prescribed for you. Read the directions carefully, and ask your doctor or other care provider to review them with you.

## 2018-01-18 ENCOUNTER — ALLIED HEALTH/NURSE VISIT (OUTPATIENT)
Dept: ALLERGY | Facility: CLINIC | Age: 43
End: 2018-01-18

## 2018-01-18 DIAGNOSIS — J30.1 ALLERGIC RHINITIS DUE TO POLLEN: Primary | ICD-10-CM

## 2018-01-18 PROCEDURE — 95117 IMMUNOTHERAPY INJECTIONS: CPT

## 2018-01-18 PROCEDURE — 99207 ZZC DROP WITH A PROCEDURE: CPT

## 2018-01-18 NOTE — MR AVS SNAPSHOT
After Visit Summary   1/18/2018    Reji Carpenter    MRN: [id number]           Patient Information     Date Of Birth          1975        Visit Information        Provider Department      1/18/2018 9:00 AM FZ ALLERGY SHOTS [facility]        Today's Diagnoses     Allergic rhinitis due to pollen    -  1       Follow-ups after your visit        Your next 10 appointments already scheduled     Feb 01, 2018  9:00 AM CST   Nurse Only with FZ ALLERGY SHOTS   [facility] ([facility])    [address] [address]   [phone]            Feb 08, 2018  9:00 AM CST   Nurse Only with FZ ALLERGY SHOTS   [facility] ([facility])    [address] [address]   [phone]            Feb 15, 2018  9:00 AM CST   Nurse Only with FZ ALLERGY SHOTS   [facility] ([facility])    [address] [address]   [phone]              Who to contact     If you have questions or need follow up information about today's clinic visit or your schedule please contact [facility] directly at [phone].  Normal or non-critical lab and imaging results will be communicated to you by LightSand Communicationshart, letter or phone within 4 business days after the clinic has received the results. If you do not hear from us within 7 days, please contact the clinic through Light[employer]hart or phone. If you have a critical or abnormal lab result, we will notify you by phone as soon as possible.  Submit refill requests through Squirrly or call your pharmacy and they will forward the refill request to us. Please allow 3 business days for your refill to be completed.          Additional Information About Your Visit        [employer] Apps Information     Squirrly gives you secure access to your electronic health record. If you see a primary care provider, you can also send messages to your care team and make  appointments. If you have questions, please call your primary care clinic.  If you do not have a primary care provider, please call [phone] and they will assist you.        Care EveryWhere ID     This is your Care EveryWhere ID. This could be used by other organizations to access your [place] medical records  [id number]         Blood Pressure from Last 3 Encounters:   09/25/17 (!) 165/96   09/19/17 (!) 149/95   07/11/17 145/88    Weight from Last 3 Encounters:   09/25/17 90.2 kg (198 lb 14.4 oz)   09/19/17 89.4 kg (197 lb 3.2 oz)   07/11/17 88.7 kg (195 lb 8 oz)              We Performed the Following     Allergy Shot: Two or more injections        Primary Care Provider Office Phone # Fax #    Wili Kaplan [phone]       [address] [facility] [address]        Equal Access to Services     [facility] : Hadii aad ku hadasho Soomaali, waaxda luqadaha, qaybta kaalmada adeegyada, waxay idiin haymerissan thierno khmary flores . So [facility] [phone].    ATENCIÓN: Si marii vazquez, tiene a bolton disposición servicios gratuitos de asistencia lingüística. Llame al [phone].    We comply with applicable federal civil rights laws and [place] laws. We do not discriminate on the basis of race, color, national origin, age, disability, sex, sexual orientation, or gender identity.            Thank you!     Thank you for choosing [facility] [place]  for your care. Our goal is always to provide you with excellent care. Hearing back from our patients is one way we can continue to improve our services. Please take a few minutes to complete the written survey that you may receive in the mail after your visit with us. Thank you!             Your Updated Medication List - Protect others around you: Learn how to safely use, store and throw away your medicines at www.disposemymeds.org.          This list is accurate as of: 1/18/18  9:33 AM.  Always use your most recent med list.                    Brand Name Dispense Instructions for use Diagnosis    albuterol 108 (90 BASE) MCG/ACT Inhaler    PROAIR HFA/PROVENTIL HFA/VENTOLIN HFA    3 Inhaler    Inhale 2 puffs into the lungs every 4 hours as needed for shortness of breath / dyspnea    Intermittent asthma, uncomplicated       * ALLERGEN IMMUNOTHERAPY PRESCRIPTION     5 mL    Name of Mix: Mix #1  Cat, Dog Cat Hair, Standardized 10,000 BAU/mL, ALK  2.0 ml Dog Hair Dander, A. P.  1:100 w/v, HS  1.0 ml  Diluent: HSA qs to 5ml    Non-seasonal allergic rhinitis due to animal hair and dander, Seasonal allergic rhinitis due to pollen, Allergic rhinitis due to mold       * ALLERGEN IMMUNOTHERAPY PRESCRIPTION     5 mL    Name of Mix: Mix #2  Mold Alternaria Tenuis GLY 1:10 w/v, HS  0.5 ml Diluent: HSA qs to 5ml    Non-seasonal allergic rhinitis due to animal hair and dander, Seasonal allergic rhinitis due to pollen, Allergic rhinitis due to mold       * ALLERGEN IMMUNOTHERAPY PRESCRIPTION     5 mL    Name of Mix: Mix #3  Tree , Weeds Naraayn, White  GLY 1:20 w/v, HS  0.5 ml Boxelder-Maple  Mix BHR (Boxelder Hard Red) 1:20 w/v, HS  0.5 ml [place], Common GLY 1:20 w/v, HS  0.5 ml Elm, American GLY 1:20 w/v, HS  0.5 ml Lamb's Quarters GLY 1:20 w/v, HS 0.5 ml Plantain, English GLY 1:20 w/v, HS 0.5 ml Ragweed Mixed 1:20 w/v ALK  0.5 ml Sagebrush, Mugwort GLY 1:20 w/v, HS 0.5 ml Diluent: HSA qs to 5ml    Non-seasonal allergic rhinitis due to animal hair and dander, Seasonal allergic rhinitis due to pollen, Allergic rhinitis due to mold       beclomethasone 80 MCG/ACT Inhaler    QVAR    3 Inhaler    Inhale 2 puffs into the lungs 2 times daily    Mild persistent asthma without complication       EPINEPHrine 0.3 MG/0.3ML injection 2-pack    AUVI-Q    2 mL    Inject 0.3 mLs (0.3 mg) into the muscle as needed for anaphylaxis    Need for desensitization to allergens       loratadine 10 MG tablet    CLARITIN    90 tablet    Take 1 tablet (10 mg) by mouth daily    Chronic  rhinitis       montelukast 10 MG tablet    SINGULAIR    90 tablet    Take 1 tablet (10 mg) by mouth At Bedtime    Environmental allergies, Intermittent asthma, uncomplicated       * Notice:  This list has 3 medication(s) that are the same as other medications prescribed for you. Read the directions carefully, and ask your doctor or other care provider to review them with you.

## 2018-02-01 ENCOUNTER — ALLIED HEALTH/NURSE VISIT (OUTPATIENT)
Dept: ALLERGY | Facility: CLINIC | Age: 43
End: 2018-02-01

## 2018-02-01 DIAGNOSIS — J30.1 ALLERGIC RHINITIS DUE TO POLLEN: Primary | ICD-10-CM

## 2018-02-01 PROCEDURE — 99207 ZZC DROP WITH A PROCEDURE: CPT

## 2018-02-01 PROCEDURE — 95117 IMMUNOTHERAPY INJECTIONS: CPT

## 2018-02-01 NOTE — MR AVS SNAPSHOT
After Visit Summary   2/1/2018    Reji aCrpenter    MRN: [id number]           Patient Information     Date Of Birth          1975        Visit Information        Provider Department      2/1/2018 9:00 AM FZ ALLERGY SHOTS [facility]        Today's Diagnoses     Allergic rhinitis due to pollen    -  1       Follow-ups after your visit        Your next 10 appointments already scheduled     Feb 08, 2018  9:00 AM CST   Nurse Only with FZ ALLERGY SHOTS   [facility] ([facility])    [address] [address] [phone]            Feb 15, 2018  9:00 AM CST   Nurse Only with FZ ALLERGY SHOTS   [facility] ([facility])    [address]  [place] [address] [phone]              Who to contact     If you have questions or need follow up information about today's clinic visit or your schedule please contact [facility] directly at [phone].  Normal or non-critical lab and imaging results will be communicated to you by Baiduhart, letter or phone within 4 business days after the clinic has received the results. If you do not hear from us within 7 days, please contact the clinic through Global [employer]t or phone. If you have a critical or abnormal lab result, we will notify you by phone as soon as possible.  Submit refill requests through Eventyard or call your pharmacy and they will forward the refill request to us. Please allow 3 business days for your refill to be completed.          Additional Information About Your Visit        Baiduhart Information     Eventyard gives you secure access to your electronic health record. If you see a primary care provider, you can also send messages to your care team and make appointments. If you have questions, please call your primary care clinic.  If you do not have a primary care provider, please call [phone] and they will assist you.        Care  EveryWhere ID     This is your Care EveryWhere ID. This could be used by other organizations to access your [place] medical records  [id number]         Blood Pressure from Last 3 Encounters:   09/25/17 (!) 165/96   09/19/17 (!) 149/95   07/11/17 145/88    Weight from Last 3 Encounters:   09/25/17 90.2 kg (198 lb 14.4 oz)   09/19/17 89.4 kg (197 lb 3.2 oz)   07/11/17 88.7 kg (195 lb 8 oz)              We Performed the Following     Allergy Shot: Two or more injections        Primary Care Provider Office Phone # Fax #    Wili Kaplan [phone]       [address] [facility] [address]        Equal Access to Services     DENITA JAVIER : Hadii keily valleso Sodennys, waaxda luqadaha, qaybta kaalmada adeegyada, murray flores . So [facility] [phone].    ATENCIÓN: Si habla español, tiene a bolton disposición servicios gratuitos de asistencia lingüística. [facility] [phone].    We comply with applicable federal civil rights laws and [place] laws. We do not discriminate on the basis of race, color, national origin, age, disability, sex, sexual orientation, or gender identity.            Thank you!     Thank you for choosing [facility]  for your care. Our goal is always to provide you with excellent care. Hearing back from our patients is one way we can continue to improve our services. Please take a few minutes to complete the written survey that you may receive in the mail after your visit with us. Thank you!             Your Updated Medication List - Protect others around you: Learn how to safely use, store and throw away your medicines at www.disposemymeds.org.          This list is accurate as of 2/1/18  9:25 AM.  Always use your most recent med list.                   Brand Name Dispense Instructions for use Diagnosis    albuterol 108 (90 BASE) MCG/ACT Inhaler    PROAIR HFA/PROVENTIL HFA/VENTOLIN HFA    3 Inhaler    Inhale 2 puffs into the  lungs every 4 hours as needed for shortness of breath / dyspnea    Intermittent asthma, uncomplicated       * ALLERGEN IMMUNOTHERAPY PRESCRIPTION     5 mL    Name of Mix: Mix #1  Cat, Dog Cat Hair, Standardized 10,000 BAU/mL, ALK  2.0 ml Dog Hair Dander, A. P.  1:100 w/v, HS  1.0 ml  Diluent: HSA qs to 5ml    Non-seasonal allergic rhinitis due to animal hair and dander, Seasonal allergic rhinitis due to pollen, Allergic rhinitis due to mold       * ALLERGEN IMMUNOTHERAPY PRESCRIPTION     5 mL    Name of Mix: Mix #2  Mold Alternaria Tenuis GLY 1:10 w/v, HS  0.5 ml Diluent: HSA qs to 5ml    Non-seasonal allergic rhinitis due to animal hair and dander, Seasonal allergic rhinitis due to pollen, Allergic rhinitis due to mold       * ALLERGEN IMMUNOTHERAPY PRESCRIPTION     5 mL    Name of Mix: Mix #3  Tree , Weeds Narayan, White  GLY 1:20 w/v, HS  0.5 ml Boxelder-Maple  Mix BHR (Boxelder Hard Red) 1:20 w/v, HS  0.5 ml [place], Common GLY 1:20 w/v, HS  0.5 ml Elm, American GLY 1:20 w/v, HS  0.5 ml Lamb's Quarters GLY 1:20 w/v, HS 0.5 ml Plantain, English GLY 1:20 w/v, HS 0.5 ml Ragweed Mixed 1:20 w/v ALK  0.5 ml Sagebrush, Mugwort GLY 1:20 w/v, HS 0.5 ml Diluent: HSA qs to 5ml    Non-seasonal allergic rhinitis due to animal hair and dander, Seasonal allergic rhinitis due to pollen, Allergic rhinitis due to mold       beclomethasone 80 MCG/ACT Inhaler    QVAR    3 Inhaler    Inhale 2 puffs into the lungs 2 times daily    Mild persistent asthma without complication       EPINEPHrine 0.3 MG/0.3ML injection 2-pack    AUVI-Q    2 mL    Inject 0.3 mLs (0.3 mg) into the muscle as needed for anaphylaxis    Need for desensitization to allergens       loratadine 10 MG tablet    CLARITIN    90 tablet    Take 1 tablet (10 mg) by mouth daily    Chronic rhinitis       montelukast 10 MG tablet    SINGULAIR    90 tablet    Take 1 tablet (10 mg) by mouth At Bedtime    Environmental allergies, Intermittent asthma, uncomplicated       *  Notice:  This list has 3 medication(s) that are the same as other medications prescribed for you. Read the directions carefully, and ask your doctor or other care provider to review them with you.

## 2018-02-08 ENCOUNTER — ALLIED HEALTH/NURSE VISIT (OUTPATIENT)
Dept: ALLERGY | Facility: CLINIC | Age: 43
End: 2018-02-08

## 2018-02-08 DIAGNOSIS — J30.9 ALLERGIC RHINITIS: Primary | ICD-10-CM

## 2018-02-08 PROCEDURE — 95117 IMMUNOTHERAPY INJECTIONS: CPT

## 2018-02-08 PROCEDURE — 99207 ZZC DROP WITH A PROCEDURE: CPT

## 2018-02-08 NOTE — MR AVS SNAPSHOT
After Visit Summary   2/8/2018    Reji Carpenter    MRN: [id number]           Patient Information     Date Of Birth          1975        Visit Information        Provider Department      2/8/2018 9:00 AM FZ ALLERGY SHOTS [facility]        Today's Diagnoses     Allergic rhinitis    -  1       Follow-ups after your visit        Your next 10 appointments already scheduled     Feb 15, 2018  9:00 AM CST   Nurse Only with FZ ALLERGY SHOTS   [facility] [place] ([facility])    [address]  [place] [address] [phone]              Who to contact     If you have questions or need follow up information about today's clinic visit or your schedule please contact [facility] directly at [phone].  Normal or non-critical lab and imaging results will be communicated to you by MyChart, letter or phone within 4 business days after the clinic has received the results. If you do not hear from us within 7 days, please contact the clinic through MyChart or phone. If you have a critical or abnormal lab result, we will notify you by phone as soon as possible.  Submit refill requests through Garlik or call your pharmacy and they will forward the refill request to us. Please allow 3 business days for your refill to be completed.          Additional Information About Your Visit        MyChart Information     Garlik gives you secure access to your electronic health record. If you see a primary care provider, you can also send messages to your care team and make appointments. If you have questions, please call your primary care clinic.  If you do not have a primary care provider, please call [phone] and they will assist you.        Care EveryWhere ID     This is your Care EveryWhere ID. This could be used by other organizations to access your [place] medical records  [id number]         Blood Pressure from Last 3 Encounters:   09/25/17 (!)  165/96   09/19/17 (!) 149/95   07/11/17 145/88    Weight from Last 3 Encounters:   09/25/17 90.2 kg (198 lb 14.4 oz)   09/19/17 89.4 kg (197 lb 3.2 oz)   07/11/17 88.7 kg (195 lb 8 oz)              We Performed the Following     Allergy Shot: Two or more injections        Primary Care Provider Office Phone # Fax #    iWli Kaplan -[phone]       [address] [address]        Equal Access to Services     [facility]: Hadii aad ku hadasho Soomaali, waaxda luqadaha, qaybta kaalmada adeegyada, murray flores . So [facility] [phone].    ATENCIÓN: Si habla español, tiene a bolton disposición servicios gratuitos de asistencia lingüística. [facility] [phone].    We comply with applicable federal civil rights laws and [place] laws. We do not discriminate on the basis of race, color, national origin, age, disability, sex, sexual orientation, or gender identity.            Thank you!     [facility]  [facility]. Our goal is always to provide you with excellent care. Hearing back from our patients is one way we can continue to improve our services. Please take a few minutes to complete the written survey that you may receive in the mail after your visit with us. Thank you!             Your Updated Medication List - Protect others around you: Learn how to safely use, store and throw away your medicines at www.disposemymeds.org.          This list is accurate as of 2/8/18  9:51 AM.  Always use your most recent med list.                   Brand Name Dispense Instructions for use Diagnosis    albuterol 108 (90 BASE) MCG/ACT Inhaler    PROAIR HFA/PROVENTIL HFA/VENTOLIN HFA    3 Inhaler    Inhale 2 puffs into the lungs every 4 hours as needed for shortness of breath / dyspnea    Intermittent asthma, uncomplicated       * ALLERGEN IMMUNOTHERAPY PRESCRIPTION     5 mL    Name of Mix: Mix #1  Cat, Dog Cat Hair, Standardized  10,000 BAU/mL, ALK  2.0 ml Dog Hair Dander, A. P.  1:100 w/v, HS  1.0 ml  Diluent: HSA qs to 5ml    Non-seasonal allergic rhinitis due to animal hair and dander, Seasonal allergic rhinitis due to pollen, Allergic rhinitis due to mold       * ALLERGEN IMMUNOTHERAPY PRESCRIPTION     5 mL    Name of Mix: Mix #2  Mold Alternaria Tenuis GLY 1:10 w/v, HS  0.5 ml Diluent: HSA qs to 5ml    Non-seasonal allergic rhinitis due to animal hair and dander, Seasonal allergic rhinitis due to pollen, Allergic rhinitis due to mold       * ALLERGEN IMMUNOTHERAPY PRESCRIPTION     5 mL    Name of Mix: Mix #3  Tree , Weeds Narayan, White  GLY 1:20 w/v, HS  0.5 ml Boxelder-Maple  Mix BHR (Boxelder Hard Red) 1:20 w/v, HS  0.5 ml [place], Common GLY 1:20 w/v, HS  0.5 ml Elm, American GLY 1:20 w/v, HS  0.5 ml Lamb's Quarters GLY 1:20 w/v, HS 0.5 ml Plantain, English GLY 1:20 w/v, HS 0.5 ml Ragweed Mixed 1:20 w/v ALK  0.5 ml Sagebrush, Mugwort GLY 1:20 w/v, HS 0.5 ml Diluent: HSA qs to 5ml    Non-seasonal allergic rhinitis due to animal hair and dander, Seasonal allergic rhinitis due to pollen, Allergic rhinitis due to mold       beclomethasone 80 MCG/ACT Inhaler    QVAR    3 Inhaler    Inhale 2 puffs into the lungs 2 times daily    Mild persistent asthma without complication       EPINEPHrine 0.3 MG/0.3ML injection 2-pack    AUVI-Q    2 mL    Inject 0.3 mLs (0.3 mg) into the muscle as needed for anaphylaxis    Need for desensitization to allergens       loratadine 10 MG tablet    CLARITIN    90 tablet    Take 1 tablet (10 mg) by mouth daily    Chronic rhinitis       montelukast 10 MG tablet    SINGULAIR    90 tablet    Take 1 tablet (10 mg) by mouth At Bedtime    Environmental allergies, Intermittent asthma, uncomplicated       * Notice:  This list has 3 medication(s) that are the same as other medications prescribed for you. Read the directions carefully, and ask your doctor or other care provider to review them with you.

## 2018-02-15 ENCOUNTER — ALLIED HEALTH/NURSE VISIT (OUTPATIENT)
Dept: ALLERGY | Facility: CLINIC | Age: 43
End: 2018-02-15

## 2018-02-15 DIAGNOSIS — J30.9 ALLERGIC RHINITIS: Primary | ICD-10-CM

## 2018-02-15 PROCEDURE — 95117 IMMUNOTHERAPY INJECTIONS: CPT

## 2018-02-15 PROCEDURE — 99207 ZZC DROP WITH A PROCEDURE: CPT

## 2018-02-15 NOTE — MR AVS SNAPSHOT
After Visit Summary   2/15/2018    Reji Carpenter    MRN: [id number]           Patient Information     Date Of Birth          1975        Visit Information        Provider Department      2/15/2018 9:00 AM FZ ALLERGY SHOTS [facility] [place]        Today's Diagnoses     Allergic rhinitis    -  1       Follow-ups after your visit        Who to contact     If you have questions or need follow up information about today's clinic visit or your schedule please contact [facility] [place] directly at [phone].  Normal or non-critical lab and imaging results will be communicated to you by [employer], letter or phone within 4 business days after the clinic has received the results. If you do not hear from us within 7 days, please contact the clinic through RingCentralt or phone. If you have a critical or abnormal lab result, we will notify you by phone as soon as possible.  Submit refill requests through Tripbirds or call your pharmacy and they will forward the refill request to us. Please allow 3 business days for your refill to be completed.          Additional Information About Your Visit        MyChart Information     Tripbirds gives you secure access to your electronic health record. If you see a primary care provider, you can also send messages to your care team and make appointments. If you have questions, please call your primary care clinic.  If you do not have a primary care provider, please call [phone] and they will assist you.        Care EveryWhere ID     This is your Care EveryWhere ID. This could be used by other organizations to access your [place] medical records  [id number]         Blood Pressure from Last 3 Encounters:   09/25/17 (!) 165/96   09/19/17 (!) 149/95   07/11/17 145/88    Weight from Last 3 Encounters:   09/25/17 90.2 kg (198 lb 14.4 oz)   09/19/17 89.4 kg (197 lb 3.2 oz)   07/11/17 88.7 kg (195 lb 8 oz)              We Performed the Following     Allergy  Shot: Two or more injections        Primary Care Provider Office Phone # Fax #    Wili Kaplan [phone]       [address] [facility] [address]        Equal Access to Services     [facility] : Haderik keily mi haio Sokingali, waaxda luqadaha, qaybta kaalmada adeviktoryada, murray figueroa laMichaelemily may. So [facility] [phone].    ATENCIÓN: Si habla español, tiene a bolton disposición servicios gratuitos de asistencia lingüística. Llame al [phone].    We comply with applicable federal civil rights laws and [place] laws. We do not discriminate on the basis of race, color, national origin, age, disability, sex, sexual orientation, or gender identity.            Thank you!     Thank you for choosing [facility]  [facility] your [facility]. Our goal is always to provide you with excellent care. Hearing back from our patients is one way we can continue to improve our services. Please take a few minutes to complete the written survey that you may receive in the mail after your visit with us. Thank you!             Your Updated Medication List - Protect others around you: Learn how to safely use, store and throw away your medicines at www.disposemymeds.org.          This list is accurate as of 2/15/18  9:57 AM.  Always use your most recent med list.                   Brand Name Dispense Instructions for use Diagnosis    albuterol 108 (90 BASE) MCG/ACT Inhaler    PROAIR HFA/PROVENTIL HFA/VENTOLIN HFA    3 Inhaler    Inhale 2 puffs into the lungs every 4 hours as needed for shortness of breath / dyspnea    Intermittent asthma, uncomplicated       * ALLERGEN IMMUNOTHERAPY PRESCRIPTION     5 mL    Name of Mix: Mix #1  Cat, Dog Cat Hair, Standardized 10,000 BAU/mL, ALK  2.0 ml Dog Hair Dander, A. P.  1:100 w/v, HS  1.0 ml  Diluent: HSA qs to 5ml    Non-seasonal allergic rhinitis due to animal hair and dander, Seasonal allergic rhinitis due to pollen, Allergic rhinitis due to mold        * ALLERGEN IMMUNOTHERAPY PRESCRIPTION     5 mL    Name of Mix: Mix #2  Mold Alternaria Tenuis GLY 1:10 w/v, HS  0.5 ml Diluent: HSA qs to 5ml    Non-seasonal allergic rhinitis due to animal hair and dander, Seasonal allergic rhinitis due to pollen, Allergic rhinitis due to mold       * ALLERGEN IMMUNOTHERAPY PRESCRIPTION     5 mL    Name of Mix: Mix #3  Tree , Weeds Narayan, White  GLY 1:20 w/v, HS  0.5 ml Boxelder-Maple  Mix BHR (Boxelder Hard Red) 1:20 w/v, HS  0.5 ml [place], Common GLY 1:20 w/v, HS  0.5 ml Elm, American GLY 1:20 w/v, HS  0.5 ml Lamb's Quarters GLY 1:20 w/v, HS 0.5 ml Plantain, English GLY 1:20 w/v, HS 0.5 ml Ragweed Mixed 1:20 w/v ALK  0.5 ml Sagebrush, Mugwort GLY 1:20 w/v, HS 0.5 ml Diluent: HSA qs to 5ml    Non-seasonal allergic rhinitis due to animal hair and dander, Seasonal allergic rhinitis due to pollen, Allergic rhinitis due to mold       beclomethasone 80 MCG/ACT Inhaler    QVAR    3 Inhaler    Inhale 2 puffs into the lungs 2 times daily    Mild persistent asthma without complication       EPINEPHrine 0.3 MG/0.3ML injection 2-pack    AUVI-Q    2 mL    Inject 0.3 mLs (0.3 mg) into the muscle as needed for anaphylaxis    Need for desensitization to allergens       loratadine 10 MG tablet    CLARITIN    90 tablet    Take 1 tablet (10 mg) by mouth daily    Chronic rhinitis       montelukast 10 MG tablet    SINGULAIR    90 tablet    Take 1 tablet (10 mg) by mouth At Bedtime    Environmental allergies, Intermittent asthma, uncomplicated       * Notice:  This list has 3 medication(s) that are the same as other medications prescribed for you. Read the directions carefully, and ask your doctor or other care provider to review them with you.

## 2018-02-22 ENCOUNTER — ALLIED HEALTH/NURSE VISIT (OUTPATIENT)
Dept: ALLERGY | Facility: CLINIC | Age: 43
End: 2018-02-22

## 2018-02-22 DIAGNOSIS — J30.9 ALLERGIC RHINITIS: Primary | ICD-10-CM

## 2018-02-22 PROCEDURE — 99207 ZZC DROP WITH A PROCEDURE: CPT

## 2018-02-22 PROCEDURE — 95117 IMMUNOTHERAPY INJECTIONS: CPT

## 2018-02-22 NOTE — MR AVS SNAPSHOT
After Visit Summary   2/22/2018    Reji Carpenter    MRN: [id number]           Patient Information     Date Of Birth          1975        Visit Information        Provider Department      2/22/2018 9:10 AM FZ ALLERGY SHOTS [facility]        Today's Diagnoses     Allergic rhinitis    -  1       Follow-ups after your visit        Your next 10 appointments already scheduled     Mar 08, 2018  9:00 AM CST   Nurse Only with FZ ALLERGY SHOTS   [facility]    [address]  [place] [address]   [phone]            Mar 22, 2018  9:10 AM CDT   Nurse Only with FZ ALLERGY SHOTS   [facility]    [address]  [place] [address]   [phone]            Apr 05, 2018  9:00 AM CDT   Nurse Only with FZ ALLERGY SHOTS   [facility]    [address]  [place] [address]   [phone]            Apr 19, 2018  9:00 AM CDT   Nurse Only with FZ ALLERGY SHOTS   [facility]    [address]  [place] [address]   [phone]            May 03, 2018  9:00 AM CDT   Nurse Only with FZ ALLERGY SHOTS   [facility])    [address]  [place] [address]   [phone]              Who to contact     If you have questions or need follow up information about today's clinic visit or your schedule please contact [facility] directly at [phone].  Normal or non-critical lab and imaging results will be communicated to you by MyChart, letter or phone within 4 business days after the clinic has received the results. If you do not hear from us within 7 days, please contact the clinic through MyChart or phone. If you have a critical or abnormal lab result, we will notify you by phone as soon as possible.  Submit refill requests through PR Slidest or  call your pharmacy and they will forward the refill request to us. Please allow 3 business days for your refill to be completed.          Additional Information About Your Visit        Volteahart Information     Foound gives you secure access to your electronic health record. If you see a primary care provider, you can also send messages to your care team and make appointments. If you have questions, please call your primary care clinic.  If you do not have a primary care provider, please call [phone] and they will assist you.        Care EveryWhere ID     This is your Care EveryWhere ID. This could be used by other organizations to access your [place] medical records  [id number]         Blood Pressure from Last 3 Encounters:   09/25/17 (!) 165/96   09/19/17 (!) 149/95   07/11/17 145/88    Weight from Last 3 Encounters:   09/25/17 90.2 kg (198 lb 14.4 oz)   09/19/17 89.4 kg (197 lb 3.2 oz)   07/11/17 88.7 kg (195 lb 8 oz)              We Performed the Following     Allergy Shot: Two or more injections        Primary Care Provider Office Phone # Fax #    Wili Kaplan [phone]       [address] [address]        Equal Access to Services     DENITA JAVIER : Hadii keily mi hadasho Soomaali, waaxda luqadaha, qaybta kaalmada adeegyada, murray may. So [facility] [phone].    ATENCIÓN: Si habla español, tiene a bolton disposición servicios gratuitos de asistencia lingüística. Llame al [phone].    We comply with applicable federal civil rights laws and [place] laws. We do not discriminate on the basis of race, color, national origin, age, disability, sex, sexual orientation, or gender identity.            Thank you!     Thank you for choosing [facility] [place]  for your care. Our goal is always to provide you with excellent care. Hearing back from our patients is one way we can continue to improve our services. Please take a few minutes  to complete the written survey that you may receive in the mail after your visit with us. Thank you!             Your Updated Medication List - Protect others around you: Learn how to safely use, store and throw away your medicines at www.disposemymeds.org.          This list is accurate as of 2/22/18  9:44 AM.  Always use your most recent med list.                   Brand Name Dispense Instructions for use Diagnosis    albuterol 108 (90 BASE) MCG/ACT Inhaler    PROAIR HFA/PROVENTIL HFA/VENTOLIN HFA    3 Inhaler    Inhale 2 puffs into the lungs every 4 hours as needed for shortness of breath / dyspnea    Intermittent asthma, uncomplicated       * ALLERGEN IMMUNOTHERAPY PRESCRIPTION     5 mL    Name of Mix: Mix #1  Cat, Dog Cat Hair, Standardized 10,000 BAU/mL, ALK  2.0 ml Dog Hair Dander, A. P.  1:100 w/v, HS  1.0 ml  Diluent: HSA qs to 5ml    Non-seasonal allergic rhinitis due to animal hair and dander, Seasonal allergic rhinitis due to pollen, Allergic rhinitis due to mold       * ALLERGEN IMMUNOTHERAPY PRESCRIPTION     5 mL    Name of Mix: Mix #2  Mold Alternaria Tenuis GLY 1:10 w/v, HS  0.5 ml Diluent: HSA qs to 5ml    Non-seasonal allergic rhinitis due to animal hair and dander, Seasonal allergic rhinitis due to pollen, Allergic rhinitis due to mold       * ALLERGEN IMMUNOTHERAPY PRESCRIPTION     5 mL    Name of Mix: Mix #3  Tree , Weeds Narayan, White  GLY 1:20 w/v, HS  0.5 ml Boxelder-Maple  Mix BHR (Boxelder Hard Red) 1:20 w/v, HS  0.5 ml [place], Common GLY 1:20 w/v, HS  0.5 ml Elm, American GLY 1:20 w/v, HS  0.5 ml Lamb's Quarters GLY 1:20 w/v, HS 0.5 ml Plantain, English GLY 1:20 w/v, HS 0.5 ml Ragweed Mixed 1:20 w/v ALK  0.5 ml Sagebrush, Mugwort GLY 1:20 w/v, HS 0.5 ml Diluent: HSA qs to 5ml    Non-seasonal allergic rhinitis due to animal hair and dander, Seasonal allergic rhinitis due to pollen, Allergic rhinitis due to mold       beclomethasone 80 MCG/ACT Inhaler    QVAR    3 Inhaler    Inhale 2 puffs  into the lungs 2 times daily    Mild persistent asthma without complication       EPINEPHrine 0.3 MG/0.3ML injection 2-pack    AUVI-Q    2 mL    Inject 0.3 mLs (0.3 mg) into the muscle as needed for anaphylaxis    Need for desensitization to allergens       loratadine 10 MG tablet    CLARITIN    90 tablet    Take 1 tablet (10 mg) by mouth daily    Chronic rhinitis       montelukast 10 MG tablet    SINGULAIR    90 tablet    Take 1 tablet (10 mg) by mouth At Bedtime    Environmental allergies, Intermittent asthma, uncomplicated       * Notice:  This list has 3 medication(s) that are the same as other medications prescribed for you. Read the directions carefully, and ask your doctor or other care provider to review them with you.

## 2018-03-08 ENCOUNTER — ALLIED HEALTH/NURSE VISIT (OUTPATIENT)
Dept: ALLERGY | Facility: CLINIC | Age: 43
End: 2018-03-08
Payer: COMMERCIAL

## 2018-03-08 DIAGNOSIS — J30.9 ALLERGIC RHINITIS: Primary | ICD-10-CM

## 2018-03-08 PROCEDURE — 95117 IMMUNOTHERAPY INJECTIONS: CPT

## 2018-03-08 PROCEDURE — 99207 ZZC DROP WITH A PROCEDURE: CPT

## 2018-03-08 NOTE — MR AVS SNAPSHOT
After Visit Summary   3/8/2018    Reji Carpenter    MRN: [id number]           Patient Information     Date Of Birth          1975        Visit Information        Provider Department      3/8/2018 9:00 AM FZ ALLERGY SHOTS [facility]        Today's Diagnoses     Allergic rhinitis    -  1       Follow-ups after your visit        Your next 10 appointments already scheduled     Mar 22, 2018  9:10 AM CDT   Nurse Only with FZ ALLERGY SHOTS   [facility] ([facility])    [address]  [place] [address]   [phone]            Apr 05, 2018  9:00 AM CDT   Nurse Only with FZ ALLERGY SHOTS   [facility] ([facility])    [address]  [place] [address]   [phone]            Apr 19, 2018  9:00 AM CDT   Nurse Only with FZ ALLERGY SHOTS   [facility] ([facility])    [address]  [place] [address]   [phone]            May 03, 2018  9:00 AM CDT   Nurse Only with FZ ALLERGY SHOTS   [facility] ([facility])    [address]  [place] [address]   [phone]              Who to contact     If you have questions or need follow up information about today's clinic visit or your schedule please contact [facility] directly at [phone].  Normal or non-critical lab and imaging results will be communicated to you by MyChart, letter or phone within 4 business days after the clinic has received the results. If you do not hear from us within 7 days, please contact the clinic through [employer] or phone. If you have a critical or abnormal lab result, we will notify you by phone as soon as possible.  Submit refill requests through [employer] or call your pharmacy and they will forward the refill request to us. Please allow 3 business days for your refill to be completed.          Additional Information About Your Visit        [facility]au TherapeuticsY[facility] HospitalNanotecture  Information     [employer] gives you secure access to your electronic health record. If you see a primary care provider, you can also send messages to your care team and make appointments. If you have questions, please call your primary care clinic.  If you do not have a primary care provider, please call [phone] and they will assist you.        Care EveryWhere ID     This is your Care EveryWhere ID. This could be used by other organizations to access your [place] medical records  [id number]         Blood Pressure from Last 3 Encounters:   09/25/17 (!) 165/96   09/19/17 (!) 149/95   07/11/17 145/88    Weight from Last 3 Encounters:   09/25/17 90.2 kg (198 lb 14.4 oz)   09/19/17 89.4 kg (197 lb 3.2 oz)   07/11/17 88.7 kg (195 lb 8 oz)              We Performed the Following     Allergy Shot: Two or more injections        Primary Care Provider Office Phone # Fax #    Wili Kaplan [phone]       [address] [place] [address]        Equal Access to Services     [facility]: Hadii aad ku hadasho Soomaali, waaxda luqadaha, qaybta kaalmada adeegyatabitha, murray flores . So [facility] [phone].    ATENCIÓN: Si habla español, tiene a bolton disposición servicios gratuitos de asistencia lingüística. Candice al [phone].    We comply with applicable federal civil rights laws and [place] laws. We do not discriminate on the basis of race, color, national origin, age, disability, sex, sexual orientation, or gender identity.            Thank you!     Thank you for choosing [facility]  for your care. Our goal is always to provide you with excellent care. Hearing back from our patients is one way we can continue to improve our services. Please take a few minutes to complete the written survey that you may receive in the mail after your visit with us. Thank you!             Your Updated Medication List - Protect others around you: Learn how to safely  use, store and throw away your medicines at www.disposemymeds.org.          This list is accurate as of 3/8/18  9:49 AM.  Always use your most recent med list.                   Brand Name Dispense Instructions for use Diagnosis    albuterol 108 (90 BASE) MCG/ACT Inhaler    PROAIR HFA/PROVENTIL HFA/VENTOLIN HFA    3 Inhaler    Inhale 2 puffs into the lungs every 4 hours as needed for shortness of breath / dyspnea    Intermittent asthma, uncomplicated       * ALLERGEN IMMUNOTHERAPY PRESCRIPTION     5 mL    Name of Mix: Mix #1  Cat, Dog Cat Hair, Standardized 10,000 BAU/mL, ALK  2.0 ml Dog Hair Dander, A. P.  1:100 w/v, HS  1.0 ml  Diluent: HSA qs to 5ml    Non-seasonal allergic rhinitis due to animal hair and dander, Seasonal allergic rhinitis due to pollen, Allergic rhinitis due to mold       * ALLERGEN IMMUNOTHERAPY PRESCRIPTION     5 mL    Name of Mix: Mix #2  Mold Alternaria Tenuis GLY 1:10 w/v, HS  0.5 ml Diluent: HSA qs to 5ml    Non-seasonal allergic rhinitis due to animal hair and dander, Seasonal allergic rhinitis due to pollen, Allergic rhinitis due to mold       * ALLERGEN IMMUNOTHERAPY PRESCRIPTION     5 mL    Name of Mix: Mix #3  Tree , Weeds Narayan, White  GLY 1:20 w/v, HS  0.5 ml Boxelder-Maple  Mix BHR (Boxelder Hard Red) 1:20 w/v, HS  0.5 ml Pershing, Common GLY 1:20 w/v, HS  0.5 ml Elm, American GLY 1:20 w/v, HS  0.5 ml Lamb's Quarters GLY 1:20 w/v, HS 0.5 ml Plantain, English GLY 1:20 w/v, HS 0.5 ml Ragweed Mixed 1:20 w/v ALK  0.5 ml Sagebrush, Mugwort GLY 1:20 w/v, HS 0.5 ml Diluent: HSA qs to 5ml    Non-seasonal allergic rhinitis due to animal hair and dander, Seasonal allergic rhinitis due to pollen, Allergic rhinitis due to mold       beclomethasone 80 MCG/ACT Inhaler    QVAR    3 Inhaler    Inhale 2 puffs into the lungs 2 times daily    Mild persistent asthma without complication       EPINEPHrine 0.3 MG/0.3ML injection 2-pack    AUVI-Q    2 mL    Inject 0.3 mLs (0.3 mg) into the muscle as  needed for anaphylaxis    Need for desensitization to allergens       loratadine 10 MG tablet    CLARITIN    90 tablet    Take 1 tablet (10 mg) by mouth daily    Chronic rhinitis       montelukast 10 MG tablet    SINGULAIR    90 tablet    Take 1 tablet (10 mg) by mouth At Bedtime    Environmental allergies, Intermittent asthma, uncomplicated       * Notice:  This list has 3 medication(s) that are the same as other medications prescribed for you. Read the directions carefully, and ask your doctor or other care provider to review them with you.

## 2018-03-22 ENCOUNTER — ALLIED HEALTH/NURSE VISIT (OUTPATIENT)
Dept: ALLERGY | Facility: CLINIC | Age: 43
End: 2018-03-22
Payer: COMMERCIAL

## 2018-03-22 DIAGNOSIS — J30.9 ALLERGIC RHINITIS: Primary | ICD-10-CM

## 2018-03-22 DIAGNOSIS — J30.81 NON-SEASONAL ALLERGIC RHINITIS DUE TO ANIMAL HAIR AND DANDER: ICD-10-CM

## 2018-03-22 DIAGNOSIS — J30.89 ALLERGIC RHINITIS DUE TO MOLD: ICD-10-CM

## 2018-03-22 DIAGNOSIS — J30.1 SEASONAL ALLERGIC RHINITIS DUE TO POLLEN: ICD-10-CM

## 2018-03-22 PROCEDURE — 99207 ZZC DROP WITH A PROCEDURE: CPT

## 2018-03-22 PROCEDURE — 95117 IMMUNOTHERAPY INJECTIONS: CPT

## 2018-03-22 NOTE — TELEPHONE ENCOUNTER
ALLERGY SOLUTION RE-ORDER REQUEST    Reji Carpenter 1975 MRN: [id number]    DATE NEEDED:  4/4/18  Vial Color Content   Top Dose       Last Dose     Vial Size  Red 1:1 Molds   Red 1:1 0.5                                                   Red 1:1   0.5                                                     5 mL  Red 1:1 Trees, Weeds   Red 1:1 0.5                                                   Red 1:1   0.5                                                     5 mL  Red 1:1 Cat, Dog   Red 1:1 0.5                                                   Red 1:1    0.5                                                     5 mL        Shot Clinic Location:  [place]  Ship to Location: [place]  Special Instructions:  none      Updated Prescription Needed: No      Requester Signature  Adan Gordon RN....3/22/2018 9:28 AM

## 2018-03-22 NOTE — PROGRESS NOTES
Patient presented after waiting 30 minutes with no reaction to allergy injections. Discharged from clinic.    Adan Gordon RN....3/22/2018 10:37 AM

## 2018-03-22 NOTE — MR AVS SNAPSHOT
After Visit Summary   3/22/2018    Reji Carpenter    MRN: [id number]           Patient Information     Date Of Birth          1975        Visit Information        Provider Department      3/22/2018 9:10 AM FZ ALLERGY SHOTS [facility]        Today's Diagnoses     Allergic rhinitis    -  1       Follow-ups after your visit        Your next 10 appointments already scheduled     Apr 05, 2018  9:00 AM CDT   Nurse Only with FZ ALLERGY SHOTS   [facility] ([facility])    [address] [address]   [phone]            Apr 19, 2018  9:00 AM CDT   Nurse Only with FZ ALLERGY SHOTS   [facility] ([facility]    [address]  [place] [address]   [phone]            May 03, 2018  9:00 AM CDT   Nurse Only with FZ ALLERGY SHOTS   [facility] ([facility])    [address]  [place] [address]   [phone]              Who to contact     If you have questions or need follow up information about today's clinic visit or your schedule please contact [facility] directly at [phone].  Normal or non-critical lab and imaging results will be communicated to you by [employer], letter or phone within 4 business days after the clinic has received the results. If you do not hear from us within 7 days, please contact the clinic through [employer] or phone. If you have a critical or abnormal lab result, we will notify you by phone as soon as possible.  Submit refill requests through [employer] or call your pharmacy and they will forward the refill request to us. Please allow 3 business days for your refill to be completed.          Additional Information About Your Visit        [employer] Information     [employer] gives you secure access to your electronic health record. If you see a primary care provider, you can also send messages to your care team and make appointments.  If you have questions, please call your primary care clinic.  If you do not have a primary care provider, please call [phone] and they will assist you.        Care EveryWhere ID     This is your Care EveryWhere ID. This could be used by other organizations to access your [place] medical records  [id number]         Blood Pressure from Last 3 Encounters:   09/25/17 (!) 165/96   09/19/17 (!) 149/95   07/11/17 145/88    Weight from Last 3 Encounters:   09/25/17 90.2 kg (198 lb 14.4 oz)   09/19/17 89.4 kg (197 lb 3.2 oz)   07/11/17 88.7 kg (195 lb 8 oz)              We Performed the Following     Allergy Shot: Two or more injections        Primary Care Provider Office Phone # Fax #    Wili Kaplan [phone]       [address] [facility] [address]        Equal Access to Services     [facility] : Hadii aad ku hadasho Soomaali, waaxda luqadaha, qaybta kaalmada adeegyada, waxay catherinein haymerissan thierno flores . So [facility] [phone].    ATENCIÓN: Si habla español, tiene a bolton disposición servicios gratuitos de asistencia lingüística. Llame al [phone].    We comply with applicable federal civil rights laws and [place] laws. We do not discriminate on the basis of race, color, national origin, age, disability, sex, sexual orientation, or gender identity.            Thank you!     Thank you for choosing [facility]  for your care. Our goal is always to provide you with excellent care. Hearing back from our patients is one way we can continue to improve our services. Please take a few minutes to complete the written survey that you may receive in the mail after your visit with us. Thank you!             Your Updated Medication List - Protect others around you: Learn how to safely use, store and throw away your medicines at www.disposemymeds.org.          This list is accurate as of 3/22/18 10:37 AM.  Always use your most recent med list.                   Brand  Name Dispense Instructions for use Diagnosis    albuterol 108 (90 BASE) MCG/ACT Inhaler    PROAIR HFA/PROVENTIL HFA/VENTOLIN HFA    3 Inhaler    Inhale 2 puffs into the lungs every 4 hours as needed for shortness of breath / dyspnea    Intermittent asthma, uncomplicated       * ALLERGEN IMMUNOTHERAPY PRESCRIPTION     5 mL    Name of Mix: Mix #1  Cat, Dog Cat Hair, Standardized 10,000 BAU/mL, ALK  2.0 ml Dog Hair Dander, A. P.  1:100 w/v, HS  1.0 ml  Diluent: HSA qs to 5ml    Non-seasonal allergic rhinitis due to animal hair and dander, Seasonal allergic rhinitis due to pollen, Allergic rhinitis due to mold       * ALLERGEN IMMUNOTHERAPY PRESCRIPTION     5 mL    Name of Mix: Mix #2  Mold Alternaria Tenuis GLY 1:10 w/v, HS  0.5 ml Diluent: HSA qs to 5ml    Non-seasonal allergic rhinitis due to animal hair and dander, Seasonal allergic rhinitis due to pollen, Allergic rhinitis due to mold       * ALLERGEN IMMUNOTHERAPY PRESCRIPTION     5 mL    Name of Mix: Mix #3  Tree , Weeds Narayan, White  GLY 1:20 w/v, HS  0.5 ml Boxelder-Maple  Mix BHR (Boxelder Hard Red) 1:20 w/v, HS  0.5 ml Calloway, Common GLY 1:20 w/v, HS  0.5 ml Elm, American GLY 1:20 w/v, HS  0.5 ml Lamb's Quarters GLY 1:20 w/v, HS 0.5 ml Plantain, English GLY 1:20 w/v, HS 0.5 ml Ragweed Mixed 1:20 w/v ALK  0.5 ml Sagebrush, Mugwort GLY 1:20 w/v, HS 0.5 ml Diluent: HSA qs to 5ml    Non-seasonal allergic rhinitis due to animal hair and dander, Seasonal allergic rhinitis due to pollen, Allergic rhinitis due to mold       beclomethasone 80 MCG/ACT Inhaler    QVAR    3 Inhaler    Inhale 2 puffs into the lungs 2 times daily    Mild persistent asthma without complication       EPINEPHrine 0.3 MG/0.3ML injection 2-pack    AUVI-Q    2 mL    Inject 0.3 mLs (0.3 mg) into the muscle as needed for anaphylaxis    Need for desensitization to allergens       loratadine 10 MG tablet    CLARITIN    90 tablet    Take 1 tablet (10 mg) by mouth daily    Chronic rhinitis        montelukast 10 MG tablet    SINGULAIR    90 tablet    Take 1 tablet (10 mg) by mouth At Bedtime    Environmental allergies, Intermittent asthma, uncomplicated       * Notice:  This list has 3 medication(s) that are the same as other medications prescribed for you. Read the directions carefully, and ask your doctor or other care provider to review them with you.

## 2018-03-27 DIAGNOSIS — J30.2 SEASONAL ALLERGIC RHINITIS: Primary | ICD-10-CM

## 2018-03-27 PROCEDURE — 95165 ANTIGEN THERAPY SERVICES: CPT | Performed by: ALLERGY & IMMUNOLOGY

## 2018-03-27 NOTE — PROGRESS NOTES
Allergy serums billed at [place].     Vials received below:    Vial Color Content                      Vial Size Expiration Date  Red 1:1 Molds 5mL  3/26/2019  Red 1:1 Trees, Weeds 5mL  3/26/2019  Red 1:1 Cat, Dog 5mL  3/26/2019    Original Refill encounter date: 3/22/2018      Signature  Brandy Burciaga

## 2018-03-27 NOTE — TELEPHONE ENCOUNTER
Allergy serums received at [place].     Vials received below:    Vial Color Content                      Vial Size Expiration Date  Red 1:1 Molds 5mL  3/26/2019  Red 1:1 Trees, Weeds 5mL  3/26/2019  Red 1:1 Cat, Dog 5mL  3/26/2019        Signature  Brandy Burciaga

## 2018-04-05 ENCOUNTER — ALLIED HEALTH/NURSE VISIT (OUTPATIENT)
Dept: ALLERGY | Facility: CLINIC | Age: 43
End: 2018-04-05
Payer: COMMERCIAL

## 2018-04-05 DIAGNOSIS — J30.9 ALLERGIC RHINITIS: Primary | ICD-10-CM

## 2018-04-05 PROCEDURE — 99207 ZZC DROP WITH A PROCEDURE: CPT

## 2018-04-05 PROCEDURE — 95117 IMMUNOTHERAPY INJECTIONS: CPT

## 2018-04-05 NOTE — MR AVS SNAPSHOT
After Visit Summary   4/5/2018    Reji Carpenter    MRN: [id number]           Patient Information     Date Of Birth          1975        Visit Information        Provider Department      4/5/2018 9:00 AM FZ ALLERGY SHOTS [facility]        Today's Diagnoses     Allergic rhinitis    -  1       Follow-ups after your visit        Your next 10 appointments already scheduled     Apr 19, 2018  9:00 AM CDT   Nurse Only with FZ ALLERGY SHOTS   [facility] ([facility])    [address]  [place] [address] [phone]            May 03, 2018  9:00 AM CDT   Nurse Only with FZ ALLERGY SHOTS   [facility] ([facility])    [address]  [place] [address] [phone]              Who to contact     If you have questions or need follow up information about today's clinic visit or your schedule please contact [facility] directly at [phone].  Normal or non-critical lab and imaging results will be communicated to you by [employer], letter or phone within 4 business days after the clinic has received the results. If you do not hear from us within 7 days, please contact the clinic through [employer] or phone. If you have a critical or abnormal lab result, we will notify you by phone as soon as possible.  Submit refill requests through [employer] or call your pharmacy and they will forward the refill request to us. Please allow 3 business days for your refill to be completed.          Additional Information About Your Visit        [employer] Information     [employer] gives you secure access to your electronic health record. If you see a primary care provider, you can also send messages to your care team and make appointments. If you have questions, please call your primary care clinic.  If you do not have a primary care provider, please call [phone] and they will assist you.        Care EveryWhere ID     This  is your Care EveryWhere ID. This could be used by other organizations to access your [place] medical records  [id number]         Blood Pressure from Last 3 Encounters:   09/25/17 (!) 165/96   09/19/17 (!) 149/95   07/11/17 145/88    Weight from Last 3 Encounters:   09/25/17 90.2 kg (198 lb 14.4 oz)   09/19/17 89.4 kg (197 lb 3.2 oz)   07/11/17 88.7 kg (195 lb 8 oz)              We Performed the Following     Allergy Shot: Two or more injections        Primary Care Provider Office Phone # Fax #    Wili Kaplan [phone]       [address] [address]        Equal Access to Services     DENITA JAVIER : Hadii keily valleso Sokingali, waaxda luqadaha, qaybta kaalmada adeegyada, murray flores . So [facility] [phone].    ATENCIÓN: Si habla español, tiene a bolton disposición servicios gratuitos de asistencia lingüística. Ll[facility] [phone].    We comply with applicable federal civil rights laws and [place] laws. We do not discriminate on the basis of race, color, national origin, age, disability, sex, sexual orientation, or gender identity.            Thank you!     [facility] you [facility]  for your [facility]. Our goal is always to provide you with excellent care. Hearing back from our patients is one way we can continue to improve our services. Please take a few minutes to complete the written survey that you may receive in the mail after your visit with us. Thank you!             Your Updated Medication List - Protect others around you: Learn how to safely use, store and throw away your medicines at www.disposemymeds.org.          This list is accurate as of 4/5/18  9:47 AM.  Always use your most recent med list.                   Brand Name Dispense Instructions for use Diagnosis    albuterol 108 (90 BASE) MCG/ACT Inhaler    PROAIR HFA/PROVENTIL HFA/VENTOLIN HFA    3 Inhaler    Inhale 2 puffs into the lungs every 4 hours as  needed for shortness of breath / dyspnea    Intermittent asthma, uncomplicated       ALLERGEN IMMUNOTHERAPY PRESCRIPTION     5 mL    Name of Mix: Mix #1  Cat, Dog Cat Hair, Standardized 10,000 BAU/mL, ALK  2.0 ml Dog Hair Dander, A. P.  1:100 w/v, HS  1.0 ml  Diluent: HSA qs to 5ml    Non-seasonal allergic rhinitis due to animal hair and dander, Seasonal allergic rhinitis due to pollen, Allergic rhinitis due to mold       ALLERGEN IMMUNOTHERAPY PRESCRIPTION     5 mL    Name of Mix: Mix #2  Mold Alternaria Tenuis 1:10 w/v, HS  0.5 ml Diluent: HSA qs to 5ml    Non-seasonal allergic rhinitis due to animal hair and dander, Seasonal allergic rhinitis due to pollen, Allergic rhinitis due to mold       ALLERGEN IMMUNOTHERAPY PRESCRIPTION     5 mL    Name of Mix: Mix #3  Tree , Weeds Narayan, White  1:20 w/v, HS  0.5 ml Boxelder-Maple  Mix BHR (Boxelder Hard Red) 1:20 w/v, HS  0.5 ml [place], Common 1:20 w/v, HS  0.5 ml Elm, American 1:20 w/v, HS  0.5 ml Lamb's Quarters 1:20 w/v, HS 0.5 ml Plantain, English 1:20 w/v, HS 0.5 ml Ragweed Mixed 1:20 w/v ALK  0.5 ml Sagebrush, Mugwort 1:20 w/v, HS 0.5 ml Diluent: HSA qs to 5ml    Non-seasonal allergic rhinitis due to animal hair and dander, Seasonal allergic rhinitis due to pollen, Allergic rhinitis due to mold       beclomethasone 80 MCG/ACT Inhaler    QVAR    3 Inhaler    Inhale 2 puffs into the lungs 2 times daily    Mild persistent asthma without complication       EPINEPHrine 0.3 MG/0.3ML injection 2-pack    AUVI-Q    2 mL    Inject 0.3 mLs (0.3 mg) into the muscle as needed for anaphylaxis    Need for desensitization to allergens       loratadine 10 MG tablet    CLARITIN    90 tablet    Take 1 tablet (10 mg) by mouth daily    Chronic rhinitis       montelukast 10 MG tablet    SINGULAIR    90 tablet    Take 1 tablet (10 mg) by mouth At Bedtime    Environmental allergies, Intermittent asthma, uncomplicated

## 2018-04-05 NOTE — PROGRESS NOTES
Patient presented after waiting 30 minutes with no reaction to allergy injections. Discharged from clinic.    Adan Gordon RN....4/5/2018 9:47 AM

## 2018-04-19 ENCOUNTER — ALLIED HEALTH/NURSE VISIT (OUTPATIENT)
Dept: ALLERGY | Facility: CLINIC | Age: 43
End: 2018-04-19
Payer: COMMERCIAL

## 2018-04-19 DIAGNOSIS — J30.9 ALLERGIC RHINITIS: Primary | ICD-10-CM

## 2018-04-19 PROCEDURE — 99207 ZZC DROP WITH A PROCEDURE: CPT

## 2018-04-19 PROCEDURE — 95117 IMMUNOTHERAPY INJECTIONS: CPT

## 2018-04-19 NOTE — PROGRESS NOTES
Patient presented after waiting 30 minutes with no reaction to allergy injections. Discharged from clinic.    Adan Gordon RN....4/19/2018 9:40 AM

## 2018-04-19 NOTE — MR AVS SNAPSHOT
After Visit Summary   4/19/2018    Reji Carpenter    MRN: [id number]           Patient Information     Date Of Birth          1975        Visit Information        Provider Department      4/19/2018 9:00 AM FZ ALLERGY SHOTS [facility]        Today's Diagnoses     Allergic rhinitis    -  1       Follow-ups after your visit        Your next 10 appointments already scheduled     May 03, 2018  9:00 AM CDT   Nurse Only with FZ ALLERGY SHOTS   [facility] ([facility])    [address]  [place] [address] [phone]            May 17, 2018  9:00 AM CDT   Nurse Only with FZ ALLERGY SHOTS   [facility] ([facility])    [address]  [place] [address] [phone]              Who to contact     If you have questions or need follow up information about today's clinic visit or your schedule please contact [facility] directly at [phone].  Normal or non-critical lab and imaging results will be communicated to you by Aereohart, letter or phone within 4 business days after the clinic has received the results. If you do not hear from us within 7 days, please contact the clinic through Touchotelt or phone. If you have a critical or abnormal lab result, we will notify you by phone as soon as possible.  Submit refill requests through Options Away or call your pharmacy and they will forward the refill request to us. Please allow 3 business days for your refill to be completed.          Additional Information About Your Visit        Aereohart Information     Options Away gives you secure access to your electronic health record. If you see a primary care provider, you can also send messages to your care team and make appointments. If you have questions, please call your primary care clinic.  If you do not have a primary care provider, please call [phone] and they will assist you.        Care EveryWhere ID      This is your Care EveryWhere ID. This could be used by other organizations to access your [place] medical records  [id number]         Blood Pressure from Last 3 Encounters:   09/25/17 (!) 165/96   09/19/17 (!) 149/95   07/11/17 145/88    Weight from Last 3 Encounters:   09/25/17 90.2 kg (198 lb 14.4 oz)   09/19/17 89.4 kg (197 lb 3.2 oz)   07/11/17 88.7 kg (195 lb 8 oz)              We Performed the Following     Allergy Shot: Two or more injections        Primary Care Provider Office Phone # Fax #    Wili Kaplan [phone]       [address] [facility] [address]        Equal Access to Services     DENITA JAVIER : Hadii aad ku hadasho Sokingali, waaxda luqadaha, qaybta kaalmada adeegyada, murray flores . So [facility] [phone].    ATENCIÓN: Si habla español, tiene a bolton disposición servicios gratuitos de asistencia lingüística. Ll[facility] [phone].    We comply with applicable federal civil rights laws and [place] laws. We do not discriminate on the basis of race, color, national origin, age, disability, sex, sexual orientation, or gender identity.            Thank you!     Thank you for choosing [facility]  for your care. Our goal is always to provide you with excellent care. Hearing back from our patients is one way we can continue to improve our services. Please take a few minutes to complete the written survey that you may receive in the mail after your visit with us. Thank you!             Your Updated Medication List - Protect others around you: Learn how to safely use, store and throw away your medicines at www.disposemymeds.org.          This list is accurate as of 4/19/18  9:40 AM.  Always use your most recent med list.                   Brand Name Dispense Instructions for use Diagnosis    albuterol 108 (90 Base) MCG/ACT Inhaler    PROAIR HFA/PROVENTIL HFA/VENTOLIN HFA    3 Inhaler    Inhale 2 puffs into the lungs every 4 hours  as needed for shortness of breath / dyspnea    Intermittent asthma, uncomplicated       ALLERGEN IMMUNOTHERAPY PRESCRIPTION     5 mL    Name of Mix: Mix #1  Cat, Dog Cat Hair, Standardized 10,000 BAU/mL, ALK  2.0 ml Dog Hair Dander, A. P.  1:100 w/v, HS  1.0 ml  Diluent: HSA qs to 5ml    Non-seasonal allergic rhinitis due to animal hair and dander, Seasonal allergic rhinitis due to pollen, Allergic rhinitis due to mold       ALLERGEN IMMUNOTHERAPY PRESCRIPTION     5 mL    Name of Mix: Mix #2  Mold Alternaria Tenuis 1:10 w/v, HS  0.5 ml Diluent: HSA qs to 5ml    Non-seasonal allergic rhinitis due to animal hair and dander, Seasonal allergic rhinitis due to pollen, Allergic rhinitis due to mold       ALLERGEN IMMUNOTHERAPY PRESCRIPTION     5 mL    Name of Mix: Mix #3  Tree , Weeds Narayan, White  1:20 w/v, HS  0.5 ml Boxelder-Maple  Mix BHR (Boxelder Hard Red) 1:20 w/v, HS  0.5 ml [place], Common 1:20 w/v, HS  0.5 ml Elm, American 1:20 w/v, HS  0.5 ml Lamb's Quarters 1:20 w/v, HS 0.5 ml Plantain, English 1:20 w/v, HS 0.5 ml Ragweed Mixed 1:20 w/v ALK  0.5 ml Sagebrush, Mugwort 1:20 w/v, HS 0.5 ml Diluent: HSA qs to 5ml    Non-seasonal allergic rhinitis due to animal hair and dander, Seasonal allergic rhinitis due to pollen, Allergic rhinitis due to mold       beclomethasone 80 MCG/ACT Inhaler    QVAR    3 Inhaler    Inhale 2 puffs into the lungs 2 times daily    Mild persistent asthma without complication       EPINEPHrine 0.3 MG/0.3ML injection 2-pack    AUVI-Q    2 mL    Inject 0.3 mLs (0.3 mg) into the muscle as needed for anaphylaxis    Need for desensitization to allergens       loratadine 10 MG tablet    CLARITIN    90 tablet    Take 1 tablet (10 mg) by mouth daily    Chronic rhinitis       montelukast 10 MG tablet    SINGULAIR    90 tablet    Take 1 tablet (10 mg) by mouth At Bedtime    Environmental allergies, Intermittent asthma, uncomplicated

## 2018-05-01 ENCOUNTER — MYC MEDICAL ADVICE (OUTPATIENT)
Dept: ALLERGY | Facility: CLINIC | Age: 43
End: 2018-05-01

## 2018-05-01 DIAGNOSIS — J45.20 INTERMITTENT ASTHMA, UNCOMPLICATED: ICD-10-CM

## 2018-05-01 RX ORDER — ALBUTEROL SULFATE 90 UG/1
2 AEROSOL, METERED RESPIRATORY (INHALATION) EVERY 4 HOURS PRN
Qty: 3 INHALER | Refills: 0 | Status: SHIPPED | OUTPATIENT
Start: 2018-05-01 | End: 2018-08-15

## 2018-05-03 ENCOUNTER — ALLIED HEALTH/NURSE VISIT (OUTPATIENT)
Dept: ALLERGY | Facility: CLINIC | Age: 43
End: 2018-05-03
Payer: COMMERCIAL

## 2018-05-03 DIAGNOSIS — J30.9 ALLERGIC RHINITIS: Primary | ICD-10-CM

## 2018-05-03 PROCEDURE — 95117 IMMUNOTHERAPY INJECTIONS: CPT

## 2018-05-03 PROCEDURE — 99207 ZZC DROP WITH A PROCEDURE: CPT

## 2018-05-03 NOTE — PROGRESS NOTES
Patient presented after waiting 30 minutes with no reaction to allergy injections. Discharged from clinic.    Adan Gordon RN....5/3/2018 9:47 AM

## 2018-05-17 ENCOUNTER — ALLIED HEALTH/NURSE VISIT (OUTPATIENT)
Dept: ALLERGY | Facility: CLINIC | Age: 43
End: 2018-05-17
Payer: COMMERCIAL

## 2018-05-17 ENCOUNTER — TELEPHONE (OUTPATIENT)
Dept: ALLERGY | Facility: CLINIC | Age: 43
End: 2018-05-17

## 2018-05-17 DIAGNOSIS — J30.9 ALLERGIC RHINITIS: Primary | ICD-10-CM

## 2018-05-17 PROCEDURE — 95117 IMMUNOTHERAPY INJECTIONS: CPT

## 2018-05-17 PROCEDURE — 99207 ZZC DROP WITH A PROCEDURE: CPT

## 2018-05-17 NOTE — TELEPHONE ENCOUNTER
Reason for Call:  Other call back    Detailed comments:  Patient calling. He just left the clinic. When does he need his next allergy shot? Please call and let know.     Phone Number Patient can be reached at: Home number on file [phone] (home)    Best Time:  Any     Can we leave a detailed message on this number? YES    Call taken on 5/17/2018 at 10:01 AM by Shahla Angulo

## 2018-05-17 NOTE — MR AVS SNAPSHOT
After Visit Summary   5/17/2018    Reji Carpenter    MRN: [id number]           Patient Information     Date Of Birth          1975        Visit Information        Provider Department      5/17/2018 9:00 AM FZ ALLERGY SHOTS [facility] [place]        Today's Diagnoses     Allergic rhinitis    -  1       Follow-ups after your visit        Who to contact     If you have questions or need follow up information about today's clinic visit or your schedule please contact [facility] [place] directly at [phone].  Normal or non-critical lab and imaging results will be communicated to you by G2 Microsystemshart, letter or phone within 4 business days after the clinic has received the results. If you do not hear from us within 7 days, please contact the clinic through [employer] or phone. If you have a critical or abnormal lab result, we will notify you by phone as soon as possible.  Submit refill requests through Hi-G-Tek or call your pharmacy and they will forward the refill request to us. Please allow 3 business days for your refill to be completed.          Additional Information About Your Visit        MyChart Information     Hi-G-Tek gives you secure access to your electronic health record. If you see a primary care provider, you can also send messages to your care team and make appointments. If you have questions, please call your primary care clinic.  If you do not have a primary care provider, please call [phone] and they will assist you.        Care EveryWhere ID     This is your Care EveryWhere ID. This could be used by other organizations to access your [place] medical records  [id number]         Blood Pressure from Last 3 Encounters:   09/25/17 (!) 165/96   09/19/17 (!) 149/95   07/11/17 145/88    Weight from Last 3 Encounters:   09/25/17 90.2 kg (198 lb 14.4 oz)   09/19/17 89.4 kg (197 lb 3.2 oz)   07/11/17 88.7 kg (195 lb 8 oz)              We Performed the Following     Allergy  Shot: Two or more injections        Primary Care Provider Office Phone # Fax #    Wili Kaplan [phone]       [address] [facility] [address]        Equal Access to Services     DENITA JAVIER : Lois mi haio Sokingali, waaxda luqadaha, qaybta kaalmada adeviktoryada, murray figueroa laMichaelemily may. So [facility] [phone].    ATENCIÓN: Si habla español, tiene a bolton disposición servicios gratuitos de asistencia lingüística. Llame al [phone].    We comply with applicable federal civil rights laws and [place] laws. We do not discriminate on the basis of race, color, national origin, age, disability, sex, sexual orientation, or gender identity.            Thank you!     [facility] you [facility]  [facility] your [facility]. Our goal is always to provide you with excellent care. Hearing back from our patients is one way we can continue to improve our services. Please take a few minutes to complete the written survey that you may receive in the mail after your visit with us. Thank you!             Your Updated Medication List - Protect others around you: Learn how to safely use, store and throw away your medicines at www.disposemymeds.org.          This list is accurate as of 5/17/18 10:07 AM.  Always use your most recent med list.                   Brand Name Dispense Instructions for use Diagnosis    albuterol 108 (90 Base) MCG/ACT Inhaler    PROAIR HFA/PROVENTIL HFA/VENTOLIN HFA    3 Inhaler    Inhale 2 puffs into the lungs every 4 hours as needed for shortness of breath / dyspnea    Intermittent asthma, uncomplicated       ALLERGEN IMMUNOTHERAPY PRESCRIPTION     5 mL    Name of Mix: Mix #1  Cat, Dog Cat Hair, Standardized 10,000 BAU/mL, ALK  2.0 ml Dog Hair Dander, A. P.  1:100 w/v, HS  1.0 ml  Diluent: HSA qs to 5ml    Non-seasonal allergic rhinitis due to animal hair and dander, Seasonal allergic rhinitis due to pollen, Allergic rhinitis due to mold        ALLERGEN IMMUNOTHERAPY PRESCRIPTION     5 mL    Name of Mix: Mix #2  Mold Alternaria Tenuis 1:10 w/v, HS  0.5 ml Diluent: HSA qs to 5ml    Non-seasonal allergic rhinitis due to animal hair and dander, Seasonal allergic rhinitis due to pollen, Allergic rhinitis due to mold       ALLERGEN IMMUNOTHERAPY PRESCRIPTION     5 mL    Name of Mix: Mix #3  Tree , Weeds Narayan, White  1:20 w/v, HS  0.5 ml Boxelder-Maple  Mix BHR (Boxelder Hard Red) 1:20 w/v, HS  0.5 ml [place], Common 1:20 w/v, HS  0.5 ml Elm, American 1:20 w/v, HS  0.5 ml Lamb's Quarters 1:20 w/v, HS 0.5 ml Plantain, English 1:20 w/v, HS 0.5 ml Ragweed Mixed 1:20 w/v ALK  0.5 ml Sagebrush, Mugwort 1:20 w/v, HS 0.5 ml Diluent: HSA qs to 5ml    Non-seasonal allergic rhinitis due to animal hair and dander, Seasonal allergic rhinitis due to pollen, Allergic rhinitis due to mold       beclomethasone 80 MCG/ACT Inhaler    QVAR    3 Inhaler    Inhale 2 puffs into the lungs 2 times daily    Mild persistent asthma without complication       EPINEPHrine 0.3 MG/0.3ML injection 2-pack    AUVI-Q    2 mL    Inject 0.3 mLs (0.3 mg) into the muscle as needed for anaphylaxis    Need for desensitization to allergens       loratadine 10 MG tablet    CLARITIN    90 tablet    Take 1 tablet (10 mg) by mouth daily    Chronic rhinitis       montelukast 10 MG tablet    SINGULAIR    90 tablet    TAKE 1 TABLET (10 MG) BY MOUTH AT BEDTIME    Environmental allergies, Intermittent asthma, uncomplicated

## 2018-05-17 NOTE — TELEPHONE ENCOUNTER
Called and informed patient that he will be due for his next allergy injection in 4 weeks as he has been at his top dose for 5 doses. Scheduled patient an allergy shot appointment for June 14 at 9:00 am.    Miracle Johnson RN

## 2018-05-17 NOTE — PROGRESS NOTES
Patient presented after waiting 30 minutes with no reaction to allergy injections. Discharged from clinic.    Adan Gordon RN....5/17/2018 10:07 AM

## 2018-06-14 ENCOUNTER — ALLIED HEALTH/NURSE VISIT (OUTPATIENT)
Dept: ALLERGY | Facility: CLINIC | Age: 43
End: 2018-06-14
Payer: COMMERCIAL

## 2018-06-14 DIAGNOSIS — J30.9 ALLERGIC RHINITIS: Primary | ICD-10-CM

## 2018-06-14 PROCEDURE — 95117 IMMUNOTHERAPY INJECTIONS: CPT

## 2018-06-14 PROCEDURE — 99207 ZZC DROP WITH A PROCEDURE: CPT

## 2018-06-14 NOTE — MR AVS SNAPSHOT
After Visit Summary   6/14/2018    Reji Carpenter    MRN: [id number]           Patient Information     Date Of Birth          1975        Visit Information        Provider Department      6/14/2018 9:00 AM FZ ALLERGY SHOTS [facility]        Today's Diagnoses     Allergic rhinitis    -  1       Follow-ups after your visit        Your next 10 appointments already scheduled     Jul 12, 2018  9:10 AM CDT   Nurse Only with FZ ALLERGY SHOTS   [facility] [place] ([facility])    [address]  [place] [address] [phone]              Who to contact     If you have questions or need follow up information about today's clinic visit or your schedule please contact [facility] directly at [phone].  Normal or non-critical lab and imaging results will be communicated to you by MyChart, letter or phone within 4 business days after the clinic has received the results. If you do not hear from us within 7 days, please contact the clinic through MyChart or phone. If you have a critical or abnormal lab result, we will notify you by phone as soon as possible.  Submit refill requests through City-dimensional network logo or call your pharmacy and they will forward the refill request to us. Please allow 3 business days for your refill to be completed.          Additional Information About Your Visit        MyChart Information     City-dimensional network logo gives you secure access to your electronic health record. If you see a primary care provider, you can also send messages to your care team and make appointments. If you have questions, please call your primary care clinic.  If you do not have a primary care provider, please call [phone] and they will assist you.        Care EveryWhere ID     This is your Care EveryWhere ID. This could be used by other organizations to access your [place] medical records  [id number]         Blood Pressure from Last 3 Encounters:   09/25/17 (!)  165/96   09/19/17 (!) 149/95   07/11/17 145/88    Weight from Last 3 Encounters:   09/25/17 90.2 kg (198 lb 14.4 oz)   09/19/17 89.4 kg (197 lb 3.2 oz)   07/11/17 88.7 kg (195 lb 8 oz)              We Performed the Following     Allergy Shot: Two or more injections        Primary Care Provider Office Phone # Fax #    Wili Kaplan [phone]       [address] [address]        Equal Access to Services     [facility]: Hadii aad ku hadasho Soomaali, waaxda luqadaha, qaybta kaalmada adeegyada, murray flores . So [facility] [phone].    ATENCIÓN: Si habla español, tiene a bolton disposición servicios gratuitos de asistencia lingüística. LlUC Health [phone].    We comply with applicable federal civil rights laws and [place] laws. We do not discriminate on the basis of race, color, national origin, age, disability, sex, sexual orientation, or gender identity.            Thank you!     [facility] you [facility]  for your [facility]. Our goal is always to provide you with excellent care. Hearing back from our patients is one way we can continue to improve our services. Please take a few minutes to complete the written survey that you may receive in the mail after your visit with us. Thank you!             Your Updated Medication List - Protect others around you: Learn how to safely use, store and throw away your medicines at www.disposemymeds.org.          This list is accurate as of 6/14/18  9:44 AM.  Always use your most recent med list.                   Brand Name Dispense Instructions for use Diagnosis    albuterol 108 (90 Base) MCG/ACT Inhaler    PROAIR HFA/PROVENTIL HFA/VENTOLIN HFA    3 Inhaler    Inhale 2 puffs into the lungs every 4 hours as needed for shortness of breath / dyspnea    Intermittent asthma, uncomplicated       ALLERGEN IMMUNOTHERAPY PRESCRIPTION     5 mL    Name of Mix: Mix #1  Cat, Dog Cat Hair, Standardized  10,000 BAU/mL, ALK  2.0 ml Dog Hair Dander, A. P.  1:100 w/v, HS  1.0 ml  Diluent: HSA qs to 5ml    Non-seasonal allergic rhinitis due to animal hair and dander, Seasonal allergic rhinitis due to pollen, Allergic rhinitis due to mold       ALLERGEN IMMUNOTHERAPY PRESCRIPTION     5 mL    Name of Mix: Mix #2  Mold Alternaria Tenuis 1:10 w/v, HS  0.5 ml Diluent: HSA qs to 5ml    Non-seasonal allergic rhinitis due to animal hair and dander, Seasonal allergic rhinitis due to pollen, Allergic rhinitis due to mold       ALLERGEN IMMUNOTHERAPY PRESCRIPTION     5 mL    Name of Mix: Mix #3  Tree , Weeds Narayan, White  1:20 w/v, HS  0.5 ml Boxelder-Maple  Mix BHR (Boxelder Hard Red) 1:20 w/v, HS  0.5 ml [place], Common 1:20 w/v, HS  0.5 ml Elm, American 1:20 w/v, HS  0.5 ml Lamb's Quarters 1:20 w/v, HS 0.5 ml Plantain, English 1:20 w/v, HS 0.5 ml Ragweed Mixed 1:20 w/v ALK  0.5 ml Sagebrush, Mugwort 1:20 w/v, HS 0.5 ml Diluent: HSA qs to 5ml    Non-seasonal allergic rhinitis due to animal hair and dander, Seasonal allergic rhinitis due to pollen, Allergic rhinitis due to mold       beclomethasone 80 MCG/ACT Inhaler    QVAR    3 Inhaler    Inhale 2 puffs into the lungs 2 times daily    Mild persistent asthma without complication       EPINEPHrine 0.3 MG/0.3ML injection 2-pack    AUVI-Q    2 mL    Inject 0.3 mLs (0.3 mg) into the muscle as needed for anaphylaxis    Need for desensitization to allergens       loratadine 10 MG tablet    CLARITIN    90 tablet    Take 1 tablet (10 mg) by mouth daily    Chronic rhinitis       montelukast 10 MG tablet    SINGULAIR    90 tablet    TAKE 1 TABLET (10 MG) BY MOUTH AT BEDTIME    Environmental allergies, Intermittent asthma, uncomplicated

## 2018-06-14 NOTE — PROGRESS NOTES
Patient presented after waiting 30 minutes with no reaction to allergy injections. Discharged from clinic.    Adan Gordon RN....6/14/2018 9:44 AM

## 2018-07-12 ENCOUNTER — ALLIED HEALTH/NURSE VISIT (OUTPATIENT)
Dept: ALLERGY | Facility: CLINIC | Age: 43
End: 2018-07-12
Payer: COMMERCIAL

## 2018-07-12 DIAGNOSIS — Z51.6 NEED FOR DESENSITIZATION TO ALLERGENS: ICD-10-CM

## 2018-07-12 DIAGNOSIS — J30.9 ALLERGIC RHINITIS: Primary | ICD-10-CM

## 2018-07-12 PROCEDURE — 99207 ZZC DROP WITH A PROCEDURE: CPT

## 2018-07-12 PROCEDURE — 95117 IMMUNOTHERAPY INJECTIONS: CPT

## 2018-07-12 RX ORDER — EPINEPHRINE 0.3 MG/.3ML
0.3 INJECTION SUBCUTANEOUS PRN
Qty: 2 ML | Refills: 2 | Status: SHIPPED | OUTPATIENT
Start: 2018-07-12 | End: 2019-07-08

## 2018-07-12 NOTE — PROGRESS NOTES
Patient presented after waiting 30 minutes with no reaction to allergy injections. Discharged from clinic.    Adan Gordon RN....7/12/2018 9:52 AM

## 2018-07-29 DIAGNOSIS — J45.20 INTERMITTENT ASTHMA, UNCOMPLICATED: ICD-10-CM

## 2018-07-29 DIAGNOSIS — Z91.09 ENVIRONMENTAL ALLERGIES: ICD-10-CM

## 2018-07-30 RX ORDER — MONTELUKAST SODIUM 10 MG/1
TABLET ORAL
Qty: 90 TABLET | Refills: 0 | Status: SHIPPED | OUTPATIENT
Start: 2018-07-30 | End: 2018-11-08

## 2018-07-30 NOTE — TELEPHONE ENCOUNTER
"Requested Prescriptions   Pending Prescriptions Disp Refills     montelukast (SINGULAIR) 10 MG tablet [Pharmacy Med Name: MONTELUKAST SOD 10 MG TABLET] 90 tablet 0    Last Written Prescription Date:  5/2/18  Last Fill Quantity: 90,  # refills: 0   Last office visit: 7/11/2017 with prescribing provider:     Future Office Visit:   Next 5 appointments (look out 90 days)     Aug 09, 2018  9:00 AM CDT   Nurse Only with FZ ALLERGY SHOTS   [facility] ([facility]    [address] [address]   [phone]                  Sig: TAKE 1 TABLET (10 MG) BY MOUTH AT BEDTIME    Leukotriene Inhibitors Protocol Failed    7/29/2018  1:21 AM       Failed - Asthma control assessment score within normal limits in last 6 months    Please review ACT score.          Failed - Recent (6 mo) or future (30 days) visit within the authorizing provider's specialty    Patient had office visit in the last 6 months or has a visit in the next 30 days with authorizing provider or within the authorizing provider's specialty.  See \"Patient Info\" tab in inbasket, or \"Choose Columns\" in Meds & Orders section of the refill encounter.           Passed - Patient is age 12 or older    If patient is under 16, ok to refill using age based dosing.             "

## 2018-08-03 DIAGNOSIS — J45.20 INTERMITTENT ASTHMA, UNCOMPLICATED: ICD-10-CM

## 2018-08-03 DIAGNOSIS — Z91.09 ENVIRONMENTAL ALLERGIES: ICD-10-CM

## 2018-08-03 RX ORDER — MONTELUKAST SODIUM 10 MG/1
TABLET ORAL
Qty: 90 TABLET | Refills: 0 | OUTPATIENT
Start: 2018-08-03

## 2018-08-03 NOTE — TELEPHONE ENCOUNTER
"Requested Prescriptions   Pending Prescriptions Disp Refills     montelukast (SINGULAIR) 10 MG tablet [Pharmacy Med Name: MONTELUKAST SOD 10 MG TABLET] 90 tablet 0    Last Written Prescription Date:  7/30/18  Last Fill Quantity: 90,  # refills: 0   Last office visit: 7/11/2017 with prescribing provider:     Future Office Visit:   Next 5 appointments (look out 90 days)     Aug 09, 2018  9:00 AM CDT   Nurse Only with FZ ALLERGY SHOTS   [facility] ([facility]    [address] [address]   [phone]                  Sig: TAKE 1 TABLET (10 MG) BY MOUTH AT BEDTIME    Leukotriene Inhibitors Protocol Failed    8/3/2018 10:11 AM       Failed - Asthma control assessment score within normal limits in last 6 months    Please review ACT score.          Failed - Recent (6 mo) or future (30 days) visit within the authorizing provider's specialty    Patient had office visit in the last 6 months or has a visit in the next 30 days with authorizing provider or within the authorizing provider's specialty.  See \"Patient Info\" tab in inbasket, or \"Choose Columns\" in Meds & Orders section of the refill encounter.           Passed - Patient is age 12 or older    If patient is under 16, ok to refill using age based dosing.             "

## 2018-08-09 ENCOUNTER — ALLIED HEALTH/NURSE VISIT (OUTPATIENT)
Dept: ALLERGY | Facility: CLINIC | Age: 43
End: 2018-08-09
Payer: COMMERCIAL

## 2018-08-09 DIAGNOSIS — J30.9 ALLERGIC RHINITIS: Primary | ICD-10-CM

## 2018-08-09 PROCEDURE — 99207 ZZC DROP WITH A PROCEDURE: CPT

## 2018-08-09 PROCEDURE — 95117 IMMUNOTHERAPY INJECTIONS: CPT

## 2018-08-09 NOTE — PROGRESS NOTES
Patient presented after waiting 30 minutes with no reaction to allergy injections. Discharged from clinic.    Adan Gordon RN....8/9/2018 9:50 AM

## 2018-08-09 NOTE — MR AVS SNAPSHOT
After Visit Summary   8/9/2018    Reji Carpenter    MRN: [id number]           Patient Information     Date Of Birth          1975        Visit Information        Provider Department      8/9/2018 9:00 AM FZ [facility]ER[facility]OT[facility]        Today's Diagnoses     Allergic rhinitis    -  1       Follow-ups after your visit        Your next 10 appointments already scheduled     Sep 06, 2018  9:00 AM CDT   Nurse Only with FZ ALLERGY SHOTS   [facility] ([facility])    [address]  [place] [address] [phone]              Who to contact     If you have questions or need follow up information about today's clinic visit or your schedule please contact [facility] directly at [phone].  Normal or non-critical lab and imaging results will be communicated to you by MyChart, letter or phone within 4 business days after the clinic has received the results. If you do not hear from us within 7 days, please contact the clinic through MyChart or phone. If you have a critical or abnormal lab result, we will notify you by phone as soon as possible.  Submit refill requests through [employer] or call your pharmacy and they will forward the refill request to us. Please allow 3 business days for your refill to be completed.          Additional Information About Your Visit        MyChart Information     [employer] gives you secure access to your electronic health record. If you see a primary care provider, you can also send messages to your care team and make appointments. If you have questions, please call your primary care clinic.  If you do not have a primary care provider, please call [phone] and they will assist you.        Care EveryWhere ID     This is your Care EveryWhere ID. This could be used by other organizations to access your [facility] medical records  [id number]         Blood Pressure from Last 3 Encounters:   09/25/17 (!)  165/96   09/19/17 (!) 149/95   07/11/17 145/88    Weight from Last 3 Encounters:   09/25/17 90.2 kg (198 lb 14.4 oz)   09/19/17 89.4 kg (197 lb 3.2 oz)   07/11/17 88.7 kg (195 lb 8 oz)              Today, you had the following     No orders found for display       Primary Care Provider Office Phone # Fax #    Wili Kaplan [phone]       [address] [facility] [address]        Equal Access to Services     [facility]: Hadii aad ku hadasho Soomaali, waaxda luqadaha, qaybta kaalmada adeegyatabitha, murray flores . So [facility] [phone].    ATENCIÓN: Si habla español, tiene a bolton disposición servicios gratuitos de asistencia lingüística. [facility] [phone].    We comply with applicable federal civil rights laws and [place] laws. We do not discriminate on the basis of race, color, national origin, age, disability, sex, sexual orientation, or gender identity.            Thank you!     Thank you for choosing [facility]  for your care. Our goal is always to provide you with excellent care. Hearing back from our patients is one way we can continue to improve our services. Please take a few minutes to complete the written survey that you may receive in the mail after your visit with us. Thank you!             Your Updated Medication List - Protect others around you: Learn how to safely use, store and throw away your medicines at www.disposemymeds.org.          This list is accurate as of 8/9/18  9:50 AM.  Always use your most recent med list.                   Brand Name Dispense Instructions for use Diagnosis    albuterol 108 (90 Base) MCG/ACT Inhaler    PROAIR HFA/PROVENTIL HFA/VENTOLIN HFA    3 Inhaler    Inhale 2 puffs into the lungs every 4 hours as needed for shortness of breath / dyspnea    Intermittent asthma, uncomplicated       ALLERGEN IMMUNOTHERAPY PRESCRIPTION     5 mL    Name of Mix: Mix #1  Cat, Dog Cat Hair, Standardized 10,000  BAU/mL, ALK  2.0 ml Dog Hair Dander, A. P.  1:100 w/v, HS  1.0 ml  Diluent: HSA qs to 5ml    Non-seasonal allergic rhinitis due to animal hair and dander, Seasonal allergic rhinitis due to pollen, Allergic rhinitis due to mold       ALLERGEN IMMUNOTHERAPY PRESCRIPTION     5 mL    Name of Mix: Mix #2  Mold Alternaria Tenuis 1:10 w/v, HS  0.5 ml Diluent: HSA qs to 5ml    Non-seasonal allergic rhinitis due to animal hair and dander, Seasonal allergic rhinitis due to pollen, Allergic rhinitis due to mold       ALLERGEN IMMUNOTHERAPY PRESCRIPTION     5 mL    Name of Mix: Mix #3  Tree , Weeds Narayan, White  1:20 w/v, HS  0.5 ml Boxelder-Maple  Mix BHR (Boxelder Hard Red) 1:20 w/v, HS  0.5 ml [place], Common 1:20 w/v, HS  0.5 ml Elm, American 1:20 w/v, HS  0.5 ml Lamb's Quarters 1:20 w/v, HS 0.5 ml Plantain, English 1:20 w/v, HS 0.5 ml Ragweed Mixed 1:20 w/v ALK  0.5 ml Sagebrush, Mugwort 1:20 w/v, HS 0.5 ml Diluent: HSA qs to 5ml    Non-seasonal allergic rhinitis due to animal hair and dander, Seasonal allergic rhinitis due to pollen, Allergic rhinitis due to mold       beclomethasone 80 MCG/ACT Inhaler    QVAR    3 Inhaler    Inhale 2 puffs into the lungs 2 times daily    Mild persistent asthma without complication       EPINEPHrine 0.3 MG/0.3ML injection 2-pack    AUVI-Q    2 mL    Inject 0.3 mLs (0.3 mg) into the muscle as needed for anaphylaxis    Need for desensitization to allergens       loratadine 10 MG tablet    CLARITIN    90 tablet    Take 1 tablet (10 mg) by mouth daily    Chronic rhinitis       montelukast 10 MG tablet    SINGULAIR    90 tablet    TAKE 1 TABLET (10 MG) BY MOUTH AT BEDTIME    Environmental allergies, Intermittent asthma, uncomplicated

## 2018-08-15 ENCOUNTER — MYC MEDICAL ADVICE (OUTPATIENT)
Dept: FAMILY MEDICINE | Facility: CLINIC | Age: 43
End: 2018-08-15

## 2018-08-15 DIAGNOSIS — J45.20 INTERMITTENT ASTHMA, UNCOMPLICATED: ICD-10-CM

## 2018-08-15 RX ORDER — ALBUTEROL SULFATE 90 UG/1
2 AEROSOL, METERED RESPIRATORY (INHALATION) EVERY 4 HOURS PRN
Qty: 3 INHALER | Refills: 0 | Status: SHIPPED | OUTPATIENT
Start: 2018-08-15 | End: 2018-09-13

## 2018-08-15 NOTE — TELEPHONE ENCOUNTER
See printed prescription.    Advise clinic appointment in the next 2-3 months    Please inform patient    Wili Kaplan MD

## 2018-08-15 NOTE — TELEPHONE ENCOUNTER
Patient was last seen by Dr. Kaplan 7/11/17.  The last Rx for albuterol was addressed by allergy.    ACT Total Scores 5/2/2017 6/6/2017 9/19/2017   ACT TOTAL SCORE - - -   ASTHMA ER VISITS - - -   ASTHMA HOSPITALIZATIONS - - -   ACT TOTAL SCORE (Goal Greater than or Equal to 20) 11 21 21   In the past 12 months, how many times did you visit the emergency room for your asthma without being admitted to the hospital? 0 0 0   In the past 12 months, how many times were you hospitalized overnight because of your asthma? 0 0 0         Routed response to patient advising he needs to be seen.  Due for ACT and office visit.    Also requested information on name of pharmacy we are faxing to.    Africa Malagon RN  [facility]

## 2018-08-15 NOTE — LETTER
Dear Reji,          Your Care Team has attempted to reach you multiple times regarding a recent refill request for your Albuterol. You are overdue for your annual physical and fasting labs. Please contact the clinic to schedule an appointment at [phone].      Sincerely,    Wili GIBBONS

## 2018-08-15 NOTE — TELEPHONE ENCOUNTER
"Routed to Dr. Kaplan to address albuterol Rx refill, will need to be local printed and faxed per patient's Mychart instructions (\"[facility]\").    See patient response, he says he will schedule annual exam.    Routing refill request to provider for review/approval because:  ACT overdue, needs printed/signed Rx faxed.    Africa Malagon RN  [facility]            "

## 2018-08-15 NOTE — TELEPHONE ENCOUNTER
Rx for Albuterol was faxed to [facility] [phone], also left patient a message stating to call back and schedule annual exam  Urszula Melchor

## 2018-09-06 ENCOUNTER — ALLIED HEALTH/NURSE VISIT (OUTPATIENT)
Dept: ALLERGY | Facility: CLINIC | Age: 43
End: 2018-09-06
Payer: COMMERCIAL

## 2018-09-06 DIAGNOSIS — J30.9 ALLERGIC RHINITIS: Primary | ICD-10-CM

## 2018-09-06 PROCEDURE — 99207 ZZC DROP WITH A PROCEDURE: CPT

## 2018-09-06 PROCEDURE — 95117 IMMUNOTHERAPY INJECTIONS: CPT

## 2018-09-06 NOTE — MR AVS SNAPSHOT
After Visit Summary   9/6/2018    Reji Carpenter    MRN: [id number]           Patient Information     Date Of Birth          1975        Visit Information        Provider Department      9/6/2018 9:00 AM [facility]Z [facility]ERGY SHOT[facility]        Today's Diagnoses     Allergic rhinitis    -  1       Follow-ups after your visit        Your next 10 appointments already scheduled     Sep 13, 2018  9:00 AM CDT   [facility] with Wili Kaplan MD   [facility]    [address] [place] [address] [phone]              Who to contact     If you have questions or need follow up information about today's clinic visit or your schedule please contact [facility] directly at [phone].  Normal or non-critical lab and imaging results will be communicated to you by MyChart, letter or phone within 4 business days after the clinic has received the results. If you do not hear from us within 7 days, please contact the clinic through Re[employer] or phone. If you have a critical or abnormal lab result, we will notify you by phone as soon as possible.  Submit refill requests through [employer] or call your pharmacy and they will forward the refill request to us. Please allow 3 business days for your refill to be completed.          Additional Information About Your Visit        MyChart Information     [employer] gives you secure access to your electronic health record. If you see a primary care provider, you can also send messages to your care team and make appointments. If you have questions, please call your primary care clinic.  If you do not have a primary care provider, please call [phone] and they will assist you.        Care EveryWhere ID     This is your Care EveryWhere ID. This could be used by other organizations to access your [facility] medical records  [id number]         Blood  Pressure from Last 3 Encounters:   09/25/17 (!) 165/96   09/19/17 (!) 149/95   07/11/17 145/88    Weight from Last 3 Encounters:   09/25/17 90.2 kg (198 lb 14.4 oz)   09/19/17 89.4 kg (197 lb 3.2 oz)   07/11/17 88.7 kg (195 lb 8 oz)              We Performed the Following     Allergy Shot: Two or more injections        Primary Care Provider Office Phone # Fax #    Wili Kaplan [phone]       [address] [facility] [address]        Equal Access to Services     [facility]: Hadii aad ku hadasho Sodennys, waaxda luqadaha, qaybta kaalmada adeviktoryada, murray flores . So [facility] [phone].    ATENCIÓN: Si habla español, tiene a bolton disposición servicios gratuitos de asistencia lingüística. Llame al [phone].    We comply with applicable federal civil rights laws and [place] laws. We do not discriminate on the basis of race, color, national origin, age, disability, sex, sexual orientation, or gender identity.            Thank you!     Thank you for choosing [facility]  for your care. Our goal is always to provide you with excellent care. Hearing back from our patients is one way we can continue to improve our services. Please take a few minutes to complete the written survey that you may receive in the mail after your visit with us. Thank you!             Your Updated Medication List - Protect others around you: Learn how to safely use, store and throw away your medicines at www.disposemymeds.org.          This list is accurate as of 9/6/18 10:04 AM.  Always use your most recent med list.                   Brand Name Dispense Instructions for use Diagnosis    albuterol 108 (90 Base) MCG/ACT inhaler    PROAIR HFA/PROVENTIL HFA/VENTOLIN HFA    3 Inhaler    Inhale 2 puffs into the lungs every 4 hours as needed for shortness of breath / dyspnea    Intermittent asthma, uncomplicated       beclomethasone 80 MCG/ACT Inhaler    QVAR    3 Inhaler     Inhale 2 puffs into the lungs 2 times daily    Mild persistent asthma without complication       EPINEPHrine 0.3 MG/0.3ML injection 2-pack    AUVI-Q    2 mL    Inject 0.3 mLs (0.3 mg) into the muscle as needed for anaphylaxis    Need for desensitization to allergens       loratadine 10 MG tablet    CLARITIN    90 tablet    Take 1 tablet (10 mg) by mouth daily    Chronic rhinitis       montelukast 10 MG tablet    SINGULAIR    90 tablet    TAKE 1 TABLET (10 MG) BY MOUTH AT BEDTIME    Environmental allergies, Intermittent asthma, uncomplicated       ORDER FOR ALLERGEN IMMUNOTHERAPY 5 mL vial     5 mL    Name of Mix: Mix #1  Cat, Dog Cat Hair, Standardized 10,000 BAU/mL, ALK  2.0 ml Dog Hair Dander, A. P.  1:100 w/v, HS  1.0 ml  Diluent: HSA qs to 5ml    Non-seasonal allergic rhinitis due to animal hair and dander, Seasonal allergic rhinitis due to pollen, Allergic rhinitis due to mold       ORDER FOR ALLERGEN IMMUNOTHERAPY 5 mL vial     5 mL    Name of Mix: Mix #2  Mold Alternaria Tenuis 1:10 w/v, HS  0.5 ml Diluent: HSA qs to 5ml    Non-seasonal allergic rhinitis due to animal hair and dander, Seasonal allergic rhinitis due to pollen, Allergic rhinitis due to mold       ORDER FOR ALLERGEN IMMUNOTHERAPY 5 mL vial     5 mL    Name of Mix: Mix #3  Tree , Weeds Narayan, White  1:20 w/v, HS  0.5 ml Boxelder-Maple  Mix BHR (Boxelder Hard Red) 1:20 w/v, HS  0.5 ml [place], Common 1:20 w/v, HS  0.5 ml Elm, American 1:20 w/v, HS  0.5 ml Lamb's Quarters 1:20 w/v, HS 0.5 ml Plantain, English 1:20 w/v, HS 0.5 ml Ragweed Mixed 1:20 w/v ALK  0.5 ml Sagebrush, Mugwort 1:20 w/v, HS 0.5 ml Diluent: HSA qs to 5ml    Non-seasonal allergic rhinitis due to animal hair and dander, Seasonal allergic rhinitis due to pollen, Allergic rhinitis due to mold

## 2018-09-06 NOTE — PROGRESS NOTES
Patient presented after waiting 30 minutes with no reaction to allergy injections. Discharged from clinic.    Adan Gordon RN....9/6/2018 10:04 AM

## 2018-09-13 ENCOUNTER — OFFICE VISIT (OUTPATIENT)
Dept: FAMILY MEDICINE | Facility: CLINIC | Age: 43
End: 2018-09-13
Payer: COMMERCIAL

## 2018-09-13 VITALS
HEART RATE: 67 BPM | OXYGEN SATURATION: 99 % | BODY MASS INDEX: 26.31 KG/M2 | HEIGHT: 72 IN | DIASTOLIC BLOOD PRESSURE: 78 MMHG | SYSTOLIC BLOOD PRESSURE: 142 MMHG | WEIGHT: 194.25 LBS | TEMPERATURE: 97.6 F

## 2018-09-13 DIAGNOSIS — J45.20 INTERMITTENT ASTHMA, UNCOMPLICATED: ICD-10-CM

## 2018-09-13 DIAGNOSIS — L70.0 ACNE VULGARIS: ICD-10-CM

## 2018-09-13 DIAGNOSIS — I10 HYPERTENSION GOAL BP (BLOOD PRESSURE) < 140/90: ICD-10-CM

## 2018-09-13 DIAGNOSIS — E78.5 HYPERLIPIDEMIA LDL GOAL <100: Chronic | ICD-10-CM

## 2018-09-13 DIAGNOSIS — Z00.00 ROUTINE GENERAL MEDICAL EXAMINATION AT A HEALTH CARE FACILITY: Primary | ICD-10-CM

## 2018-09-13 DIAGNOSIS — R53.83 FATIGUE, UNSPECIFIED TYPE: ICD-10-CM

## 2018-09-13 DIAGNOSIS — J45.30 MILD PERSISTENT ASTHMA WITHOUT COMPLICATION: ICD-10-CM

## 2018-09-13 DIAGNOSIS — Z12.5 SCREENING FOR PROSTATE CANCER: ICD-10-CM

## 2018-09-13 LAB
ALBUMIN SERPL-MCNC: 4.3 G/DL (ref 3.4–5)
ALP SERPL-CCNC: 91 U/L (ref 40–150)
ALT SERPL W P-5'-P-CCNC: 71 U/L (ref 0–70)
ANION GAP SERPL CALCULATED.3IONS-SCNC: 9 MMOL/L (ref 3–14)
AST SERPL W P-5'-P-CCNC: 46 U/L (ref 0–45)
BASOPHILS # BLD AUTO: 0 10E9/L (ref 0–0.2)
BASOPHILS NFR BLD AUTO: 0.4 %
BILIRUB SERPL-MCNC: 0.7 MG/DL (ref 0.2–1.3)
BUN SERPL-MCNC: 11 MG/DL (ref 7–30)
CALCIUM SERPL-MCNC: 9.3 MG/DL (ref 8.5–10.1)
CHLORIDE SERPL-SCNC: 106 MMOL/L (ref 94–109)
CHOLEST SERPL-MCNC: 181 MG/DL
CO2 SERPL-SCNC: 26 MMOL/L (ref 20–32)
CREAT SERPL-MCNC: 0.9 MG/DL (ref 0.66–1.25)
DIFFERENTIAL METHOD BLD: ABNORMAL
EOSINOPHIL # BLD AUTO: 0.5 10E9/L (ref 0–0.7)
EOSINOPHIL NFR BLD AUTO: 6.6 %
ERYTHROCYTE [DISTWIDTH] IN BLOOD BY AUTOMATED COUNT: 11.9 % (ref 10–15)
GFR SERPL CREATININE-BSD FRML MDRD: >90 ML/MIN/1.7M2
GLUCOSE SERPL-MCNC: 83 MG/DL (ref 70–99)
HCT VFR BLD AUTO: 44.6 % (ref 40–53)
HDLC SERPL-MCNC: 47 MG/DL
HGB BLD-MCNC: 15.8 G/DL (ref 13.3–17.7)
LDLC SERPL CALC-MCNC: 111 MG/DL
LYMPHOCYTES # BLD AUTO: 1.5 10E9/L (ref 0.8–5.3)
LYMPHOCYTES NFR BLD AUTO: 20.9 %
MCH RBC QN AUTO: 34.1 PG (ref 26.5–33)
MCHC RBC AUTO-ENTMCNC: 35.4 G/DL (ref 31.5–36.5)
MCV RBC AUTO: 96 FL (ref 78–100)
MONOCYTES # BLD AUTO: 0.8 10E9/L (ref 0–1.3)
MONOCYTES NFR BLD AUTO: 10.8 %
NEUTROPHILS # BLD AUTO: 4.4 10E9/L (ref 1.6–8.3)
NEUTROPHILS NFR BLD AUTO: 61.3 %
NONHDLC SERPL-MCNC: 134 MG/DL
PLATELET # BLD AUTO: 254 10E9/L (ref 150–450)
POTASSIUM SERPL-SCNC: 4.1 MMOL/L (ref 3.4–5.3)
PROT SERPL-MCNC: 7.8 G/DL (ref 6.8–8.8)
PSA SERPL-ACNC: 1.26 UG/L (ref 0–4)
RBC # BLD AUTO: 4.64 10E12/L (ref 4.4–5.9)
SODIUM SERPL-SCNC: 141 MMOL/L (ref 133–144)
TRIGL SERPL-MCNC: 114 MG/DL
TSH SERPL DL<=0.005 MIU/L-ACNC: 0.92 MU/L (ref 0.4–4)
WBC # BLD AUTO: 7.2 10E9/L (ref 4–11)

## 2018-09-13 PROCEDURE — 80061 LIPID PANEL: CPT | Performed by: FAMILY MEDICINE

## 2018-09-13 PROCEDURE — 80053 COMPREHEN METABOLIC PANEL: CPT | Performed by: FAMILY MEDICINE

## 2018-09-13 PROCEDURE — 99396 PREV VISIT EST AGE 40-64: CPT | Performed by: FAMILY MEDICINE

## 2018-09-13 PROCEDURE — G0103 PSA SCREENING: HCPCS | Performed by: FAMILY MEDICINE

## 2018-09-13 PROCEDURE — 84443 ASSAY THYROID STIM HORMONE: CPT | Performed by: FAMILY MEDICINE

## 2018-09-13 PROCEDURE — 85025 COMPLETE CBC W/AUTO DIFF WBC: CPT | Performed by: FAMILY MEDICINE

## 2018-09-13 PROCEDURE — 36415 COLL VENOUS BLD VENIPUNCTURE: CPT | Performed by: FAMILY MEDICINE

## 2018-09-13 RX ORDER — DOXYCYCLINE 100 MG/1
100 CAPSULE ORAL 2 TIMES DAILY
Qty: 60 CAPSULE | Refills: 5 | Status: SHIPPED | OUTPATIENT
Start: 2018-09-13 | End: 2019-02-01

## 2018-09-13 RX ORDER — LISINOPRIL 5 MG/1
5 TABLET ORAL DAILY
Qty: 30 TABLET | Refills: 1 | Status: SHIPPED | OUTPATIENT
Start: 2018-09-13 | End: 2019-02-01

## 2018-09-13 RX ORDER — ALBUTEROL SULFATE 90 UG/1
2 AEROSOL, METERED RESPIRATORY (INHALATION) EVERY 4 HOURS PRN
Qty: 1 INHALER | Refills: 0 | Status: SHIPPED | OUTPATIENT
Start: 2018-09-13 | End: 2018-11-26

## 2018-09-13 ASSESSMENT — ENCOUNTER SYMPTOMS
SORE THROAT: 0
WEAKNESS: 0
HEMATURIA: 0
EYE PAIN: 0
FREQUENCY: 0
CONSTIPATION: 0
NERVOUS/ANXIOUS: 0
FEVER: 0
DYSURIA: 0
SHORTNESS OF BREATH: 0
PARESTHESIAS: 0
HEMATOCHEZIA: 0
COUGH: 0
DIZZINESS: 0
JOINT SWELLING: 0
HEADACHES: 0
CHILLS: 0
DIARRHEA: 0
ABDOMINAL PAIN: 0
HEARTBURN: 0
NAUSEA: 0
PALPITATIONS: 0
ARTHRALGIAS: 0
MYALGIAS: 0

## 2018-09-13 ASSESSMENT — PAIN SCALES - GENERAL: PAINLEVEL: NO PAIN (0)

## 2018-09-13 NOTE — LETTER
My Asthma Action Plan  Name: Reji Carpenter   YOB: 1975  Date: 9/13/2018   My doctor: Wili Kaplan MD   My clinic: [facility]        My Control Medicine: qvar  My Rescue Medicine: Albuterol (Proair/Ventolin/Proventil) inhaler     My Asthma Severity: intermittent  Avoid your asthma triggers: see list               GREEN ZONE   Good Control    I feel good    No cough or wheeze    Can work, sleep and play without asthma symptoms       Take your asthma control medicine every day.     1. If exercise triggers your asthma, take your rescue medication    15 minutes before exercise or sports, and    During exercise if you have asthma symptoms  2. Spacer to use with inhaler: If you have a spacer, make sure to use it with your inhaler             YELLOW ZONE Getting Worse  I have ANY of these:    I do not feel good    Cough or wheeze    Chest feels tight    Wake up at night   1. Keep taking your Green Zone medications  2. Start taking your rescue medicine:    every 20 minutes for up to 1 hour. Then every 4 hours for 24-48 hours.  3. If you stay in the Yellow Zone for more than 12-24 hours, contact your doctor.  4. If you do not return to the Green Zone in 12-24 hours or you get worse, start taking your oral steroid medicine if prescribed by your provider.           RED ZONE Medical Alert - Get Help  I have ANY of these:    I feel awful    Medicine is not helping    Breathing getting harder    Trouble walking or talking    Nose opens wide to breathe       1. Take your rescue medicine NOW  2. If your provider has prescribed an oral steroid medicine, start taking it NOW  3. Call your doctor NOW  4. If you are still in the Red Zone after 20 minutes and you have not reached your doctor:    Take your rescue medicine again and    Call 911 or go to the emergency room right away    See your regular doctor within 2 weeks of an Emergency Room or Urgent Care visit for follow-up treatment.           Annual Reminders:  Meet with Asthma Educator,  Flu Shot in the Fall, consider Pneumonia Vaccination for patients with asthma (aged 19 and older).    Pharmacy:    [facility] - [place], [place] - [address]  [id number] - [place], [place] - [address]  [facility] - [place], [place] - [address]  WRITTEN PRESCRIPTION REQUESTED  [facility] - [place], [place] - [address] [facility] [address] [facility] [facility]                      Asthma Triggers  How To Control Things That Make Your Asthma Worse    Triggers are things that make your asthma worse.  Look at the list below to help you find your triggers and what you can do about them.  You can help prevent asthma flare-ups by staying away from your triggers.      Trigger                                                          What you can do   Cigarette Smoke  Tobacco smoke can make asthma worse. Do not allow smoking in your home, car or around you.  Be sure no one smokes at a child s day care or school.  If you smoke, ask your health care provider for ways to help you quit.  Ask family members to quit too.  Ask your health care provider for a referral to Quit Plan to help you quit smoking, or call 8-400-660-PLAN.     Colds, Flu, Bronchitis  These are common triggers of asthma. Wash your hands often.  Don t touch your eyes, nose or mouth.  Get a flu shot every year.     Dust Mites  These are tiny bugs that live in cloth or carpet. They are too small to see. Wash sheets and blankets in hot water every week.   Encase pillows and mattress in dust mite proof covers.  Avoid having carpet if you can. If you have carpet, vacuum weekly.   Use a dust mask and HEPA vacuum.   Pollen and Outdoor Mold  Some people are allergic to trees, grass, or weed pollen, or molds. Try to keep your windows closed.  Limit time out doors when pollen count is high.   Ask you health care provider about  taking medicine during allergy season.     Animal Dander  Some people are allergic to skin flakes, urine or saliva from pets with fur or feathers. Keep pets with fur or feathers out of your home.    If you can t keep the pet outdoors, then keep the pet out of your bedroom.  Keep the bedroom door closed.  Keep pets off cloth furniture and away from stuffed toys.     Mice, Rats, and Cockroaches  Some people are allergic to the waste from these pests.   Cover food and garbage.  Clean up spills and food crumbs.  Store grease in the refrigerator.   Keep food out of the bedroom.   Indoor Mold  This can be a trigger if your home has high moisture. Fix leaking faucets, pipes, or other sources of water.   Clean moldy surfaces.  Dehumidify basement if it is damp and smelly.   Smoke, Strong Odors, and Sprays  These can reduce air quality. Stay away from strong odors and sprays, such as perfume, powder, hair spray, paints, smoke incense, paint, cleaning products, candles and new carpet.   Exercise or Sports  Some people with asthma have this trigger. Be active!  Ask your doctor about taking medicine before sports or exercise to prevent symptoms.    Warm up for 5-10 minutes before and after sports or exercise.     Other Triggers of Asthma  Cold air:  Cover your nose and mouth with a scarf.  Sometimes laughing or crying can be a trigger.  Some medicines and food can trigger asthma.

## 2018-09-13 NOTE — PROGRESS NOTES
SUBJECTIVE:   CC: Reji Carpenter is an 43 year old male who presents for preventative health visit.     Physical   Annual:     Getting at least 3 servings of Calcium per day:  Yes    Bi-annual eye exam:  NO    Dental care twice a year:  NO    Sleep apnea or symptoms of sleep apnea:  None    Diet:  Regular (no restrictions)    Frequency of exercise:  1 day/week    Duration of exercise:  Greater than 60 minutes    Taking medications regularly:  Yes    Medication side effects:  None    Additional concerns today:  No        None    No concerns/ questions    Back better    Home exercises for the back    Once a week goes to club and does boxing    Allergy shots for a year, helping    Still takes allergy meds     Walking dogs 4 x per day        Today's PHQ-2 Score:   PHQ-2 ( 1999 Pfizer) 9/13/2018   Q1: Little interest or pleasure in doing things 0   Q2: Feeling down, depressed or hopeless 0   PHQ-2 Score 0   Q1: Little interest or pleasure in doing things Not at all   Q2: Feeling down, depressed or hopeless Not at all   PHQ-2 Score 0       Abuse: Current or Past(Physical, Sexual or Emotional)- No  Do you feel safe in your environment - Yes    Social History   Substance Use Topics     Smoking status: Never Smoker     Smokeless tobacco: Never Used     Alcohol use No      Comment: occasional     Alcohol Use 9/13/2018   If you drink alcohol do you typically have greater than 3 drinks per day OR greater than 7 drinks per week? No   No flowsheet data found.    Last PSA:   PSA   Date Value Ref Range Status   06/10/2016 1.31 0 - 4 ug/L Final       Reviewed orders with patient. Reviewed health maintenance and updated orders accordingly - Yes       Reviewed and updated as needed this visit by clinical staff  Tobacco  Allergies  Meds  Med Hx  Surg Hx  Fam Hx  Soc Hx        Reviewed and updated as needed this visit by Provider            Review of Systems   Constitutional: Negative for chills and fever.   HENT: Negative  "for congestion, ear pain, hearing loss and sore throat.    Eyes: Negative for pain and visual disturbance.   Respiratory: Negative for cough and shortness of breath.    Cardiovascular: Negative for chest pain, palpitations and peripheral edema.   Gastrointestinal: Negative for abdominal pain, constipation, diarrhea, heartburn, hematochezia and nausea.   Genitourinary: Negative for discharge, dysuria, frequency, genital sores, hematuria, impotence and urgency.   Musculoskeletal: Negative for arthralgias, joint swelling and myalgias.   Skin: Negative for rash.   Neurological: Negative for dizziness, weakness, headaches and paresthesias.   Psychiatric/Behavioral: Negative for mood changes. The patient is not nervous/anxious.       Grandfather had mi 60s      OBJECTIVE:   /88 (BP Location: Right arm, Patient Position: Chair, Cuff Size: Adult Regular)  Pulse 67  Temp 97.6  F (36.4  C) (Oral)  Ht 6' 0.44\" (1.84 m)  Wt 194 lb 4 oz (88.1 kg)  SpO2 99%  BMI 26.03 kg/m2    Physical Exam  GENERAL: healthy, alert and no distress  HENT: ear canals and TM's normal, nose and mouth without ulcers or lesions  NECK: no adenopathy, no asymmetry, masses, or scars and thyroid normal to palpation  RESP: lungs clear to auscultation - no rales, rhonchi or wheezes  CV: regular rate and rhythm, normal S1 S2, no S3 or S4, no murmur, click or rub, no peripheral edema and peripheral pulses strong  ABDOMEN: soft, nontender, no hepatosplenomegaly, no masses and bowel sounds normal   (male): normal male genitalia without lesions or urethral discharge, no hernia  MS: no gross musculoskeletal defects noted, no edema  SKIN: no suspicious lesions or rashes  NEURO: Normal strength and tone, mentation intact and speech normal  PSYCH: mentation appears normal, affect normal/bright    Diagnostic Test Results:  none     ASSESSMENT/PLAN:   Reji was seen today for physical and health maintenance.    Diagnoses and all orders for this " "visit:    Routine general medical examination at a health care facility    Hypertension goal BP (blood pressure) < 140/90  -     lisinopril (PRINIVIL/ZESTRIL) 5 MG tablet; Take 1 tablet (5 mg) by mouth daily    Intermittent asthma, uncomplicated  -     albuterol (PROAIR HFA/PROVENTIL HFA/VENTOLIN HFA) 108 (90 Base) MCG/ACT inhaler; Inhale 2 puffs into the lungs every 4 hours as needed for shortness of breath / dyspnea    Mild persistent asthma without complication    Screening for prostate cancer  -     Prostate spec antigen screen    Fatigue, unspecified type  -     TSH with free T4 reflex  -     CBC with platelets differential    Hyperlipidemia LDL goal <100  -     Lipid panel reflex to direct LDL Fasting  -     Comprehensive metabolic panel    Acne vulgaris  -     doxycycline (VIBRAMYCIN) 100 MG capsule; Take 1 capsule (100 mg) by mouth 2 times daily    Other orders  -     Cancel: HIV Antigen Antibody Combo    discussed in detail multiple issues with patient  Check blood pressure regularly.  If avg is high, then fill the paper prescription for lisinopril given to patient and see us in 1-2 months  Start doxycycline for acne  Check labs  Keep working on healthy diet/exercise   Discussed cholesterol.  Hold off on med for now.  Allergies well controlled        COUNSELING:   Reviewed preventive health counseling, as reflected in patient instructions       Regular exercise       Healthy diet/nutrition       Vision screening       Prostate cancer screening    BP Readings from Last 1 Encounters:   09/13/18 142/88     Estimated body mass index is 26.03 kg/(m^2) as calculated from the following:    Height as of this encounter: 6' 0.44\" (1.84 m).    Weight as of this encounter: 194 lb 4 oz (88.1 kg).           reports that he has never smoked. He has never used smokeless tobacco.      Counseling Resources:  ATP IV Guidelines  Pooled Cohorts Equation Calculator  FRAX Risk Assessment  ICSI Preventive Guidelines  Dietary " Guidelines for Americans, 2010  USDA's MyPlate  ASA Prophylaxis  Lung CA Screening    Wili Kaplan MD  [facility]  Answers for HPI/ROS submitted by the patient on 9/13/2018   PHQ-2 Score: 0

## 2018-09-13 NOTE — LETTER
[facility]   [address] [place]  [address]  [phone]                                   September 14, 2018    Reji Carpenter  [address] [facility] [address]        Dear Reji,        The cholesterol is much better than last year.    Other labs are fine.      Don't worry about the liver tests ( ALT and AST ) being high.  They are only barely high.  We only worry if they are at least twice the high end of normal.         Results for orders placed or performed in visit on 09/13/18   Lipid panel reflex to direct LDL Fasting   Result Value Ref Range    Cholesterol 181 <200 mg/dL    Triglycerides 114 <150 mg/dL    HDL Cholesterol 47 >39 mg/dL    LDL Cholesterol Calculated 111 (H) <100 mg/dL    Non HDL Cholesterol 134 (H) <130 mg/dL   Comprehensive metabolic panel   Result Value Ref Range    Sodium 141 133 - 144 mmol/L    Potassium 4.1 3.4 - 5.3 mmol/L    Chloride 106 94 - 109 mmol/L    Carbon Dioxide 26 20 - 32 mmol/L    Anion Gap 9 3 - 14 mmol/L    Glucose 83 70 - 99 mg/dL    Urea Nitrogen 11 7 - 30 mg/dL    Creatinine 0.90 0.66 - 1.25 mg/dL    GFR Estimate >90 >60 mL/min/1.7m2    GFR Estimate If Black >90 >60 mL/min/1.7m2    Calcium 9.3 8.5 - 10.1 mg/dL    Bilirubin Total 0.7 0.2 - 1.3 mg/dL    Albumin 4.3 3.4 - 5.0 g/dL    Protein Total 7.8 6.8 - 8.8 g/dL    Alkaline Phosphatase 91 40 - 150 U/L    ALT 71 (H) 0 - 70 U/L    AST 46 (H) 0 - 45 U/L   TSH with free T4 reflex   Result Value Ref Range    TSH 0.92 0.40 - 4.00 mU/L   CBC with platelets differential   Result Value Ref Range    WBC 7.2 4.0 - 11.0 10e9/L    RBC Count 4.64 4.4 - 5.9 10e12/L    Hemoglobin 15.8 13.3 - 17.7 g/dL    Hematocrit 44.6 40.0 - 53.0 %    MCV 96 78 - 100 fl    MCH 34.1 (H) 26.5 - 33.0 pg    MCHC 35.4 31.5 - 36.5 g/dL    RDW 11.9 10.0 - 15.0 %    Platelet Count 254 150 - 450 10e9/L    % Neutrophils 61.3 %    % Lymphocytes 20.9 %    % Monocytes 10.8 %    % Eosinophils 6.6 %    %  Basophils 0.4 %    Absolute Neutrophil 4.4 1.6 - 8.3 10e9/L    Absolute Lymphocytes 1.5 0.8 - 5.3 10e9/L    Absolute Monocytes 0.8 0.0 - 1.3 10e9/L    Absolute Eosinophils 0.5 0.0 - 0.7 10e9/L    Absolute Basophils 0.0 0.0 - 0.2 10e9/L    Diff Method Automated Method    Prostate spec antigen screen   Result Value Ref Range    PSA 1.26 0 - 4 ug/L       If you have any questions please call the clinic at [phone]    Sincerely,    Wili Kaplan MD  [facility]

## 2018-09-13 NOTE — PATIENT INSTRUCTIONS
Keep working on healthy diet/exercise     Back strengthening    If blood pressure averages 140 or higher on top or 90 or higher on bottom, advise starting the low dose lisinopril    See us 1-2 months after starting that    We will send you lab results    Start doxycycline for acne

## 2018-09-13 NOTE — MR AVS SNAPSHOT
After Visit Summary   9/13/2018    Reji Carpenter    MRN: [id number]           Patient Information     Date Of Birth          1975        Visit Information        Provider Department      9/13/2018 9:00 AM Wili Kaplan MD [facility]        Today's Diagnoses     Hypertension goal BP (blood pressure) < 140/90    -  1    Intermittent asthma, uncomplicated        Mild persistent asthma without complication        Screening for prostate cancer        Fatigue, unspecified type        Hyperlipidemia LDL goal <100        Acne vulgaris          Care Instructions    Keep working on healthy diet/exercise     Back strengthening    If blood pressure averages 140 or higher on top or 90 or higher on bottom, advise starting the low dose lisinopril    See us 1-2 months after starting that    We will send you lab results    Start doxycycline for acne           Follow-ups after your visit        Who to contact     If you have questions or need follow up information about today's clinic visit or your schedule please contact [facility] directly at [phone].  Normal or non-critical lab and imaging results will be communicated to you by iVengohart, letter or phone within 4 business days after the clinic has received the results. If you do not hear from us within 7 days, please contact the clinic through iVengohart or phone. If you have a critical or abnormal lab result, we will notify you by phone as soon as possible.  Submit refill requests through Appscio or call your pharmacy and they will forward the refill request to us. Please allow 3 business days for your refill to be completed.          Additional Information About Your Visit        iVengohart Information     Appscio gives you secure access to your electronic health record. If you see a primary care provider, you can also send messages to your care team and make appointments. If you have questions, please call  "your primary care clinic.  If you do not have a primary care provider, please call [phone] and they will assist you.        Care EveryWhere ID     This is your Care EveryWhere ID. This could be used by other organizations to access your [facility] medical records  [id number]        Your Vitals Were     Pulse Temperature Height Pulse Oximetry BMI (Body Mass Index)       67 97.6  F (36.4  C) (Oral) 6' 0.44\" (1.84 m) 99% 26.03 kg/m2        Blood Pressure from Last 3 Encounters:   09/13/18 142/78   09/25/17 (!) 165/96   09/19/17 (!) 149/95    Weight from Last 3 Encounters:   09/13/18 194 lb 4 oz (88.1 kg)   09/25/17 198 lb 14.4 oz (90.2 kg)   09/19/17 197 lb 3.2 oz (89.4 kg)              We Performed the Following     Asthma Action Plan (AAP)     CBC with platelets differential     Comprehensive metabolic panel     Lipid panel reflex to direct LDL Fasting     Prostate spec antigen screen     TSH with free T4 reflex          Today's Medication Changes          These changes are accurate as of 9/13/18  9:53 AM.  If you have any questions, ask your nurse or doctor.               Start taking these medicines.        Dose/Directions    doxycycline 100 MG capsule   Commonly known as:  VIBRAMYCIN   Used for:  Acne vulgaris   Started by:  Wili Kaplan MD        Dose:  100 mg   Take 1 capsule (100 mg) by mouth 2 times daily   Quantity:  60 capsule   Refills:  5       lisinopril 5 MG tablet   Commonly known as:  PRINIVIL/ZESTRIL   Used for:  Hypertension goal BP (blood pressure) < 140/90   Started by:  Wili Kaplan MD        Dose:  5 mg   Take 1 tablet (5 mg) by mouth daily   Quantity:  30 tablet   Refills:  1            Where to get your medicines      These medications were sent to [facility]/pharmacy #[id number] - [place], [place] - [address]  [facility] [address]     Phone:  [phone]     albuterol 108 (90 Base) MCG/ACT inhaler    doxycycline 100 MG capsule         Some of these " will need a paper prescription and others can be bought over the counter.  Ask your nurse if you have questions.     Bring a paper prescription for each of these medications     lisinopril 5 MG tablet                Primary Care Provider Office Phone # Fax #    Wili Kaplan [phone] [address] [facility] [address]        Equal Access to Services     [facility]: Hadii aad ku hadasho Soomaali, waaxda luqadaha, qaybta kaalmada adeegyada, waxay idiin hayaan adeeg kharash lacarmelina . So [facility] [phone].    ATENCIÓN: Si habla español, tiene a bolton disposición servicios gratuitos de asistencia lingüística. Llame al [phone].    We comply with applicable federal civil rights laws and [place] laws. We do not discriminate on the basis of race, color, national origin, age, disability, sex, sexual orientation, or gender identity.            Thank you!     Thank you for choosing [facility]  for your care. Our goal is always to provide you with excellent care. Hearing back from our patients is one way we can continue to improve our services. Please take a few minutes to complete the written survey that you may receive in the mail after your visit with us. Thank you!             Your Updated Medication List - Protect others around you: Learn how to safely use, store and throw away your medicines at www.disposemymeds.org.          This list is accurate as of 9/13/18  9:53 AM.  Always use your most recent med list.                   Brand Name Dispense Instructions for use Diagnosis    albuterol 108 (90 Base) MCG/ACT inhaler    PROAIR HFA/PROVENTIL HFA/VENTOLIN HFA    1 Inhaler    Inhale 2 puffs into the lungs every 4 hours as needed for shortness of breath / dyspnea    Intermittent asthma, uncomplicated       beclomethasone 80 MCG/ACT Inhaler    QVAR    3 Inhaler    Inhale 2 puffs into the lungs 2 times daily    Mild persistent asthma without complication        doxycycline 100 MG capsule    VIBRAMYCIN    60 capsule    Take 1 capsule (100 mg) by mouth 2 times daily    Acne vulgaris       EPINEPHrine 0.3 MG/0.3ML injection 2-pack    AUVI-Q    2 mL    Inject 0.3 mLs (0.3 mg) into the muscle as needed for anaphylaxis    Need for desensitization to allergens       lisinopril 5 MG tablet    PRINIVIL/ZESTRIL    30 tablet    Take 1 tablet (5 mg) by mouth daily    Hypertension goal BP (blood pressure) < 140/90       loratadine 10 MG tablet    CLARITIN    90 tablet    Take 1 tablet (10 mg) by mouth daily    Chronic rhinitis       montelukast 10 MG tablet    SINGULAIR    90 tablet    TAKE 1 TABLET (10 MG) BY MOUTH AT BEDTIME    Environmental allergies, Intermittent asthma, uncomplicated       ORDER FOR ALLERGEN IMMUNOTHERAPY 5 mL vial     5 mL    Name of Mix: Mix #1  Cat, Dog Cat Hair, Standardized 10,000 BAU/mL, ALK  2.0 ml Dog Hair Dander, A. P.  1:100 w/v, HS  1.0 ml  Diluent: HSA qs to 5ml    Non-seasonal allergic rhinitis due to animal hair and dander, Seasonal allergic rhinitis due to pollen, Allergic rhinitis due to mold       ORDER FOR ALLERGEN IMMUNOTHERAPY 5 mL vial     5 mL    Name of Mix: Mix #2  Mold Alternaria Tenuis 1:10 w/v, HS  0.5 ml Diluent: HSA qs to 5ml    Non-seasonal allergic rhinitis due to animal hair and dander, Seasonal allergic rhinitis due to pollen, Allergic rhinitis due to mold       ORDER FOR ALLERGEN IMMUNOTHERAPY 5 mL vial     5 mL    Name of Mix: Mix #3  Tree , Weeds Narayan, White  1:20 w/v, HS  0.5 ml Boxelder-Maple  Mix BHR (Boxelder Hard Red) 1:20 w/v, HS  0.5 ml [place], Common 1:20 w/v, HS  0.5 ml Elm, American 1:20 w/v, HS  0.5 ml Lamb's Quarters 1:20 w/v, HS 0.5 ml Plantain, English 1:20 w/v, HS 0.5 ml Ragweed Mixed 1:20 w/v ALK  0.5 ml Sagebrush, Mugwort 1:20 w/v, HS 0.5 ml Diluent: HSA qs to 5ml    Non-seasonal allergic rhinitis due to animal hair and dander, Seasonal allergic rhinitis due to pollen, Allergic rhinitis due to mold

## 2018-09-14 ASSESSMENT — ASTHMA QUESTIONNAIRES: ACT_TOTALSCORE: 22

## 2018-09-14 NOTE — PROGRESS NOTES
The cholesterol is much better than last year.    Other labs are fine.      Don't worry about the liver tests ( ALT and AST ) being high.  They are only barely high.  We only worry if they are at least twice the high end of normal.    Wili Kaplan MD

## 2018-09-17 PROBLEM — I10 HYPERTENSION GOAL BP (BLOOD PRESSURE) < 140/90: Status: ACTIVE | Noted: 2018-09-17

## 2018-09-25 ENCOUNTER — MYC MEDICAL ADVICE (OUTPATIENT)
Dept: ALLERGY | Facility: CLINIC | Age: 43
End: 2018-09-25

## 2018-10-04 ENCOUNTER — ALLIED HEALTH/NURSE VISIT (OUTPATIENT)
Dept: ALLERGY | Facility: CLINIC | Age: 43
End: 2018-10-04
Payer: COMMERCIAL

## 2018-10-04 DIAGNOSIS — J30.9 ALLERGIC RHINITIS: Primary | ICD-10-CM

## 2018-10-04 PROCEDURE — 95117 IMMUNOTHERAPY INJECTIONS: CPT

## 2018-10-04 PROCEDURE — 99207 ZZC DROP WITH A PROCEDURE: CPT

## 2018-10-04 NOTE — MR AVS SNAPSHOT
After Visit Summary   10/4/2018    Reji Carpenter    MRN: [id number]           Patient Information     Date Of Birth          1975        Visit Information        Provider Department      10/4/2018 9:00 AM FZ ALLERGY SHOT[facility]        Today's Diagnoses     Allergic rhinitis    -  1       Follow-ups after your visit        Who to contact     If you have questions or need follow up information about today's clinic visit or your schedule please contact [facility] directly at [phone].  Normal or non-critical lab and imaging results will be communicated to you by CalStar Productshart, letter or phone within 4 business days after the clinic has received the results. If you do not hear from us within 7 days, please contact the clinic through SquaredOutt or phone. If you have a critical or abnormal lab result, we will notify you by phone as soon as possible.  Submit refill requests through AppGate Network Security or call your pharmacy and they will forward the refill request to us. Please allow 3 business days for your refill to be completed.          Additional Information About Your Visit        MyChart Information     App[employer] gives you secure access to your electronic health record. If you see a primary care provider, you can also send messages to your care team and make appointments. If you have questions, please call your primary care clinic.  If you do not have a primary care provider, please call [phone] and they will assist you.        Care EveryWhere ID     This is your Care EveryWhere ID. This could be used by other organizations to access your [facility] medical records  [id number]         Blood Pressure from Last 3 Encounters:   09/13/18 142/78   09/25/17 (!) 165/96   09/19/17 (!) 149/95    Weight from Last 3 Encounters:   09/13/18 88.1 kg (194 lb 4 oz)   09/25/17 90.2 kg (198 lb 14.4 oz)   09/19/17 89.4 kg (197 lb 3.2 oz)              We Performed the Following     Allergy  Shot: Two or more injections        Primary Care Provider Office Phone # Fax #    Wili Kaplan [phone]       [address] [facility] [address]        Equal Access to Services     DENITA JAVIER : Lois mi haio Sodennys, waaxda luqadaha, qaybta kaalmada jes, murray figueroa laMichaelemily may. So [facility] [phone].    ATENCIÓN: Si habla español, tiene a bolton disposición servicios gratuitos de asistencia lingüística. Llame al [phone].    We comply with applicable federal civil rights laws and [place] laws. We do not discriminate on the basis of race, color, national origin, age, disability, sex, sexual orientation, or gender identity.            Thank you!     Thank you for choosing [facility]  for your care. Our goal is always to provide you with excellent care. Hearing back from our patients is one way we can continue to improve our services. Please take a few minutes to complete the written survey that you may receive in the mail after your visit with us. Thank you!             Your Updated Medication List - Protect others around you: Learn how to safely use, store and throw away your medicines at www.disposemymeds.org.          This list is accurate as of 10/4/18  9:48 AM.  Always use your most recent med list.                   Brand Name Dispense Instructions for use Diagnosis    albuterol 108 (90 Base) MCG/ACT inhaler    PROAIR HFA/PROVENTIL HFA/VENTOLIN HFA    1 Inhaler    Inhale 2 puffs into the lungs every 4 hours as needed for shortness of breath / dyspnea    Intermittent asthma, uncomplicated       beclomethasone 80 MCG/ACT Inhaler    QVAR    3 Inhaler    Inhale 2 puffs into the lungs 2 times daily    Mild persistent asthma without complication       doxycycline 100 MG capsule    VIBRAMYCIN    60 capsule    Take 1 capsule (100 mg) by mouth 2 times daily    Acne vulgaris       EPINEPHrine 0.3 MG/0.3ML injection 2-pack    AUVI-Q    2  mL    Inject 0.3 mLs (0.3 mg) into the muscle as needed for anaphylaxis    Need for desensitization to allergens       lisinopril 5 MG tablet    PRINIVIL/ZESTRIL    30 tablet    Take 1 tablet (5 mg) by mouth daily    Hypertension goal BP (blood pressure) < 140/90       loratadine 10 MG tablet    CLARITIN    90 tablet    Take 1 tablet (10 mg) by mouth daily    Chronic rhinitis       montelukast 10 MG tablet    SINGULAIR    90 tablet    TAKE 1 TABLET (10 MG) BY MOUTH AT BEDTIME    Environmental allergies, Intermittent asthma, uncomplicated       ORDER FOR ALLERGEN IMMUNOTHERAPY 5 mL vial     5 mL    Name of Mix: Mix #1  Cat, Dog Cat Hair, Standardized 10,000 BAU/mL, ALK  2.0 ml Dog Hair Dander, A. P.  1:100 w/v, HS  1.0 ml  Diluent: HSA qs to 5ml    Non-seasonal allergic rhinitis due to animal hair and dander, Seasonal allergic rhinitis due to pollen, Allergic rhinitis due to mold       ORDER FOR ALLERGEN IMMUNOTHERAPY 5 mL vial     5 mL    Name of Mix: Mix #2  Mold Alternaria Tenuis 1:10 w/v, HS  0.5 ml Diluent: HSA qs to 5ml    Non-seasonal allergic rhinitis due to animal hair and dander, Seasonal allergic rhinitis due to pollen, Allergic rhinitis due to mold       ORDER FOR ALLERGEN IMMUNOTHERAPY 5 mL vial     5 mL    Name of Mix: Mix #3  Tree , Weeds Narayan, White  1:20 w/v, HS  0.5 ml Boxelder-Maple  Mix BHR (Boxelder Hard Red) 1:20 w/v, HS  0.5 ml [place], Common 1:20 w/v, HS  0.5 ml Elm, American 1:20 w/v, HS  0.5 ml Lamb's Quarters 1:20 w/v, HS 0.5 ml Plantain, English 1:20 w/v, HS 0.5 ml Ragweed Mixed 1:20 w/v ALK  0.5 ml Sagebrush, Mugwort 1:20 w/v, HS 0.5 ml Diluent: HSA qs to 5ml    Non-seasonal allergic rhinitis due to animal hair and dander, Seasonal allergic rhinitis due to pollen, Allergic rhinitis due to mold

## 2018-10-04 NOTE — PROGRESS NOTES
Patient presented after waiting 30 minutes with no reaction to allergy injections. Discharged from clinic.    Adan Gordon RN....10/4/2018 9:47 AM

## 2018-11-01 ENCOUNTER — ALLIED HEALTH/NURSE VISIT (OUTPATIENT)
Dept: ALLERGY | Facility: CLINIC | Age: 43
End: 2018-11-01
Payer: COMMERCIAL

## 2018-11-01 ENCOUNTER — MYC MEDICAL ADVICE (OUTPATIENT)
Dept: ALLERGY | Facility: CLINIC | Age: 43
End: 2018-11-01

## 2018-11-01 DIAGNOSIS — J30.9 ALLERGIC RHINITIS: Primary | ICD-10-CM

## 2018-11-01 PROCEDURE — 95117 IMMUNOTHERAPY INJECTIONS: CPT

## 2018-11-01 NOTE — PROGRESS NOTES
Patient presented after waiting 30 minutes with no reaction to allergy injections. Discharged from clinic.    Adan Gordon RN....11/1/2018 10:02 AM

## 2018-11-01 NOTE — MR AVS SNAPSHOT
After Visit Summary   11/1/2018    Reji Carpenter    MRN: [id number]           Patient Information     Date Of Birth          1975        Visit Information        Provider Department      11/1/2018 9:00 AM FZ ALLERGY SHOTS [facility] [place]        Today's Diagnoses     Allergic rhinitis    -  1       Follow-ups after your visit        Who to contact     If you have questions or need follow up information about today's clinic visit or your schedule please contact [facility] directly at [phone].  Normal or non-critical lab and imaging results will be communicated to you by Thismomenthart, letter or phone within 4 business days after the clinic has received the results. If you do not hear from us within 7 days, please contact the clinic through [employer] or phone. If you have a critical or abnormal lab result, we will notify you by phone as soon as possible.  Submit refill requests through [address] or call your pharmacy and they will forward the refill request to us. Please allow 3 business days for your refill to be completed.          Additional Information About Your Visit        MyChart Information     [address] gives you secure access to your electronic health record. If you see a primary care provider, you can also send messages to your care team and make appointments. If you have questions, please call your primary care clinic.  If you do not have a primary care provider, please call [phone] and they will assist you.        Care EveryWhere ID     This is your Care EveryWhere ID. This could be used by other organizations to access your [place] medical records  [id number]         Blood Pressure from Last 3 Encounters:   09/13/18 142/78   09/25/17 (!) 165/96   09/19/17 (!) 149/95    Weight from Last 3 Encounters:   09/13/18 88.1 kg (194 lb 4 oz)   09/25/17 90.2 kg (198 lb 14.4 oz)   09/19/17 89.4 kg (197 lb 3.2 oz)              We Performed the Following     Allergy  Shot: Two or more injections        Primary Care Provider Office Phone # Fax #    Wili Kaplan [phone]       [address] [facility] [address]        Equal Access to Services     DENITA JAVIER : Lois mi haio Sodennys, waaxda luqadaha, qaybta kaalmada adechito, murray figueroa laMichaelemily may. So [facility] [phone].    ATENCIÓN: Si habla español, tiene a bolton disposición servicios gratuitos de asistencia lingüística. Llame al [phone].    We comply with applicable federal civil rights laws and [place] laws. We do not discriminate on the basis of race, color, national origin, age, disability, sex, sexual orientation, or gender identity.            Thank you!     Thank you for choosing [facility]  for your care. Our goal is always to provide you with excellent care. Hearing back from our patients is one way we can continue to improve our services. Please take a few minutes to complete the written survey that you may receive in the mail after your visit with us. Thank you!             Your Updated Medication List - Protect others around you: Learn how to safely use, store and throw away your medicines at www.disposemymeds.org.          This list is accurate as of 11/1/18 10:03 AM.  Always use your most recent med list.                   Brand Name Dispense Instructions for use Diagnosis    albuterol 108 (90 Base) MCG/ACT inhaler    PROAIR HFA/PROVENTIL HFA/VENTOLIN HFA    1 Inhaler    Inhale 2 puffs into the lungs every 4 hours as needed for shortness of breath / dyspnea    Intermittent asthma, uncomplicated       beclomethasone 80 MCG/ACT Aers IS A DISCONTINUED MEDICATION    QVAR    3 Inhaler    Inhale 2 puffs into the lungs 2 times daily    Mild persistent asthma without complication       doxycycline 100 MG capsule    VIBRAMYCIN    60 capsule    Take 1 capsule (100 mg) by mouth 2 times daily    Acne vulgaris       EPINEPHrine 0.3 MG/0.3ML  injection 2-pack    AUVI-Q    2 mL    Inject 0.3 mLs (0.3 mg) into the muscle as needed for anaphylaxis    Need for desensitization to allergens       lisinopril 5 MG tablet    PRINIVIL/ZESTRIL    30 tablet    Take 1 tablet (5 mg) by mouth daily    Hypertension goal BP (blood pressure) < 140/90       loratadine 10 MG tablet    CLARITIN    90 tablet    Take 1 tablet (10 mg) by mouth daily    Chronic rhinitis       montelukast 10 MG tablet    SINGULAIR    90 tablet    TAKE 1 TABLET (10 MG) BY MOUTH AT BEDTIME    Environmental allergies, Intermittent asthma, uncomplicated       ORDER FOR ALLERGEN IMMUNOTHERAPY 5 mL vial     5 mL    Name of Mix: Mix #1  Cat, Dog Cat Hair, Standardized 10,000 BAU/mL, ALK  2.0 ml Dog Hair Dander, A. P.  1:100 w/v, HS  1.0 ml  Diluent: HSA qs to 5ml    Non-seasonal allergic rhinitis due to animal hair and dander, Seasonal allergic rhinitis due to pollen, Allergic rhinitis due to mold       ORDER FOR ALLERGEN IMMUNOTHERAPY 5 mL vial     5 mL    Name of Mix: Mix #2  Mold Alternaria Tenuis 1:10 w/v, HS  0.5 ml Diluent: HSA qs to 5ml    Non-seasonal allergic rhinitis due to animal hair and dander, Seasonal allergic rhinitis due to pollen, Allergic rhinitis due to mold       ORDER FOR ALLERGEN IMMUNOTHERAPY 5 mL vial     5 mL    Name of Mix: Mix #3  Tree , Weeds Narayan, White  1:20 w/v, HS  0.5 ml Boxelder-Maple  Mix BHR (Boxelder Hard Red) 1:20 w/v, HS  0.5 ml Collier, Common 1:20 w/v, HS  0.5 ml Elm, American 1:20 w/v, HS  0.5 ml Lamb's Quarters 1:20 w/v, HS 0.5 ml Plantain, English 1:20 w/v, HS 0.5 ml Ragweed Mixed 1:20 w/v ALK  0.5 ml Sagebrush, Mugwort 1:20 w/v, HS 0.5 ml Diluent: HSA qs to 5ml    Non-seasonal allergic rhinitis due to animal hair and dander, Seasonal allergic rhinitis due to pollen, Allergic rhinitis due to mold

## 2018-11-08 DIAGNOSIS — J45.20 INTERMITTENT ASTHMA, UNCOMPLICATED: ICD-10-CM

## 2018-11-08 DIAGNOSIS — Z91.09 ENVIRONMENTAL ALLERGIES: ICD-10-CM

## 2018-11-08 NOTE — TELEPHONE ENCOUNTER
"Requested Prescriptions   Pending Prescriptions Disp Refills     montelukast (SINGULAIR) 10 MG tablet [Pharmacy Med Name: MONTELUKAST SOD 10 MG TABLET] 90 tablet 0    Last Written Prescription Date:  7-30-18  Last Fill Quantity: 90,  # refills: 0   Last office visit: 9/13/2018 with prescribing provider:     Future Office Visit:   Next 5 appointments (look out 90 days)     Nov 29, 2018  9:00 AM CST   Nurse Only with FZ ALLERGY SHOTS   [place] [id number] [facility] [address]   [phone]            Dec 27, 2018  9:00 AM CST   Nurse Only with FZ ALLERGY SHOTS   [facility]    [address] [facility] [address] [facility] [address]   [phone]                  Sig: TAKE 1 TABLET (10 MG) BY MOUTH AT BEDTIME    Leukotriene Inhibitors Protocol Passed    11/8/2018 10:20 AM       Passed - Patient is age 12 or older    If patient is under 16, ok to refill using age based dosing.          Passed - Asthma control assessment score within normal limits in last 6 months    Please review ACT score.          Passed - Recent (6 mo) or future (30 days) visit within the authorizing provider's specialty    Patient had office visit in the last 6 months or has a visit in the next 30 days with authorizing provider or within the authorizing provider's specialty.  See \"Patient Info\" tab in inbasket, or \"Choose Columns\" in Meds & Orders section of the refill encounter.              "

## 2018-11-09 RX ORDER — MONTELUKAST SODIUM 10 MG/1
TABLET ORAL
Qty: 90 TABLET | Refills: 3 | Status: SHIPPED | OUTPATIENT
Start: 2018-11-09 | End: 2019-02-07

## 2018-11-09 NOTE — TELEPHONE ENCOUNTER
Prescription approved per [facility] Refill Protocol.  Dora Marr RN

## 2018-11-26 ENCOUNTER — MYC MEDICAL ADVICE (OUTPATIENT)
Dept: FAMILY MEDICINE | Facility: CLINIC | Age: 43
End: 2018-11-26

## 2018-11-26 DIAGNOSIS — J45.20 INTERMITTENT ASTHMA, UNCOMPLICATED: ICD-10-CM

## 2018-11-28 NOTE — TELEPHONE ENCOUNTER
To PCP:  Can you please print and sign this prescription?  Patient is using an online pharmacy not in our database.  Thank you.  Dora Marr RN

## 2018-11-28 NOTE — TELEPHONE ENCOUNTER
Patient called to check on the status of his request. He just contacted the pharmacy and they still have not received the prescription. Please call back or send [employer] message    Thank you  Dolly Woodall  Patient Representative

## 2018-11-29 ENCOUNTER — ALLIED HEALTH/NURSE VISIT (OUTPATIENT)
Dept: ALLERGY | Facility: CLINIC | Age: 43
End: 2018-11-29
Payer: COMMERCIAL

## 2018-11-29 DIAGNOSIS — J30.1 SEASONAL ALLERGIC RHINITIS DUE TO POLLEN: ICD-10-CM

## 2018-11-29 DIAGNOSIS — J30.89 ALLERGIC RHINITIS DUE TO MOLD: ICD-10-CM

## 2018-11-29 DIAGNOSIS — J30.81 NON-SEASONAL ALLERGIC RHINITIS DUE TO ANIMAL HAIR AND DANDER: ICD-10-CM

## 2018-11-29 DIAGNOSIS — J30.9 ALLERGIC RHINITIS: Primary | ICD-10-CM

## 2018-11-29 PROCEDURE — 99207 ZZC DROP WITH A PROCEDURE: CPT

## 2018-11-29 PROCEDURE — 95117 IMMUNOTHERAPY INJECTIONS: CPT

## 2018-11-29 RX ORDER — ALBUTEROL SULFATE 90 UG/1
2 AEROSOL, METERED RESPIRATORY (INHALATION) EVERY 4 HOURS PRN
Qty: 1 INHALER | Refills: 11 | Status: SHIPPED | OUTPATIENT
Start: 2018-11-29 | End: 2019-09-09

## 2018-11-29 NOTE — TELEPHONE ENCOUNTER
ALLERGY SOLUTION RE-ORDER REQUEST    Reji Carpenter 1975 MRN: [id number]    DATE NEEDED:  12/20/18  Vial Color Content   Top Dose       Last Dose     Vial Size  Red 1:1 Molds   Red 1:1 0.5                                                   Red 1:10.5                                                 5 mL  Red 1:1 Trees, Weeds   Red 1:1 0.5                                                   Red 1:10.5                                                 5 mL  Red 1:1 Cat, Dog   Red 1:1 0.5                                                   Red 1:10.5                                                 5 mL        Serum reorder consent signed and patient/parent was advised that new serums would be ordered through the pharmacy and billed to their insurance company when they arrive in clinic. Yes    Shot Clinic Location:  [place]  Ship to Location: [place]  Serum billed to:  [place]    Special Instructions:  none      Updated Prescription Needed: No      Requester Signature  Adan Gordon RN....11/29/2018 9:25 AM

## 2018-11-29 NOTE — PROGRESS NOTES
Patient presented after waiting 30 minutes with no reaction to allergy injections. Discharged from clinic.    Adan Gordon RN....11/29/2018 9:43 AM

## 2018-11-29 NOTE — MR AVS SNAPSHOT
After Visit Summary   11/29/2018    Reji Carpenter    MRN: [id number]           Patient Information     Date Of Birth          1975        Visit Information        Provider Department      11/29/2018 9:00 AM FZ ALLERGY SHOTS [facility]        Today's Diagnoses     Allergic rhinitis    -  1       Follow-ups after your visit        Your next 10 appointments already scheduled     Dec 27, 2018  9:00 AM CST   Nurse Only with FZ ALLERGY SHOTS   [facility] [place] ([facility])    [address]  [place] [address] [phone]              Who to contact     If you have questions or need follow up information about today's clinic visit or your schedule please contact [facility] directly at [phone].  Normal or non-critical lab and imaging results will be communicated to you by MyChart, letter or phone within 4 business days after the clinic has received the results. If you do not hear from us within 7 days, please contact the clinic through MyChart or phone. If you have a critical or abnormal lab result, we will notify you by phone as soon as possible.  Submit refill requests through MePlease or call your pharmacy and they will forward the refill request to us. Please allow 3 business days for your refill to be completed.          Additional Information About Your Visit        MyChart Information     MePlease gives you secure access to your electronic health record. If you see a primary care provider, you can also send messages to your care team and make appointments. If you have questions, please call your primary care clinic.  If you do not have a primary care provider, please call [phone] and they will assist you.        Care EveryWhere ID     This is your Care EveryWhere ID. This could be used by other organizations to access your [place] medical records  [id number]         Blood Pressure from Last 3 Encounters:   09/13/18  142/78   09/25/17 (!) 165/96   09/19/17 (!) 149/95    Weight from Last 3 Encounters:   09/13/18 88.1 kg (194 lb 4 oz)   09/25/17 90.2 kg (198 lb 14.4 oz)   09/19/17 89.4 kg (197 lb 3.2 oz)              We Performed the Following     Allergy Shot: Two or more injections        Primary Care Provider Office Phone # Fax #    Wili Kaplan [phone]       [address] [facility] [address]        Equal Access to Services     [facility]: Hadii aad ku hadasho Soomaali, waaxda luqadaha, qaybta kaalmada adeegyada, murray zaragoza adeviktor folres . So [facility] [phone].    ATENCIÓN: Si habla español, tiene a bolton disposición servicios gratuitos de asistencia lingüística. [facility] [phone].    We comply with applicable federal civil rights laws and [place] laws. We do not discriminate on the basis of race, color, national origin, age, disability, sex, sexual orientation, or gender identity.            Thank you!     Thank you for choosing [facility]  for your care. Our goal is always to provide you with excellent care. Hearing back from our patients is one way we can continue to improve our services. Please take a few minutes to complete the written survey that you may receive in the mail after your visit with us. Thank you!             Your Updated Medication List - Protect others around you: Learn how to safely use, store and throw away your medicines at www.disposemymeds.org.          This list is accurate as of 11/29/18  9:59 AM.  Always use your most recent med list.                   Brand Name Dispense Instructions for use Diagnosis    albuterol 108 (90 Base) MCG/ACT inhaler    PROAIR HFA/PROVENTIL HFA/VENTOLIN HFA    1 Inhaler    Inhale 2 puffs into the lungs every 4 hours as needed for shortness of breath / dyspnea    Intermittent asthma, uncomplicated       beclomethasone 80 MCG/ACT Aers IS A DISCONTINUED MEDICATION    QVAR    3 Inhaler    Inhale 2 puffs  into the lungs 2 times daily    Mild persistent asthma without complication       doxycycline hyclate 100 MG capsule    VIBRAMYCIN    60 capsule    Take 1 capsule (100 mg) by mouth 2 times daily    Acne vulgaris       EPINEPHrine 0.3 MG/0.3ML injection 2-pack    AUVI-Q    2 mL    Inject 0.3 mLs (0.3 mg) into the muscle as needed for anaphylaxis    Need for desensitization to allergens       lisinopril 5 MG tablet    PRINIVIL/ZESTRIL    30 tablet    Take 1 tablet (5 mg) by mouth daily    Hypertension goal BP (blood pressure) < 140/90       loratadine 10 MG tablet    CLARITIN    90 tablet    Take 1 tablet (10 mg) by mouth daily    Chronic rhinitis       montelukast 10 MG tablet    SINGULAIR    90 tablet    TAKE 1 TABLET (10 MG) BY MOUTH AT BEDTIME    Environmental allergies, Intermittent asthma, uncomplicated       ORDER FOR ALLERGEN IMMUNOTHERAPY 5 mL vial     5 mL    Name of Mix: Mix #1  Cat, Dog Cat Hair, Standardized 10,000 BAU/mL, ALK  2.0 ml Dog Hair Dander, A. P.  1:100 w/v, HS  1.0 ml  Diluent: HSA qs to 5ml    Non-seasonal allergic rhinitis due to animal hair and dander, Seasonal allergic rhinitis due to pollen, Allergic rhinitis due to mold       ORDER FOR ALLERGEN IMMUNOTHERAPY 5 mL vial     5 mL    Name of Mix: Mix #2  Mold Alternaria Tenuis 1:10 w/v, HS  0.5 ml Diluent: HSA qs to 5ml    Non-seasonal allergic rhinitis due to animal hair and dander, Seasonal allergic rhinitis due to pollen, Allergic rhinitis due to mold       ORDER FOR ALLERGEN IMMUNOTHERAPY 5 mL vial     5 mL    Name of Mix: Mix #3  Tree , Weeds Narayan, White  1:20 w/v, HS  0.5 ml Boxelder-Maple  Mix BHR (Boxelder Hard Red) 1:20 w/v, HS  0.5 ml Lowgap, Common 1:20 w/v, HS  0.5 ml Elm, American 1:20 w/v, HS  0.5 ml Lamb's Quarters 1:20 w/v, HS 0.5 ml Plantain, English 1:20 w/v, HS 0.5 ml Ragweed Mixed 1:20 w/v ALK  0.5 ml Sagebrush, Mugwort 1:20 w/v, HS 0.5 ml Diluent: HSA qs to 5ml    Non-seasonal allergic rhinitis due to animal hair and  dander, Seasonal allergic rhinitis due to pollen, Allergic rhinitis due to mold

## 2018-12-03 NOTE — TELEPHONE ENCOUNTER
Routing to provider - please send new prescription for serum to compounding pharmacy.  Thank you.    Shana Edouard RN

## 2018-12-17 DIAGNOSIS — Z51.6 NEED FOR DESENSITIZATION TO ALLERGENS: Primary | ICD-10-CM

## 2018-12-17 PROCEDURE — 95165 ANTIGEN THERAPY SERVICES: CPT | Performed by: ALLERGY & IMMUNOLOGY

## 2018-12-17 NOTE — TELEPHONE ENCOUNTER
Allergy serums received at [place].     Vials received below:    Vial Color Content                      Vial Size Expiration Date  Red 1:1 Cat, Dog 5mL  12/10/2019  Red 1:1 Trees, Weeds 5mL  12/10/2019  Red 1:1 Molds 5mL  12/10/2019        Signature  Violet Mckeon CMA  12/17/2018  4:40 PM

## 2018-12-27 ENCOUNTER — ALLIED HEALTH/NURSE VISIT (OUTPATIENT)
Dept: ALLERGY | Facility: CLINIC | Age: 43
End: 2018-12-27
Payer: COMMERCIAL

## 2018-12-27 DIAGNOSIS — J30.9 ALLERGIC RHINITIS: Primary | ICD-10-CM

## 2018-12-27 PROCEDURE — 95117 IMMUNOTHERAPY INJECTIONS: CPT

## 2018-12-27 PROCEDURE — 99207 ZZC DROP WITH A PROCEDURE: CPT

## 2019-01-03 ENCOUNTER — ALLIED HEALTH/NURSE VISIT (OUTPATIENT)
Dept: ALLERGY | Facility: CLINIC | Age: 44
End: 2019-01-03
Payer: COMMERCIAL

## 2019-01-03 DIAGNOSIS — J30.9 ALLERGIC RHINITIS: Primary | ICD-10-CM

## 2019-01-03 PROCEDURE — 99207 ZZC DROP WITH A PROCEDURE: CPT

## 2019-01-03 PROCEDURE — 95117 IMMUNOTHERAPY INJECTIONS: CPT

## 2019-01-03 NOTE — PROGRESS NOTES
Patient presented after waiting 30 minutes with no reaction to allergy injections. Discharged from clinic.    Adan Gordon RN....1/3/2019 9:44 AM

## 2019-01-10 ENCOUNTER — ALLIED HEALTH/NURSE VISIT (OUTPATIENT)
Dept: ALLERGY | Facility: CLINIC | Age: 44
End: 2019-01-10
Payer: COMMERCIAL

## 2019-01-10 DIAGNOSIS — J30.9 ALLERGIC RHINITIS: Primary | ICD-10-CM

## 2019-01-10 PROCEDURE — 99207 ZZC DROP WITH A PROCEDURE: CPT

## 2019-01-10 PROCEDURE — 95117 IMMUNOTHERAPY INJECTIONS: CPT

## 2019-01-10 NOTE — PROGRESS NOTES
Patient presented after waiting 30 minutes with no reaction to allergy injections. Discharged from clinic.    Adan Gordon RN....1/10/2019 9:44 AM

## 2019-02-01 DIAGNOSIS — L70.0 ACNE VULGARIS: ICD-10-CM

## 2019-02-01 DIAGNOSIS — I10 HYPERTENSION GOAL BP (BLOOD PRESSURE) < 140/90: ICD-10-CM

## 2019-02-01 RX ORDER — LISINOPRIL 5 MG/1
5 TABLET ORAL DAILY
Qty: 30 TABLET | Refills: 1 | Status: SHIPPED | OUTPATIENT
Start: 2019-02-01 | End: 2019-02-13

## 2019-02-01 RX ORDER — DOXYCYCLINE 100 MG/1
100 CAPSULE ORAL 2 TIMES DAILY
Qty: 60 CAPSULE | Refills: 5 | Status: SHIPPED | OUTPATIENT
Start: 2019-02-01 | End: 2020-05-13

## 2019-02-01 NOTE — TELEPHONE ENCOUNTER
Routing refill request to provider for review/approval because:  Drug not on the [facility] refill protocol   Geno Olvera RN CPC Triage.

## 2019-02-01 NOTE — TELEPHONE ENCOUNTER
Requested Prescriptions   Pending Prescriptions Disp Refills     doxycycline hyclate (VIBRAMYCIN) 100 MG capsule 60 capsule 5     Sig: Take 1 capsule (100 mg) by mouth 2 times daily    There is no refill protocol information for this order   Last Written Prescription Date:  9/13/18  Last Fill Quantity: 60,  # refills: 5   Last office visit: 9/13/2018 with prescribing provider:     Future Office Visit:   Next 5 appointments (look out 90 days)    Feb 07, 2019  9:00 AM CST  Nurse Only with FZ ALLERGY SHOTS  [facility] ([facility] [address] [address]  [phone]   Feb 07, 2019  9:20 AM CST  Return Visit with Rosemarie Posada MD  [facility] ([facility]) [address] [address]  [phone]

## 2019-02-01 NOTE — TELEPHONE ENCOUNTER
"Requested Prescriptions   Pending Prescriptions Disp Refills     lisinopril (PRINIVIL/ZESTRIL) 5 MG tablet 30 tablet 1     Sig: Take 1 tablet (5 mg) by mouth daily    ACE Inhibitors (Including Combos) Protocol Failed - 2/1/2019  9:48 AM       Failed - Blood pressure under 140/90 in past 12 months    BP Readings from Last 3 Encounters:   09/13/18 142/78   09/25/17 (!) 165/96   09/19/17 (!) 149/95                Passed - Recent (12 mo) or future (30 days) visit within the authorizing provider's specialty    Patient had office visit in the last 12 months or has a visit in the next 30 days with authorizing provider or within the authorizing provider's specialty.  See \"Patient Info\" tab in inbasket, or \"Choose Columns\" in Meds & Orders section of the refill encounter.             Passed - Medication is active on med list       Passed - Patient is age 18 or older       Passed - Normal serum creatinine on file in past 12 months    Recent Labs   Lab Test 09/13/18  1007   CR 0.90            Passed - Normal serum potassium on file in past 12 months    Recent Labs   Lab Test 09/13/18  1007   POTASSIUM 4.1             Routing refill request to provider for review/approval because:  Failed BP.  Geno Olvera RN CPC Triage.          "

## 2019-02-01 NOTE — TELEPHONE ENCOUNTER
Okay refill but see us in the next 1-2 months in clinic    Please inform patient    Wili Kaplan MD

## 2019-02-01 NOTE — TELEPHONE ENCOUNTER
Requested Prescriptions   Pending Prescriptions Disp Refills     lisinopril (PRINIVIL/ZESTRIL) 5 MG tablet 30 tablet 1     Sig: Take 1 tablet (5 mg) by mouth daily    There is no refill protocol information for this order   Last Written Prescription Date:  9/13/18  Last Fill Quantity: 30,  # refills: 1   Last office visit: 9/13/2018 with prescribing provider:     Future Office Visit:   Next 5 appointments (look out 90 days)    Feb 07, 2019  9:00 AM CST  Nurse Only with FZ ALLERGY SHOTS  [facility] [address] [address]  [phone]   Feb 07, 2019  9:20 AM CST  Return Visit with Rosemarie Posada MD  [facility] ([facility]) [address] [address]  [phone]

## 2019-02-04 NOTE — TELEPHONE ENCOUNTER
LM for pt to call back and schedule appt in 1-2 months  Urszula Jackson [facility]  Team 3 Coordinator

## 2019-02-07 ENCOUNTER — OFFICE VISIT (OUTPATIENT)
Dept: ALLERGY | Facility: CLINIC | Age: 44
End: 2019-02-07
Payer: COMMERCIAL

## 2019-02-07 ENCOUNTER — ALLIED HEALTH/NURSE VISIT (OUTPATIENT)
Dept: ALLERGY | Facility: CLINIC | Age: 44
End: 2019-02-07
Payer: COMMERCIAL

## 2019-02-07 VITALS
DIASTOLIC BLOOD PRESSURE: 96 MMHG | SYSTOLIC BLOOD PRESSURE: 154 MMHG | BODY MASS INDEX: 26.88 KG/M2 | HEART RATE: 72 BPM | OXYGEN SATURATION: 99 % | WEIGHT: 200.6 LBS

## 2019-02-07 DIAGNOSIS — J45.30 MILD PERSISTENT ASTHMA WITHOUT COMPLICATION: Primary | ICD-10-CM

## 2019-02-07 DIAGNOSIS — J30.89 ALLERGIC RHINITIS DUE TO MOLD: ICD-10-CM

## 2019-02-07 DIAGNOSIS — J30.81 NON-SEASONAL ALLERGIC RHINITIS DUE TO ANIMAL HAIR AND DANDER: ICD-10-CM

## 2019-02-07 DIAGNOSIS — J30.1 SEASONAL ALLERGIC RHINITIS DUE TO POLLEN: ICD-10-CM

## 2019-02-07 DIAGNOSIS — J30.1 SEASONAL ALLERGIC RHINITIS DUE TO POLLEN: Primary | ICD-10-CM

## 2019-02-07 LAB
FEF 25/75: NORMAL
FEV-1: NORMAL
FEV1/FVC: NORMAL
FVC: NORMAL

## 2019-02-07 PROCEDURE — 94010 BREATHING CAPACITY TEST: CPT | Performed by: ALLERGY & IMMUNOLOGY

## 2019-02-07 PROCEDURE — 95117 IMMUNOTHERAPY INJECTIONS: CPT

## 2019-02-07 PROCEDURE — 99213 OFFICE O/P EST LOW 20 MIN: CPT | Mod: 25 | Performed by: ALLERGY & IMMUNOLOGY

## 2019-02-07 PROCEDURE — 99207 ZZC DROP WITH A PROCEDURE: CPT

## 2019-02-07 RX ORDER — MONTELUKAST SODIUM 10 MG/1
10 TABLET ORAL DAILY
Qty: 90 TABLET | Refills: 3 | Status: SHIPPED | OUTPATIENT
Start: 2019-02-07 | End: 2020-03-20

## 2019-02-07 NOTE — LETTER
2/7/2019         RE: Reji Carpenter  [address] [facility] [address]        Dear Colleague,    Thank you for referring your patient, Reji Carpenter, to the [facility]. Please see a copy of my visit note below.    Reji Carpenter was seen in the Allergy Clinic at [facility]. The following are my recommendations regarding his Mild Persistent Asthma, Allergic Rhinitis Due to Animals, Allergic Rhinitis Due to Pollen and Allergic Rhinitis Due to Mold    1. Continue allergen immunotherapy per protocol  2. Continue loratadine 10mg daily  3. Continue montelukast 10mg daily  4. Continue Qvar 80mcg, 2 puffs twice daily  5. Use albuterol HFA, 2-4 puffs every 4 hours as needed  6. Follow-up in 6 months  7. Patient to follow up with Primary Care provider regarding elevated blood pressure.      Reji Carpenter is a 43 year old American male who is seen today for follow-up of asthma and allergic rhinitis. He began immunotherapy treatment in 6/2017 and reports that he has been doing well. He denies any history of significant large local or systemic reactions to treatment. Reji notes that there has been improvement in his seasonal and perennial rhinitis symptoms. He does continue to take loratadine and montelukast daily but has not needed to use any nasal sprays or eye drops.     Reji reports that his asthma has been well controlled. He feels that taking the Qvar has been helpful and he continues to take this twice daily. He does use albuterol 2 to 3 times per week, primarily in the mornings. Reji denies having any nocturnal asthma symptoms or limitations in his activity. He has not had any exacerbations or use of prednisone in the past year. He did have a cold this fall but it did not affect his asthma.    Symptoms have significantly improved, taking claritin and singulair daily, not on nasal sprays or eye drops. No reactions to immunotherapy. Symptoms in the  spring and around his pets have improved.      Past Medical History:   Diagnosis Date     NONSPECIFIC MEDICAL HISTORY 06/06    ruptured achilles tendon repaired       REVIEW OF SYSTEMS:  General: negative for weight gain. negative for weight loss. negative for changes in sleep.   Eyes: negative for itching. negative for redness. negative for tearing/watering. negative for vision changes  Ears: negative for fullness. negative for hearing loss. negative for dizziness.   Nose: negative for snoring.negative for changes in smell. negative for drainage.   Throat: negative for hoarseness. negative for sore throat. negative for trouble swallowing.   Lungs: negative for cough. negative for shortness of breath.negative for wheezing. negative for sputum production.   Cardiovascular: negative for chest pain. negative for swelling of ankles. negative for fast or irregular heartbeat.   Gastrointestinal: negative for nausea. negative for heartburn. negative for acid reflux.   Musculoskeletal: negative for joint pain. negative for joint stiffness. negative for joint swelling.   Neurologic: negative for seizures. negative for fainting. negative for weakness.   Psychiatric: negative for changes in mood. negative for anxiety.   Endocrine: negative for cold intolerance. negative for heat intolerance. negative for tremors.   Hematologic: negative for easy bruising. negative for easy bleeding.  Integumentary: negative for rash. negative for scaling. negative for nail changes.       Current Outpatient Medications:      albuterol (PROAIR HFA/PROVENTIL HFA/VENTOLIN HFA) 108 (90 Base) MCG/ACT inhaler, Inhale 2 puffs into the lungs every 4 hours as needed for shortness of breath / dyspnea, Disp: 1 Inhaler, Rfl: 11     beclomethasone (QVAR) 80 MCG/ACT Inhaler, Inhale 2 puffs into the lungs 2 times daily, Disp: 3 Inhaler, Rfl: 1     doxycycline hyclate (VIBRAMYCIN) 100 MG capsule, Take 1 capsule (100 mg) by mouth 2 times daily, Disp: 60  capsule, Rfl: 5     lisinopril (PRINIVIL/ZESTRIL) 5 MG tablet, Take 1 tablet (5 mg) by mouth daily, Disp: 30 tablet, Rfl: 1     loratadine (CLARITIN) 10 MG tablet, Take 1 tablet (10 mg) by mouth daily, Disp: 90 tablet, Rfl: 2     montelukast (SINGULAIR) 10 MG tablet, TAKE 1 TABLET (10 MG) BY MOUTH AT BEDTIME, Disp: 90 tablet, Rfl: 3     ORDER FOR ALLERGEN IMMUNOTHERAPY, Name of Mix: Mix #1  Cat, Dog Cat Hair, Standardized 10,000 BAU/mL, ALK  2.0 ml Dog Hair Dander, A. P.  1:100 w/v, HS  1.0 ml  Diluent: HSA qs to 5ml, Disp: 5 mL, Rfl: PRN     ORDER FOR ALLERGEN IMMUNOTHERAPY, Name of Mix: Mix #2  Mold Alternaria Tenuis 1:10 w/v, HS  0.5 ml Diluent: HSA qs to 5ml, Disp: 5 mL, Rfl: PRN     ORDER FOR ALLERGEN IMMUNOTHERAPY, Name of Mix: Mix #3  Tree , Weeds Narayan, White  1:20 w/v, HS  0.5 ml Boxelder-Maple  Mix BHR (Boxelder Hard Red) 1:20 w/v, HS  0.5 ml [place], Common 1:20 w/v, HS  0.5 ml Elm, American 1:20 w/v, HS  0.5 ml Lamb's Quarters 1:20 w/v, HS 0.5 ml Plantain, English 1:20 w/v, HS 0.5 ml Ragweed Mixed 1:20 w/v ALK  0.5 ml Sagebrush, Mugwort 1:20 w/v, HS 0.5 ml Diluent: HSA qs to 5ml, Disp: 5 mL, Rfl: PRN     EPINEPHrine (AUVI-Q) 0.3 MG/0.3ML injection 2-pack, Inject 0.3 mLs (0.3 mg) into the muscle as needed for anaphylaxis (Patient not taking: Reported on 2/7/2019), Disp: 2 mL, Rfl: 2    EXAM:   BP (!) 150/94 (BP Location: Left arm, Patient Position: Sitting, Cuff Size: Adult Regular)   Pulse 72   Wt 91 kg (200 lb 9.6 oz)   SpO2 99%   BMI 26.88 kg/m     GENERAL APPEARANCE: alert, cooperative and not in distress  SKIN: no rashes, no lesions  HEAD: atraumatic, normocephalic  ENT: no scars or lesions, nasal exam showed no discharge, swelling or lesions noted, tongue midline and normal, soft palate, uvula, and tonsils normal  NECK: no asymmetry, masses, or scars, supple without significant adenopathy  LUNGS: unlabored respirations, no intercostal retractions or accessory muscle use, clear to auscultation  without rales or wheezes  HEART: regular rate and rhythm without murmurs and normal S1 and S2  MUSCULOSKELETAL: no musculoskeletal defects are noted  NEURO: no focal deficits noted  PSYCH: does not appear depressed or anxious      WORKUP:  Spirometry    SPIROMETRY       FVC 5.94L (105% of predicted).     FEV1 4.57L (103% of predicted).     FEV1/FVC 77%     FEF 25%-75%  4.00L/s (99% of predicted).    These values and flow volume loop are consistent with normal lung function without evidence of airflow obstruction.    Asthma Control Test (ACT) total score: 23       ASSESSMENT/PLAN:  Reji Carpenter is a 43 year old male here for follow-up of asthma and allergic rhinitis. He reports that he has been doing well since beginning immunotherapy treatment. He was counseled regarding the risks and benefits of continuing immunotherapy and wishes to proceed.    Reji's asthma has been well controlled with his current treatment regimen and spirometry is normal. He does use albuterol 2 to 3 times per week for acute symptoms. He wishes to continue with his current medications.    1. Continue allergen immunotherapy per protocol  2. Continue loratadine 10mg daily  3. Continue montelukast 10mg daily  4. Continue Qvar 80mcg, 2 puffs twice daily  5. Use albuterol HFA, 2-4 puffs every 4 hours as needed  6. Follow-up in 6 months  7. Patient to follow up with Primary Care provider regarding elevated blood pressure.      Rosemarie Posada MD  Allergy/Immunology  [facility] and [place], [place]      Chart documentation done in part with Dragon Voice Recognition Software. Although reviewed after completion, some word and grammatical errors may remain.    Again, thank you for allowing me to participate in the care of your patient.        Sincerely,        Rosemarie Posada MD

## 2019-02-07 NOTE — PROGRESS NOTES
Reji Carpenter was seen in the Allergy Clinic at [facility]. The following are my recommendations regarding his Mild Persistent Asthma, Allergic Rhinitis Due to Animals, Allergic Rhinitis Due to Pollen and Allergic Rhinitis Due to Mold    1. Continue allergen immunotherapy per protocol  2. Continue loratadine 10mg daily  3. Continue montelukast 10mg daily  4. Continue Qvar 80mcg, 2 puffs twice daily  5. Use albuterol HFA, 2-4 puffs every 4 hours as needed  6. Follow-up in 6 months  7. Patient to follow up with Primary Care provider regarding elevated blood pressure.      Reji Carpenter is a 43 year old American male who is seen today for follow-up of asthma and allergic rhinitis. He began immunotherapy treatment in 6/2017 and reports that he has been doing well. He denies any history of significant large local or systemic reactions to treatment. Reji notes that there has been improvement in his seasonal and perennial rhinitis symptoms. He does continue to take loratadine and montelukast daily but has not needed to use any nasal sprays or eye drops.     Reji reports that his asthma has been well controlled. He feels that taking the Qvar has been helpful and he continues to take this twice daily. He does use albuterol 2 to 3 times per week, primarily in the mornings. Rjei denies having any nocturnal asthma symptoms or limitations in his activity. He has not had any exacerbations or use of prednisone in the past year. He did have a cold this fall but it did not affect his asthma.    Symptoms have significantly improved, taking claritin and singulair daily, not on nasal sprays or eye drops. No reactions to immunotherapy. Symptoms in the spring and around his pets have improved.      Past Medical History:   Diagnosis Date     NONSPECIFIC MEDICAL HISTORY 06/06    ruptured achilles tendon repaired       REVIEW OF SYSTEMS:  General: negative for weight gain. negative for weight loss. negative  for changes in sleep.   Eyes: negative for itching. negative for redness. negative for tearing/watering. negative for vision changes  Ears: negative for fullness. negative for hearing loss. negative for dizziness.   Nose: negative for snoring.negative for changes in smell. negative for drainage.   Throat: negative for hoarseness. negative for sore throat. negative for trouble swallowing.   Lungs: negative for cough. negative for shortness of breath.negative for wheezing. negative for sputum production.   Cardiovascular: negative for chest pain. negative for swelling of ankles. negative for fast or irregular heartbeat.   Gastrointestinal: negative for nausea. negative for heartburn. negative for acid reflux.   Musculoskeletal: negative for joint pain. negative for joint stiffness. negative for joint swelling.   Neurologic: negative for seizures. negative for fainting. negative for weakness.   Psychiatric: negative for changes in mood. negative for anxiety.   Endocrine: negative for cold intolerance. negative for heat intolerance. negative for tremors.   Hematologic: negative for easy bruising. negative for easy bleeding.  Integumentary: negative for rash. negative for scaling. negative for nail changes.       Current Outpatient Medications:      albuterol (PROAIR HFA/PROVENTIL HFA/VENTOLIN HFA) 108 (90 Base) MCG/ACT inhaler, Inhale 2 puffs into the lungs every 4 hours as needed for shortness of breath / dyspnea, Disp: 1 Inhaler, Rfl: 11     beclomethasone (QVAR) 80 MCG/ACT Inhaler, Inhale 2 puffs into the lungs 2 times daily, Disp: 3 Inhaler, Rfl: 1     doxycycline hyclate (VIBRAMYCIN) 100 MG capsule, Take 1 capsule (100 mg) by mouth 2 times daily, Disp: 60 capsule, Rfl: 5     lisinopril (PRINIVIL/ZESTRIL) 5 MG tablet, Take 1 tablet (5 mg) by mouth daily, Disp: 30 tablet, Rfl: 1     loratadine (CLARITIN) 10 MG tablet, Take 1 tablet (10 mg) by mouth daily, Disp: 90 tablet, Rfl: 2     montelukast (SINGULAIR) 10 MG  tablet, TAKE 1 TABLET (10 MG) BY MOUTH AT BEDTIME, Disp: 90 tablet, Rfl: 3     ORDER FOR ALLERGEN IMMUNOTHERAPY, Name of Mix: Mix #1  Cat, Dog Cat Hair, Standardized 10,000 BAU/mL, ALK  2.0 ml Dog Hair Dander, A. P.  1:100 w/v, HS  1.0 ml  Diluent: HSA qs to 5ml, Disp: 5 mL, Rfl: PRN     ORDER FOR ALLERGEN IMMUNOTHERAPY, Name of Mix: Mix #2  Mold Alternaria Tenuis 1:10 w/v, HS  0.5 ml Diluent: HSA qs to 5ml, Disp: 5 mL, Rfl: PRN     ORDER FOR ALLERGEN IMMUNOTHERAPY, Name of Mix: Mix #3  Tree , Weeds Narayan, White  1:20 w/v, HS  0.5 ml Boxelder-Maple  Mix BHR (Boxelder Hard Red) 1:20 w/v, HS  0.5 ml [place], Common 1:20 w/v, HS  0.5 ml Elm, American 1:20 w/v, HS  0.5 ml Lamb's Quarters 1:20 w/v, HS 0.5 ml Plantain, English 1:20 w/v, HS 0.5 ml Ragweed Mixed 1:20 w/v ALK  0.5 ml Sagebrush, Mugwort 1:20 w/v, HS 0.5 ml Diluent: HSA qs to 5ml, Disp: 5 mL, Rfl: PRN     EPINEPHrine (AUVI-Q) 0.3 MG/0.3ML injection 2-pack, Inject 0.3 mLs (0.3 mg) into the muscle as needed for anaphylaxis (Patient not taking: Reported on 2/7/2019), Disp: 2 mL, Rfl: 2    EXAM:   BP (!) 150/94 (BP Location: Left arm, Patient Position: Sitting, Cuff Size: Adult Regular)   Pulse 72   Wt 91 kg (200 lb 9.6 oz)   SpO2 99%   BMI 26.88 kg/m    GENERAL APPEARANCE: alert, cooperative and not in distress  SKIN: no rashes, no lesions  HEAD: atraumatic, normocephalic  ENT: no scars or lesions, nasal exam showed no discharge, swelling or lesions noted, tongue midline and normal, soft palate, uvula, and tonsils normal  NECK: no asymmetry, masses, or scars, supple without significant adenopathy  LUNGS: unlabored respirations, no intercostal retractions or accessory muscle use, clear to auscultation without rales or wheezes  HEART: regular rate and rhythm without murmurs and normal S1 and S2  MUSCULOSKELETAL: no musculoskeletal defects are noted  NEURO: no focal deficits noted  PSYCH: does not appear depressed or anxious      WORKUP:   Spirometry    SPIROMETRY       FVC 5.94L (105% of predicted).     FEV1 4.57L (103% of predicted).     FEV1/FVC 77%     FEF 25%-75%  4.00L/s (99% of predicted).    These values and flow volume loop are consistent with normal lung function without evidence of airflow obstruction.    Asthma Control Test (ACT) total score: 23       ASSESSMENT/PLAN:  Reji Carpenter is a 43 year old male here for follow-up of asthma and allergic rhinitis. He reports that he has been doing well since beginning immunotherapy treatment. He was counseled regarding the risks and benefits of continuing immunotherapy and wishes to proceed.    Reji's asthma has been well controlled with his current treatment regimen and spirometry is normal. He does use albuterol 2 to 3 times per week for acute symptoms. He wishes to continue with his current medications.    1. Continue allergen immunotherapy per protocol  2. Continue loratadine 10mg daily  3. Continue montelukast 10mg daily  4. Continue Qvar 80mcg, 2 puffs twice daily  5. Use albuterol HFA, 2-4 puffs every 4 hours as needed  6. Follow-up in 6 months  7. Patient to follow up with Primary Care provider regarding elevated blood pressure.      Rosemarie Posada MD  Allergy/Immunology  [facility] and [place], [place]      Chart documentation done in part with Dragon Voice Recognition Software. Although reviewed after completion, some word and grammatical errors may remain.

## 2019-02-07 NOTE — LETTER
My Asthma Action Plan  Name: Reji Carpenter   YOB: 1975  Date: 2/7/2019   My doctor: Rosemarie Posada MD   My clinic: [facility]        My Control Medicine: Beclomethasone (QVar) -  80 mcg 2 puffs twice daily  My Rescue Medicine: Albuterol (Proair/Ventolin/Proventil) inhaler 2-4 puffs every 4 hours as needed   My Asthma Severity: mild persistent  Avoid your asthma triggers: upper respiratory infections, pollens, animal dander and exercise or sports               GREEN ZONE   Good Control    I feel good    No cough or wheeze    Can work, sleep and play without asthma symptoms       Take your asthma control medicine every day.     1. If exercise triggers your asthma, take your rescue medication    15 minutes before exercise or sports, and    During exercise if you have asthma symptoms  2. Spacer to use with inhaler: If you have a spacer, make sure to use it with your inhaler             YELLOW ZONE Getting Worse  I have ANY of these:    I do not feel good    Cough or wheeze    Chest feels tight    Wake up at night   1. Keep taking your Green Zone medications  2. Start taking your rescue medicine:    every 20 minutes for up to 1 hour. Then every 4 hours for 24-48 hours.  3. If you stay in the Yellow Zone for more than 12-24 hours, contact your doctor.  4. If you do not return to the Green Zone in 12-24 hours or you get worse, start taking your oral steroid medicine if prescribed by your provider.           RED ZONE Medical Alert - Get Help  I have ANY of these:    I feel awful    Medicine is not helping    Breathing getting harder    Trouble walking or talking    Nose opens wide to breathe       1. Take your rescue medicine NOW  2. If your provider has prescribed an oral steroid medicine, start taking it NOW  3. Call your doctor NOW  4. If you are still in the Red Zone after 20 minutes and you have not reached your doctor:    Take your rescue medicine again and    Call 911 or go to the  emergency room right away    See your regular doctor within 2 weeks of an Emergency Room or Urgent Care visit for follow-up treatment.          Annual Reminders:  Meet with Asthma Educator,  Flu Shot in the Fall, consider Pneumonia Vaccination for patients with asthma (aged 19 and older).    Pharmacy:    [facility] - [place], [place] - [address]  [id number] - [place], [place] - [address]  [facility] - [place], [place] - [address]  WRITTEN PRESCRIPTION REQUESTED  [facility] - [place], [place] - [address] [facility]  [facility] - [place], [place] - [address]                      Asthma Triggers  How To Control Things That Make Your Asthma Worse    Triggers are things that make your asthma worse.  Look at the list below to help you find your triggers and what you can do about them.  You can help prevent asthma flare-ups by staying away from your triggers.      Trigger                                                          What you can do   Cigarette Smoke  Tobacco smoke can make asthma worse. Do not allow smoking in your home, car or around you.  Be sure no one smokes at a child s day care or school.  If you smoke, ask your health care provider for ways to help you quit.  Ask family members to quit too.  Ask your health care provider for a referral to Quit Plan to help you quit smoking, or call 9-887-574-PLAN.     Colds, Flu, Bronchitis  These are common triggers of asthma. Wash your hands often.  Don t touch your eyes, nose or mouth.  Get a flu shot every year.     Dust Mites  These are tiny bugs that live in cloth or carpet. They are too small to see. Wash sheets and blankets in hot water every week.   Encase pillows and mattress in dust mite proof covers.  Avoid having carpet if you can. If you have carpet, vacuum weekly.   Use a dust mask and HEPA vacuum.   Pollen and  Outdoor Mold  Some people are allergic to trees, grass, or weed pollen, or molds. Try to keep your windows closed.  Limit time out doors when pollen count is high.   Ask you health care provider about taking medicine during allergy season.     Animal Dander  Some people are allergic to skin flakes, urine or saliva from pets with fur or feathers. Keep pets with fur or feathers out of your home.    If you can t keep the pet outdoors, then keep the pet out of your bedroom.  Keep the bedroom door closed.  Keep pets off cloth furniture and away from stuffed toys.     Mice, Rats, and Cockroaches  Some people are allergic to the waste from these pests.   Cover food and garbage.  Clean up spills and food crumbs.  Store grease in the refrigerator.   Keep food out of the bedroom.   Indoor Mold  This can be a trigger if your home has high moisture. Fix leaking faucets, pipes, or other sources of water.   Clean moldy surfaces.  Dehumidify basement if it is damp and smelly.   Smoke, Strong Odors, and Sprays  These can reduce air quality. Stay away from strong odors and sprays, such as perfume, powder, hair spray, paints, smoke incense, paint, cleaning products, candles and new carpet.   Exercise or Sports  Some people with asthma have this trigger. Be active!  Ask your doctor about taking medicine before sports or exercise to prevent symptoms.    Warm up for 5-10 minutes before and after sports or exercise.     Other Triggers of Asthma  Cold air:  Cover your nose and mouth with a scarf.  Sometimes laughing or crying can be a trigger.  Some medicines and food can trigger asthma.

## 2019-02-07 NOTE — PROGRESS NOTES
Patient presented after waiting 30 minutes with normal reaction to allergy injections. Discharged from clinic.    Rosa Maria Pena RN ............   2/7/2019...9:41 AM

## 2019-02-07 NOTE — PATIENT INSTRUCTIONS
If you have any questions regarding your allergies, asthma, or what we discussed during your visit today please call the allergy clinic or contact us via [employer].    Brayan Sparks/[facility]: [phone]

## 2019-02-08 ASSESSMENT — ASTHMA QUESTIONNAIRES: ACT_TOTALSCORE: 23

## 2019-02-13 ENCOUNTER — OFFICE VISIT (OUTPATIENT)
Dept: FAMILY MEDICINE | Facility: CLINIC | Age: 44
End: 2019-02-13
Payer: COMMERCIAL

## 2019-02-13 VITALS
HEIGHT: 72 IN | TEMPERATURE: 98 F | SYSTOLIC BLOOD PRESSURE: 137 MMHG | DIASTOLIC BLOOD PRESSURE: 80 MMHG | HEART RATE: 63 BPM | OXYGEN SATURATION: 100 % | BODY MASS INDEX: 26.73 KG/M2 | WEIGHT: 197.38 LBS

## 2019-02-13 DIAGNOSIS — H11.31 SUBCONJUNCTIVAL HEMORRHAGE OF RIGHT EYE: ICD-10-CM

## 2019-02-13 DIAGNOSIS — R05.9 COUGH: ICD-10-CM

## 2019-02-13 DIAGNOSIS — I10 HYPERTENSION GOAL BP (BLOOD PRESSURE) < 140/90: Primary | ICD-10-CM

## 2019-02-13 PROCEDURE — 99213 OFFICE O/P EST LOW 20 MIN: CPT | Performed by: FAMILY MEDICINE

## 2019-02-13 RX ORDER — VALSARTAN 80 MG/1
80 TABLET ORAL DAILY
Qty: 30 TABLET | Refills: 1 | Status: SHIPPED | OUTPATIENT
Start: 2019-02-13 | End: 2019-04-12

## 2019-02-13 ASSESSMENT — MIFFLIN-ST. JEOR: SCORE: 1835.29

## 2019-02-13 ASSESSMENT — PAIN SCALES - GENERAL: PAINLEVEL: NO PAIN (0)

## 2019-02-13 NOTE — PROGRESS NOTES
SUBJECTIVE:   Reji Carpenter is a 43 year old male who presents to clinic today for the following health issues:       Hypertension Follow-up      Outpatient blood pressures are being checked at home.  Results are 150's over 90's.    Low Salt Diet: not monitoring salt      Amount of exercise or physical activity: 1 day/week for an average of 30-45 minutes    Problems taking medications regularly: No    Medication side effects: none    Diet: regular (no restrictions)        Burst vessel in right eye    Problem list and histories reviewed & adjusted, as indicated.  Additional history: as documented         Reviewed and updated as needed this visit by clinical staff  Tobacco  Allergies  Meds  Med Hx  Surg Hx  Fam Hx  Soc Hx      Reviewed and updated as needed this visit by Provider          150/90 on avg at home    Occasionally lower    Getting allergy shots, helping    Lots of allergies but doing well with allergy shots    Average diet    Not a lot of red meat    Once a week to boxing class    Walking dogs 2x daily    Hour total per day    Patient has mild cough, chronic    Physical Exam   Constitutional: He is oriented to person, place, and time. He appears well-developed and well-nourished.   HENT:   Head: Normocephalic and atraumatic.   Eyes: Conjunctivae are normal.   Neck: Carotid bruit is not present.   Cardiovascular: Normal rate, regular rhythm, normal heart sounds and intact distal pulses.   Pulmonary/Chest: Effort normal and breath sounds normal. No respiratory distress.   Musculoskeletal: He exhibits no edema.   Neurological: He is alert and oriented to person, place, and time. No cranial nerve deficit.   Psychiatric: He has a normal mood and affect. His speech is normal and behavior is normal.   patient has subconjuctival hemorrhage right eye.  Per patient, getting better.  He states vision fine.     ASSESSMENT / PLAN:  (I10) Hypertension goal BP (blood pressure) < 140/90  (primary encounter  diagnosis)  Comment: due to non ideal control and the mild cough of which acei may be contributing, discontinue lisinopril and start arb instead   Plan: valsartan (DIOVAN) 80 MG tablet        Patient to monitor blood pressure and let me know in a few weeks what avg is    (H11.31) Subconjunctival hemorrhage of right eye  Comment: imroving.  Not worrisome.   Plan: follow up prn symptoms     (R05) Cough  Comment: hopefully will improve on arb instead of acei  Plan: follow up prn       I reviewed the patient's medications, allergies, medical history, family history, and social history.    Wili Kaplan MD

## 2019-02-13 NOTE — PATIENT INSTRUCTIONS
Stop lisinopril    Start valsartan    Check blood pressure     In a few weeks send message with update on blood pressure readings    Keep working on healthy diet/exercise

## 2019-02-14 ASSESSMENT — ASTHMA QUESTIONNAIRES: ACT_TOTALSCORE: 23

## 2019-03-07 ENCOUNTER — ALLIED HEALTH/NURSE VISIT (OUTPATIENT)
Dept: ALLERGY | Facility: CLINIC | Age: 44
End: 2019-03-07
Payer: COMMERCIAL

## 2019-03-07 DIAGNOSIS — J30.1 SEASONAL ALLERGIC RHINITIS DUE TO POLLEN: Primary | ICD-10-CM

## 2019-03-07 PROCEDURE — 95117 IMMUNOTHERAPY INJECTIONS: CPT

## 2019-03-07 PROCEDURE — 99207 ZZC DROP WITH A PROCEDURE: CPT

## 2019-03-15 ENCOUNTER — MYC MEDICAL ADVICE (OUTPATIENT)
Dept: ALLERGY | Facility: CLINIC | Age: 44
End: 2019-03-15

## 2019-04-08 ENCOUNTER — ALLIED HEALTH/NURSE VISIT (OUTPATIENT)
Dept: ALLERGY | Facility: CLINIC | Age: 44
End: 2019-04-08
Payer: COMMERCIAL

## 2019-04-08 DIAGNOSIS — J30.1 SEASONAL ALLERGIC RHINITIS DUE TO POLLEN: Primary | ICD-10-CM

## 2019-04-08 PROCEDURE — 99207 ZZC DROP WITH A PROCEDURE: CPT

## 2019-04-08 PROCEDURE — 95117 IMMUNOTHERAPY INJECTIONS: CPT

## 2019-04-08 NOTE — PROGRESS NOTES
Patient presented after waiting 30 minutes with normal reaction to allergy injections. Discharged from clinic.    Rosa Maria Pena RN ............   4/8/2019...10:06 AM

## 2019-05-13 ENCOUNTER — ALLIED HEALTH/NURSE VISIT (OUTPATIENT)
Dept: ALLERGY | Facility: CLINIC | Age: 44
End: 2019-05-13
Payer: COMMERCIAL

## 2019-05-13 DIAGNOSIS — J30.1 SEASONAL ALLERGIC RHINITIS DUE TO POLLEN: Primary | ICD-10-CM

## 2019-05-13 PROCEDURE — 95117 IMMUNOTHERAPY INJECTIONS: CPT

## 2019-05-13 PROCEDURE — 99207 ZZC DROP WITH A PROCEDURE: CPT

## 2019-05-13 NOTE — PROGRESS NOTES
Patient presented after waiting 30 minutes with no reaction to allergy injections. Discharged from clinic.    Miracle Johnson RN

## 2019-06-07 DIAGNOSIS — Z11.9 SCREENING EXAMINATION FOR INFECTIOUS DISEASE: ICD-10-CM

## 2019-06-07 PROCEDURE — 86762 RUBELLA ANTIBODY: CPT | Performed by: FAMILY MEDICINE

## 2019-06-07 PROCEDURE — 86735 MUMPS ANTIBODY: CPT | Performed by: FAMILY MEDICINE

## 2019-06-07 PROCEDURE — 86765 RUBEOLA ANTIBODY: CPT | Performed by: FAMILY MEDICINE

## 2019-06-07 PROCEDURE — 36415 COLL VENOUS BLD VENIPUNCTURE: CPT | Performed by: FAMILY MEDICINE

## 2019-06-08 LAB
MEV IGG SER QL IA: 6.9 AI (ref 0–0.8)
MUV IGG SER QL IA: 2.9 AI (ref 0–0.8)
RUBV IGG SERPL IA-ACNC: 79 IU/ML

## 2019-06-10 ENCOUNTER — ALLIED HEALTH/NURSE VISIT (OUTPATIENT)
Dept: ALLERGY | Facility: CLINIC | Age: 44
End: 2019-06-10
Payer: COMMERCIAL

## 2019-06-10 DIAGNOSIS — J30.1 SEASONAL ALLERGIC RHINITIS DUE TO POLLEN: Primary | ICD-10-CM

## 2019-06-10 PROCEDURE — 95117 IMMUNOTHERAPY INJECTIONS: CPT

## 2019-06-10 PROCEDURE — 99207 ZZC DROP WITH A PROCEDURE: CPT

## 2019-06-10 NOTE — PROGRESS NOTES
Patient presented after waiting 30 minutes with normal reaction to allergy injections. Discharged from clinic.    Rosa Maria Pena RN ............   6/10/2019...9:36 AM

## 2019-07-08 ENCOUNTER — ALLIED HEALTH/NURSE VISIT (OUTPATIENT)
Dept: ALLERGY | Facility: CLINIC | Age: 44
End: 2019-07-08
Payer: COMMERCIAL

## 2019-07-08 DIAGNOSIS — Z51.6 NEED FOR DESENSITIZATION TO ALLERGENS: ICD-10-CM

## 2019-07-08 DIAGNOSIS — J45.20 INTERMITTENT ASTHMA, UNCOMPLICATED: ICD-10-CM

## 2019-07-08 DIAGNOSIS — J30.1 SEASONAL ALLERGIC RHINITIS DUE TO POLLEN: ICD-10-CM

## 2019-07-08 DIAGNOSIS — J30.89 ALLERGIC RHINITIS DUE TO MOLD: ICD-10-CM

## 2019-07-08 DIAGNOSIS — J30.1 SEASONAL ALLERGIC RHINITIS DUE TO POLLEN: Primary | ICD-10-CM

## 2019-07-08 DIAGNOSIS — J30.81 NON-SEASONAL ALLERGIC RHINITIS DUE TO ANIMAL HAIR AND DANDER: ICD-10-CM

## 2019-07-08 PROCEDURE — 99207 ZZC DROP WITH A PROCEDURE: CPT

## 2019-07-08 PROCEDURE — 95117 IMMUNOTHERAPY INJECTIONS: CPT

## 2019-07-08 RX ORDER — EPINEPHRINE 0.3 MG/.3ML
0.3 INJECTION SUBCUTANEOUS PRN
Qty: 2 ML | Refills: 2 | Status: SHIPPED | OUTPATIENT
Start: 2019-07-08 | End: 2020-08-06

## 2019-07-08 NOTE — PROGRESS NOTES
Patient presented after waiting 30 minutes with normal reaction to allergy injections. Discharged from clinic.    Rosa Maria Pena RN ............   7/8/2019...9:45 AM

## 2019-07-08 NOTE — TELEPHONE ENCOUNTER
ALLERGY SOLUTION RE-ORDER REQUEST    Reji Carpenter 1975 MRN: [id number]    DATE NEEDED:  07/22/2019  Vial Color Content   Top Dose   Last Dose Vial Size  Red 1:1 Molds   Red 1:1 0.5   Red 1:10.5 5ml  Red 1:1 Trees, Weeds   Red 1:1 0.5   Red 1:10.5 5ml  Red 1:1 Cat, Dog   Red 1:1 0.5   Red 1:10.5 5ml        Serum reorder consent signed and patient/parent was advised that new serums would be ordered through the pharmacy and billed to their insurance company when they arrive in clinic. Yes    Shot Clinic Location:  [facility]  Ship to Location: [facility]  Serum billed to:  [facility]    Special Instructions:  N/A      Updated Prescription Needed: No      Requester Signature  Sahara Haynes [facility]

## 2019-07-08 NOTE — PROGRESS NOTES
Signed Prescriptions:                        Disp   Refills    EPINEPHrine (AUVI-Q) 0.3 MG/0.3ML injectio*2 mL   2        Sig: Inject 0.3 mLs (0.3 mg) into the muscle as needed for           anaphylaxis  Authorizing Provider: ASIF MARTIN RN refilled medication per [facility] Refill Protocol. Assisted patient with scheduling f/u appt with provider.   Rosa Maria Pena RN

## 2019-07-11 RX ORDER — ALBUTEROL SULFATE 90 UG/1
2 AEROSOL, METERED RESPIRATORY (INHALATION) EVERY 4 HOURS PRN
Qty: 1 INHALER | Refills: 11 | Status: CANCELLED | OUTPATIENT
Start: 2019-07-11

## 2019-07-15 DIAGNOSIS — Z51.6 NEED FOR DESENSITIZATION TO ALLERGENS: Primary | ICD-10-CM

## 2019-07-15 PROCEDURE — 95165 ANTIGEN THERAPY SERVICES: CPT | Performed by: ALLERGY & IMMUNOLOGY

## 2019-07-15 NOTE — PROGRESS NOTES
Allergy serums billed at [place].     Vials billed below:    Vial Color Content                      Vial Size Expiration Date  Red 1:1 Molds 5mL  07/11/2020  Red 1:1 Cat, Dog 5mL  07/11/2020  Red 1:1 Trees, Weeds 5mL  07/11/2020      Original Refill encounter date: 07/08/2019      Signature  Sahara Haynes [facility]

## 2019-07-15 NOTE — TELEPHONE ENCOUNTER
Allergy serums received at [place].     Vials received below:    Vial Color Content                      Vial Size Expiration Date  Red 1:1 Molds 5mL  07/11/2020  Red 1:1 Cat, Dog 5mL  07/11/2020  Red 1:1 Trees, Weeds 5mL  07/11/2020        Signature  Sahara Haynes [facility]

## 2019-08-05 ENCOUNTER — ALLIED HEALTH/NURSE VISIT (OUTPATIENT)
Dept: ALLERGY | Facility: CLINIC | Age: 44
End: 2019-08-05
Payer: COMMERCIAL

## 2019-08-05 DIAGNOSIS — Z51.6 NEED FOR DESENSITIZATION TO ALLERGENS: Primary | ICD-10-CM

## 2019-08-05 PROCEDURE — 99207 ZZC DROP WITH A PROCEDURE: CPT

## 2019-08-05 PROCEDURE — 95117 IMMUNOTHERAPY INJECTIONS: CPT

## 2019-08-05 NOTE — PROGRESS NOTES
Patient presented after waiting 30 minutes with normal reaction to allergy injections. Discharged from clinic.    Rosa Maria Pena RN ............   8/5/2019...10:04 AM

## 2019-09-09 ENCOUNTER — ALLIED HEALTH/NURSE VISIT (OUTPATIENT)
Dept: ALLERGY | Facility: CLINIC | Age: 44
End: 2019-09-09
Payer: COMMERCIAL

## 2019-09-09 ENCOUNTER — OFFICE VISIT (OUTPATIENT)
Dept: ALLERGY | Facility: CLINIC | Age: 44
End: 2019-09-09
Payer: COMMERCIAL

## 2019-09-09 VITALS — DIASTOLIC BLOOD PRESSURE: 92 MMHG | OXYGEN SATURATION: 99 % | SYSTOLIC BLOOD PRESSURE: 141 MMHG | HEART RATE: 72 BPM

## 2019-09-09 DIAGNOSIS — Z51.6 NEED FOR DESENSITIZATION TO ALLERGENS: Primary | ICD-10-CM

## 2019-09-09 DIAGNOSIS — Z91.09 OTHER ALLERGY STATUS, OTHER THAN TO DRUGS AND BIOLOGICAL SUBSTANCES: ICD-10-CM

## 2019-09-09 DIAGNOSIS — J45.30 MILD PERSISTENT ASTHMA WITHOUT COMPLICATION: Primary | ICD-10-CM

## 2019-09-09 PROCEDURE — 99207 ZZC DROP WITH A PROCEDURE: CPT

## 2019-09-09 PROCEDURE — 95117 IMMUNOTHERAPY INJECTIONS: CPT

## 2019-09-09 PROCEDURE — 99213 OFFICE O/P EST LOW 20 MIN: CPT | Mod: 25 | Performed by: ALLERGY & IMMUNOLOGY

## 2019-09-09 RX ORDER — ALBUTEROL SULFATE 90 UG/1
2 AEROSOL, METERED RESPIRATORY (INHALATION) EVERY 4 HOURS PRN
Qty: 3 INHALER | Refills: 0 | Status: SHIPPED | OUTPATIENT
Start: 2019-09-09 | End: 2020-03-09

## 2019-09-09 ASSESSMENT — ASTHMA QUESTIONNAIRES
ACT_TOTALSCORE: 23
QUESTION_2 LAST FOUR WEEKS HOW OFTEN HAVE YOU HAD SHORTNESS OF BREATH: NOT AT ALL
QUESTION_4 LAST FOUR WEEKS HOW OFTEN HAVE YOU USED YOUR RESCUE INHALER OR NEBULIZER MEDICATION (SUCH AS ALBUTEROL): ONCE A WEEK OR LESS
QUESTION_3 LAST FOUR WEEKS HOW OFTEN DID YOUR ASTHMA SYMPTOMS (WHEEZING, COUGHING, SHORTNESS OF BREATH, CHEST TIGHTNESS OR PAIN) WAKE YOU UP AT NIGHT OR EARLIER THAN USUAL IN THE MORNING: ONCE OR TWICE
QUESTION_5 LAST FOUR WEEKS HOW WOULD YOU RATE YOUR ASTHMA CONTROL: COMPLETELY CONTROLLED
QUESTION_1 LAST FOUR WEEKS HOW MUCH OF THE TIME DID YOUR ASTHMA KEEP YOU FROM GETTING AS MUCH DONE AT WORK, SCHOOL OR AT HOME: NONE OF THE TIME

## 2019-09-09 NOTE — PROGRESS NOTES
Reji Carpenter was seen in the Allergy Clinic at [facility]. The following are my recommendations regarding his Mild Persistent Asthma, Allergic Rhinitis Due to Animals, Allergic Rhinitis Due to Pollen and Allergic Rhinitis Due to Mold    1. Continue Qvar Redihaler 80mcg, 2 puffs twice daily  2. Discontinue montelukast  3. Take 2 to 4 puffs of albuterol HFA every 4 hours as needed  4. Continue allergen immunotherapy treatment per protocol  5. Continue loratadine 10mg daily as needed  6. Follow-up in 6 months, sooner if needed  7. Reji to follow up with Primary Care provider regarding elevated blood pressure.      Reji Carpenter is a 44 year old American male who is seen today for follow-up of asthma and allergic rhinitis. He reports that his asthma has been well controlled. He continues to take Qvar twice daily and montelukast once daily and denies having any side effects with these medications. Reji has rarely needed to use his albuterol and denies having nocturnal asthma symptoms or limitations in his activity. He has had no significant exacerbations or need for prednisone in the past year.    Reji began allergen immunotherapy treatment in 6/2017 and reports he is doing well. He has had no significant large local or systemic reactions to treatment. He feels that he has had 85% improvement in his symptoms with immunotherapy treatment. Reji continues to take loratadine daily and also does a sinus irrigation rinse daily. He is not using any nasal steroid or antihistamines and has not had any sinus infections in the past year.      Past Medical History:   Diagnosis Date     NONSPECIFIC MEDICAL HISTORY 06/06    ruptured achilles tendon repaired     Family History   Problem Relation Age of Onset     Lipids Father      Hypertension Mother      Cancer Maternal Grandmother         brain     Cancer Paternal Grandmother      Coronary Artery Disease Maternal Grandfather         MI 50s      Coronary Artery Disease Paternal Grandfather         MI 60s     Diabetes No family hx of      C.A.D. No family hx of      Social History     Tobacco Use     Smoking status: Never Smoker     Smokeless tobacco: Never Used   Substance Use Topics     Alcohol use: No     Comment: occasional     Drug use: No       Past medical, family, and social history were reviewed.    REVIEW OF SYSTEMS:  General: negative for weight gain. negative for weight loss. negative for changes in sleep.   Eyes: negative for itching. negative for redness. negative for tearing/watering. negative for vision changes  Ears: negative for fullness. negative for hearing loss. negative for dizziness.   Nose: negative for snoring.negative for changes in smell. negative for drainage.   Throat: negative for hoarseness. negative for sore throat. negative for trouble swallowing.   Lungs: negative for cough. negative for shortness of breath.negative for wheezing. negative for sputum production.   Cardiovascular: negative for chest pain. negative for swelling of ankles. negative for fast or irregular heartbeat.   Gastrointestinal: negative for nausea. negative for heartburn. negative for acid reflux.   Musculoskeletal: negative for joint pain. negative for joint stiffness. negative for joint swelling.   Neurologic: negative for seizures. negative for fainting. negative for weakness.   Psychiatric: negative for changes in mood. negative for anxiety.   Endocrine: negative for cold intolerance. negative for heat intolerance. negative for tremors.   Hematologic: negative for easy bruising. negative for easy bleeding.  Integumentary: negative for rash. negative for scaling. negative for nail changes.       Current Outpatient Medications:      albuterol (PROAIR HFA/PROVENTIL HFA/VENTOLIN HFA) 108 (90 Base) MCG/ACT inhaler, Inhale 2 puffs into the lungs every 4 hours as needed for shortness of breath / dyspnea or other (cough), Disp: 3 Inhaler, Rfl: 0      beclomethasone HFA (QVAR REDIHALER) 80 MCG/ACT inhaler, Inhale 2 puffs into the lungs 2 times daily, Disp: 3 Inhaler, Rfl: 1     doxycycline hyclate (VIBRAMYCIN) 100 MG capsule, Take 1 capsule (100 mg) by mouth 2 times daily, Disp: 60 capsule, Rfl: 5     loratadine (CLARITIN) 10 MG tablet, Take 1 tablet (10 mg) by mouth daily, Disp: 90 tablet, Rfl: 2     montelukast (SINGULAIR) 10 MG tablet, Take 1 tablet (10 mg) by mouth daily, Disp: 90 tablet, Rfl: 3     ORDER FOR ALLERGEN IMMUNOTHERAPY, Name of Mix: Mix #1  Cat, Dog Cat Hair, Standardized 10,000 BAU/mL, ALK  2.0 ml Dog Hair Dander, A. P.  1:100 w/v, HS  1.0 ml  Diluent: HSA qs to 5ml, Disp: 5 mL, Rfl: PRN     ORDER FOR ALLERGEN IMMUNOTHERAPY, Name of Mix: Mix #2  Mold Alternaria Tenuis 1:10 w/v, HS  0.5 ml Diluent: HSA qs to 5ml, Disp: 5 mL, Rfl: PRN     ORDER FOR ALLERGEN IMMUNOTHERAPY, Name of Mix: Mix #3  Tree , Weeds Narayan, White  1:20 w/v, HS  0.5 ml Boxelder-Maple  Mix BHR (Boxelder Hard Red) 1:20 w/v, HS  0.5 ml Goodell, Common 1:20 w/v, HS  0.5 ml Elm, American 1:20 w/v, HS  0.5 ml Lamb's Quarters 1:20 w/v, HS 0.5 ml Plantain, English 1:20 w/v, HS 0.5 ml Ragweed Mixed 1:20 w/v ALK  0.5 ml Sagebrush, Mugwort 1:20 w/v, HS 0.5 ml Diluent: HSA qs to 5ml, Disp: 5 mL, Rfl: PRN     valsartan (DIOVAN) 80 MG tablet, Take 1 tablet (80 mg) by mouth daily, Disp: 90 tablet, Rfl: 3     EPINEPHrine (AUVI-Q) 0.3 MG/0.3ML injection 2-pack, Inject 0.3 mLs (0.3 mg) into the muscle as needed for anaphylaxis (Patient not taking: Reported on 9/9/2019), Disp: 2 mL, Rfl: 2    EXAM:   BP (!) 141/92 (BP Location: Left arm, Patient Position: Sitting, Cuff Size: Adult Large)   Pulse 72   SpO2 99%   GENERAL APPEARANCE: alert, cooperative and not in distress  SKIN: no rashes, no lesions  HEAD: atraumatic, normocephalic  EYES: lids and lashes normal, conjunctivae and sclerae clear  ENT: no scars or lesions, nasal exam showed no discharge, swelling or lesions noted, tongue midline  and normal, soft palate, uvula, and tonsils normal  NECK: no asymmetry, masses, or scars, supple without significant adenopathy  LUNGS: unlabored respirations, no intercostal retractions or accessory muscle use, clear to auscultation without rales or wheezes  HEART: regular rate and rhythm without murmurs and normal S1 and S2  MUSCULOSKELETAL: no musculoskeletal defects are noted  NEURO: no focal deficits noted  PSYCH: does not appear depressed or anxious      WORKUP:  None    ASSESSMENT/PLAN:  Reji Carpenter is a 44 year old male here for follow-up of asthma and allergic rhinitis. He reports that his asthma has been well controlled over the last 6 months. He has had no exacerbations or need for prednisone. He continues to take Qvar and montelukast as prescribed and has rarely needed to use albuterol. Overall his symptoms have been stable and we discussed stepping down on his asthma therapy and he was agreeable with this.    Reji has completed 2 years of allergen immunotherapy treatment and notes significant improvement in his symptoms. He was counseled regarding the risks, benefits, and anticipated duration of treatment and wishes to continue at this time.    1. Continue Qvar Redihaler 80mcg, 2 puffs twice daily  2. Discontinue montelukast  3. Take 2 to 4 puffs of albuterol HFA every 4 hours as needed  4. Continue allergen immunotherapy treatment per protocol  5. Continue loratadine 10mg daily as needed  6. Follow-up in 6 months, sooner if needed  7. Reji to follow up with Primary Care provider regarding elevated blood pressure.      Thank you for allowing me to participate in the care of Reji Carpenter.      Rosemarie Posada MD  Allergy/Immunology  [facility] and [place], [place]      Chart documentation done in part with Dragon Voice Recognition Software. Although reviewed after completion, some word and grammatical errors may remain.

## 2019-09-09 NOTE — LETTER
My Asthma Action Plan    Name: Reji Carpenter   YOB: 1975  Date: 9/9/2019   My doctor: Rosemarie Posada MD   My clinic: [facility]        My Control Medicine: Beclomethasone dipropionate (Qvar Redihaler) -  80 mcg 2 puffs twice daily  My Rescue Medicine: Albuterol (Proair/Ventolin/Proventil HFA) 2-4 puffs EVERY 4 HOURS as needed. Use a spacer if recommended by your provider.   My Asthma Severity:   Mild Persistent  Know your asthma triggers: upper respiratory infections, pollens, animal dander and exercise or sports               GREEN ZONE   Good Control    I feel good    No cough or wheeze    Can work, sleep and play without asthma symptoms       Take your asthma control medicine every day.     1. If exercise triggers your asthma, take your rescue medication    15 minutes before exercise or sports, and    During exercise if you have asthma symptoms  2. Spacer to use with inhaler: If you have a spacer, make sure to use it with your inhaler             YELLOW ZONE Getting Worse  I have ANY of these:    I do not feel good    Cough or wheeze    Chest feels tight    Wake up at night   1. Keep taking your Green Zone medications  2. Start taking your rescue medicine:    every 20 minutes for up to 1 hour. Then every 4 hours for 24-48 hours.  3. If you stay in the Yellow Zone for more than 12-24 hours, contact your doctor.  4. If you do not return to the Green Zone in 12-24 hours or you get worse, start taking your oral steroid medicine if prescribed by your provider.           RED ZONE Medical Alert - Get Help  I have ANY of these:    I feel awful    Medicine is not helping    Breathing getting harder    Trouble walking or talking    Nose opens wide to breathe       1. Take your rescue medicine NOW  2. If your provider has prescribed an oral steroid medicine, start taking it NOW  3. Call your doctor NOW  4. If you are still in the Red Zone after 20 minutes and you have not reached your  doctor:    Take your rescue medicine again and    Call 911 or go to the emergency room right away    See your regular doctor within 2 weeks of an Emergency Room or Urgent Care visit for follow-up treatment.          Annual Reminders:  Meet with Asthma Educator,  Flu Shot in the Fall, consider Pneumonia Vaccination for patients with asthma (aged 19 and older).    Pharmacy:    [facility] - [place], [place] - [address]  [id number] - [place], [place] - [address]  [facility] - [place], [place] - [address]  WRITTEN PRESCRIPTION REQUESTED  [facility] - [place], [place] - [address]  [facility]  [facility] - [place], [place] - [address]                           Asthma Triggers  How To Control Things That Make Your Asthma Worse    Triggers are things that make your asthma worse.  Look at the list below to help you find your triggers and what you can do about them.  You can help prevent asthma flare-ups by staying away from your triggers.      Trigger                                                          What you can do   Cigarette Smoke  Tobacco smoke can make asthma worse. Do not allow smoking in your home, car or around you.  Be sure no one smokes at a child s day care or school.  If you smoke, ask your health care provider for ways to help you quit.  Ask family members to quit too.  Ask your health care provider for a referral to Quit Plan to help you quit smoking, or call 9-384-953-PLAN.     Colds, Flu, Bronchitis  These are common triggers of asthma. Wash your hands often.  Don t touch your eyes, nose or mouth.  Get a flu shot every year.     Dust Mites  These are tiny bugs that live in cloth or carpet. They are too small to see. Wash sheets and blankets in hot water every week.   Encase pillows and mattress in dust mite proof covers.  Avoid having carpet if you can. If you  have carpet, vacuum weekly.   Use a dust mask and HEPA vacuum.   Pollen and Outdoor Mold  Some people are allergic to trees, grass, or weed pollen, or molds. Try to keep your windows closed.  Limit time out doors when pollen count is high.   Ask you health care provider about taking medicine during allergy season.     Animal Dander  Some people are allergic to skin flakes, urine or saliva from pets with fur or feathers. Keep pets with fur or feathers out of your home.    If you can t keep the pet outdoors, then keep the pet out of your bedroom.  Keep the bedroom door closed.  Keep pets off cloth furniture and away from stuffed toys.     Mice, Rats, and Cockroaches   Some people are allergic to the waste from these pests.   Cover food and garbage.  Clean up spills and food crumbs.  Store grease in the refrigerator.   Keep food out of the bedroom.   Indoor Mold  This can be a trigger if your home has high moisture. Fix leaking faucets, pipes, or other sources of water.   Clean moldy surfaces.  Dehumidify basement if it is damp and smelly.   Smoke, Strong Odors, and Sprays  These can reduce air quality. Stay away from strong odors and sprays, such as perfume, powder, hair spray, paints, smoke incense, paint, cleaning products, candles and new carpet.   Exercise or Sports  Some people with asthma have this trigger. Be active!  Ask your doctor about taking medicine before sports or exercise to prevent symptoms.    Warm up for 5-10 minutes before and after sports or exercise.     Other Triggers of Asthma  Cold air:  Cover your nose and mouth with a scarf.  Sometimes laughing or crying can be a trigger.  Some medicines and food can trigger asthma.

## 2019-09-09 NOTE — PATIENT INSTRUCTIONS
If you have any questions regarding your allergies, asthma, or what we discussed during your visit today please call the allergy clinic or contact us via [facility].    [facility] All[facility] RN Line: [phone]  [facility] Scheduling Line: [phone]  [facility] Scheduling Line: [phone]      Continue taking the Qvar twice daily    Stop the montelukast (Singulair) - if your asthma or allergy symptoms worsen you can re-start this medication    Continue the loratadine (Claritin) once daily    Follow-up in 6 months - sooner if needed

## 2019-09-09 NOTE — LETTER
9/9/2019         RE: Reji Carpenter  [address] [facility] [address]        Dear Colleague,    Thank you for referring your patient, Reji Carpenter, to the [facility]. Please see a copy of my visit note below.    Reji Carpenter was seen in the Allergy Clinic at [facility]. The following are my recommendations regarding his Mild Persistent Asthma, Allergic Rhinitis Due to Animals, Allergic Rhinitis Due to Pollen and Allergic Rhinitis Due to Mold    1. Continue Qvar Redihaler 80mcg, 2 puffs twice daily  2. Discontinue montelukast  3. Take 2 to 4 puffs of albuterol HFA every 4 hours as needed  4. Continue allergen immunotherapy treatment per protocol  5. Continue loratadine 10mg daily as needed  6. Follow-up in 6 months, sooner if needed  7. Reji to follow up with Primary Care provider regarding elevated blood pressure.      Reji Carpenter is a 44 year old American male who is seen today for follow-up of asthma and allergic rhinitis. He reports that his asthma has been well controlled. He continues to take Qvar twice daily and montelukast once daily and denies having any side effects with these medications. Reji has rarely needed to use his albuterol and denies having nocturnal asthma symptoms or limitations in his activity. He has had no significant exacerbations or need for prednisone in the past year.    Reji began allergen immunotherapy treatment in 6/2017 and reports he is doing well. He has had no significant large local or systemic reactions to treatment. He feels that he has had 85% improvement in his symptoms with immunotherapy treatment. Reji continues to take loratadine daily and also does a sinus irrigation rinse daily. He is not using any nasal steroid or antihistamines and has not had any sinus infections in the past year.      Past Medical History:   Diagnosis Date     NONSPECIFIC MEDICAL HISTORY 06/06    ruptured achilles tendon  repaired     Family History   Problem Relation Age of Onset     Lipids Father      Hypertension Mother      Cancer Maternal Grandmother         brain     Cancer Paternal Grandmother      Coronary Artery Disease Maternal Grandfather         MI 50s     Coronary Artery Disease Paternal Grandfather         MI 60s     Diabetes No family hx of      C.A.D. No family hx of      Social History     Tobacco Use     Smoking status: Never Smoker     Smokeless tobacco: Never Used   Substance Use Topics     Alcohol use: No     Comment: occasional     Drug use: No       Past medical, family, and social history were reviewed.    REVIEW OF SYSTEMS:  General: negative for weight gain. negative for weight loss. negative for changes in sleep.   Eyes: negative for itching. negative for redness. negative for tearing/watering. negative for vision changes  Ears: negative for fullness. negative for hearing loss. negative for dizziness.   Nose: negative for snoring.negative for changes in smell. negative for drainage.   Throat: negative for hoarseness. negative for sore throat. negative for trouble swallowing.   Lungs: negative for cough. negative for shortness of breath.negative for wheezing. negative for sputum production.   Cardiovascular: negative for chest pain. negative for swelling of ankles. negative for fast or irregular heartbeat.   Gastrointestinal: negative for nausea. negative for heartburn. negative for acid reflux.   Musculoskeletal: negative for joint pain. negative for joint stiffness. negative for joint swelling.   Neurologic: negative for seizures. negative for fainting. negative for weakness.   Psychiatric: negative for changes in mood. negative for anxiety.   Endocrine: negative for cold intolerance. negative for heat intolerance. negative for tremors.   Hematologic: negative for easy bruising. negative for easy bleeding.  Integumentary: negative for rash. negative for scaling. negative for nail changes.       Current  Outpatient Medications:      albuterol (PROAIR HFA/PROVENTIL HFA/VENTOLIN HFA) 108 (90 Base) MCG/ACT inhaler, Inhale 2 puffs into the lungs every 4 hours as needed for shortness of breath / dyspnea or other (cough), Disp: 3 Inhaler, Rfl: 0     beclomethasone HFA (QVAR REDIHALER) 80 MCG/ACT inhaler, Inhale 2 puffs into the lungs 2 times daily, Disp: 3 Inhaler, Rfl: 1     doxycycline hyclate (VIBRAMYCIN) 100 MG capsule, Take 1 capsule (100 mg) by mouth 2 times daily, Disp: 60 capsule, Rfl: 5     loratadine (CLARITIN) 10 MG tablet, Take 1 tablet (10 mg) by mouth daily, Disp: 90 tablet, Rfl: 2     montelukast (SINGULAIR) 10 MG tablet, Take 1 tablet (10 mg) by mouth daily, Disp: 90 tablet, Rfl: 3     ORDER FOR ALLERGEN IMMUNOTHERAPY, Name of Mix: Mix #1  Cat, Dog Cat Hair, Standardized 10,000 BAU/mL, ALK  2.0 ml Dog Hair Dander, A. P.  1:100 w/v, HS  1.0 ml  Diluent: HSA qs to 5ml, Disp: 5 mL, Rfl: PRN     ORDER FOR ALLERGEN IMMUNOTHERAPY, Name of Mix: Mix #2  Mold Alternaria Tenuis 1:10 w/v, HS  0.5 ml Diluent: HSA qs to 5ml, Disp: 5 mL, Rfl: PRN     ORDER FOR ALLERGEN IMMUNOTHERAPY, Name of Mix: Mix #3  Tree , Weeds Narayan, White  1:20 w/v, HS  0.5 ml Boxelder-Maple  Mix BHR (Boxelder Hard Red) 1:20 w/v, HS  0.5 ml Eugene, Common 1:20 w/v, HS  0.5 ml Elm, American 1:20 w/v, HS  0.5 ml Lamb's Quarters 1:20 w/v, HS 0.5 ml Plantain, English 1:20 w/v, HS 0.5 ml Ragweed Mixed 1:20 w/v ALK  0.5 ml Sagebrush, Mugwort 1:20 w/v, HS 0.5 ml Diluent: HSA qs to 5ml, Disp: 5 mL, Rfl: PRN     valsartan (DIOVAN) 80 MG tablet, Take 1 tablet (80 mg) by mouth daily, Disp: 90 tablet, Rfl: 3     EPINEPHrine (AUVI-Q) 0.3 MG/0.3ML injection 2-pack, Inject 0.3 mLs (0.3 mg) into the muscle as needed for anaphylaxis (Patient not taking: Reported on 9/9/2019), Disp: 2 mL, Rfl: 2    EXAM:   BP (!) 141/92 (BP Location: Left arm, Patient Position: Sitting, Cuff Size: Adult Large)   Pulse 72   SpO2 99%   GENERAL APPEARANCE: alert, cooperative and  not in distress  SKIN: no rashes, no lesions  HEAD: atraumatic, normocephalic  EYES: lids and lashes normal, conjunctivae and sclerae clear  ENT: no scars or lesions, nasal exam showed no discharge, swelling or lesions noted, tongue midline and normal, soft palate, uvula, and tonsils normal  NECK: no asymmetry, masses, or scars, supple without significant adenopathy  LUNGS: unlabored respirations, no intercostal retractions or accessory muscle use, clear to auscultation without rales or wheezes  HEART: regular rate and rhythm without murmurs and normal S1 and S2  MUSCULOSKELETAL: no musculoskeletal defects are noted  NEURO: no focal deficits noted  PSYCH: does not appear depressed or anxious      WORKUP:  None    ASSESSMENT/PLAN:  Reji Carpenter is a 44 year old male here for follow-up of asthma and allergic rhinitis. He reports that his asthma has been well controlled over the last 6 months. He has had no exacerbations or need for prednisone. He continues to take Qvar and montelukast as prescribed and has rarely needed to use albuterol. Overall his symptoms have been stable and we discussed stepping down on his asthma therapy and he was agreeable with this.    Reji has completed 2 years of allergen immunotherapy treatment and notes significant improvement in his symptoms. He was counseled regarding the risks, benefits, and anticipated duration of treatment and wishes to continue at this time.    1. Continue Qvar Redihaler 80mcg, 2 puffs twice daily  2. Discontinue montelukast  3. Take 2 to 4 puffs of albuterol HFA every 4 hours as needed  4. Continue allergen immunotherapy treatment per protocol  5. Continue loratadine 10mg daily as needed  6. Follow-up in 6 months, sooner if needed  7. Reji to follow up with Primary Care provider regarding elevated blood pressure.      Thank you for allowing me to participate in the care of Reji Carpenter.      Rosemarie Posada MD  Allergy/Immunology  [place]  [place], [place]      Chart documentation done in part with Dragon Voice Recognition Software. Although reviewed after completion, some word and grammatical errors may remain.    Again, thank you for allowing me to participate in the care of your patient.        Sincerely,        Rosemarie Posada MD

## 2019-09-09 NOTE — PROGRESS NOTES
Patient presented 30 minutes post injection with normal reaction at both injections sites. Several very small hives at left upper arm, and one 20 mm hive at right upper arm with a couple smaller hives. Denies systemic symptoms.     The following medication was given:     MEDICATION:Cetirizine  ROUTE: PO  SITE: mouth  DOSE: 10 mg   LOT #: [id number]  :  [employer]  EXPIRATION DATE:  04/20    Patient will notify us if reaction progresses to large local at next visit.     Rosa Maria Pena RN on 9/9/2019 at 9:56 AM

## 2019-09-10 ASSESSMENT — ASTHMA QUESTIONNAIRES: ACT_TOTALSCORE: 23

## 2019-09-11 ENCOUNTER — MYC MEDICAL ADVICE (OUTPATIENT)
Dept: ALLERGY | Facility: CLINIC | Age: 44
End: 2019-09-11

## 2019-09-11 NOTE — TELEPHONE ENCOUNTER
Responded to pt's MyChart question regarding allergy shot appt.  Will await response.    Shana Edouard RN

## 2019-09-19 ENCOUNTER — TELEPHONE (OUTPATIENT)
Dept: FAMILY MEDICINE | Facility: CLINIC | Age: 44
End: 2019-09-19

## 2019-09-19 NOTE — TELEPHONE ENCOUNTER
Panel Management Review      Patient has the following on his problem list:     Hypertension   Last three blood pressure readings:  BP Readings from Last 3 Encounters:   09/09/19 (!) 141/92   02/13/19 137/80   02/07/19 (!) 154/96     Blood pressure: FAILED    HTN Guidelines:  Less than 140/90      Composite cancer screening  Chart review shows that this patient is due/due soon for the following None  Summary:    Patient is due/failing the following:   BP CHECK    Action needed:   Patient needs office visit for blood pressure check up.    Type of outreach:    Sent [employer] message. 09/19/2019    Questions for provider review:    None                                                                                                                                    Roseann Garber [facility]       Chart routed to Care Team .

## 2019-09-23 ENCOUNTER — ALLIED HEALTH/NURSE VISIT (OUTPATIENT)
Dept: ALLERGY | Facility: CLINIC | Age: 44
End: 2019-09-23
Payer: COMMERCIAL

## 2019-09-23 DIAGNOSIS — Z51.6 NEED FOR DESENSITIZATION TO ALLERGENS: Primary | ICD-10-CM

## 2019-09-23 PROCEDURE — 95117 IMMUNOTHERAPY INJECTIONS: CPT

## 2019-09-23 PROCEDURE — 99207 ZZC DROP WITH A PROCEDURE: CPT

## 2019-09-23 NOTE — PROGRESS NOTES
Patient presented after waiting 30 minutes with normal reaction to allergy injections. Discharged from clinic.    Rosa Maria Pena RN, RN ............   9/23/2019...10:11 AM

## 2019-10-02 NOTE — TELEPHONE ENCOUNTER
Panel Management Review      Patient has the following on his problem list:       Composite cancer screening  Chart review shows that this patient is due/due soon for the following   Summary:    Patient is due/failing the following:       Action needed:       Type of outreach:    patient has reviewed my chart message.    Questions for provider review:    None                                                                                                                                    Roseann Garber CMA       Chart routed to chart closed .

## 2019-10-04 ENCOUNTER — HEALTH MAINTENANCE LETTER (OUTPATIENT)
Age: 44
End: 2019-10-04

## 2019-10-07 ENCOUNTER — ALLIED HEALTH/NURSE VISIT (OUTPATIENT)
Dept: ALLERGY | Facility: CLINIC | Age: 44
End: 2019-10-07
Payer: COMMERCIAL

## 2019-10-07 DIAGNOSIS — Z51.6 NEED FOR DESENSITIZATION TO ALLERGENS: Primary | ICD-10-CM

## 2019-10-07 PROCEDURE — 95117 IMMUNOTHERAPY INJECTIONS: CPT

## 2019-10-07 PROCEDURE — 99207 ZZC DROP WITH A PROCEDURE: CPT

## 2019-10-07 NOTE — PROGRESS NOTES
Patient presented after waiting 30 minutes with normal reaction to allergy injections. Discharged from clinic.    Rosa Maria Pena RN, RN ............   10/7/2019...10:34 AM

## 2019-11-02 ENCOUNTER — HEALTH MAINTENANCE LETTER (OUTPATIENT)
Age: 44
End: 2019-11-02

## 2019-11-05 ENCOUNTER — ALLIED HEALTH/NURSE VISIT (OUTPATIENT)
Dept: ALLERGY | Facility: CLINIC | Age: 44
End: 2019-11-05
Payer: COMMERCIAL

## 2019-11-05 DIAGNOSIS — Z51.6 NEED FOR DESENSITIZATION TO ALLERGENS: Primary | ICD-10-CM

## 2019-11-05 PROCEDURE — 95117 IMMUNOTHERAPY INJECTIONS: CPT

## 2019-11-05 PROCEDURE — 99207 ZZC DROP WITH A PROCEDURE: CPT

## 2019-11-06 NOTE — PROGRESS NOTES
Patient presented after waiting 30 minutes with normal reaction to allergy injections. Discharged from clinic.    Rosa Maria Pena RN, RN ............   11/5/2019...6:06 PM

## 2019-11-10 ENCOUNTER — MYC MEDICAL ADVICE (OUTPATIENT)
Dept: ALLERGY | Facility: CLINIC | Age: 44
End: 2019-11-10

## 2019-12-03 ENCOUNTER — ALLIED HEALTH/NURSE VISIT (OUTPATIENT)
Dept: ALLERGY | Facility: CLINIC | Age: 44
End: 2019-12-03
Payer: COMMERCIAL

## 2019-12-03 DIAGNOSIS — Z51.6 NEED FOR DESENSITIZATION TO ALLERGENS: Primary | ICD-10-CM

## 2019-12-03 PROCEDURE — 99207 ZZC NO CHARGE NURSE ONLY: CPT

## 2019-12-03 NOTE — PROGRESS NOTES
Patient presents for allergy injections today. RN assessed patient is congested, coughing occasionally. Patient states he has a new cold, is having nasal drainage/congestion and taking cold medicine to feel okay. Discuss rescheduling injections to next week when patient is feeling better and assisted with this. Injections not given today.     Rosa Maria Pena RN on 12/3/2019 at 5:28 PM

## 2019-12-10 ENCOUNTER — ALLIED HEALTH/NURSE VISIT (OUTPATIENT)
Dept: ALLERGY | Facility: CLINIC | Age: 44
End: 2019-12-10
Payer: COMMERCIAL

## 2019-12-10 DIAGNOSIS — Z51.6 NEED FOR DESENSITIZATION TO ALLERGENS: Primary | ICD-10-CM

## 2019-12-10 PROCEDURE — 95117 IMMUNOTHERAPY INJECTIONS: CPT

## 2019-12-10 PROCEDURE — 99207 ZZC DROP WITH A PROCEDURE: CPT

## 2019-12-10 NOTE — PROGRESS NOTES
Immunotherapy 11/5/2019   Serum #1 Antigen(s) M   Expiration Date #1 7/11/2020   Vial Concentration #1 1:1 (Red)   Dose (mL) #1 0.5   Location #1 JUMA   Antigen Top Dose #1 0.5   Comment #1 red   Given By ls   Verified By te   Serum #2 Antigen(s) T;W   Expiration Date #2 7/11/2020   Vial Concentration #2 1:1 (Red)   Dose (mL)#2 0.5   Location #2 [facility]   Antigen Top Dose #2 0.5   Comment #2 red   Given By ls   Verified By te   Serum #3 Antigen(s) C;D   Expiration Date #3 7/11/2020   Vial Concentration #3 1:1 (Red)   Dose (mL) #3 0.5   Location #3 RLA   Antigen Top Dose #3 0.5   Comment #3 red   Given By ls   Verified By te

## 2019-12-11 NOTE — PROGRESS NOTES
Patient presented after waiting 30 minutes with normal reaction to allergy injections. Discharged from clinic.    Rosa Maria Pena RN, RN ............   12/10/2019...6:10 PM

## 2020-01-14 ENCOUNTER — ALLIED HEALTH/NURSE VISIT (OUTPATIENT)
Dept: ALLERGY | Facility: CLINIC | Age: 45
End: 2020-01-14
Payer: COMMERCIAL

## 2020-01-14 DIAGNOSIS — Z51.6 NEED FOR DESENSITIZATION TO ALLERGENS: Primary | ICD-10-CM

## 2020-01-14 PROCEDURE — 95117 IMMUNOTHERAPY INJECTIONS: CPT

## 2020-01-14 PROCEDURE — 99207 ZZC DROP WITH A PROCEDURE: CPT

## 2020-01-14 NOTE — PROGRESS NOTES
Immunotherapy 12/10/2019   Does patient have their Epi Pen today that is not ?   Yes   Serum #1 Antigen(s) M   Expiration Date #1 2020   Vial Concentration #1 1:1 (Red)   Dose (mL) #1 0.5   Location #1 [facility]   Antigen Top Dose #1 0.5   Comment #1 red    Given By te   Verified By ls   Serum #2 Antigen(s) T;W   Expiration Date #2 2020   Vial Concentration #2 1:1 (Red)   Dose (mL)#2 0.5   Location #2 [facility]   Antigen Top Dose #2 0.5   Comment #2 red    Given By te   Verified By ls   Serum #3 Antigen(s) C;D   Expiration Date #3 2020   Vial Concentration #3 1:1 (Red)   Dose (mL) #3 0.5   Location #3 RLA   Antigen Top Dose #3 0.5   Comment #3 red    Given By te   Verified By ls

## 2020-01-15 NOTE — PROGRESS NOTES
Patient presented after waiting 30 minutes with normal reaction to allergy injections. Discharged from clinic.    Rosa Maria Pena RN, RN ............   1/14/2020...6:15 PM

## 2020-01-20 ENCOUNTER — MYC MEDICAL ADVICE (OUTPATIENT)
Dept: ALLERGY | Facility: CLINIC | Age: 45
End: 2020-01-20

## 2020-01-21 NOTE — TELEPHONE ENCOUNTER
Scheduled patient for 2/11/2020 at 5:30pm. [employer] message sent to patient to notify him of the date and time of the appointment.  Jolene Osborne MA

## 2020-02-03 ENCOUNTER — OFFICE VISIT (OUTPATIENT)
Dept: FAMILY MEDICINE | Facility: CLINIC | Age: 45
End: 2020-02-03
Payer: COMMERCIAL

## 2020-02-03 VITALS
SYSTOLIC BLOOD PRESSURE: 160 MMHG | DIASTOLIC BLOOD PRESSURE: 102 MMHG | BODY MASS INDEX: 26.9 KG/M2 | HEART RATE: 66 BPM | TEMPERATURE: 98 F | WEIGHT: 198.6 LBS | HEIGHT: 72 IN

## 2020-02-03 DIAGNOSIS — I10 HYPERTENSION GOAL BP (BLOOD PRESSURE) < 140/90: Primary | ICD-10-CM

## 2020-02-03 DIAGNOSIS — L82.1 SEBORRHEIC KERATOSES: ICD-10-CM

## 2020-02-03 PROCEDURE — 99213 OFFICE O/P EST LOW 20 MIN: CPT | Mod: 25 | Performed by: PHYSICIAN ASSISTANT

## 2020-02-03 PROCEDURE — 17110 DESTRUCTION B9 LES UP TO 14: CPT | Performed by: PHYSICIAN ASSISTANT

## 2020-02-03 RX ORDER — VALSARTAN 80 MG/1
160 TABLET ORAL DAILY
Qty: 90 TABLET | Refills: 3
Start: 2020-02-03 | End: 2020-03-20

## 2020-02-03 ASSESSMENT — MIFFLIN-ST. JEOR: SCORE: 1835.84

## 2020-02-03 NOTE — PATIENT INSTRUCTIONS
Take 160mg of the valsartan, (2 of the 80mg) daily.   Check your blood pressure every morning and send Dr. Kaplan or Juana a [employer] message in 1 week with the blood pressure numbers.   Patient Education     Understanding Seborrheic Keratosis  Seborrheic keratoses are wart-like growths on the skin. These growths are not cancer. Many people get them, especially after age 30.  How to say it  [id number] [id number]-WDE-sis   What causes seborrheic keratoses?  Doctors do not know what causes seborrheic keratoses. They may run in families. In addition, they become more common as people get older.  What are the symptoms of seborrheic keratoses?  Here is what seborrheic keratoses often look like:    They tend to be round or oval in shape. They can be very small or about as big across as a quarter.    They can appear singly or in clusters.    They tend to be tan, brown, or black in color.    The edges may be scalloped or uneven-looking.    They can have a waxy or scaly look.    They can be a bit raised, appearing to be  stuck on  the skin.    They may become red and irritated if scratched or rubbed by clothing  Sebhorreic keratoses are not painful, but they may be itchy. They can become irritated if they are continually rubbed by skin or clothing. Seborrheic keratoses most often appear on the face, arms, chest, back, or belly.  How are seborrheic keratoses treated?  Seborrheic keratoses don t need to be treated unless they bother you. You may choose to have them removed because you find them unattractive. You may also want them removed because they get irritated and uncomfortable. A healthcare provider can remove them in an office visit. Ways that seborrheic keratoses can be removed include:    Freezing them off with liquid nitrogen    Cutting them off with a scalpel    Burning them off with electricity  What are the complications of seborrheic keratoses?  Seborrheic keratoses are not cancer, but they can look like some types of  skin cancer. So it s a good idea to ask your healthcare provider to check any new skin growths. Ask your healthcare provider about any skin problem that concerns you.  When should I call my healthcare provider?  Call your healthcare provider right away if any of these occur:    You develop a lot of seborrheic keratoses very quickly    You have a sore that does not heal within a few weeks, or heals and then comes back    You have a mole or skin growth that is changing in size, shape, or color    You have a mole or skin growth that looks different on one side from the other    You have a mole or skin growth that is not the same color throughout   Date Last Reviewed: 5/1/2016 2000-2019 [employer]. [address] [place] [address], [place] [address]. All rights reserved. This information is not intended as a substitute for professional medical care. Always follow your healthcare professional's instructions.

## 2020-02-03 NOTE — PROGRESS NOTES
"Subjective     Reji Kale Carpenter is a 44 year old male who presents to clinic today for the following health issues:    HPI   Pt would like a mole checked on his back.    Just noticed it over the past 1-2 months.   It feels like a scab, rough.   No bleeding. No pain.   He has had moles removed in the past. No precancerous lesion.   Mother with skin cancer. Likely BCC>     Elevated BP- patient has home BP cuff, has not checked at home in over 1 year.   His last office visit in 9/19 was elevated slightly as well.   Takes his meds at night.         Reviewed and updated as needed this visit by Provider  Tobacco  Allergies  Meds  Problems  Med Hx  Surg Hx  Fam Hx         Review of Systems   ROS COMP: Constitutional, HEENT, cardiovascular, pulmonary, gi and gu systems are negative, except as otherwise noted.      Objective    BP (!) 160/102   Pulse 66   Temp 98  F (36.7  C) (Oral)   Ht 1.84 m (6' 0.44\")   Wt 90.1 kg (198 lb 9.6 oz)   BMI 26.61 kg/m    Body mass index is 26.61 kg/m .  Physical Exam   GENERAL: healthy, alert and no distress  SKIN: on the left mid back there is a raised rough stuck on appearing lesion. This was treated with cryotherapy and patient tolerated well.     Diagnostic Test Results:  none         Assessment & Plan     1. Hypertension goal BP (blood pressure) < 140/90  Double the valsartan to 160mg daily. Check home BP's for 1 week and report to me or PCP.     2. Seborrheic keratoses  Advised follow up in 2 weeks if not completely resolved.   - DESTRUCT BENIGN LESION, UP TO 14         Return for BP Recheck.    Juana Sharma PA-C  [facility]      "

## 2020-02-18 ENCOUNTER — ALLIED HEALTH/NURSE VISIT (OUTPATIENT)
Dept: ALLERGY | Facility: CLINIC | Age: 45
End: 2020-02-18
Payer: COMMERCIAL

## 2020-02-18 DIAGNOSIS — J30.1 SEASONAL ALLERGIC RHINITIS DUE TO POLLEN: Primary | ICD-10-CM

## 2020-02-18 PROCEDURE — 99207 ZZC DROP WITH A PROCEDURE: CPT

## 2020-02-18 PROCEDURE — 95117 IMMUNOTHERAPY INJECTIONS: CPT

## 2020-02-19 NOTE — PROGRESS NOTES
Patient presented after waiting 30 minutes with normal reaction to allergy injections. Discharged from clinic.    Rosa Maria Pena RN, RN ............   2/18/2020...6:03 PM

## 2020-03-01 ENCOUNTER — MYC MEDICAL ADVICE (OUTPATIENT)
Dept: ALLERGY | Facility: CLINIC | Age: 45
End: 2020-03-01

## 2020-03-05 ENCOUNTER — MYC MEDICAL ADVICE (OUTPATIENT)
Dept: ALLERGY | Facility: CLINIC | Age: 45
End: 2020-03-05

## 2020-03-05 DIAGNOSIS — J45.30 MILD PERSISTENT ASTHMA WITHOUT COMPLICATION: ICD-10-CM

## 2020-03-09 RX ORDER — ALBUTEROL SULFATE 90 UG/1
2 AEROSOL, METERED RESPIRATORY (INHALATION) EVERY 4 HOURS PRN
Qty: 3 INHALER | Refills: 0 | Status: SHIPPED | OUTPATIENT
Start: 2020-03-09 | End: 2020-09-15

## 2020-03-20 DIAGNOSIS — I10 HYPERTENSION GOAL BP (BLOOD PRESSURE) < 140/90: ICD-10-CM

## 2020-03-20 RX ORDER — VALSARTAN 80 MG/1
160 TABLET ORAL DAILY
Qty: 90 TABLET | Refills: 0 | Status: SHIPPED | OUTPATIENT
Start: 2020-03-20 | End: 2020-03-24

## 2020-03-20 NOTE — TELEPHONE ENCOUNTER
Reason for Call:  Other prescription    Detailed comments: Patient is out of medication pended.    Phone Number Kavitha Youngblood  [phone]     Best Time:     Can we leave a detailed message on this number? YES    Call taken on 3/20/2020 at 9:19 AM by Angela Lam

## 2020-03-23 ENCOUNTER — MYC MEDICAL ADVICE (OUTPATIENT)
Dept: FAMILY MEDICINE | Facility: CLINIC | Age: 45
End: 2020-03-23

## 2020-03-23 DIAGNOSIS — I10 HYPERTENSION GOAL BP (BLOOD PRESSURE) < 140/90: ICD-10-CM

## 2020-03-24 RX ORDER — VALSARTAN 80 MG/1
160 TABLET ORAL DAILY
Qty: 180 TABLET | Refills: 0 | Status: SHIPPED | OUTPATIENT
Start: 2020-03-24 | End: 2020-06-18

## 2020-03-24 NOTE — TELEPHONE ENCOUNTER
Patient due to see allergy in April so Dr. Houston only refilled singulair for 30 days.    I see valsartan was sent by Dr. Kaplan, but patient takes 2 tabs daily, only 90 tab refill sent.       I routed message back to patient advising allergy will address longer refill singulair at April visit and routed valsartan refill to Dr. Kaplan, okay to send 90 days?  I adjusted the dispense.    Africa Malagon RN  [facility]

## 2020-03-24 NOTE — TELEPHONE ENCOUNTER
Yes did 3 months supply    See us in clinic in 3 months    Please inform patient    Wili Kaplan MD

## 2020-04-08 ENCOUNTER — MYC MEDICAL ADVICE (OUTPATIENT)
Dept: ALLERGY | Facility: CLINIC | Age: 45
End: 2020-04-08

## 2020-04-13 ENCOUNTER — COMMUNICATION - HEALTHEAST (OUTPATIENT)
Dept: ALLERGY | Facility: CLINIC | Age: 45
End: 2020-04-13

## 2020-04-13 ENCOUNTER — TELEPHONE (OUTPATIENT)
Dept: ALLERGY | Facility: CLINIC | Age: 45
End: 2020-04-13

## 2020-04-13 DIAGNOSIS — J45.30 MILD PERSISTENT ASTHMA WITHOUT COMPLICATION: ICD-10-CM

## 2020-04-13 RX ORDER — MONTELUKAST SODIUM 10 MG/1
10 TABLET ORAL DAILY
Qty: 30 TABLET | Refills: 0 | Status: SHIPPED | OUTPATIENT
Start: 2020-04-13 | End: 2020-05-13

## 2020-04-13 NOTE — TELEPHONE ENCOUNTER
Rx filled for 30 day supply. Patient is overdue for follow-up visit. Please schedule video visit for additional medication refills.

## 2020-04-13 NOTE — TELEPHONE ENCOUNTER
Routed to provider to review and advise     Patient called clinic to cancel appointment for 4/14/2020 with provider.  Patient stated that during this time he would like to request a refill for medication   Patient declined to proceed with office visit at this time. Patient stated that he will like a message sent to provider about medication refills.   Patient was told by clinical assistant that a kobi refill was provided on 3/9/2020 and was unsure if provider will issue another refill but will still send message to provider to review and advise  Patient stated that he will look at his work schedule to call back for an appointment    Mona Ferreira MA on 4/13/2020 at 11:55 AM

## 2020-04-13 NOTE — TELEPHONE ENCOUNTER
Patient had appointment scheduled for tomorrow which has been canceled. Sent patient notification that 30 day supply was granted and virtual visit is needed for further refills.       Miracle Johnson RN

## 2020-05-05 ENCOUNTER — TELEPHONE (OUTPATIENT)
Dept: ALLERGY | Facility: CLINIC | Age: 45
End: 2020-05-05

## 2020-05-05 NOTE — TELEPHONE ENCOUNTER
Pt calling clinic back as he needs medication refill. Allergy shot scheduled/provider visit scheduled.  Pt last dose 2/18/2020,85 days, Red 0.5 mL received.      Please advise new dosing orders, building schedule, and date which orders are valid through.      New orders will be added to immunotherapy flowsheet once they are received.

## 2020-05-05 NOTE — TELEPHONE ENCOUNTER
Pt calling clinic back. He will call back later time to schedule allergy shots.       Ana Roman MA     Walk in

## 2020-05-05 NOTE — TELEPHONE ENCOUNTER
Pt due for allergy injection/provider visit. Left message to call back and schedule.      Ana Roman MA

## 2020-05-13 ENCOUNTER — OFFICE VISIT (OUTPATIENT)
Dept: ALLERGY | Facility: CLINIC | Age: 45
End: 2020-05-13
Payer: COMMERCIAL

## 2020-05-13 ENCOUNTER — ALLIED HEALTH/NURSE VISIT (OUTPATIENT)
Dept: ALLERGY | Facility: CLINIC | Age: 45
End: 2020-05-13
Payer: COMMERCIAL

## 2020-05-13 VITALS — HEART RATE: 79 BPM | BODY MASS INDEX: 27.28 KG/M2 | WEIGHT: 203.6 LBS | OXYGEN SATURATION: 100 %

## 2020-05-13 DIAGNOSIS — J45.30 MILD PERSISTENT ASTHMA WITHOUT COMPLICATION: Primary | ICD-10-CM

## 2020-05-13 DIAGNOSIS — Z51.6 NEED FOR DESENSITIZATION TO ALLERGENS: Primary | ICD-10-CM

## 2020-05-13 DIAGNOSIS — Z91.09 OTHER ALLERGY STATUS, OTHER THAN TO DRUGS AND BIOLOGICAL SUBSTANCES: ICD-10-CM

## 2020-05-13 PROCEDURE — 95117 IMMUNOTHERAPY INJECTIONS: CPT

## 2020-05-13 PROCEDURE — 99207 ZZC DROP WITH A PROCEDURE: CPT

## 2020-05-13 PROCEDURE — 99213 OFFICE O/P EST LOW 20 MIN: CPT | Mod: 25 | Performed by: ALLERGY & IMMUNOLOGY

## 2020-05-13 RX ORDER — TRIAMCINOLONE ACETONIDE 55 UG/1
1 SPRAY, METERED NASAL DAILY
COMMUNITY

## 2020-05-13 RX ORDER — MONTELUKAST SODIUM 10 MG/1
10 TABLET ORAL DAILY
Qty: 90 TABLET | Refills: 1 | Status: SHIPPED | OUTPATIENT
Start: 2020-05-13 | End: 2020-08-06

## 2020-05-13 RX ORDER — CETIRIZINE HYDROCHLORIDE 10 MG/1
10 TABLET ORAL DAILY
Refills: 0 | COMMUNITY
Start: 2020-05-13

## 2020-05-13 NOTE — NURSING NOTE
Patient assessed and discharged from clinic by Dr. Poasda.    Radha Liu RN, RN ............   5/13/2020...1:01 PM

## 2020-05-13 NOTE — PROGRESS NOTES
Immunotherapy 2020   Interval from last injection (days): 35   Has the vial ? No   Reaction with last injection?(historical)    Did you have a reaction after the last visit? No   Are you ill today? No   Asthma exacerbation or symptoms No   Do you have a fever today? No   Are you pregnant? (historical)    Are you on any beta blockers? No   Does the patient need to be pre-medicated? No   Does patient have their Epi Pen today that is not ?   Yes   Serum #1 Antigen(s) M   Expiration Date #1 2020   Vial Concentration #1 1:1 (Red)   Dose (mL) #1 0.5   Location #1 [facility]   Antigen Top Dose #1 0.5   Comment #1 red    Given By te   Verified By ls   Add Additional Sera?    Serum #2 Antigen(s) T;W   Expiration Date #2 2020   Vial Concentration #2 1:1 (Red)   Dose (mL)#2 0.5   Location #2 [facility]   Antigen Top Dose #2 0.5   Comment #2 red    Given By te   Verified By ls   Serum #3 Antigen(s) C;D   Expiration Date #3 2020   Vial Concentration #3 1:1 (Red)   Dose (mL) #3 0.5   Location #3 RLA   Antigen Top Dose #3 0.5   Comment #3 red    Given By te   Verified By ls

## 2020-05-13 NOTE — PROGRESS NOTES
"Reji Carpenter was seen in the Allergy Clinic at [facility].      Reji Carpenter is a 44 year old American male who is seen today for follow-up of asthma and allergic rhinitis. He feels that his asthma has been well controlled. He has not had any urgent care or ED visits for asthma and no acute exacerbations in the last 6 months. He continues to take 1 puff of Qvar twice daily along with montelukast daily. Reji does report that for the last 4-6 weeks he has been waking up once a week between 4 and 5 AM with wheezing. He will try to cough and clear his chest and often uses his albuterol inhaler. He has not had symptoms during the daytime unless it is related to exercise. He tries to avoid pre-medicating and wants to see if he can \"push through\" when working out.    Reji began allergen immunotherapy treatment in 6/2017 and reached his maintenance dose in 2/2018. He has not had any systemic or significant large local reactions to treatment. He feels that his allergic rhinitis symptoms have significantly improved since beginning treatment and he is taking antihistamines only as-needed.      Past Medical History:   Diagnosis Date     Allergic rhinitis      Asthma      Hypertension      NONSPECIFIC MEDICAL HISTORY 06/06    ruptured achilles tendon repaired     Family History   Problem Relation Age of Onset     Lipids Father      Hypertension Mother      Cancer Maternal Grandmother         brain     Cancer Paternal Grandmother      Coronary Artery Disease Maternal Grandfather         MI 50s     Coronary Artery Disease Paternal Grandfather         MI 60s     Diabetes No family hx of      C.A.D. No family hx of      Social History     Tobacco Use     Smoking status: Never Smoker     Smokeless tobacco: Never Used   Substance Use Topics     Alcohol use: No     Comment: occasional     Drug use: No     Social History     Social History Narrative    ENVIRONMENTAL HISTORY: [place] The home is heated with a forced air. They do have central air conditioning. The patient's bedroom is furnished with carpeting in bedroom, allergen mattress cover, allergen pillowcase cover and fabric window coverings.  Pets inside the house include 3 dog(s). There is no history of cockroach or mice infestation. There is/are 0 smokers in the house.  The house does not have a damp basement.          SOCIAL HISTORY:     Reji is employed as a . He lives with his spouse. His spouse works as a nurse.       Past medical, family, and social history were reviewed.    REVIEW OF SYSTEMS:  General: negative for weight gain. negative for weight loss. negative for changes in sleep.   Eyes: negative for itching. negative for redness. negative for tearing/watering. negative for vision changes  Ears: negative for fullness. negative for hearing loss. negative for dizziness.   Nose: negative for snoring.negative for changes in smell. negative for drainage.   Throat: negative for hoarseness. negative for sore throat. negative for trouble swallowing.   Lungs: negative for cough. negative for shortness of breath.negative for wheezing. negative for sputum production.   Cardiovascular: negative for chest pain. negative for swelling of ankles. negative for fast or irregular heartbeat.   Gastrointestinal: negative for nausea. negative for heartburn. negative for acid reflux.   Musculoskeletal: negative for joint pain. negative for joint stiffness. negative for joint swelling.   Neurologic: negative for seizures. negative for fainting. negative for weakness.   Psychiatric: negative for changes in mood. negative for anxiety.   Endocrine: negative for cold intolerance. negative for heat intolerance. negative for tremors.   Hematologic: negative for easy bruising. negative for easy bleeding.  Integumentary: negative for rash. negative for scaling. negative for nail changes.       Current Outpatient  Medications:      albuterol (PROAIR HFA/PROVENTIL HFA/VENTOLIN HFA) 108 (90 Base) MCG/ACT inhaler, Inhale 2 puffs into the lungs every 4 hours as needed for shortness of breath / dyspnea or other (cough), Disp: 3 Inhaler, Rfl: 0     beclomethasone HFA (QVAR REDIHALER) 80 MCG/ACT inhaler, Inhale 2 puffs into the lungs 2 times daily, Disp: 3 Inhaler, Rfl: 1     cetirizine (ZYRTEC) 10 MG tablet, Take 1 tablet (10 mg) by mouth daily, Disp: , Rfl: 0     montelukast (SINGULAIR) 10 MG tablet, Take 1 tablet (10 mg) by mouth daily, Disp: 90 tablet, Rfl: 1     ORDER FOR ALLERGEN IMMUNOTHERAPY, Name of Mix: Mix #1  Cat, Dog Cat Hair, Standardized 10,000 BAU/mL, ALK  2.0 ml Dog Hair Dander, A. P.  1:100 w/v, HS  1.0 ml  Diluent: HSA qs to 5ml, Disp: 5 mL, Rfl: PRN     ORDER FOR ALLERGEN IMMUNOTHERAPY, Name of Mix: Mix #2  Mold Alternaria Tenuis 1:10 w/v, HS  0.5 ml Diluent: HSA qs to 5ml, Disp: 5 mL, Rfl: PRN     ORDER FOR ALLERGEN IMMUNOTHERAPY, Name of Mix: Mix #3  Tree , Weeds Narayna, White  1:20 w/v, HS  0.5 ml Boxelder-Maple  Mix BHR (Boxelder Hard Red) 1:20 w/v, HS  0.5 ml [place], Common 1:20 w/v, HS  0.5 ml Elm, American 1:20 w/v, HS  0.5 ml Lamb's Quarters 1:20 w/v, HS 0.5 ml Plantain, English 1:20 w/v, HS 0.5 ml Ragweed Mixed 1:20 w/v ALK  0.5 ml Sagebrush, Mugwort 1:20 w/v, HS 0.5 ml Diluent: HSA qs to 5ml, Disp: 5 mL, Rfl: PRN     triamcinolone (NASACORT) 55 MCG/ACT nasal aerosol, Spray 1 spray into both nostrils daily, Disp: , Rfl:      valsartan (DIOVAN) 80 MG tablet, Take 2 tablets (160 mg) by mouth daily, Disp: 180 tablet, Rfl: 0     EPINEPHrine (AUVI-Q) 0.3 MG/0.3ML injection 2-pack, Inject 0.3 mLs (0.3 mg) into the muscle as needed for anaphylaxis (Patient not taking: Reported on 9/9/2019), Disp: 2 mL, Rfl: 2    EXAM:   Pulse 79   Wt 92.4 kg (203 lb 9.6 oz)   SpO2 100%   BMI 27.28 kg/m    GENERAL APPEARANCE: alert, cooperative and not in distress  SKIN: no rashes, no lesions  HEAD: atraumatic,  normocephalic  EYES: lids and lashes normal, conjunctivae and sclerae clear  ENT: no scars or lesions, nasal exam showed no discharge, swelling or lesions noted, tongue midline and normal, soft palate, uvula, and tonsils normal  NECK: no asymmetry, masses, or scars, supple without significant adenopathy  LUNGS: unlabored respirations, no intercostal retractions or accessory muscle use, clear to auscultation without rales or wheezes  HEART: regular rate and rhythm without murmurs and normal S1 and S2  MUSCULOSKELETAL: no musculoskeletal defects are noted  NEURO: no focal deficits noted  PSYCH: does not appear depressed or anxious      WORKUP:  None    ASSESSMENT/PLAN:  Reji Carpenter is a 44 year old male seen for follow-up of asthma and allergic rhinitis.    1. Mild persistent asthma without complication - has been controlled on ICS therapy though he reports weekly nocturnal symptoms for the past several weeks.    - increased Qvar to 2 puffs twice daily  - take 2 to 4 puffs of albuterol HFA every 4 hours as needed, advised to also pre-medicate regularly prior to exercise - 2 puffs 15 minutes prior to activity  - montelukast (SINGULAIR) 10 MG tablet; Take 1 tablet (10 mg) by mouth daily  Dispense: 90 tablet; Refill: 1  - beclomethasone HFA (QVAR REDIHALER) 80 MCG/ACT inhaler; Inhale 2 puffs into the lungs 2 times daily  Dispense: 3 Inhaler; Refill: 1    2. Other allergy status, other than to drugs and biological substances - he has completed 2 years of immunotherapy treatment at his maintenance dose with notable improvement in symptoms. He was counseled regarding the risks and benefits of continuing immunotherapy treatment as well as the recommended duration of treatment. He wishes to continue at this time.    - continue allergen immunotherapy per protocol  - epinephrine auto-injector no longer required for immunotherapy      Follow-up in 6 months      Thank you for allowing me to participate in the care of  Reji Carpenter.      Rosemarie Posada MD  Allergy/Immunology  [facility]      Chart documentation done in part with Dragon Voice Recognition Software. Although reviewed after completion, some word and grammatical errors may remain.

## 2020-05-13 NOTE — LETTER
"    5/13/2020         RE: Reji Carpenter  [address] [facility] [place] [address]        Dear Colleague,    Thank you for referring your patient, Reji Carpenter, to the [facility]. Please see a copy of my visit note below.    Reji Carpenter was seen in the Allergy Clinic at [facility].      Reji Carpenter is a 44 year old American male who is seen today for follow-up of asthma and allergic rhinitis. He feels that his asthma has been well controlled. He has not had any urgent care or ED visits for asthma and no acute exacerbations in the last 6 months. He continues to take 1 puff of Qvar twice daily along with montelukast daily. Reji does report that for the last 4-6 weeks he has been waking up once a week between 4 and 5 AM with wheezing. He will try to cough and clear his chest and often uses his albuterol inhaler. He has not had symptoms during the daytime unless it is related to exercise. He tries to avoid pre-medicating and wants to see if he can \"push through\" when working out.    Reji began allergen immunotherapy treatment in 6/2017 and reached his maintenance dose in 2/2018. He has not had any systemic or significant large local reactions to treatment. He feels that his allergic rhinitis symptoms have significantly improved since beginning treatment and he is taking antihistamines only as-needed.      Past Medical History:   Diagnosis Date     Allergic rhinitis      Asthma      Hypertension      NONSPECIFIC MEDICAL HISTORY 06/06    ruptured achilles tendon repaired     Family History   Problem Relation Age of Onset     Lipids Father      Hypertension Mother      Cancer Maternal Grandmother         brain     Cancer Paternal Grandmother      Coronary Artery Disease Maternal Grandfather         MI 50s     Coronary Artery Disease Paternal Grandfather         MI 60s     Diabetes No family hx of      C.A.D. No family hx of      Social History     Tobacco " Use     Smoking status: Never Smoker     Smokeless tobacco: Never Used   Substance Use Topics     Alcohol use: No     Comment: occasional     Drug use: No     Social History     Social History Narrative    ENVIRONMENTAL HISTORY: [place] The home is heated with a forced air. They do have central air conditioning. The patient's bedroom is furnished with carpeting in bedroom, allergen mattress cover, allergen pillowcase cover and fabric window coverings.  Pets inside the house include 3 dog(s). There is no history of cockroach or mice infestation. There is/are 0 smokers in the house.  The house does not have a damp basement.          SOCIAL HISTORY:     Reji is employed as a . He lives with his spouse. His spouse works as a nurse.       Past medical, family, and social history were reviewed.    REVIEW OF SYSTEMS:  General: negative for weight gain. negative for weight loss. negative for changes in sleep.   Eyes: negative for itching. negative for redness. negative for tearing/watering. negative for vision changes  Ears: negative for fullness. negative for hearing loss. negative for dizziness.   Nose: negative for snoring.negative for changes in smell. negative for drainage.   Throat: negative for hoarseness. negative for sore throat. negative for trouble swallowing.   Lungs: negative for cough. negative for shortness of breath.negative for wheezing. negative for sputum production.   Cardiovascular: negative for chest pain. negative for swelling of ankles. negative for fast or irregular heartbeat.   Gastrointestinal: negative for nausea. negative for heartburn. negative for acid reflux.   Musculoskeletal: negative for joint pain. negative for joint stiffness. negative for joint swelling.   Neurologic: negative for seizures. negative for fainting. negative for weakness.   Psychiatric: negative for changes in mood. negative for anxiety.   Endocrine: negative for  cold intolerance. negative for heat intolerance. negative for tremors.   Hematologic: negative for easy bruising. negative for easy bleeding.  Integumentary: negative for rash. negative for scaling. negative for nail changes.       Current Outpatient Medications:      albuterol (PROAIR HFA/PROVENTIL HFA/VENTOLIN HFA) 108 (90 Base) MCG/ACT inhaler, Inhale 2 puffs into the lungs every 4 hours as needed for shortness of breath / dyspnea or other (cough), Disp: 3 Inhaler, Rfl: 0     beclomethasone HFA (QVAR REDIHALER) 80 MCG/ACT inhaler, Inhale 2 puffs into the lungs 2 times daily, Disp: 3 Inhaler, Rfl: 1     cetirizine (ZYRTEC) 10 MG tablet, Take 1 tablet (10 mg) by mouth daily, Disp: , Rfl: 0     montelukast (SINGULAIR) 10 MG tablet, Take 1 tablet (10 mg) by mouth daily, Disp: 90 tablet, Rfl: 1     ORDER FOR ALLERGEN IMMUNOTHERAPY, Name of Mix: Mix #1  Cat, Dog Cat Hair, Standardized 10,000 BAU/mL, ALK  2.0 ml Dog Hair Dander, A. P.  1:100 w/v, HS  1.0 ml  Diluent: HSA qs to 5ml, Disp: 5 mL, Rfl: PRN     ORDER FOR ALLERGEN IMMUNOTHERAPY, Name of Mix: Mix #2  Mold Alternaria Tenuis 1:10 w/v, HS  0.5 ml Diluent: HSA qs to 5ml, Disp: 5 mL, Rfl: PRN     ORDER FOR ALLERGEN IMMUNOTHERAPY, Name of Mix: Mix #3  Tree , Weeds Narayan, White  1:20 w/v, HS  0.5 ml Boxelder-Maple  Mix BHR (Boxelder Hard Red) 1:20 w/v, HS  0.5 ml [place], Common 1:20 w/v, HS  0.5 ml Elm, American 1:20 w/v, HS  0.5 ml Lamb's Quarters 1:20 w/v, HS 0.5 ml Plantain, English 1:20 w/v, HS 0.5 ml Ragweed Mixed 1:20 w/v ALK  0.5 ml Sagebrush, Mugwort 1:20 w/v, HS 0.5 ml Diluent: HSA qs to 5ml, Disp: 5 mL, Rfl: PRN     triamcinolone (NASACORT) 55 MCG/ACT nasal aerosol, Spray 1 spray into both nostrils daily, Disp: , Rfl:      valsartan (DIOVAN) 80 MG tablet, Take 2 tablets (160 mg) by mouth daily, Disp: 180 tablet, Rfl: 0     EPINEPHrine (AUVI-Q) 0.3 MG/0.3ML injection 2-pack, Inject 0.3 mLs (0.3 mg) into the muscle as needed for anaphylaxis (Patient not  taking: Reported on 9/9/2019), Disp: 2 mL, Rfl: 2    EXAM:   Pulse 79   Wt 92.4 kg (203 lb 9.6 oz)   SpO2 100%   BMI 27.28 kg/m    GENERAL APPEARANCE: alert, cooperative and not in distress  SKIN: no rashes, no lesions  HEAD: atraumatic, normocephalic  EYES: lids and lashes normal, conjunctivae and sclerae clear  ENT: no scars or lesions, nasal exam showed no discharge, swelling or lesions noted, tongue midline and normal, soft palate, uvula, and tonsils normal  NECK: no asymmetry, masses, or scars, supple without significant adenopathy  LUNGS: unlabored respirations, no intercostal retractions or accessory muscle use, clear to auscultation without rales or wheezes  HEART: regular rate and rhythm without murmurs and normal S1 and S2  MUSCULOSKELETAL: no musculoskeletal defects are noted  NEURO: no focal deficits noted  PSYCH: does not appear depressed or anxious      WORKUP:  None    ASSESSMENT/PLAN:  Reji Carpenter is a 44 year old male seen for follow-up of asthma and allergic rhinitis.    1. Mild persistent asthma without complication - has been controlled on ICS therapy though he reports weekly nocturnal symptoms for the past several weeks.    - increased Qvar to 2 puffs twice daily  - take 2 to 4 puffs of albuterol HFA every 4 hours as needed, advised to also pre-medicate regularly prior to exercise - 2 puffs 15 minutes prior to activity  - montelukast (SINGULAIR) 10 MG tablet; Take 1 tablet (10 mg) by mouth daily  Dispense: 90 tablet; Refill: 1  - beclomethasone HFA (QVAR REDIHALER) 80 MCG/ACT inhaler; Inhale 2 puffs into the lungs 2 times daily  Dispense: 3 Inhaler; Refill: 1    2. Other allergy status, other than to drugs and biological substances - he has completed 2 years of immunotherapy treatment at his maintenance dose with notable improvement in symptoms. He was counseled regarding the risks and benefits of continuing immunotherapy treatment as well as the recommended duration of treatment. He  wishes to continue at this time.    - continue allergen immunotherapy per protocol  - epinephrine auto-injector no longer required for immunotherapy      Follow-up in 6 months      Thank you for allowing me to participate in the care of Reji Kale Carpenter.      Rosemarie Posada MD  Allergy/Immunology  [facility]      Chart documentation done in part with Dragon Voice Recognition Software. Although reviewed after completion, some word and grammatical errors may remain.    Again, thank you for allowing me to participate in the care of your patient.        Sincerely,        Rosemarie Posada MD

## 2020-05-14 ASSESSMENT — ASTHMA QUESTIONNAIRES: ACT_TOTALSCORE: 21

## 2020-05-20 DIAGNOSIS — J45.30 MILD PERSISTENT ASTHMA WITHOUT COMPLICATION: Primary | ICD-10-CM

## 2020-05-27 NOTE — TELEPHONE ENCOUNTER
[employer] message has been read. Will await patient's response for pharmacy.    Miracle Johnson RN

## 2020-05-28 RX ORDER — DEXAMETHASONE 4 MG/1
2 TABLET ORAL 2 TIMES DAILY
Qty: 3 INHALER | Refills: 0 | Status: SHIPPED | OUTPATIENT
Start: 2020-05-28 | End: 2020-08-06 | Stop reason: ALTCHOICE

## 2020-05-28 NOTE — TELEPHONE ENCOUNTER
No response from patient. Sent prescription to patient's preferred pharmacy listed in chart.    Miracle Johnson RN

## 2020-06-09 ENCOUNTER — TELEPHONE (OUTPATIENT)
Dept: ALLERGY | Facility: CLINIC | Age: 45
End: 2020-06-09

## 2020-06-09 NOTE — TELEPHONE ENCOUNTER
Patient may resume building toward maintenance dose in 0.1mL increments. Visits may be scheduled every 14-28 days as available per the current injection clinic schedule.

## 2020-06-17 ENCOUNTER — TELEPHONE (OUTPATIENT)
Dept: ALLERGY | Facility: CLINIC | Age: 45
End: 2020-06-17

## 2020-06-18 ENCOUNTER — ALLIED HEALTH/NURSE VISIT (OUTPATIENT)
Dept: ALLERGY | Facility: CLINIC | Age: 45
End: 2020-06-18
Payer: COMMERCIAL

## 2020-06-18 DIAGNOSIS — J30.81 NON-SEASONAL ALLERGIC RHINITIS DUE TO ANIMAL HAIR AND DANDER: ICD-10-CM

## 2020-06-18 DIAGNOSIS — Z51.6 NEED FOR DESENSITIZATION TO ALLERGENS: Primary | ICD-10-CM

## 2020-06-18 DIAGNOSIS — J30.89 ALLERGIC RHINITIS DUE TO MOLD: ICD-10-CM

## 2020-06-18 DIAGNOSIS — J30.1 SEASONAL ALLERGIC RHINITIS DUE TO POLLEN: ICD-10-CM

## 2020-06-18 PROCEDURE — 95117 IMMUNOTHERAPY INJECTIONS: CPT

## 2020-06-18 PROCEDURE — 99207 ZZC DROP WITH A PROCEDURE: CPT

## 2020-06-18 NOTE — TELEPHONE ENCOUNTER
ALLERGY SOLUTION RE-ORDER REQUEST    Reji Carpenter 1975 MRN: [id number]    DATE NEEDED:  7/2/2020  Vial Color Content   Top Dose  Last Dose Vial Size  Red 1:1 Molds   Red 1:1 0.5  Red 1:1  0.2 5  Red 1:1 Trees, Weeds   Red 1:1 0.5  Red 1:1  0.2 5  Red 1:1 Cat, Dog   Red 1:1 0.5  Red 1:1  0.2 5                 Serum reorder consent signed and patient/parent was advised that new serums would be ordered through the pharmacy and billed to their insurance company when they arrive in clinic. Yes    Shot Clinic Location:  [place]  Ship to Location: [facility]  Serum billed to:  [facility]    Special Instructions:  none      Updated Prescription Needed: No      Requester Signature  Jolene Osborne, [facility]

## 2020-06-18 NOTE — PROGRESS NOTES
Patient presented after waiting 30 minutes with no reaction to allergy injections. Discharged from clinic.    Jennie Lewis RN

## 2020-06-19 NOTE — TELEPHONE ENCOUNTER
Routing to provider - please send new prescription for serum to compounding pharmacy.  Thank you.        Ana Roman MA

## 2020-06-30 ENCOUNTER — TELEPHONE (OUTPATIENT)
Dept: ALLERGY | Facility: CLINIC | Age: 45
End: 2020-06-30

## 2020-06-30 DIAGNOSIS — Z51.6 NEED FOR DESENSITIZATION TO ALLERGENS: Primary | ICD-10-CM

## 2020-06-30 PROCEDURE — 95165 ANTIGEN THERAPY SERVICES: CPT | Performed by: ALLERGY & IMMUNOLOGY

## 2020-06-30 NOTE — PROGRESS NOTES
Allergy serums billed at [place].     Vials billed below:    Vial Color Content                      Vial Size Expiration Date  Red 1:1 Cat, Dog 5mL  06/25/2021  Red 1:1 Trees, Weeds 5mL  06/25/2021  Red 1:1 Molds 5mL  06/25/2021       Original Refill encounter date: 06-      Signature  Jennie Lewis RN

## 2020-06-30 NOTE — TELEPHONE ENCOUNTER
Allergy serums received at [place].     Vials received below:    Vial Color Content                      Vial Size Expiration Date  Red 1:1 Cat, Dog 5mL  06/25/2021  Red 1:1 Trees, Weeds 5mL  06/25/2021  Red 1:1 Molds 5mL  06/25/2021        Signature  Jennie Lewis RN

## 2020-06-30 NOTE — TELEPHONE ENCOUNTER
Please advise new dosing orders, building schedule, and date which orders are valid through.  Patient's last shot was 06-, dose was 0.2ml red, next appointment scheduled for 07- which will be 28 days.    PATIENT HAS NEW VIALS     New orders will be added to immunotherapy flowsheet once they are received.     Jennie Lewis RN

## 2020-06-30 NOTE — TELEPHONE ENCOUNTER
Decrease to 0.1mL of new red vial at next injection visit. If tolerated patient may resume building toward maintenance dose in 0.1mL increments every 14-28 days.

## 2020-07-16 ENCOUNTER — ALLIED HEALTH/NURSE VISIT (OUTPATIENT)
Dept: ALLERGY | Facility: CLINIC | Age: 45
End: 2020-07-16
Payer: COMMERCIAL

## 2020-07-16 ENCOUNTER — TELEPHONE (OUTPATIENT)
Dept: ALLERGY | Facility: CLINIC | Age: 45
End: 2020-07-16

## 2020-07-16 DIAGNOSIS — Z51.6 NEED FOR DESENSITIZATION TO ALLERGENS: Primary | ICD-10-CM

## 2020-07-16 PROCEDURE — 99207 ZZC DROP WITH A PROCEDURE: CPT

## 2020-07-16 PROCEDURE — 95117 IMMUNOTHERAPY INJECTIONS: CPT

## 2020-07-16 NOTE — TELEPHONE ENCOUNTER
Patient called asking if he can received shots today as his epi pen  on 20. Dr. Posada's last office visit notes on 20 state:     - continue allergen immunotherapy per protocol  - epinephrine auto-injector no longer required for immunotherapy    Advised patient that he does not need to bring an epi pen with him to shots anymore. Patient verbalized understanding and has no other questions.    Adan Gordon RN....2020 8:50 AM

## 2020-07-16 NOTE — PROGRESS NOTES
Patient presented after waiting 30 minutes with no reaction to allergy injections. Discharged from clinic.    Adan Gordon RN....7/16/2020 11:14 AM

## 2020-07-18 ENCOUNTER — MYC MEDICAL ADVICE (OUTPATIENT)
Dept: FAMILY MEDICINE | Facility: CLINIC | Age: 45
End: 2020-07-18

## 2020-07-20 NOTE — TELEPHONE ENCOUNTER
I called and left message for patient to call back in regards to scheduling a physical  Roseann Garber CMA

## 2020-07-24 ENCOUNTER — TRANSFERRED RECORDS (OUTPATIENT)
Dept: HEALTH INFORMATION MANAGEMENT | Facility: CLINIC | Age: 45
End: 2020-07-24

## 2020-07-28 ENCOUNTER — TRANSFERRED RECORDS (OUTPATIENT)
Dept: HEALTH INFORMATION MANAGEMENT | Facility: CLINIC | Age: 45
End: 2020-07-28

## 2020-08-06 ENCOUNTER — OFFICE VISIT (OUTPATIENT)
Dept: FAMILY MEDICINE | Facility: CLINIC | Age: 45
End: 2020-08-06
Payer: COMMERCIAL

## 2020-08-06 VITALS
HEIGHT: 72 IN | HEART RATE: 71 BPM | WEIGHT: 204.25 LBS | BODY MASS INDEX: 27.67 KG/M2 | DIASTOLIC BLOOD PRESSURE: 85 MMHG | TEMPERATURE: 98.7 F | SYSTOLIC BLOOD PRESSURE: 139 MMHG

## 2020-08-06 DIAGNOSIS — J45.30 MILD PERSISTENT ASTHMA WITHOUT COMPLICATION: ICD-10-CM

## 2020-08-06 DIAGNOSIS — Z12.5 SCREENING FOR PROSTATE CANCER: ICD-10-CM

## 2020-08-06 DIAGNOSIS — E78.5 HYPERLIPIDEMIA LDL GOAL <100: ICD-10-CM

## 2020-08-06 DIAGNOSIS — I10 HYPERTENSION GOAL BP (BLOOD PRESSURE) < 140/90: ICD-10-CM

## 2020-08-06 DIAGNOSIS — Z00.00 ROUTINE GENERAL MEDICAL EXAMINATION AT A HEALTH CARE FACILITY: Primary | ICD-10-CM

## 2020-08-06 DIAGNOSIS — Z11.9 SCREENING EXAMINATION FOR INFECTIOUS DISEASE: ICD-10-CM

## 2020-08-06 DIAGNOSIS — Z23 NEED FOR TD VACCINE: ICD-10-CM

## 2020-08-06 PROCEDURE — 80053 COMPREHEN METABOLIC PANEL: CPT | Performed by: FAMILY MEDICINE

## 2020-08-06 PROCEDURE — 90471 IMMUNIZATION ADMIN: CPT | Performed by: FAMILY MEDICINE

## 2020-08-06 PROCEDURE — G0103 PSA SCREENING: HCPCS | Performed by: FAMILY MEDICINE

## 2020-08-06 PROCEDURE — 90714 TD VACC NO PRESV 7 YRS+ IM: CPT | Performed by: FAMILY MEDICINE

## 2020-08-06 PROCEDURE — 99396 PREV VISIT EST AGE 40-64: CPT | Mod: 25 | Performed by: FAMILY MEDICINE

## 2020-08-06 PROCEDURE — 80061 LIPID PANEL: CPT | Performed by: FAMILY MEDICINE

## 2020-08-06 PROCEDURE — 86769 SARS-COV-2 COVID-19 ANTIBODY: CPT | Performed by: FAMILY MEDICINE

## 2020-08-06 PROCEDURE — 36415 COLL VENOUS BLD VENIPUNCTURE: CPT | Performed by: FAMILY MEDICINE

## 2020-08-06 RX ORDER — BECLOMETHASONE DIPROPIONATE HFA 80 UG/1
2 AEROSOL, METERED RESPIRATORY (INHALATION) 2 TIMES DAILY
COMMUNITY
Start: 2020-05-18 | End: 2020-12-08

## 2020-08-06 RX ORDER — VALSARTAN 80 MG/1
160 TABLET ORAL DAILY
Qty: 180 TABLET | Refills: 3 | Status: SHIPPED | OUTPATIENT
Start: 2020-08-06 | End: 2021-10-15

## 2020-08-06 RX ORDER — MONTELUKAST SODIUM 10 MG/1
10 TABLET ORAL DAILY
Qty: 90 TABLET | Refills: 3 | Status: SHIPPED | OUTPATIENT
Start: 2020-08-06 | End: 2021-09-21

## 2020-08-06 ASSESSMENT — MIFFLIN-ST. JEOR: SCORE: 1856.47

## 2020-08-06 ASSESSMENT — PAIN SCALES - GENERAL: PAINLEVEL: NO PAIN (0)

## 2020-08-06 NOTE — PROGRESS NOTES
3  SUBJECTIVE:   CC: Reji Carpenter is an 45 year old male who presents for preventive health visit.     Healthy Habits:    Do you get at least three servings of calcium containing foods daily (dairy, green leafy vegetables, etc.)? yes    Amount of exercise or daily activities, outside of work: 2 day(s) per week    Problems taking medications regularly No    Medication side effects: No    Have you had an eye exam in the past two years? no    Do you see a dentist twice per year? yes    Do you have sleep apnea, excessive snoring or daytime drowsiness?no       136/ 80s at home  In June Today's PHQ-2 Score:   PHQ-2 ( 1999 Pfizer) 8/6/2020 2/3/2020   Q1: Little interest or pleasure in doing things 0 0   Q2: Feeling down, depressed or hopeless 0 0   PHQ-2 Score 0 0   Q1: Little interest or pleasure in doing things - -   Q2: Feeling down, depressed or hopeless - -   PHQ-2 Score - -       Abuse: Current or Past(Physical, Sexual or Emotional)- No  Do you feel safe in your environment? Yes        Social History     Tobacco Use     Smoking status: Never Smoker     Smokeless tobacco: Never Used   Substance Use Topics     Alcohol use: No     Comment: occasional     If you drink alcohol do you typically have >3 drinks per day or >7 drinks per week? No                      Last PSA:   PSA   Date Value Ref Range Status   09/13/2018 1.26 0 - 4 ug/L Final     Comment:     Assay Method:  Chemiluminescence using Siemens Vista analyzer       Reviewed orders with patient. Reviewed health maintenance and updated orders accordingly - Yes       Reviewed and updated as needed this visit by clinical staff  Tobacco  Allergies  Meds  Med Hx  Surg Hx  Fam Hx  Soc Hx        Reviewed and updated as needed this visit by Provider            ROS:  Some  Wt lifting at home  Some aerobic exercise   Physically feels good  Going in to office  Asthma doing  Well  Get allergy shots once a month  Was more frequent  "before  Helps        OBJECTIVE:   BP (!) 158/98 (BP Location: Right arm, Patient Position: Chair, Cuff Size: Adult Regular)   Pulse 71   Temp 98.7  F (37.1  C) (Oral)   Ht 1.84 m (6' 0.44\")   Wt 92.6 kg (204 lb 4 oz)   BMI 27.37 kg/m    EXAM:  GENERAL: healthy, alert and no distress  EYES: Eyes grossly normal to inspection, PERRL and conjunctivae and sclerae normal  HENT: ear canals and TM's normal, nose and mouth without ulcers or lesions  NECK: no adenopathy, no asymmetry, masses, or scars and thyroid normal to palpation  RESP: lungs clear to auscultation - no rales, rhonchi or wheezes  CV: regular rate and rhythm, normal S1 S2, no S3 or S4, no murmur, click or rub, no peripheral edema and peripheral pulses strong  ABDOMEN: soft, nontender, no hepatosplenomegaly, no masses and bowel sounds normal  MS: no gross musculoskeletal defects noted, no edema  SKIN: no suspicious lesions or rashes  NEURO: Normal strength and tone, mentation intact and speech normal  PSYCH: mentation appears normal, affect normal/bright    Diagnostic Test Results:  Labs reviewed in Epic    ASSESSMENT/PLAN:   Reji was seen today for physical and health maintenance.    Diagnoses and all orders for this visit:    Routine general medical examination at a health care facility    Need for Td vaccine  -     SCREENING QUESTIONS FOR ADULT IMMUNIZATIONS  -     VACCINE ADMINISTRATION, INITIAL  -     TD (ADULT, 7+) PRESERVE FREE    Screening examination for infectious disease  -     COVID-19 Virus (Coronavirus) Antibody & Titer Reflex; Future  -     COVID-19 Virus (Coronavirus) Antibody & Titer Reflex    Screening for prostate cancer  -     Prostate spec antigen screen    Hyperlipidemia LDL goal <100  -     Lipid panel reflex to direct LDL Fasting  -     Comprehensive metabolic panel    Hypertension goal BP (blood pressure) < 140/90  -     valsartan (DIOVAN) 80 MG tablet; Take 2 tablets (160 mg) by mouth daily    Mild persistent asthma without " "complication  -     montelukast (SINGULAIR) 10 MG tablet; Take 1 tablet (10 mg) by mouth daily    Overall patient stable  Keep working on healthy diet/exercise   Check labs  On recheck blood pressure okay  No std concern  Td given    COUNSELING:  Reviewed preventive health counseling, as reflected in patient instructions       Regular exercise       Healthy diet/nutrition       Vision screening       Safe sex practices/STD prevention       Prostate cancer screening    Estimated body mass index is 27.37 kg/m  as calculated from the following:    Height as of this encounter: 1.84 m (6' 0.44\").    Weight as of this encounter: 92.6 kg (204 lb 4 oz).    Weight management plan: Discussed healthy diet and exercise guidelines     reports that he has never smoked. He has never used smokeless tobacco.      Counseling Resources:  ATP IV Guidelines  Pooled Cohorts Equation Calculator  FRAX Risk Assessment  ICSI Preventive Guidelines  Dietary Guidelines for Americans, 2010  USDA's MyPlate  ASA Prophylaxis  Lung CA Screening    Wili Kaplan MD  [facility]  "

## 2020-08-06 NOTE — PATIENT INSTRUCTIONS
Preventive Health Recommendations  Male Ages 40 to 49    Yearly exam:             See your health care provider every year in order to  o   Review health changes.   o   Discuss preventive care.    o   Review your medicines if your doctor has prescribed any.    You should be tested each year for STDs (sexually transmitted diseases) if you re at risk.     Have a cholesterol test every 5 years.     Have a colonoscopy (test for colon cancer) if someone in your family has had colon cancer or polyps before age 50.     After age 45, have a diabetes test (fasting glucose). If you are at risk for diabetes, you should have this test every 3 years.      Talk with your health care provider about whether or not a prostate cancer screening test (PSA) is right for you.    Shots: Get a flu shot each year. Get a tetanus shot every 10 years.     Nutrition:    Eat at least 5 servings of fruits and vegetables daily.     Eat whole-grain bread, whole-wheat pasta and brown rice instead of white grains and rice.     Get adequate Calcium and Vitamin D.     Lifestyle    Exercise for at least 150 minutes a week (30 minutes a day, 5 days a week). This will help you control your weight and prevent disease.     Limit alcohol to one drink per day.     No smoking.     Wear sunscreen to prevent skin cancer.     See your dentist every six months for an exam and cleaning.         We  Will send you lab results    Keep working on healthy diet/exercise     Call / return to clinic with problems/ questions

## 2020-08-07 LAB
ALBUMIN SERPL-MCNC: 4.5 G/DL (ref 3.4–5)
ALP SERPL-CCNC: 87 U/L (ref 40–150)
ALT SERPL W P-5'-P-CCNC: 65 U/L (ref 0–70)
ANION GAP SERPL CALCULATED.3IONS-SCNC: 5 MMOL/L (ref 3–14)
AST SERPL W P-5'-P-CCNC: 38 U/L (ref 0–45)
BILIRUB SERPL-MCNC: 0.8 MG/DL (ref 0.2–1.3)
BUN SERPL-MCNC: 13 MG/DL (ref 7–30)
CALCIUM SERPL-MCNC: 9.3 MG/DL (ref 8.5–10.1)
CHLORIDE SERPL-SCNC: 108 MMOL/L (ref 94–109)
CHOLEST SERPL-MCNC: 252 MG/DL
CO2 SERPL-SCNC: 28 MMOL/L (ref 20–32)
COVID-19 SPIKE RBD ABY TITER: NORMAL
COVID-19 SPIKE RBD ABY: NEGATIVE
CREAT SERPL-MCNC: 0.93 MG/DL (ref 0.66–1.25)
GFR SERPL CREATININE-BSD FRML MDRD: >90 ML/MIN/{1.73_M2}
GLUCOSE SERPL-MCNC: 92 MG/DL (ref 70–99)
HDLC SERPL-MCNC: 57 MG/DL
LDLC SERPL CALC-MCNC: 169 MG/DL
NONHDLC SERPL-MCNC: 195 MG/DL
POTASSIUM SERPL-SCNC: 3.8 MMOL/L (ref 3.4–5.3)
PROT SERPL-MCNC: 8.1 G/DL (ref 6.8–8.8)
PSA SERPL-ACNC: 1.38 UG/L (ref 0–4)
SODIUM SERPL-SCNC: 141 MMOL/L (ref 133–144)
TRIGL SERPL-MCNC: 130 MG/DL

## 2020-08-09 NOTE — RESULT ENCOUNTER NOTE
Covid antibody negative.    Cholesterol is quite a bit higher than last year.  Keep working on healthy diet/exercise as we discussed.    Other labs are fine.    Wili Kaplan MD

## 2020-08-11 ENCOUNTER — MYC MEDICAL ADVICE (OUTPATIENT)
Dept: FAMILY MEDICINE | Facility: CLINIC | Age: 45
End: 2020-08-11

## 2020-08-11 DIAGNOSIS — Z13.6 SCREENING FOR HEART DISEASE: Primary | ICD-10-CM

## 2020-08-17 NOTE — TELEPHONE ENCOUNTER
I called and discussed in detail    Patient agreed with doing the coronary calcium ct test    He will call to schedule    Wili Kaplan MD

## 2020-08-31 ENCOUNTER — HOSPITAL ENCOUNTER (OUTPATIENT)
Dept: CT IMAGING | Facility: CLINIC | Age: 45
Discharge: HOME | End: 2020-08-31
Attending: FAMILY MEDICINE | Admitting: FAMILY MEDICINE
Payer: COMMERCIAL

## 2020-08-31 DIAGNOSIS — Z13.6 SCREENING FOR HEART DISEASE: ICD-10-CM

## 2020-08-31 LAB — RADIOLOGIST FLAGS: NORMAL

## 2020-08-31 PROCEDURE — 75571 CT HRT W/O DYE W/CA TEST: CPT | Mod: GA

## 2020-08-31 PROCEDURE — 75571 CT HRT W/O DYE W/CA TEST: CPT | Mod: 26 | Performed by: STUDENT IN AN ORGANIZED HEALTH CARE EDUCATION/TRAINING PROGRAM

## 2020-09-02 ENCOUNTER — TELEPHONE (OUTPATIENT)
Dept: FAMILY MEDICINE | Facility: CLINIC | Age: 45
End: 2020-09-02

## 2020-09-02 DIAGNOSIS — E78.5 HYPERLIPIDEMIA LDL GOAL <100: Primary | Chronic | ICD-10-CM

## 2020-09-02 NOTE — TELEPHONE ENCOUNTER
Bob called [facility] imaging to report an incidental finding.    CT Calcium screening 8/31 flagged for lung nodule.     Routing to PCP high priority to review and advise.      Tricia Spears RN  [facility]

## 2020-09-07 RX ORDER — SIMVASTATIN 20 MG
20 TABLET ORAL AT BEDTIME
Qty: 90 TABLET | Refills: 0 | Status: SHIPPED | OUTPATIENT
Start: 2020-09-07 | End: 2020-12-02

## 2020-09-07 NOTE — RESULT ENCOUNTER NOTE
Here is the calcium score.  As we discussed by phone, fairly high.  We want to be aggressive about controlling cardiac risk factors.  I sent in the prescription for simvastatin to take one pill at bedtime.  In a couple months you can do a fasting lab only visit to recheck cholesterol.  If you have any bad side effects on the med let me know.    Wili Kaplan MD

## 2020-09-07 NOTE — TELEPHONE ENCOUNTER
Patient called back  Discussed in detail   Want to be aggressive with risk factors given percentile calcium score  Start statin, then in 2-3 months recheck labs  Wili Kaplan MD

## 2020-09-07 NOTE — RESULT ENCOUNTER NOTE
Here is the radiology report for non heart findings.  Very small lung nodule, but per guidelines no follow up needed as you are low risk/ nonsmoker.    The fatty liver will hopefully improve with the cholesterol medication and healthy diet/ exercise.    Wili Kaplan MD

## 2020-09-08 ENCOUNTER — ALLIED HEALTH/NURSE VISIT (OUTPATIENT)
Dept: ALLERGY | Facility: CLINIC | Age: 45
End: 2020-09-08
Payer: COMMERCIAL

## 2020-09-08 ENCOUNTER — TELEPHONE (OUTPATIENT)
Dept: ALLERGY | Facility: CLINIC | Age: 45
End: 2020-09-08

## 2020-09-08 DIAGNOSIS — Z51.6 NEED FOR DESENSITIZATION TO ALLERGENS: Primary | ICD-10-CM

## 2020-09-08 PROCEDURE — 95117 IMMUNOTHERAPY INJECTIONS: CPT

## 2020-09-08 PROCEDURE — 99207 ZZC DROP WITH A PROCEDURE: CPT

## 2020-09-08 NOTE — TELEPHONE ENCOUNTER
Patient last received 0.1 ml red on 7/16/20 which was 54 days ago. Please advise if patient can build at his shot appointment today or if his dose needs to be adjusted.    Adan Gordon RN....9/8/2020 9:19 AM

## 2020-09-08 NOTE — PROGRESS NOTES
Immunotherapy 2020   Are you on any beta blockers? No   Does the patient need to be pre-medicated? No   Does patient have their Epi Pen today that is not ?   No   Serum #1 Antigen(s) M   Expiration Date #1 2021   Vial Concentration #1 1:1 (Red)   Dose (mL) #1 0.1   Location #1 JUMA   Antigen Top Dose #1 0.5   Comment #1 red   Given By    Verified By    Add Additional Sera?    Serum #2 Antigen(s) T;W   Expiration Date #2 2021   Vial Concentration #2 1:1 (Red)   Dose (mL)#2 0.1   Location #2 ARMAAN   Antigen Top Dose #2 0.5   Comment #2 red   Given By    Verified By    Serum #3 Antigen(s) C;D   Expiration Date #3 2021   Vial Concentration #3 1:1 (Red)   Dose (mL) #3 0.1   Location #3 RLA   Antigen Top Dose #3 0.5   Comment #3 red   Given By    Verified By

## 2020-09-08 NOTE — PROGRESS NOTES
Patient presented after waiting 30 minutes with no reaction to allergy injections. Discharged from clinic.    Adan Gordon RN....9/8/2020 11:40 AM

## 2020-09-08 NOTE — TELEPHONE ENCOUNTER
Decrease to 0.05mL of red vial for all injections. If tolerated he may resume building in 0.1mL increments every 14-28 days.

## 2020-09-11 DIAGNOSIS — J45.30 MILD PERSISTENT ASTHMA WITHOUT COMPLICATION: ICD-10-CM

## 2020-09-14 DIAGNOSIS — J45.30 MILD PERSISTENT ASTHMA WITHOUT COMPLICATION: ICD-10-CM

## 2020-09-14 RX ORDER — ALBUTEROL SULFATE 90 UG/1
2 AEROSOL, METERED RESPIRATORY (INHALATION) EVERY 4 HOURS PRN
Qty: 3 INHALER | Refills: 0 | OUTPATIENT
Start: 2020-09-14

## 2020-09-14 NOTE — TELEPHONE ENCOUNTER
Routing refill request to allergist, who has filled in the past. Was refused earlier today, needing appointment. Appears one is scheduled for 9/29/20 - consider refill until then?     Bren Strauss, RN, BSN, PHN  [facility]: [place]

## 2020-09-14 NOTE — TELEPHONE ENCOUNTER
Refill request to PCP routed to me for review. Patient requested refill last week however due to concerns regarding asthma control a follow-up visit was requested prior to refilling the medication. Offered in-clinic and virtual appointment to patient and message sent via [employer] on 9/10/20. Message reviewed by patient but no follow-up visit has been scheduled as of yet - patient has allergy injection visit scheduled for 9/29/20 but no clinic visit.

## 2020-09-15 ENCOUNTER — MYC MEDICAL ADVICE (OUTPATIENT)
Dept: FAMILY MEDICINE | Facility: CLINIC | Age: 45
End: 2020-09-15

## 2020-09-15 DIAGNOSIS — J45.30 MILD PERSISTENT ASTHMA WITHOUT COMPLICATION: ICD-10-CM

## 2020-09-15 RX ORDER — ALBUTEROL SULFATE 90 UG/1
2 AEROSOL, METERED RESPIRATORY (INHALATION) EVERY 4 HOURS PRN
Qty: 3 INHALER | Refills: 0 | Status: CANCELLED | OUTPATIENT
Start: 2020-09-15

## 2020-09-15 RX ORDER — ALBUTEROL SULFATE 90 UG/1
2 AEROSOL, METERED RESPIRATORY (INHALATION) EVERY 4 HOURS PRN
Qty: 3 INHALER | Refills: 0 | OUTPATIENT
Start: 2020-09-15

## 2020-09-15 RX ORDER — ALBUTEROL SULFATE 90 UG/1
2 AEROSOL, METERED RESPIRATORY (INHALATION) EVERY 4 HOURS PRN
Qty: 3 INHALER | Refills: 1 | Status: SHIPPED | OUTPATIENT
Start: 2020-09-15 | End: 2023-08-16

## 2020-09-15 NOTE — TELEPHONE ENCOUNTER
Refused yesterday by allergist, needs appointment.     Bren Strauss, RN, BSN, PHN  [facility]: [place]

## 2020-09-15 NOTE — TELEPHONE ENCOUNTER
RN replied to patient via Mychart. See message for details.     Bren Strauss RN, BSN, PHN  [facility]: [place]

## 2020-09-29 ENCOUNTER — ALLIED HEALTH/NURSE VISIT (OUTPATIENT)
Dept: ALLERGY | Facility: CLINIC | Age: 45
End: 2020-09-29
Payer: COMMERCIAL

## 2020-09-29 DIAGNOSIS — J30.1 SEASONAL ALLERGIC RHINITIS DUE TO POLLEN: Primary | ICD-10-CM

## 2020-09-29 PROCEDURE — 95117 IMMUNOTHERAPY INJECTIONS: CPT

## 2020-09-29 PROCEDURE — 99207 ZZC DROP WITH A PROCEDURE: CPT

## 2020-10-01 DIAGNOSIS — E78.5 HYPERLIPIDEMIA LDL GOAL <100: Chronic | ICD-10-CM

## 2020-10-02 RX ORDER — SIMVASTATIN 20 MG
TABLET ORAL
Qty: 90 TABLET | Refills: 0 | OUTPATIENT
Start: 2020-10-02

## 2020-10-02 NOTE — TELEPHONE ENCOUNTER
"Simvastatin, 90 tabs was sent to requesting pharmacy on 9/7/20.  New med.  Due to check labs in 2-3 months.    I called pharmacy, she says system is just being proactive so he does not run out.   I advised we would check labs before refilling this.      \"Refusal\" routed back to pharmacy with note.    Africa Malagon RN  [employer]            "

## 2020-10-20 ENCOUNTER — ALLIED HEALTH/NURSE VISIT (OUTPATIENT)
Dept: ALLERGY | Facility: CLINIC | Age: 45
End: 2020-10-20
Payer: COMMERCIAL

## 2020-10-20 DIAGNOSIS — Z51.6 NEED FOR DESENSITIZATION TO ALLERGENS: Primary | ICD-10-CM

## 2020-10-20 PROCEDURE — 95117 IMMUNOTHERAPY INJECTIONS: CPT

## 2020-10-20 PROCEDURE — 99207 PR DROP WITH A PROCEDURE: CPT

## 2020-11-03 ENCOUNTER — ALLIED HEALTH/NURSE VISIT (OUTPATIENT)
Dept: ALLERGY | Facility: CLINIC | Age: 45
End: 2020-11-03
Payer: COMMERCIAL

## 2020-11-03 DIAGNOSIS — Z51.6 NEED FOR DESENSITIZATION TO ALLERGENS: Primary | ICD-10-CM

## 2020-11-03 DIAGNOSIS — J30.9 ALLERGIC RHINITIS: ICD-10-CM

## 2020-11-03 PROCEDURE — 99207 PR DROP WITH A PROCEDURE: CPT

## 2020-11-03 PROCEDURE — 95117 IMMUNOTHERAPY INJECTIONS: CPT

## 2020-11-03 NOTE — PROGRESS NOTES
Patient presented after waiting 30 minutes with no reaction to allergy injections. Discharged from clinic.    Jennie DODD RN

## 2020-11-05 ENCOUNTER — TELEPHONE (OUTPATIENT)
Dept: ALLERGY | Facility: CLINIC | Age: 45
End: 2020-11-05

## 2020-11-05 NOTE — TELEPHONE ENCOUNTER
Reason for call:  Other   Patient called regarding (reason for call): appointment  Additional comments: Patient would like to reschedule his allergy shot appointment on 11-, please call.    Phone number to reach patient:  Home number on file [phone] (home)    Best Time:  any    Can we leave a detailed message on this number?  YES    Travel screening: Not Applicable

## 2020-11-06 ENCOUNTER — MYC MEDICAL ADVICE (OUTPATIENT)
Dept: ALLERGY | Facility: CLINIC | Age: 45
End: 2020-11-06

## 2020-11-08 ENCOUNTER — HEALTH MAINTENANCE LETTER (OUTPATIENT)
Age: 45
End: 2020-11-08

## 2020-11-10 NOTE — TELEPHONE ENCOUNTER
Patient sent [employer] message to advise that he has already rescheduled this appointment.  Closing encounter.  Jolene JONES MA

## 2020-11-10 NOTE — TELEPHONE ENCOUNTER
Left message for patient to return call to clinic to reschedule his allergy shot appointment. Advised patient he may also contact us via [employer].  Jolene JONES MA

## 2020-11-24 ENCOUNTER — ALLIED HEALTH/NURSE VISIT (OUTPATIENT)
Dept: ALLERGY | Facility: CLINIC | Age: 45
End: 2020-11-24
Payer: COMMERCIAL

## 2020-11-24 DIAGNOSIS — E78.5 HYPERLIPIDEMIA LDL GOAL <100: Chronic | ICD-10-CM

## 2020-11-24 DIAGNOSIS — Z51.6 NEED FOR DESENSITIZATION TO ALLERGENS: Primary | ICD-10-CM

## 2020-11-24 LAB
ALT SERPL W P-5'-P-CCNC: 51 U/L (ref 0–70)
CHOLEST SERPL-MCNC: 169 MG/DL
CK SERPL-CCNC: 207 U/L (ref 30–300)
HDLC SERPL-MCNC: 52 MG/DL
LDLC SERPL CALC-MCNC: 80 MG/DL
NONHDLC SERPL-MCNC: 117 MG/DL
TRIGL SERPL-MCNC: 183 MG/DL

## 2020-11-24 PROCEDURE — 36415 COLL VENOUS BLD VENIPUNCTURE: CPT | Performed by: FAMILY MEDICINE

## 2020-11-24 PROCEDURE — 80061 LIPID PANEL: CPT | Performed by: FAMILY MEDICINE

## 2020-11-24 PROCEDURE — 82550 ASSAY OF CK (CPK): CPT | Performed by: FAMILY MEDICINE

## 2020-11-24 PROCEDURE — 95117 IMMUNOTHERAPY INJECTIONS: CPT

## 2020-11-24 PROCEDURE — 84460 ALANINE AMINO (ALT) (SGPT): CPT | Performed by: FAMILY MEDICINE

## 2020-11-24 PROCEDURE — 99207 PR DROP WITH A PROCEDURE: CPT

## 2020-11-25 NOTE — RESULT ENCOUNTER NOTE
Your triglycerides have increased slightly from last check, but your LDL (bad cholesterol) has improved quite a bit. Your should keep taking your simvastatin and we usually check cholesterol again in 1 year.   Juana Sharma PA-C   (covering for Dr. Kaplan)

## 2020-11-30 ENCOUNTER — MYC MEDICAL ADVICE (OUTPATIENT)
Dept: ALLERGY | Facility: CLINIC | Age: 45
End: 2020-11-30

## 2020-11-30 DIAGNOSIS — E78.5 HYPERLIPIDEMIA LDL GOAL <100: Chronic | ICD-10-CM

## 2020-11-30 RX ORDER — BECLOMETHASONE DIPROPIONATE HFA 80 UG/1
2 AEROSOL, METERED RESPIRATORY (INHALATION) 2 TIMES DAILY
Qty: 1 INHALER | Refills: 0 | Status: CANCELLED | OUTPATIENT
Start: 2020-11-30

## 2020-11-30 NOTE — TELEPHONE ENCOUNTER
Patient has an appointment scheduled for next week. Is he completely out of medication? If so, OK to send Rx for 30 day supply. If not, prescription will be provided during his visit next week.

## 2020-11-30 NOTE — TELEPHONE ENCOUNTER
Requested Prescriptions   Pending Prescriptions Disp Refills     QVAR REDIHALER 80 MCG/ACT inhaler 1 Inhaler 0     Sig: Inhale 2 puffs into the lungs 2 times daily       There is no refill protocol information for this order        Routing refill request to provider for review/approval because:  LOV: 5/13/2020 follow-up recommended in 6 months    Patient requesting refill of QVAR inhaler be faxed/emailed to him.    Miracle Johnson, BSN, RN

## 2020-12-02 RX ORDER — SIMVASTATIN 20 MG
TABLET ORAL
Qty: 90 TABLET | Refills: 2 | Status: SHIPPED | OUTPATIENT
Start: 2020-12-02 | End: 2021-11-24

## 2020-12-02 NOTE — TELEPHONE ENCOUNTER
Prescription approved per [facility] Refill Protocol.  Bel Garcia RN

## 2020-12-08 ENCOUNTER — OFFICE VISIT (OUTPATIENT)
Dept: ALLERGY | Facility: CLINIC | Age: 45
End: 2020-12-08
Payer: COMMERCIAL

## 2020-12-08 ENCOUNTER — MYC MEDICAL ADVICE (OUTPATIENT)
Dept: ALLERGY | Facility: CLINIC | Age: 45
End: 2020-12-08

## 2020-12-08 ENCOUNTER — ALLIED HEALTH/NURSE VISIT (OUTPATIENT)
Dept: ALLERGY | Facility: CLINIC | Age: 45
End: 2020-12-08
Payer: COMMERCIAL

## 2020-12-08 VITALS
HEIGHT: 72 IN | RESPIRATION RATE: 16 BRPM | HEART RATE: 58 BPM | BODY MASS INDEX: 27.63 KG/M2 | OXYGEN SATURATION: 100 % | WEIGHT: 204 LBS

## 2020-12-08 DIAGNOSIS — Z91.09 OTHER ALLERGY STATUS, OTHER THAN TO DRUGS AND BIOLOGICAL SUBSTANCES: ICD-10-CM

## 2020-12-08 DIAGNOSIS — J45.30 MILD PERSISTENT ASTHMA WITHOUT COMPLICATION: ICD-10-CM

## 2020-12-08 DIAGNOSIS — J45.30 MILD PERSISTENT ASTHMA WITHOUT COMPLICATION: Primary | ICD-10-CM

## 2020-12-08 DIAGNOSIS — Z51.6 NEED FOR DESENSITIZATION TO ALLERGENS: Primary | ICD-10-CM

## 2020-12-08 PROCEDURE — 99207 PR DROP WITH A PROCEDURE: CPT

## 2020-12-08 PROCEDURE — 99214 OFFICE O/P EST MOD 30 MIN: CPT | Mod: 25 | Performed by: ALLERGY & IMMUNOLOGY

## 2020-12-08 PROCEDURE — 95117 IMMUNOTHERAPY INJECTIONS: CPT

## 2020-12-08 RX ORDER — BUDESONIDE AND FORMOTEROL FUMARATE DIHYDRATE 80; 4.5 UG/1; UG/1
2 AEROSOL RESPIRATORY (INHALATION) 2 TIMES DAILY
Qty: 1 INHALER | Refills: 1 | Status: SHIPPED | OUTPATIENT
Start: 2020-12-08 | End: 2020-12-08

## 2020-12-08 RX ORDER — BUDESONIDE AND FORMOTEROL FUMARATE DIHYDRATE 80; 4.5 UG/1; UG/1
2 AEROSOL RESPIRATORY (INHALATION) 2 TIMES DAILY
Qty: 3 INHALER | Refills: 1 | Status: SHIPPED | OUTPATIENT
Start: 2020-12-08 | End: 2021-07-19

## 2020-12-08 ASSESSMENT — PAIN SCALES - GENERAL: PAINLEVEL: NO PAIN (0)

## 2020-12-08 ASSESSMENT — MIFFLIN-ST. JEOR: SCORE: 1855.33

## 2020-12-08 NOTE — PROGRESS NOTES
"Reji Carpenter was seen in the Allergy Clinic at [facility].      Reji Carpenter is a 45 year old American male who is seen today for follow-up of asthma and allergic rhinitis. He began allergen immunotherapy treatment in 6/2017 and reached his maintenance dose in 2/2018. He has no prior history of systemic or significant large local reactions to treatment. He continues to take cetirizine daily. This last spring he felt his symptoms were well controlled despite the pause in immunotherapy treatment due to the COVID-19 pandemic. He has only needed to take antihistamines intermittently for occasional symptoms.    Reji reports that his asthma has been \"pretty good.\" He has been taking the Qvar 1 puff twice daily. He had been taking 2 puffs twice daily but decreased the dose as he felt he was doing OK and has also been running out of the medication. He reports that he does wake up at night or early in the morning about once per week with asthma symptoms and he needs to use his albuterol inhaler at least 2 to 3 times per week for daytime symptoms. The albuterol does work well to relieve acute symptoms. He has not had any urgent care or ED visits for asthma and has not been treated with oral steroids for asthma symptoms in the past 6 months.      Past Medical History:   Diagnosis Date     Allergic rhinitis      Asthma      Hypertension      NONSPECIFIC MEDICAL HISTORY 06/06    ruptured achilles tendon repaired     Family History   Problem Relation Age of Onset     Lipids Father      Other - See Comments Father         parkinson     Hypertension Mother      Cancer Maternal Grandmother         brain     Cancer Paternal Grandmother      Coronary Artery Disease Maternal Grandfather         MI 50s     Coronary Artery Disease Paternal Grandfather         MI 60s     Diabetes No family hx of      C.A.D. No family hx of      Social History     Tobacco Use     Smoking status: Never Smoker     " Smokeless tobacco: Never Used   Substance Use Topics     Alcohol use: No     Comment: occasional     Drug use: No     Social History     Social History Narrative    ENVIRONMENTAL HISTORY: [place] The home is heated with a forced air. They do have central air conditioning. The patient's bedroom is furnished with carpeting in bedroom, allergen mattress cover, allergen pillowcase cover and fabric window coverings.  Pets inside the house include 3 dog(s). There is no history of cockroach or mice infestation. There is/are 0 smokers in the house.  The house does not have a damp basement.          SOCIAL HISTORY:     Reji is employed as a . He lives with his spouse. His spouse works as a nurse.       Past medical, family, and social history were reviewed.    REVIEW OF SYSTEMS:  General: negative for weight gain. negative for weight loss. negative for changes in sleep.   Eyes: negative for itching. negative for redness. negative for tearing/watering. negative for vision changes  Ears: negative for fullness. negative for hearing loss. negative for dizziness.   Nose: negative for snoring.negative for changes in smell. negative for drainage.   Throat: negative for hoarseness. negative for sore throat. negative for trouble swallowing.   Lungs: negative for cough. negative for shortness of breath.negative for wheezing. negative for sputum production.   Cardiovascular: negative for chest pain. negative for swelling of ankles. negative for fast or irregular heartbeat.   Gastrointestinal: negative for nausea. negative for heartburn. negative for acid reflux.   Musculoskeletal: negative for joint pain. negative for joint stiffness. negative for joint swelling.   Neurologic: negative for seizures. negative for fainting. negative for weakness.   Psychiatric: negative for changes in mood. negative for anxiety.   Endocrine: negative for cold intolerance. negative for heat  "intolerance. negative for tremors.   Hematologic: negative for easy bruising. negative for easy bleeding.  Integumentary: negative for rash. negative for scaling. negative for nail changes.       Current Outpatient Medications:      albuterol (PROAIR HFA/PROVENTIL HFA/VENTOLIN HFA) 108 (90 Base) MCG/ACT inhaler, Inhale 2 puffs into the lungs every 4 hours as needed for shortness of breath / dyspnea or other (cough), Disp: 3 Inhaler, Rfl: 1     cetirizine (ZYRTEC) 10 MG tablet, Take 1 tablet (10 mg) by mouth daily, Disp: , Rfl: 0     montelukast (SINGULAIR) 10 MG tablet, Take 1 tablet (10 mg) by mouth daily, Disp: 90 tablet, Rfl: 3     ORDER FOR ALLERGEN IMMUNOTHERAPY, Name of Mix: Mix #3  Tree , Weeds Narayan, White  1:20 w/v, HS  0.5 ml Boxelder-Maple  Mix BHR (Boxelder Hard Red) 1:20 w/v, HS  0.5 ml Carson, Common 1:20 w/v, HS  0.5 ml Elm, American 1:20 w/v, HS  0.5 ml Lamb's Quarters 1:20 w/v, HS 0.5 ml Plantain, English 1:20 w/v, HS 0.5 ml Ragweed Mixed 1:20 w/v ALK  0.5 ml Sagebrush, Mugwort 1:20 w/v, HS 0.5 ml Diluent: HSA qs to 5ml, Disp: 5 mL, Rfl: PRN     ORDER FOR ALLERGEN IMMUNOTHERAPY, Name of Mix: Mix #1  Cat, Dog Cat Hair, Standardized 10,000 BAU/mL, ALK  2.0 ml Dog Hair Dander, A. P.  1:100 w/v, HS  1.0 ml  Diluent: HSA qs to 5ml, Disp: 5 mL, Rfl: PRN     ORDER FOR ALLERGEN IMMUNOTHERAPY, Name of Mix: Mix #2  Mold Alternaria Tenuis 1:10 w/v, HS  0.5 ml Diluent: HSA qs to 5ml, Disp: 5 mL, Rfl: PRN     QVAR REDIHALER 80 MCG/ACT inhaler, Inhale 2 puffs into the lungs 2 times daily, Disp: , Rfl:      simvastatin (ZOCOR) 20 MG tablet, TAKE 1 TABLET BY MOUTH AT BEDTIME, Disp: 90 tablet, Rfl: 2     triamcinolone (NASACORT) 55 MCG/ACT nasal aerosol, Spray 1 spray into both nostrils daily, Disp: , Rfl:      valsartan (DIOVAN) 80 MG tablet, Take 2 tablets (160 mg) by mouth daily, Disp: 180 tablet, Rfl: 3  No Known Allergies    EXAM:   Pulse 58   Resp 16   Ht 1.84 m (6' 0.44\")   Wt 92.5 kg (204 lb)   SpO2 " 100%   BMI 27.33 kg/m    GENERAL APPEARANCE: alert, cooperative and not in distress  SKIN: no rashes, no lesions  HEAD: atraumatic, normocephalic  EYES: lids and lashes normal, conjunctivae and sclerae clear, EOM full and intact  ENT: no scars or lesions, nasal exam showed no discharge, swelling or lesions noted, tongue midline and normal, soft palate, uvula, and tonsils normal  NECK: no asymmetry, masses, or scars, supple without significant adenopathy  LUNGS: unlabored respirations, no intercostal retractions or accessory muscle use, clear to auscultation without rales or wheezes  HEART: regular rate and rhythm without murmurs and normal S1 and S2  MUSCULOSKELETAL: no musculoskeletal defects are noted  NEURO: no focal deficits noted  PSYCH: does not appear depressed or anxious      WORKUP:  None    ASSESSMENT/PLAN:  Reji Carpenter is a 45 year old male seen for follow-up of asthma and allergic rhinitis.    1. Mild persistent asthma without complication - Reji's asthma is not optimally controlled as evidenced by the frequency of daytime and nocturnal asthma symptoms and use of albuterol. He has not had any exacerbations or treatment with oral steroids in the last 6 months. Currently he is on low-dose ICS therapy and we discussed the addition of LABA to his current regimen to improve overall asthma control.    - budesonide-formoterol (SYMBICORT) 80-4.5 MCG/ACT Inhaler; Inhale 2 puffs into the lungs 2 times daily  Dispense: 3 Inhaler; Refill: 1  - continue montelukast 10mg daily  - take 2 to 4 puffs of albuterol HFA every 4 hours as needed, also take 2 puffs 15 minutes prior to exercise or activity    2. Other allergy status, other than to drugs and biological substances - Reji has completed nearly 3 years of allergen immunotherapy treatment at his maintenance dose. He reports significant improvement in his symptoms since beginning treatment.    - continue allergen immunotherapy per protocol  - take 10mg of  cetirizine daily as needed      Follow-up in 6 months, sooner if needed      Thank you for allowing me to participate in the care of Reji Carpenter.      Rosemarie Posada MD, FAAAAI  Allergy/Immunology  [facility]      Chart documentation done in part with Dragon Voice Recognition Software. Although reviewed after completion, some word and grammatical errors may remain.

## 2020-12-08 NOTE — PATIENT INSTRUCTIONS
If you have any questions regarding your allergies, asthma, or what we discussed during your visit today please call the allergy clinic or contact us via [employer].    [facility] Allergy RN Line: [phone]  [facility] [place] Scheduling Line: [phone]  [facility] Scheduling Line: [phone]      Try the Symbicort - start with 2 puffs twice a day and you can decrease to 1 puff twice a day if your symptoms are controlled    If the Symbicort (or generic called budesonide/formoterol) is not covered by your insurance we'll prescribe a different version or you can use the mail order pharmacy if you prefer

## 2020-12-08 NOTE — LETTER
"    12/8/2020         RE: Reji Carpenter  [address] [facility] [address]        Dear Colleague,    Thank you for referring your patient, Reji Carpenter, to the [facility]. Please see a copy of my visit note below.    Reji Carpenter was seen in the Allergy Clinic at [facility].      Reji Carpenter is a 45 year old American male who is seen today for follow-up of asthma and allergic rhinitis. He began allergen immunotherapy treatment in 6/2017 and reached his maintenance dose in 2/2018. He has no prior history of systemic or significant large local reactions to treatment. He continues to take cetirizine daily. This last spring he felt his symptoms were well controlled despite the pause in immunotherapy treatment due to the COVID-19 pandemic. He has only needed to take antihistamines intermittently for occasional symptoms.    Reji reports that his asthma has been \"pretty good.\" He has been taking the Qvar 1 puff twice daily. He had been taking 2 puffs twice daily but decreased the dose as he felt he was doing OK and has also been running out of the medication. He reports that he does wake up at night or early in the morning about once per week with asthma symptoms and he needs to use his albuterol inhaler at least 2 to 3 times per week for daytime symptoms. The albuterol does work well to relieve acute symptoms. He has not had any urgent care or ED visits for asthma and has not been treated with oral steroids for asthma symptoms in the past 6 months.      Past Medical History:   Diagnosis Date     Allergic rhinitis      Asthma      Hypertension      NONSPECIFIC MEDICAL HISTORY 06/06    ruptured achilles tendon repaired     Family History   Problem Relation Age of Onset     Lipids Father      Other - See Comments Father         parkinson     Hypertension Mother      Cancer Maternal Grandmother         brain     Cancer Paternal " Grandmother      Coronary Artery Disease Maternal Grandfather         MI 50s     Coronary Artery Disease Paternal Grandfather         MI 60s     Diabetes No family hx of      C.A.D. No family hx of      Social History     Tobacco Use     Smoking status: Never Smoker     Smokeless tobacco: Never Used   Substance Use Topics     Alcohol use: No     Comment: occasional     Drug use: No     Social History     Social History Narrative    ENVIRONMENTAL HISTORY: [place] The home is heated with a forced air. They do have central air conditioning. The patient's bedroom is furnished with carpeting in bedroom, allergen mattress cover, allergen pillowcase cover and fabric window coverings.  Pets inside the house include 3 dog(s). There is no history of cockroach or mice infestation. There is/are 0 smokers in the house.  The house does not have a damp basement.          SOCIAL HISTORY:     Reji is employed as a . He lives with his spouse. His spouse works as a nurse.       Past medical, family, and social history were reviewed.    REVIEW OF SYSTEMS:  General: negative for weight gain. negative for weight loss. negative for changes in sleep.   Eyes: negative for itching. negative for redness. negative for tearing/watering. negative for vision changes  Ears: negative for fullness. negative for hearing loss. negative for dizziness.   Nose: negative for snoring.negative for changes in smell. negative for drainage.   Throat: negative for hoarseness. negative for sore throat. negative for trouble swallowing.   Lungs: negative for cough. negative for shortness of breath.negative for wheezing. negative for sputum production.   Cardiovascular: negative for chest pain. negative for swelling of ankles. negative for fast or irregular heartbeat.   Gastrointestinal: negative for nausea. negative for heartburn. negative for acid reflux.   Musculoskeletal: negative for joint pain.  negative for joint stiffness. negative for joint swelling.   Neurologic: negative for seizures. negative for fainting. negative for weakness.   Psychiatric: negative for changes in mood. negative for anxiety.   Endocrine: negative for cold intolerance. negative for heat intolerance. negative for tremors.   Hematologic: negative for easy bruising. negative for easy bleeding.  Integumentary: negative for rash. negative for scaling. negative for nail changes.       Current Outpatient Medications:      albuterol (PROAIR HFA/PROVENTIL HFA/VENTOLIN HFA) 108 (90 Base) MCG/ACT inhaler, Inhale 2 puffs into the lungs every 4 hours as needed for shortness of breath / dyspnea or other (cough), Disp: 3 Inhaler, Rfl: 1     cetirizine (ZYRTEC) 10 MG tablet, Take 1 tablet (10 mg) by mouth daily, Disp: , Rfl: 0     montelukast (SINGULAIR) 10 MG tablet, Take 1 tablet (10 mg) by mouth daily, Disp: 90 tablet, Rfl: 3     ORDER FOR ALLERGEN IMMUNOTHERAPY, Name of Mix: Mix #3  Tree , Weeds Narayan, White  1:20 w/v, HS  0.5 ml Boxelder-Maple  Mix BHR (Boxelder Hard Red) 1:20 w/v, HS  0.5 ml Guernsey, Common 1:20 w/v, HS  0.5 ml Elm, American 1:20 w/v, HS  0.5 ml Lamb's Quarters 1:20 w/v, HS 0.5 ml Plantain, English 1:20 w/v, HS 0.5 ml Ragweed Mixed 1:20 w/v ALK  0.5 ml Sagebrush, Mugwort 1:20 w/v, HS 0.5 ml Diluent: HSA qs to 5ml, Disp: 5 mL, Rfl: PRN     ORDER FOR ALLERGEN IMMUNOTHERAPY, Name of Mix: Mix #1  Cat, Dog Cat Hair, Standardized 10,000 BAU/mL, ALK  2.0 ml Dog Hair Dander, A. P.  1:100 w/v, HS  1.0 ml  Diluent: HSA qs to 5ml, Disp: 5 mL, Rfl: PRN     ORDER FOR ALLERGEN IMMUNOTHERAPY, Name of Mix: Mix #2  Mold Alternaria Tenuis 1:10 w/v, HS  0.5 ml Diluent: HSA qs to 5ml, Disp: 5 mL, Rfl: PRN     QVAR REDIHALER 80 MCG/ACT inhaler, Inhale 2 puffs into the lungs 2 times daily, Disp: , Rfl:      simvastatin (ZOCOR) 20 MG tablet, TAKE 1 TABLET BY MOUTH AT BEDTIME, Disp: 90 tablet, Rfl: 2     triamcinolone (NASACORT) 55 MCG/ACT nasal  "aerosol, Spray 1 spray into both nostrils daily, Disp: , Rfl:      valsartan (DIOVAN) 80 MG tablet, Take 2 tablets (160 mg) by mouth daily, Disp: 180 tablet, Rfl: 3  No Known Allergies    EXAM:   Pulse 58   Resp 16   Ht 1.84 m (6' 0.44\")   Wt 92.5 kg (204 lb)   SpO2 100%   BMI 27.33 kg/m    GENERAL APPEARANCE: alert, cooperative and not in distress  SKIN: no rashes, no lesions  HEAD: atraumatic, normocephalic  EYES: lids and lashes normal, conjunctivae and sclerae clear, EOM full and intact  ENT: no scars or lesions, nasal exam showed no discharge, swelling or lesions noted, tongue midline and normal, soft palate, uvula, and tonsils normal  NECK: no asymmetry, masses, or scars, supple without significant adenopathy  LUNGS: unlabored respirations, no intercostal retractions or accessory muscle use, clear to auscultation without rales or wheezes  HEART: regular rate and rhythm without murmurs and normal S1 and S2  MUSCULOSKELETAL: no musculoskeletal defects are noted  NEURO: no focal deficits noted  PSYCH: does not appear depressed or anxious      WORKUP:  None    ASSESSMENT/PLAN:  Reji Carpenter is a 45 year old male seen for follow-up of asthma and allergic rhinitis.    1. Mild persistent asthma without complication - Jefferys asthma is not optimally controlled as evidenced by the frequency of daytime and nocturnal asthma symptoms and use of albuterol. He has not had any exacerbations or treatment with oral steroids in the last 6 months. Currently he is on low-dose ICS therapy and we discussed the addition of LABA to his current regimen to improve overall asthma control.    - budesonide-formoterol (SYMBICORT) 80-4.5 MCG/ACT Inhaler; Inhale 2 puffs into the lungs 2 times daily  Dispense: 3 Inhaler; Refill: 1  - continue montelukast 10mg daily  - take 2 to 4 puffs of albuterol HFA every 4 hours as needed, also take 2 puffs 15 minutes prior to exercise or activity    2. Other allergy status, other than to " drugs and biological substances - Reji has completed nearly 3 years of allergen immunotherapy treatment at his maintenance dose. He reports significant improvement in his symptoms since beginning treatment.    - continue allergen immunotherapy per protocol  - take 10mg of cetirizine daily as needed      Follow-up in 6 months, sooner if needed      Thank you for allowing me to participate in the care of Reji Carpenter.      Rosemarie Posada MD, [facility]  Allergy/Immunology  [facility]      Chart documentation done in part with Dragon Voice Recognition Software. Although reviewed after completion, some word and grammatical errors may remain.      Again, thank you for allowing me to participate in the care of your patient.        Sincerely,        Rosemarie Posada MD

## 2020-12-08 NOTE — TELEPHONE ENCOUNTER
Prescription printed, signed, and faxed to patient at [phone] per patient request.     Miracle Johnson, BSN, RN

## 2020-12-09 ASSESSMENT — ASTHMA QUESTIONNAIRES: ACT_TOTALSCORE: 19

## 2021-01-05 ENCOUNTER — ALLIED HEALTH/NURSE VISIT (OUTPATIENT)
Dept: ALLERGY | Facility: CLINIC | Age: 46
End: 2021-01-05
Payer: COMMERCIAL

## 2021-01-05 DIAGNOSIS — Z51.6 NEED FOR DESENSITIZATION TO ALLERGENS: Primary | ICD-10-CM

## 2021-01-05 PROCEDURE — 95117 IMMUNOTHERAPY INJECTIONS: CPT

## 2021-01-05 PROCEDURE — 99207 PR DROP WITH A PROCEDURE: CPT

## 2021-02-01 ENCOUNTER — MYC MEDICAL ADVICE (OUTPATIENT)
Dept: ALLERGY | Facility: CLINIC | Age: 46
End: 2021-02-01

## 2021-02-02 ENCOUNTER — ALLIED HEALTH/NURSE VISIT (OUTPATIENT)
Dept: ALLERGY | Facility: CLINIC | Age: 46
End: 2021-02-02
Payer: COMMERCIAL

## 2021-02-02 DIAGNOSIS — J30.1 SEASONAL ALLERGIC RHINITIS DUE TO POLLEN: Primary | ICD-10-CM

## 2021-02-02 PROCEDURE — 95117 IMMUNOTHERAPY INJECTIONS: CPT

## 2021-02-02 PROCEDURE — 99207 PR DROP WITH A PROCEDURE: CPT

## 2021-02-24 DIAGNOSIS — I10 HYPERTENSION GOAL BP (BLOOD PRESSURE) < 140/90: ICD-10-CM

## 2021-02-24 RX ORDER — AMLODIPINE BESYLATE 5 MG/1
5 TABLET ORAL DAILY
Qty: 30 TABLET | Refills: 1 | Status: SHIPPED | OUTPATIENT
Start: 2021-02-24 | End: 2021-06-29

## 2021-02-24 NOTE — TELEPHONE ENCOUNTER
Okayed refills but advise clinic appointment at [place] in the next couple months    Please inform patient    Wili Kaplan MD

## 2021-02-24 NOTE — LETTER
[facility]  [address] [facility] [address]  [phone]          February 24, 2021    Reji Carpenter                                                                                                                     [address] [facility] [address]            Dear Reji,    Your provider has sent a  kobi refill of amlodipine. You are due for an appointment for further refills.  Please contact the clinic to schedule an appointment for further refills.               Sincerely,       Team Gregory Kaplan MD

## 2021-03-02 ENCOUNTER — ALLIED HEALTH/NURSE VISIT (OUTPATIENT)
Dept: ALLERGY | Facility: CLINIC | Age: 46
End: 2021-03-02
Payer: COMMERCIAL

## 2021-03-02 DIAGNOSIS — J30.1 SEASONAL ALLERGIC RHINITIS DUE TO POLLEN: Primary | ICD-10-CM

## 2021-03-02 PROCEDURE — 99207 PR DROP WITH A PROCEDURE: CPT

## 2021-03-02 PROCEDURE — 95117 IMMUNOTHERAPY INJECTIONS: CPT

## 2021-04-19 ENCOUNTER — ALLIED HEALTH/NURSE VISIT (OUTPATIENT)
Dept: ALLERGY | Facility: CLINIC | Age: 46
End: 2021-04-19
Payer: COMMERCIAL

## 2021-04-19 DIAGNOSIS — J30.1 SEASONAL ALLERGIC RHINITIS DUE TO POLLEN: Primary | ICD-10-CM

## 2021-04-19 PROCEDURE — 99207 PR DROP WITH A PROCEDURE: CPT

## 2021-04-19 PROCEDURE — 95117 IMMUNOTHERAPY INJECTIONS: CPT

## 2021-04-27 ENCOUNTER — MYC MEDICAL ADVICE (OUTPATIENT)
Dept: ALLERGY | Facility: CLINIC | Age: 46
End: 2021-04-27

## 2021-04-27 ENCOUNTER — ALLIED HEALTH/NURSE VISIT (OUTPATIENT)
Dept: ALLERGY | Facility: CLINIC | Age: 46
End: 2021-04-27
Payer: COMMERCIAL

## 2021-04-27 ENCOUNTER — TELEPHONE (OUTPATIENT)
Dept: ALLERGY | Facility: CLINIC | Age: 46
End: 2021-04-27

## 2021-04-27 DIAGNOSIS — J30.9 ALLERGIC RHINITIS: Primary | ICD-10-CM

## 2021-04-27 DIAGNOSIS — J30.1 SEASONAL ALLERGIC RHINITIS DUE TO POLLEN: ICD-10-CM

## 2021-04-27 DIAGNOSIS — J30.89 ALLERGIC RHINITIS DUE TO MOLD: ICD-10-CM

## 2021-04-27 DIAGNOSIS — J30.81 NON-SEASONAL ALLERGIC RHINITIS DUE TO ANIMAL HAIR AND DANDER: ICD-10-CM

## 2021-04-27 PROCEDURE — 95117 IMMUNOTHERAPY INJECTIONS: CPT

## 2021-04-27 PROCEDURE — 99207 PR DROP WITH A PROCEDURE: CPT

## 2021-04-27 NOTE — TELEPHONE ENCOUNTER
Reason for Call:  Other call back    Detailed comments: Missed appointment, has a question for the allergy nurse. Please call back    Phone Number Patient can be reached at: Cell number on file:    Telephone Information:   Mobile [phone]       Best Time: Any    Can we leave a detailed message on this number? YES    Call taken on 4/27/2021 at 10:10 AM by Lyssa Kraus

## 2021-04-27 NOTE — TELEPHONE ENCOUNTER
ALLERGY SOLUTION RE-ORDER REQUEST    Reji Carpenter 1975 MRN: [id number]    DATE NEEDED:  05/11/21  Vial Color Content    Vial Size  Red 1:1 Trees, Weeds    5mL  Red 1:1 Molds   5mL  Red 1:1 Cat, Dog    5mL      Serum reorder consent signed and patient/parent was advised that new serums would be ordered through the pharmacy and billed to their insurance company when they arrive in clinic. Yes    Shot Clinic Location:  [place]  Ship to Location: [place]  Serum billed to:  [place]      Requester Signature  Jadyn Lopez, ELIZABETH

## 2021-04-27 NOTE — TELEPHONE ENCOUNTER
Called and spoke with pt, and verified with allergy shot team that it is ok to come to clinic to get his allergy injection. Will close.    Tianna HARRIS MA

## 2021-04-27 NOTE — PROGRESS NOTES
Patient presented after waiting 30 minutes with normal reaction to  injections. Discharged from clinic.    Jadyn GRIFFITHS RN, Allergy Clinic 04/27/21 11:14 AM

## 2021-05-05 DIAGNOSIS — J30.81 NON-SEASONAL ALLERGIC RHINITIS DUE TO ANIMAL HAIR AND DANDER: Primary | ICD-10-CM

## 2021-05-05 DIAGNOSIS — Z51.6 NEED FOR DESENSITIZATION TO ALLERGENS: ICD-10-CM

## 2021-05-05 DIAGNOSIS — J30.1 SEASONAL ALLERGIC RHINITIS DUE TO POLLEN: ICD-10-CM

## 2021-05-05 DIAGNOSIS — J30.89 ALLERGIC RHINITIS DUE TO MOLD: ICD-10-CM

## 2021-05-05 PROCEDURE — 95165 ANTIGEN THERAPY SERVICES: CPT | Performed by: ALLERGY & IMMUNOLOGY

## 2021-05-05 NOTE — PROGRESS NOTES
Allergy serums billed to [facility].     Vials billed below:    Vial Color Content                      Vial Size Expiration Date  Red 1:1 Molds 5mL  05/05/2022  Red 1:1 Trees, Weeds 5mL  05/05/2022  Red 1:1 Cat, Dog 5mL  05/05/2022          Checked by Andrew Neal / MITZI  Billed for 30 units      Signature  Andrew Neal LPN

## 2021-05-11 NOTE — TELEPHONE ENCOUNTER
Allergy serums received at [place].     Vials received below:    Vial Color Content                      Vial Size Expiration Date  Red 1:1 Cat, Dog 5 mL  5/5/2022  Red 1:1 Trees, Weeds 5 mL  5/5/2022  Red 1:1 Molds 5 mL  5/5/2022        Signature  Tianna HARRIS MA

## 2021-06-01 ENCOUNTER — ALLIED HEALTH/NURSE VISIT (OUTPATIENT)
Dept: ALLERGY | Facility: CLINIC | Age: 46
End: 2021-06-01
Payer: COMMERCIAL

## 2021-06-01 DIAGNOSIS — J30.1 SEASONAL ALLERGIC RHINITIS DUE TO POLLEN: Primary | ICD-10-CM

## 2021-06-01 PROCEDURE — 95117 IMMUNOTHERAPY INJECTIONS: CPT

## 2021-06-01 PROCEDURE — 99207 PR DROP WITH A PROCEDURE: CPT

## 2021-06-02 ENCOUNTER — MYC MEDICAL ADVICE (OUTPATIENT)
Dept: ALLERGY | Facility: CLINIC | Age: 46
End: 2021-06-02

## 2021-06-08 ENCOUNTER — ALLIED HEALTH/NURSE VISIT (OUTPATIENT)
Dept: ALLERGY | Facility: CLINIC | Age: 46
End: 2021-06-08
Payer: COMMERCIAL

## 2021-06-08 DIAGNOSIS — J30.1 SEASONAL ALLERGIC RHINITIS DUE TO POLLEN: Primary | ICD-10-CM

## 2021-06-08 PROCEDURE — 99207 PR DROP WITH A PROCEDURE: CPT

## 2021-06-08 PROCEDURE — 95117 IMMUNOTHERAPY INJECTIONS: CPT

## 2021-06-22 ENCOUNTER — ALLIED HEALTH/NURSE VISIT (OUTPATIENT)
Dept: ALLERGY | Facility: CLINIC | Age: 46
End: 2021-06-22
Payer: COMMERCIAL

## 2021-06-22 DIAGNOSIS — J30.1 SEASONAL ALLERGIC RHINITIS DUE TO POLLEN: Primary | ICD-10-CM

## 2021-06-22 PROCEDURE — 99207 PR DROP WITH A PROCEDURE: CPT

## 2021-06-22 PROCEDURE — 95117 IMMUNOTHERAPY INJECTIONS: CPT

## 2021-06-24 NOTE — TELEPHONE ENCOUNTER
Called patient to schedule for follow up with [facility]. Was schedule for appointment on [facility] side for 4/13 @5:20 but needs it to be scheduled for [facility] side. Schedule 4/14 between 9-11:30. Video Visit. LILIANTCB  
Negative/Unknown

## 2021-06-28 DIAGNOSIS — I10 HYPERTENSION GOAL BP (BLOOD PRESSURE) < 140/90: ICD-10-CM

## 2021-06-29 RX ORDER — AMLODIPINE BESYLATE 5 MG/1
TABLET ORAL
Qty: 30 TABLET | Refills: 1 | Status: SHIPPED | OUTPATIENT
Start: 2021-06-29 | End: 2021-11-24

## 2021-06-29 NOTE — TELEPHONE ENCOUNTER
Okay refills but see us in clinic at [place] in the next couple months    Please inform patient    Wili Kaplan MD

## 2021-06-29 NOTE — TELEPHONE ENCOUNTER
Routing refill request to provider for review/approval because:  Drug interaction warning          Pending Prescriptions:                       Disp   Refills    amLODIPine (NORVASC) 5 MG tablet [Pharmacy*30 tab*1        Sig: TAKE 1 TABLET BY MOUTH EVERY DAY        Leander Davis RN

## 2021-07-16 ENCOUNTER — MYC MEDICAL ADVICE (OUTPATIENT)
Dept: ALLERGY | Facility: CLINIC | Age: 46
End: 2021-07-16

## 2021-07-16 DIAGNOSIS — J45.30 MILD PERSISTENT ASTHMA WITHOUT COMPLICATION: ICD-10-CM

## 2021-07-19 NOTE — TELEPHONE ENCOUNTER
Patient requesting refill of Symbicort. MyChart message sent to patient to advise that he is overdue for his 6 month asthma follow up with Dr. Posada.    Please advise if kobi can be provided to patient.    Jolene JONES MA

## 2021-07-20 ENCOUNTER — ALLIED HEALTH/NURSE VISIT (OUTPATIENT)
Dept: ALLERGY | Facility: CLINIC | Age: 46
End: 2021-07-20
Payer: COMMERCIAL

## 2021-07-20 DIAGNOSIS — J30.1 SEASONAL ALLERGIC RHINITIS DUE TO POLLEN: Primary | ICD-10-CM

## 2021-07-20 PROCEDURE — 99207 PR DROP WITH A PROCEDURE: CPT

## 2021-07-20 PROCEDURE — 95117 IMMUNOTHERAPY INJECTIONS: CPT

## 2021-07-22 RX ORDER — BUDESONIDE AND FORMOTEROL FUMARATE DIHYDRATE 80; 4.5 UG/1; UG/1
2 AEROSOL RESPIRATORY (INHALATION) 2 TIMES DAILY
Qty: 30.6 G | Refills: 1 | Status: SHIPPED | OUTPATIENT
Start: 2021-07-22 | End: 2021-09-14

## 2021-07-22 NOTE — TELEPHONE ENCOUNTER
Patient requesting written RX for Symbicort to be mailed to his home. Follow up scheduled for 8/17/2021.  Jolene JONES MA

## 2021-07-30 ENCOUNTER — ANCILLARY PROCEDURE (OUTPATIENT)
Dept: ULTRASOUND IMAGING | Facility: CLINIC | Age: 46
End: 2021-07-30
Attending: FAMILY MEDICINE
Payer: COMMERCIAL

## 2021-07-30 ENCOUNTER — OFFICE VISIT (OUTPATIENT)
Dept: FAMILY MEDICINE | Facility: CLINIC | Age: 46
End: 2021-07-30
Payer: COMMERCIAL

## 2021-07-30 ENCOUNTER — TELEPHONE (OUTPATIENT)
Dept: FAMILY MEDICINE | Facility: CLINIC | Age: 46
End: 2021-07-30

## 2021-07-30 VITALS
TEMPERATURE: 97.6 F | BODY MASS INDEX: 27.77 KG/M2 | SYSTOLIC BLOOD PRESSURE: 153 MMHG | HEIGHT: 72 IN | HEART RATE: 78 BPM | DIASTOLIC BLOOD PRESSURE: 90 MMHG | WEIGHT: 205 LBS

## 2021-07-30 DIAGNOSIS — N50.812 PAIN IN LEFT TESTICLE: ICD-10-CM

## 2021-07-30 DIAGNOSIS — J45.30 MILD PERSISTENT ASTHMA WITHOUT COMPLICATION: ICD-10-CM

## 2021-07-30 DIAGNOSIS — N45.1 EPIDIDYMITIS: Primary | ICD-10-CM

## 2021-07-30 PROCEDURE — 76870 US EXAM SCROTUM: CPT | Performed by: RADIOLOGY

## 2021-07-30 PROCEDURE — 93976 VASCULAR STUDY: CPT | Performed by: RADIOLOGY

## 2021-07-30 PROCEDURE — 99213 OFFICE O/P EST LOW 20 MIN: CPT | Performed by: FAMILY MEDICINE

## 2021-07-30 RX ORDER — SULFAMETHOXAZOLE/TRIMETHOPRIM 800-160 MG
1 TABLET ORAL 2 TIMES DAILY
Qty: 14 TABLET | Refills: 1 | Status: SHIPPED | OUTPATIENT
Start: 2021-07-30 | End: 2021-08-06

## 2021-07-30 ASSESSMENT — MIFFLIN-ST. JEOR: SCORE: 1854.86

## 2021-07-30 NOTE — LETTER
My Asthma Action Plan    Name: Reji Carpenter   YOB: 1975  Date: 7/30/2021   My doctor: Wili Kaplan MD   My clinic: [facility]        My Rescue Medicine:   Albuterol inhaler (Proair/Ventolin/Proventil HFA)  2-4 puffs EVERY 4 HOURS as needed. Use a spacer if recommended by your provider.   My Asthma Severity:   Intermittent / Exercise Induced  Know your asthma triggers: see list             GREEN ZONE   Good Control    I feel good    No cough or wheeze    Can work, sleep and play without asthma symptoms       Take your asthma control medicine every day.     1. If exercise triggers your asthma, take your rescue medication    15 minutes before exercise or sports, and    During exercise if you have asthma symptoms  2. Spacer to use with inhaler: If you have a spacer, make sure to use it with your inhaler             YELLOW ZONE Getting Worse  I have ANY of these:    I do not feel good    Cough or wheeze    Chest feels tight    Wake up at night   1. Keep taking your Green Zone medications  2. Start taking your rescue medicine:    every 20 minutes for up to 1 hour. Then every 4 hours for 24-48 hours.  3. If you stay in the Yellow Zone for more than 12-24 hours, contact your doctor.  4. If you do not return to the Green Zone in 12-24 hours or you get worse, start taking your oral steroid medicine if prescribed by your provider.           RED ZONE Medical Alert - Get Help  I have ANY of these:    I feel awful    Medicine is not helping    Breathing getting harder    Trouble walking or talking    Nose opens wide to breathe       1. Take your rescue medicine NOW  2. If your provider has prescribed an oral steroid medicine, start taking it NOW  3. Call your doctor NOW  4. If you are still in the Red Zone after 20 minutes and you have not reached your doctor:    Take your rescue medicine again and    Call 911 or go to the emergency room right away    See your regular doctor  within 2 weeks of an Emergency Room or Urgent Care visit for follow-up treatment.          Annual Reminders:  Meet with Asthma Educator,  Flu Shot in the Fall, consider Pneumonia Vaccination for patients with asthma (aged 19 and older).    Pharmacy:    [facility] - [place], [place] - [address] [facility]  [id number] - [place], [place] - [address] AT COR[address] OF [address] - [place], [place] - [address]  WRITTEN PRESCRIPTION REQUESTED  [facility] (NEW ADDRESS) - [place], [place] - [address] [place] [address] AT [facility]  [facility] - [place], [place] - [address]    Electronically signed by Wili Kaplan MD   Date: 07/30/21                    Asthma Triggers  How To Control Things That Make Your Asthma Worse    Triggers are things that make your asthma worse.  Look at the list below to help you find your triggers and   what you can do about them. You can help prevent asthma flare-ups by staying away from your triggers.      Trigger                                                          What you can do   Cigarette Smoke  Tobacco smoke can make asthma worse. Do not allow smoking in your home, car or around you.  Be sure no one smokes at a child s day care or school.  If you smoke, ask your health care provider for ways to help you quit.  Ask family members to quit too.  Ask your health care provider for a referral to Quit Plan to help you quit smoking, or call 4-144-030-PLAN.     Colds, Flu, Bronchitis  These are common triggers of asthma. Wash your hands often.  Don t touch your eyes, nose or mouth.  Get a flu shot every year.     Dust Mites  These are tiny bugs that live in cloth or carpet. They are too small to see. Wash sheets and blankets in hot water every week.   Encase pillows and mattress in dust mite proof covers.  Avoid having carpet if you can. If  you have carpet, vacuum weekly.   Use a dust mask and HEPA vacuum.   Pollen and Outdoor Mold  Some people are allergic to trees, grass, or weed pollen, or molds. Try to keep your windows closed.  Limit time out doors when pollen count is high.   Ask you health care provider about taking medicine during allergy season.     Animal Dander  Some people are allergic to skin flakes, urine or saliva from pets with fur or feathers. Keep pets with fur or feathers out of your home.    If you can t keep the pet outdoors, then keep the pet out of your bedroom.  Keep the bedroom door closed.  Keep pets off cloth furniture and away from stuffed toys.     Mice, Rats, and Cockroaches  Some people are allergic to the waste from these pests.   Cover food and garbage.  Clean up spills and food crumbs.  Store grease in the refrigerator.   Keep food out of the bedroom.   Indoor Mold  This can be a trigger if your home has high moisture. Fix leaking faucets, pipes, or other sources of water.   Clean moldy surfaces.  Dehumidify basement if it is damp and smelly.   Smoke, Strong Odors, and Sprays  These can reduce air quality. Stay away from strong odors and sprays, such as perfume, powder, hair spray, paints, smoke incense, paint, cleaning products, candles and new carpet.   Exercise or Sports  Some people with asthma have this trigger. Be active!  Ask your doctor about taking medicine before sports or exercise to prevent symptoms.    Warm up for 5-10 minutes before and after sports or exercise.     Other Triggers of Asthma  Cold air:  Cover your nose and mouth with a scarf.  Sometimes laughing or crying can be a trigger.  Some medicines and food can trigger asthma.

## 2021-07-30 NOTE — TELEPHONE ENCOUNTER
I called patient with result of ultrasound    We are already treating for epididymitis    Follow up prn symptoms    Wili Kaplan MD

## 2021-07-30 NOTE — PROGRESS NOTES
"        Subjective   Reji is a 46 year old who presents for the following health issues     HPI     Testicle pain for the past few days      Review of Systems     symbicort helping a lot    Bid    Not needing albuterol much at all    Sensitive testicular area  Maybe exerted self    No bike riding    Lifting wts     No rash    Urinating fine    No sexual difficulty    No std concern    Had vasectomy about 14 years             Objective    BP (!) 153/90 (BP Location: Left arm, Patient Position: Chair, Cuff Size: Adult Regular)   Pulse 78   Temp 97.6  F (36.4  C) (Temporal)   Ht 1.84 m (6' 0.44\")   Wt 93 kg (205 lb)   BMI 27.47 kg/m    Body mass index is 27.47 kg/m .  Physical Exam   Full physical not done     Mentation and affect are fine    No tremor of speech or extremity    Patient has some mild swelling of left testicle and left epidymitis and some mild tenderness present    Not tender on right     No obvious swelling of scrotum    ASSESSMENT / PLAN:  (N45.1) Epididymitis  (primary encounter diagnosis)  Comment: treat with antibiotics and get ultrasound  Plan: sulfamethoxazole-trimethoprim (BACTRIM DS)         800-160 MG tablet             (N50.812) Pain in left testicle  Comment: patient to schedule, do soon if possible    Plan: US Testicular and Scrotum           If symptoms worsen at all then go to emergency room     (J45.30) Mild persistent asthma without complication  Comment: well controlled in the symbicort; hardly needing the albuterol   Plan: no change in meds       I reviewed the patient's medications, allergies, medical history, family history, and social history.    Wili Kaplan MD                       "

## 2021-07-31 ASSESSMENT — ASTHMA QUESTIONNAIRES: ACT_TOTALSCORE: 24

## 2021-08-24 ENCOUNTER — ALLIED HEALTH/NURSE VISIT (OUTPATIENT)
Dept: ALLERGY | Facility: CLINIC | Age: 46
End: 2021-08-24
Payer: COMMERCIAL

## 2021-08-24 DIAGNOSIS — J30.89 ALLERGIC RHINITIS DUE TO MOLD: ICD-10-CM

## 2021-08-24 DIAGNOSIS — J30.81 NON-SEASONAL ALLERGIC RHINITIS DUE TO ANIMAL HAIR AND DANDER: ICD-10-CM

## 2021-08-24 DIAGNOSIS — J30.1 SEASONAL ALLERGIC RHINITIS DUE TO POLLEN: Primary | ICD-10-CM

## 2021-08-24 PROCEDURE — 95117 IMMUNOTHERAPY INJECTIONS: CPT

## 2021-09-12 ENCOUNTER — HEALTH MAINTENANCE LETTER (OUTPATIENT)
Age: 46
End: 2021-09-12

## 2021-09-14 ENCOUNTER — ALLIED HEALTH/NURSE VISIT (OUTPATIENT)
Dept: ALLERGY | Facility: CLINIC | Age: 46
End: 2021-09-14
Payer: COMMERCIAL

## 2021-09-14 ENCOUNTER — OFFICE VISIT (OUTPATIENT)
Dept: ALLERGY | Facility: CLINIC | Age: 46
End: 2021-09-14
Payer: COMMERCIAL

## 2021-09-14 VITALS — HEART RATE: 92 BPM | SYSTOLIC BLOOD PRESSURE: 151 MMHG | DIASTOLIC BLOOD PRESSURE: 89 MMHG | OXYGEN SATURATION: 96 %

## 2021-09-14 DIAGNOSIS — J30.81 ALLERGIC RHINITIS DUE TO ANIMALS: ICD-10-CM

## 2021-09-14 DIAGNOSIS — J45.30 MILD PERSISTENT ASTHMA WITHOUT COMPLICATION: ICD-10-CM

## 2021-09-14 DIAGNOSIS — J30.1 SEASONAL ALLERGIC RHINITIS DUE TO POLLEN: ICD-10-CM

## 2021-09-14 DIAGNOSIS — J30.89 ALLERGIC RHINITIS DUE TO MOLD: ICD-10-CM

## 2021-09-14 DIAGNOSIS — J45.30 MILD PERSISTENT ASTHMA WITHOUT COMPLICATION: Primary | ICD-10-CM

## 2021-09-14 DIAGNOSIS — J30.1 SEASONAL ALLERGIC RHINITIS DUE TO POLLEN: Primary | ICD-10-CM

## 2021-09-14 PROCEDURE — 95117 IMMUNOTHERAPY INJECTIONS: CPT

## 2021-09-14 PROCEDURE — 99213 OFFICE O/P EST LOW 20 MIN: CPT | Mod: 25 | Performed by: ALLERGY & IMMUNOLOGY

## 2021-09-14 RX ORDER — BUDESONIDE AND FORMOTEROL FUMARATE DIHYDRATE 80; 4.5 UG/1; UG/1
2 AEROSOL RESPIRATORY (INHALATION) 2 TIMES DAILY
Qty: 30.6 G | Refills: 1 | Status: SHIPPED | OUTPATIENT
Start: 2021-09-14 | End: 2021-11-24

## 2021-09-14 RX ORDER — BUDESONIDE AND FORMOTEROL FUMARATE DIHYDRATE 80; 4.5 UG/1; UG/1
2 AEROSOL RESPIRATORY (INHALATION) 2 TIMES DAILY
Qty: 30.6 G | Refills: 1 | Status: CANCELLED | OUTPATIENT
Start: 2021-09-14

## 2021-09-14 NOTE — PROGRESS NOTES
Reji Carpenter was seen in the Allergy Clinic at [facility].      Reij Carpenter is a 46 year old American male who is seen today for a follow-up visit. He reports that he has been doing well. He continues with allergen immunotherapy treatment and has no history of systemic or significant large local reactions to treatment. Overall he feels that his symptoms have improved since beginning immunotherapy. He reached his maintenance dose in 2/2018. Reji continues to take cetirizine, montelukast, and triamcinolone nasal spray daily.    Reji reports his asthma has been well controlled. His symptoms have improved with ICS/LABA therapy and he rarely uses his albuterol inhaler. He has not had any exacerbations or oral steroid use in the past year.      Past Medical History:   Diagnosis Date     Allergic rhinitis      Asthma      Hypertension      NONSPECIFIC MEDICAL HISTORY 06/06    ruptured achilles tendon repaired     Family History   Problem Relation Age of Onset     Lipids Father      Other - See Comments Father         parkinson     Hypertension Mother      Cancer Maternal Grandmother         brain     Cancer Paternal Grandmother      Coronary Artery Disease Maternal Grandfather         MI 50s     Coronary Artery Disease Paternal Grandfather         MI 60s     Diabetes No family hx of      C.A.D. No family hx of      Social History     Tobacco Use     Smoking status: Never Smoker     Smokeless tobacco: Never Used   Substance Use Topics     Alcohol use: No     Comment: occasional     Drug use: No     Social History     Social History Narrative    ENVIRONMENTAL HISTORY: [place] The home is heated with a forced air. They do have central air conditioning. The patient's bedroom is furnished with carpeting in bedroom, allergen mattress cover, allergen pillowcase cover and fabric window coverings.  Pets inside the house include 3 dog(s). There is no  history of cockroach or mice infestation. There is/are 0 smokers in the house.  The house does not have a damp basement.          SOCIAL HISTORY:     Reji is employed as a . He lives with his spouse. His spouse works as a nurse.       Past medical, family, and social history were reviewed.    REVIEW OF SYSTEMS:  General: negative for weight gain. negative for weight loss. negative for changes in sleep.   Eyes: negative for itching. negative for redness. negative for tearing/watering. negative for vision changes  Ears: negative for fullness. negative for hearing loss. negative for dizziness.   Nose: negative for snoring.negative for changes in smell. negative for drainage.   Throat: negative for hoarseness. negative for sore throat. negative for trouble swallowing.   Lungs: negative for cough. negative for shortness of breath.negative for wheezing. negative for sputum production.   Cardiovascular: negative for chest pain. negative for swelling of ankles. negative for fast or irregular heartbeat.   Gastrointestinal: negative for nausea. negative for heartburn. negative for acid reflux.   Musculoskeletal: negative for joint pain. negative for joint stiffness. negative for joint swelling.   Neurologic: negative for seizures. negative for fainting. negative for weakness.   Psychiatric: negative for changes in mood. negative for anxiety.   Endocrine: negative for cold intolerance. negative for heat intolerance. negative for tremors.   Hematologic: negative for easy bruising. negative for easy bleeding.  Integumentary: negative for rash. negative for scaling. negative for nail changes.       Current Outpatient Medications:      albuterol (PROAIR HFA/PROVENTIL HFA/VENTOLIN HFA) 108 (90 Base) MCG/ACT inhaler, Inhale 2 puffs into the lungs every 4 hours as needed for shortness of breath / dyspnea or other (cough), Disp: 3 Inhaler, Rfl: 1     amLODIPine (NORVASC) 5 MG tablet, TAKE 1 TABLET BY MOUTH EVERY DAY,  Disp: 30 tablet, Rfl: 1     budesonide-formoterol (SYMBICORT) 80-4.5 MCG/ACT Inhaler, Inhale 2 puffs into the lungs 2 times daily, Disp: 30.6 g, Rfl: 1     cetirizine (ZYRTEC) 10 MG tablet, Take 1 tablet (10 mg) by mouth daily, Disp: , Rfl: 0     montelukast (SINGULAIR) 10 MG tablet, Take 1 tablet (10 mg) by mouth daily, Disp: 90 tablet, Rfl: 3     ORDER FOR ALLERGEN IMMUNOTHERAPY, Name of Mix: Mix #3  Tree , Weeds Narayan, White  1:20 w/v, HS  0.5 ml Boxelder-Maple  Mix BHR (Boxelder Hard Red) 1:20 w/v, HS  0.5 ml [place], Common 1:20 w/v, HS  0.5 ml Elm, American 1:20 w/v, HS  0.5 ml Lamb's Quarters 1:20 w/v, HS 0.5 ml Plantain, English 1:20 w/v, HS 0.5 ml Ragweed Mixed 1:20 w/v ALK  0.5 ml Sagebrush, Mugwort 1:20 w/v, HS 0.5 ml Diluent: HSA qs to 5ml, Disp: 5 mL, Rfl: PRN     ORDER FOR ALLERGEN IMMUNOTHERAPY, Name of Mix: Mix #1  Cat, Dog Cat Hair, Standardized 10,000 BAU/mL, ALK  2.0 ml Dog Hair Dander, A. P.  1:100 w/v, HS  1.0 ml  Diluent: HSA qs to 5ml, Disp: 5 mL, Rfl: PRN     ORDER FOR ALLERGEN IMMUNOTHERAPY, Name of Mix: Mix #2  Mold Alternaria Tenuis 1:10 w/v, HS  0.5 ml Diluent: HSA qs to 5ml, Disp: 5 mL, Rfl: PRN     simvastatin (ZOCOR) 20 MG tablet, TAKE 1 TABLET BY MOUTH AT BEDTIME, Disp: 90 tablet, Rfl: 2     triamcinolone (NASACORT) 55 MCG/ACT nasal aerosol, Spray 1 spray into both nostrils daily, Disp: , Rfl:      valsartan (DIOVAN) 80 MG tablet, Take 2 tablets (160 mg) by mouth daily, Disp: 180 tablet, Rfl: 3  No Known Allergies    EXAM:   BP (!) 151/89 (BP Location: Right arm, Patient Position: Sitting, Cuff Size: Adult Large)   Pulse 92   SpO2 96%   GENERAL APPEARANCE: alert, cooperative and not in distress  SKIN: no rashes, no lesions  HEAD: atraumatic, normocephalic  EYES: lids and lashes normal, conjunctivae and sclerae clear  ENT: no scars or lesions, nasal exam showed no discharge, swelling or lesions noted, tongue midline and normal, soft palate, uvula, and tonsils normal  NECK: no  asymmetry, masses, or scars, supple without significant adenopathy  LUNGS: unlabored respirations, no intercostal retractions or accessory muscle use, clear to auscultation without rales or wheezes  HEART: regular rate and rhythm without murmurs and normal S1 and S2  MUSCULOSKELETAL: no musculoskeletal defects are noted  NEURO: no focal deficits noted  PSYCH: does not appear depressed or anxious      WORKUP:  None    ASSESSMENT/PLAN:  Reji Carpenter is a 46 year old male here for a follow-up visit.    1. Mild persistent asthma without complication - Well controlled, no exacerbations or oral steroid use in the past year.    - continue Symbicort 80/4.5mcg, 2 puffs twice daily  - continue montelukast, 10mg daily  - take 2 to 4 puffs of albuterol HFA every 4 hours as needed  - budesonide-formoterol (SYMBICORT) 80-4.5 MCG/ACT Inhaler; Inhale 2 puffs into the lungs 2 times daily  Dispense: 30.6 g; Refill: 1    2. Seasonal allergic rhinitis due to pollen - Symptoms have improved and he has completed approximately 3.5 years of treatment at his maintenance dose.    - continue allergen immunotherapy per protocol - anticipate 1.5-2 more years of treatment  - continue cetirizine, 10mg daily  - continue triamcinolone nasal spray, 1-2 sprays in each nostril daily    3. Allergic rhinitis due to animals - see above    4. Allergic rhinitis due to mold - see above      Follow-up in 6-12 months, sooner if needed      Thank you for allowing me to participate in the care of Reji Carpenter.      Rosemarie Posada MD, FAAAAI  Allergy/Immunology  [facility]      Chart documentation done in part with Dragon Voice Recognition Software. Although reviewed after completion, some word and grammatical errors may remain.

## 2021-09-14 NOTE — LETTER
9/14/2021         RE: Reji Carpenter  [address] [facility] [id number]        Dear Colleague,    Thank you for referring your patient, Reji Carpenter, to the [facility]. Please see a copy of my visit note below.    Reji Carpenter was seen in the Allergy Clinic at [facility].      Reji Carpenter is a 46 year old American male who is seen today for a follow-up visit. He reports that he has been doing well. He continues with allergen immunotherapy treatment and has no history of systemic or significant large local reactions to treatment. Overall he feels that his symptoms have improved since beginning immunotherapy. He reached his maintenance dose in 2/2018. Reji continues to take cetirizine, montelukast, and triamcinolone nasal spray daily.    Reji reports his asthma has been well controlled. His symptoms have improved with ICS/LABA therapy and he rarely uses his albuterol inhaler. He has not had any exacerbations or oral steroid use in the past year.      Past Medical History:   Diagnosis Date     Allergic rhinitis      Asthma      Hypertension      NONSPECIFIC MEDICAL HISTORY 06/06    ruptured achilles tendon repaired     Family History   Problem Relation Age of Onset     Lipids Father      Other - See Comments Father         parkinson     Hypertension Mother      Cancer Maternal Grandmother         brain     Cancer Paternal Grandmother      Coronary Artery Disease Maternal Grandfather         MI 50s     Coronary Artery Disease Paternal Grandfather         MI 60s     Diabetes No family hx of      C.A.D. No family hx of      Social History     Tobacco Use     Smoking status: Never Smoker     Smokeless tobacco: Never Used   Substance Use Topics     Alcohol use: No     Comment: occasional     Drug use: No     Social History     Social History Narrative    ENVIRONMENTAL HISTORY: [place] The  home is heated with a forced air. They do have central air conditioning. The patient's bedroom is furnished with carpeting in bedroom, allergen mattress cover, allergen pillowcase cover and fabric window coverings.  Pets inside the house include 3 dog(s). There is no history of cockroach or mice infestation. There is/are 0 smokers in the house.  The house does not have a damp basement.          SOCIAL HISTORY:     Reji is employed as a . He lives with his spouse. His spouse works as a nurse.       Past medical, family, and social history were reviewed.    REVIEW OF SYSTEMS:  General: negative for weight gain. negative for weight loss. negative for changes in sleep.   Eyes: negative for itching. negative for redness. negative for tearing/watering. negative for vision changes  Ears: negative for fullness. negative for hearing loss. negative for dizziness.   Nose: negative for snoring.negative for changes in smell. negative for drainage.   Throat: negative for hoarseness. negative for sore throat. negative for trouble swallowing.   Lungs: negative for cough. negative for shortness of breath.negative for wheezing. negative for sputum production.   Cardiovascular: negative for chest pain. negative for swelling of ankles. negative for fast or irregular heartbeat.   Gastrointestinal: negative for nausea. negative for heartburn. negative for acid reflux.   Musculoskeletal: negative for joint pain. negative for joint stiffness. negative for joint swelling.   Neurologic: negative for seizures. negative for fainting. negative for weakness.   Psychiatric: negative for changes in mood. negative for anxiety.   Endocrine: negative for cold intolerance. negative for heat intolerance. negative for tremors.   Hematologic: negative for easy bruising. negative for easy bleeding.  Integumentary: negative for rash. negative for scaling. negative for nail changes.       Current Outpatient Medications:      albuterol  (PROAIR HFA/PROVENTIL HFA/VENTOLIN HFA) 108 (90 Base) MCG/ACT inhaler, Inhale 2 puffs into the lungs every 4 hours as needed for shortness of breath / dyspnea or other (cough), Disp: 3 Inhaler, Rfl: 1     amLODIPine (NORVASC) 5 MG tablet, TAKE 1 TABLET BY MOUTH EVERY DAY, Disp: 30 tablet, Rfl: 1     budesonide-formoterol (SYMBICORT) 80-4.5 MCG/ACT Inhaler, Inhale 2 puffs into the lungs 2 times daily, Disp: 30.6 g, Rfl: 1     cetirizine (ZYRTEC) 10 MG tablet, Take 1 tablet (10 mg) by mouth daily, Disp: , Rfl: 0     montelukast (SINGULAIR) 10 MG tablet, Take 1 tablet (10 mg) by mouth daily, Disp: 90 tablet, Rfl: 3     ORDER FOR ALLERGEN IMMUNOTHERAPY, Name of Mix: Mix #3  Tree , Weeds Narayan, White  1:20 w/v, HS  0.5 ml Boxelder-Maple  Mix BHR (Boxelder Hard Red) 1:20 w/v, HS  0.5 ml [place], Common 1:20 w/v, HS  0.5 ml Elm, American 1:20 w/v, HS  0.5 ml Lamb's Quarters 1:20 w/v, HS 0.5 ml Plantain, English 1:20 w/v, HS 0.5 ml Ragweed Mixed 1:20 w/v ALK  0.5 ml Sagebrush, Mugwort 1:20 w/v, HS 0.5 ml Diluent: HSA qs to 5ml, Disp: 5 mL, Rfl: PRN     ORDER FOR ALLERGEN IMMUNOTHERAPY, Name of Mix: Mix #1  Cat, Dog Cat Hair, Standardized 10,000 BAU/mL, ALK  2.0 ml Dog Hair Dander, A. P.  1:100 w/v, HS  1.0 ml  Diluent: HSA qs to 5ml, Disp: 5 mL, Rfl: PRN     ORDER FOR ALLERGEN IMMUNOTHERAPY, Name of Mix: Mix #2  Mold Alternaria Tenuis 1:10 w/v, HS  0.5 ml Diluent: HSA qs to 5ml, Disp: 5 mL, Rfl: PRN     simvastatin (ZOCOR) 20 MG tablet, TAKE 1 TABLET BY MOUTH AT BEDTIME, Disp: 90 tablet, Rfl: 2     triamcinolone (NASACORT) 55 MCG/ACT nasal aerosol, Spray 1 spray into both nostrils daily, Disp: , Rfl:      valsartan (DIOVAN) 80 MG tablet, Take 2 tablets (160 mg) by mouth daily, Disp: 180 tablet, Rfl: 3  No Known Allergies    EXAM:   BP (!) 151/89 (BP Location: Right arm, Patient Position: Sitting, Cuff Size: Adult Large)   Pulse 92   SpO2 96%   GENERAL APPEARANCE: alert, cooperative and not in distress  SKIN: no rashes,  no lesions  HEAD: atraumatic, normocephalic  EYES: lids and lashes normal, conjunctivae and sclerae clear  ENT: no scars or lesions, nasal exam showed no discharge, swelling or lesions noted, tongue midline and normal, soft palate, uvula, and tonsils normal  NECK: no asymmetry, masses, or scars, supple without significant adenopathy  LUNGS: unlabored respirations, no intercostal retractions or accessory muscle use, clear to auscultation without rales or wheezes  HEART: regular rate and rhythm without murmurs and normal S1 and S2  MUSCULOSKELETAL: no musculoskeletal defects are noted  NEURO: no focal deficits noted  PSYCH: does not appear depressed or anxious      WORKUP:  None    ASSESSMENT/PLAN:  Reji Carpenter is a 46 year old male here for a follow-up visit.    1. Mild persistent asthma without complication - Well controlled, no exacerbations or oral steroid use in the past year.    - continue Symbicort 80/4.5mcg, 2 puffs twice daily  - continue montelukast, 10mg daily  - take 2 to 4 puffs of albuterol HFA every 4 hours as needed    2. Seasonal allergic rhinitis due to pollen - Symptoms have improved and he has completed approximately 3.5 years of treatment at his maintenance dose.    - continue allergen immunotherapy per protocol - anticipate 1.5-2 more years of treatment  - continue cetirizine, 10mg daily  - continue triamcinolone nasal spray, 1-2 sprays in each nostril daily    3. Allergic rhinitis due to animals - see above    4. Allergic rhinitis due to mold - see above      Follow-up in 6-12 months, sooner if needed      Thank you for allowing me to participate in the care of Reji Carpenter.      Rosemarie Posada MD, FAAAAI  Allergy/Immunology  [facility]      Chart documentation done in part with Dragon Voice Recognition Software. Although reviewed after completion, some word and grammatical errors may  remain.      Again, thank you for allowing me to participate in the care of your patient.        Sincerely,        Rosemarie Posada MD

## 2021-09-15 ASSESSMENT — ASTHMA QUESTIONNAIRES: ACT_TOTALSCORE: 23

## 2021-09-19 DIAGNOSIS — J45.30 MILD PERSISTENT ASTHMA WITHOUT COMPLICATION: ICD-10-CM

## 2021-09-21 RX ORDER — MONTELUKAST SODIUM 10 MG/1
TABLET ORAL
Qty: 90 TABLET | Refills: 2 | Status: SHIPPED | OUTPATIENT
Start: 2021-09-21 | End: 2021-11-24

## 2021-09-21 NOTE — TELEPHONE ENCOUNTER
Prescription approved per [facility] according to the [facility] Refill Protocol.    Eulalia Elliott RN

## 2021-10-12 ENCOUNTER — ALLIED HEALTH/NURSE VISIT (OUTPATIENT)
Dept: ALLERGY | Facility: CLINIC | Age: 46
End: 2021-10-12
Payer: COMMERCIAL

## 2021-10-12 DIAGNOSIS — J30.1 SEASONAL ALLERGIC RHINITIS DUE TO POLLEN: Primary | ICD-10-CM

## 2021-10-12 PROCEDURE — 95117 IMMUNOTHERAPY INJECTIONS: CPT

## 2021-10-14 DIAGNOSIS — I10 HYPERTENSION GOAL BP (BLOOD PRESSURE) < 140/90: ICD-10-CM

## 2021-10-15 RX ORDER — VALSARTAN 80 MG/1
160 TABLET ORAL DAILY
Qty: 180 TABLET | Refills: 0 | Status: SHIPPED | OUTPATIENT
Start: 2021-10-15 | End: 2021-11-24

## 2021-10-15 NOTE — TELEPHONE ENCOUNTER
"Routing refill request to provider for review/approval because:  Blood Pressure out of range  Labs not current:  CR, K+      Requested Prescriptions   Pending Prescriptions Disp Refills     valsartan (DIOVAN) 80 MG tablet [Pharmacy Med Name: VALSARTAN 80 MG TABLET] 180 tablet 3     Sig: TAKE 2 TABLETS (160 MG) BY MOUTH DAILY       Angiotensin-II Receptors Failed - 10/14/2021  9:08 AM        Failed - Last blood pressure under 140/90 in past 12 months     BP Readings from Last 3 Encounters:   09/14/21 (!) 151/89   07/30/21 (!) 153/90   08/06/20 139/85                 Failed - Normal serum creatinine on file in past 12 months     Recent Labs   Lab Test 08/06/20  0923   CR 0.93       Ok to refill medication if creatinine is low          Failed - Normal serum potassium on file in past 12 months     Recent Labs   Lab Test 08/06/20  0923   POTASSIUM 3.8                    Passed - Recent (12 mo) or future (30 days) visit within the authorizing provider's specialty     Patient has had an office visit with the authorizing provider or a provider within the authorizing providers department within the previous 12 mos or has a future within next 30 days. See \"Patient Info\" tab in inbasket, or \"Choose Columns\" in Meds & Orders section of the refill encounter.              Passed - Medication is active on med list        Passed - Patient is age 18 or older           Alka Roman RN on 10/15/2021 at 12:18 PM    "

## 2021-10-15 NOTE — TELEPHONE ENCOUNTER
Okay refill but see us in clinic at [place] in the next 2-3 months    Please inform patient     Wili Kaplan MD

## 2021-11-09 ENCOUNTER — ALLIED HEALTH/NURSE VISIT (OUTPATIENT)
Dept: ALLERGY | Facility: CLINIC | Age: 46
End: 2021-11-09
Payer: COMMERCIAL

## 2021-11-09 DIAGNOSIS — J30.1 SEASONAL ALLERGIC RHINITIS DUE TO POLLEN: Primary | ICD-10-CM

## 2021-11-09 PROCEDURE — 95117 IMMUNOTHERAPY INJECTIONS: CPT

## 2021-11-24 ENCOUNTER — OFFICE VISIT (OUTPATIENT)
Dept: FAMILY MEDICINE | Facility: CLINIC | Age: 46
End: 2021-11-24
Payer: COMMERCIAL

## 2021-11-24 VITALS
HEIGHT: 72 IN | HEART RATE: 58 BPM | SYSTOLIC BLOOD PRESSURE: 130 MMHG | BODY MASS INDEX: 27.97 KG/M2 | WEIGHT: 206.5 LBS | DIASTOLIC BLOOD PRESSURE: 82 MMHG | TEMPERATURE: 97 F

## 2021-11-24 DIAGNOSIS — J45.30 MILD PERSISTENT ASTHMA WITHOUT COMPLICATION: ICD-10-CM

## 2021-11-24 DIAGNOSIS — D22.9 NUMEROUS MOLES: ICD-10-CM

## 2021-11-24 DIAGNOSIS — Z12.11 SCREEN FOR COLON CANCER: ICD-10-CM

## 2021-11-24 DIAGNOSIS — R53.83 FATIGUE, UNSPECIFIED TYPE: ICD-10-CM

## 2021-11-24 DIAGNOSIS — E78.5 HYPERLIPIDEMIA LDL GOAL <100: Chronic | ICD-10-CM

## 2021-11-24 DIAGNOSIS — Z00.00 ROUTINE GENERAL MEDICAL EXAMINATION AT A HEALTH CARE FACILITY: Primary | ICD-10-CM

## 2021-11-24 DIAGNOSIS — I10 HYPERTENSION GOAL BP (BLOOD PRESSURE) < 140/90: ICD-10-CM

## 2021-11-24 DIAGNOSIS — Z12.5 SCREENING FOR PROSTATE CANCER: ICD-10-CM

## 2021-11-24 LAB
ALBUMIN SERPL-MCNC: 4.6 G/DL (ref 3.4–5)
ALP SERPL-CCNC: 84 U/L (ref 40–150)
ALT SERPL W P-5'-P-CCNC: 40 U/L (ref 0–70)
ANION GAP SERPL CALCULATED.3IONS-SCNC: 7 MMOL/L (ref 3–14)
AST SERPL W P-5'-P-CCNC: 20 U/L (ref 0–45)
BASOPHILS # BLD AUTO: 0 10E3/UL (ref 0–0.2)
BASOPHILS NFR BLD AUTO: 0 %
BILIRUB SERPL-MCNC: 0.7 MG/DL (ref 0.2–1.3)
BUN SERPL-MCNC: 14 MG/DL (ref 7–30)
CALCIUM SERPL-MCNC: 9.6 MG/DL (ref 8.5–10.1)
CHLORIDE BLD-SCNC: 105 MMOL/L (ref 94–109)
CHOLEST SERPL-MCNC: 151 MG/DL
CO2 SERPL-SCNC: 25 MMOL/L (ref 20–32)
CREAT SERPL-MCNC: 0.88 MG/DL (ref 0.66–1.25)
EOSINOPHIL # BLD AUTO: 0.3 10E3/UL (ref 0–0.7)
EOSINOPHIL NFR BLD AUTO: 4 %
ERYTHROCYTE [DISTWIDTH] IN BLOOD BY AUTOMATED COUNT: 12.1 % (ref 10–15)
FASTING STATUS PATIENT QL REPORTED: YES
GFR SERPL CREATININE-BSD FRML MDRD: >90 ML/MIN/1.73M2
GLUCOSE BLD-MCNC: 91 MG/DL (ref 70–99)
HCT VFR BLD AUTO: 44.3 % (ref 40–53)
HDLC SERPL-MCNC: 47 MG/DL
HGB BLD-MCNC: 15.2 G/DL (ref 13.3–17.7)
LDLC SERPL CALC-MCNC: 74 MG/DL
LYMPHOCYTES # BLD AUTO: 1.9 10E3/UL (ref 0.8–5.3)
LYMPHOCYTES NFR BLD AUTO: 33 %
MCH RBC QN AUTO: 32 PG (ref 26.5–33)
MCHC RBC AUTO-ENTMCNC: 34.3 G/DL (ref 31.5–36.5)
MCV RBC AUTO: 93 FL (ref 78–100)
MONOCYTES # BLD AUTO: 0.6 10E3/UL (ref 0–1.3)
MONOCYTES NFR BLD AUTO: 10 %
NEUTROPHILS # BLD AUTO: 3.1 10E3/UL (ref 1.6–8.3)
NEUTROPHILS NFR BLD AUTO: 53 %
NONHDLC SERPL-MCNC: 104 MG/DL
PLATELET # BLD AUTO: 293 10E3/UL (ref 150–450)
POTASSIUM BLD-SCNC: 4 MMOL/L (ref 3.4–5.3)
PROT SERPL-MCNC: 8.2 G/DL (ref 6.8–8.8)
PSA SERPL-MCNC: 1.18 UG/L (ref 0–4)
RBC # BLD AUTO: 4.75 10E6/UL (ref 4.4–5.9)
SODIUM SERPL-SCNC: 137 MMOL/L (ref 133–144)
TRIGL SERPL-MCNC: 149 MG/DL
TSH SERPL DL<=0.005 MIU/L-ACNC: 0.99 MU/L (ref 0.4–4)
WBC # BLD AUTO: 6 10E3/UL (ref 4–11)

## 2021-11-24 PROCEDURE — 80050 GENERAL HEALTH PANEL: CPT | Performed by: FAMILY MEDICINE

## 2021-11-24 PROCEDURE — G0103 PSA SCREENING: HCPCS | Performed by: FAMILY MEDICINE

## 2021-11-24 PROCEDURE — 80061 LIPID PANEL: CPT | Performed by: FAMILY MEDICINE

## 2021-11-24 PROCEDURE — 99396 PREV VISIT EST AGE 40-64: CPT | Performed by: FAMILY MEDICINE

## 2021-11-24 PROCEDURE — 36415 COLL VENOUS BLD VENIPUNCTURE: CPT | Performed by: FAMILY MEDICINE

## 2021-11-24 RX ORDER — AMLODIPINE BESYLATE 5 MG/1
5 TABLET ORAL DAILY
Qty: 90 TABLET | Refills: 3 | Status: SHIPPED | OUTPATIENT
Start: 2021-11-24 | End: 2022-03-29

## 2021-11-24 RX ORDER — VALSARTAN 160 MG/1
160 TABLET ORAL DAILY
Qty: 90 TABLET | Refills: 3 | Status: SHIPPED | OUTPATIENT
Start: 2021-11-24 | End: 2022-04-01

## 2021-11-24 RX ORDER — BUDESONIDE AND FORMOTEROL FUMARATE DIHYDRATE 80; 4.5 UG/1; UG/1
2 AEROSOL RESPIRATORY (INHALATION) 2 TIMES DAILY
Qty: 30.6 G | Refills: 11 | Status: SHIPPED | OUTPATIENT
Start: 2021-11-24 | End: 2022-08-09

## 2021-11-24 RX ORDER — MONTELUKAST SODIUM 10 MG/1
1 TABLET ORAL DAILY
Qty: 90 TABLET | Refills: 3 | Status: SHIPPED | OUTPATIENT
Start: 2021-11-24 | End: 2022-04-01

## 2021-11-24 RX ORDER — SIMVASTATIN 20 MG
20 TABLET ORAL AT BEDTIME
Qty: 90 TABLET | Refills: 3 | Status: SHIPPED | OUTPATIENT
Start: 2021-11-24 | End: 2022-04-01

## 2021-11-24 ASSESSMENT — ENCOUNTER SYMPTOMS
PARESTHESIAS: 0
HEADACHES: 0
FREQUENCY: 0
COUGH: 0
SORE THROAT: 0
PALPITATIONS: 0
HEARTBURN: 0
DYSURIA: 0
DIARRHEA: 0
HEMATOCHEZIA: 0
JOINT SWELLING: 0
ARTHRALGIAS: 0
NERVOUS/ANXIOUS: 0
DIZZINESS: 0
MYALGIAS: 0
CONSTIPATION: 0
SHORTNESS OF BREATH: 0
WEAKNESS: 0
ABDOMINAL PAIN: 0
HEMATURIA: 0
NAUSEA: 0
CHILLS: 0
EYE PAIN: 0
FEVER: 0

## 2021-11-24 ASSESSMENT — MIFFLIN-ST. JEOR: SCORE: 1861.67

## 2021-11-24 NOTE — PROGRESS NOTES
SUBJECTIVE:   CC: Reji Carpenter is an 46 year old male who presents for preventative health visit.        Patient has been advised of split billing requirements and indicates understanding: Yes  Healthy Habits:     Getting at least 3 servings of Calcium per day:  Yes    Bi-annual eye exam:  NO    Dental care twice a year:  NO    Sleep apnea or symptoms of sleep apnea:  None    Diet:  Regular (no restrictions)    Frequency of exercise:  2-3 days/week    Duration of exercise:  15-30 minutes    Taking medications regularly:  Yes    Medication side effects:  None    PHQ-2 Total Score: 0    Additional concerns today:  No               Today's PHQ-2 Score:   PHQ-2 ( 1999 [employer]) 11/24/2021   Q1: Little interest or pleasure in doing things 0   Q2: Feeling down, depressed or hopeless 0   PHQ-2 Score 0   PHQ-2 Total Score (12-17 Years)- Positive if 3 or more points; Administer PHQ-A if positive -   Q1: Little interest or pleasure in doing things Not at all   Q2: Feeling down, depressed or hopeless Not at all   PHQ-2 Score 0       Abuse: Current or Past(Physical, Sexual or Emotional)- No  Do you feel safe in your environment? Yes        Social History     Tobacco Use     Smoking status: Never Smoker     Smokeless tobacco: Never Used   Substance Use Topics     Alcohol use: No     Comment: occasional     If you drink alcohol do you typically have >3 drinks per day or >7 drinks per week? No    Alcohol Use 11/24/2021   Prescreen: >3 drinks/day or >7 drinks/week? No   Prescreen: >3 drinks/day or >7 drinks/week? -   No flowsheet data found.    Last PSA:   PSA   Date Value Ref Range Status   08/06/2020 1.38 0 - 4 ug/L Final     Comment:     Assay Method:  Chemiluminescence using Siemens Vista analyzer       Reviewed orders with patient. Reviewed health maintenance and updated orders accordingly - Yes       Reviewed and updated as needed this visit by clinical staff  Tobacco  Allergies  Meds   Med Hx  Surg Hx  Fam Hx   "Soc Hx       Reviewed and updated as needed this visit by Provider                   Review of Systems   Constitutional: Negative for chills and fever.   HENT: Negative for congestion, ear pain, hearing loss and sore throat.    Eyes: Negative for pain and visual disturbance.   Respiratory: Negative for cough and shortness of breath.    Cardiovascular: Negative for chest pain, palpitations and peripheral edema.   Gastrointestinal: Negative for abdominal pain, constipation, diarrhea, heartburn, hematochezia and nausea.   Genitourinary: Negative for dysuria, frequency, genital sores, hematuria and urgency.   Musculoskeletal: Negative for arthralgias, joint swelling and myalgias.   Skin: Negative for rash.   Neurological: Negative for dizziness, weakness, headaches and paresthesias.   Psychiatric/Behavioral: Negative for mood changes. The patient is not nervous/anxious.         Feeling well    A little exercise    Walk dog daily    Lift wts and boxing bag once a week    On allergy shots once a month, works well          OBJECTIVE:   BP (!) 142/92 (BP Location: Left arm, Patient Position: Chair, Cuff Size: Adult Regular)   Pulse 58   Temp 97  F (36.1  C) (Temporal)   Ht 1.84 m (6' 0.44\")   Wt 93.7 kg (206 lb 8 oz)   BMI 27.67 kg/m      Physical Exam  GENERAL: healthy, alert and no distress  EYES: Eyes grossly normal to inspection, PERRL and conjunctivae and sclerae normal  HENT: ear canals and TM's normal, nose and mouth without ulcers or lesions  NECK: no adenopathy, no asymmetry, masses, or scars and thyroid normal to palpation  RESP: lungs clear to auscultation - no rales, rhonchi or wheezes  CV: regular rate and rhythm, normal S1 S2, no S3 or S4, no murmur, click or rub, no peripheral edema and peripheral pulses strong  ABDOMEN: soft, nontender, no hepatosplenomegaly, no masses and bowel sounds normal  MS: no gross musculoskeletal defects noted, no edema  SKIN: no suspicious lesions or rashes  NEURO: Normal " strength and tone, mentation intact and speech normal  PSYCH: mentation appears normal, affect normal/bright    Diagnostic Test Results:  Labs reviewed in Epic    ASSESSMENT/PLAN:   Reji was seen today for physical and health maintenance.    Diagnoses and all orders for this visit:    Routine general medical examination at a health care facility    Hypertension goal BP (blood pressure) < 140/90  -     amLODIPine (NORVASC) 5 MG tablet; Take 1 tablet (5 mg) by mouth daily  -     valsartan (DIOVAN) 160 MG tablet; Take 1 tablet (160 mg) by mouth daily    Hyperlipidemia LDL goal <100  -     simvastatin (ZOCOR) 20 MG tablet; Take 1 tablet (20 mg) by mouth At Bedtime  -     Lipid panel reflex to direct LDL Fasting; Future  -     Comprehensive metabolic panel; Future  -     Comprehensive metabolic panel  -     Lipid panel reflex to direct LDL Fasting    Mild persistent asthma without complication  -     montelukast (SINGULAIR) 10 MG tablet; Take 1 tablet (10 mg) by mouth daily  -     budesonide-formoterol (SYMBICORT) 80-4.5 MCG/ACT Inhaler; Inhale 2 puffs into the lungs 2 times daily    Screening for prostate cancer  -     Prostate Specific Antigen Screen; Future  -     Prostate Specific Antigen Screen    Fatigue, unspecified type  -     TSH with free T4 reflex; Future  -     CBC with Platelets & Differential; Future  -     CBC with Platelets & Differential  -     TSH with free T4 reflex    Screen for colon cancer  -     Adult Gastro Ref - Procedure Only; Future    Overall patient in good health  I did advise dermatology consult given all his moles  Check labs fasting  Refill meds  Keep working on healthy diet/exercise   Given age, patient advised to have colonoscopy with new guidelines of 45 and older  Did referral for this  Also start 81 mg     Patient has been advised of split billing requirements and indicates understanding: Yes  COUNSELING:   Reviewed preventive health counseling, as reflected in patient  "instructions       Regular exercise       Healthy diet/nutrition       Vision screening       Safe sex practices/STD prevention       Colon cancer screening       Prostate cancer screening    Estimated body mass index is 27.67 kg/m  as calculated from the following:    Height as of this encounter: 1.84 m (6' 0.44\").    Weight as of this encounter: 93.7 kg (206 lb 8 oz).     Weight management plan: Discussed healthy diet and exercise guidelines    He reports that he has never smoked. He has never used smokeless tobacco.      Counseling Resources:  ATP IV Guidelines  Pooled Cohorts Equation Calculator  FRAX Risk Assessment  ICSI Preventive Guidelines  Dietary Guidelines for Americans, 2010  USDA's MyPlate  ASA Prophylaxis  Lung CA Screening    Wili Kaplan MD  [facility]  "

## 2021-11-24 NOTE — PATIENT INSTRUCTIONS
Preventive Health Recommendations  Male Ages 40 to 49    Yearly exam:             See your health care provider every year in order to  o   Review health changes.   o   Discuss preventive care.    o   Review your medicines if your doctor has prescribed any.    You should be tested each year for STDs (sexually transmitted diseases) if you re at risk.     Have a cholesterol test every 5 years.     Have a colonoscopy (test for colon cancer) if someone in your family has had colon cancer or polyps before age 50.     After age 45, have a diabetes test (fasting glucose). If you are at risk for diabetes, you should have this test every 3 years.      Talk with your health care provider about whether or not a prostate cancer screening test (PSA) is right for you.    Shots: Get a flu shot each year. Get a tetanus shot every 10 years.     Nutrition:    Eat at least 5 servings of fruits and vegetables daily.     Eat whole-grain bread, whole-wheat pasta and brown rice instead of white grains and rice.     Get adequate Calcium and Vitamin D.     Lifestyle    Exercise for at least 150 minutes a week (30 minutes a day, 5 days a week). This will help you control your weight and prevent disease.     Limit alcohol to one drink per day.     No smoking.     Wear sunscreen to prevent skin cancer.     See your dentist every six months for an exam and cleaning.         Increase exercise    Advise one daily 81 mg aspirin    Continue other meds as is    Advise scheduling colonoscopy

## 2021-12-07 ENCOUNTER — ALLIED HEALTH/NURSE VISIT (OUTPATIENT)
Dept: ALLERGY | Facility: CLINIC | Age: 46
End: 2021-12-07
Payer: COMMERCIAL

## 2021-12-07 DIAGNOSIS — J30.89 ALLERGIC RHINITIS DUE TO MOLD: ICD-10-CM

## 2021-12-07 DIAGNOSIS — J30.81 NON-SEASONAL ALLERGIC RHINITIS DUE TO ANIMAL HAIR AND DANDER: ICD-10-CM

## 2021-12-07 DIAGNOSIS — J30.1 SEASONAL ALLERGIC RHINITIS DUE TO POLLEN: ICD-10-CM

## 2021-12-07 DIAGNOSIS — J30.1 SEASONAL ALLERGIC RHINITIS DUE TO POLLEN: Primary | ICD-10-CM

## 2021-12-07 PROCEDURE — 95117 IMMUNOTHERAPY INJECTIONS: CPT

## 2021-12-07 NOTE — TELEPHONE ENCOUNTER
ALLERGY SOLUTION RE-ORDER REQUEST    Reji Carpenter 1975 MRN: [id number]    DATE NEEDED:  2 weeks  Vial Color Content    Vial Size  Red 1:1 Molds    5 ml  Red 1:1 Trees, Weeds   5 ml  Red 1:1 Cat, Dog    5 ml        Serum reorder consent signed and patient/parent was advised that new serums would be ordered through the pharmacy and billed to their insurance company when they arrive in clinic. Yes    Shot Clinic Location:  [facility]  Ship to Location: [facility]  Serum billed to:  [facility]    Special Instructions:  NA        Requester Signature  Tianna HARRIS MA

## 2021-12-17 DIAGNOSIS — J30.1 SEASONAL ALLERGIC RHINITIS DUE TO POLLEN: ICD-10-CM

## 2021-12-17 DIAGNOSIS — J30.81 NON-SEASONAL ALLERGIC RHINITIS DUE TO ANIMAL HAIR AND DANDER: Primary | ICD-10-CM

## 2021-12-17 DIAGNOSIS — J30.89 ALLERGIC RHINITIS DUE TO MOLD: ICD-10-CM

## 2021-12-17 PROCEDURE — 95165 ANTIGEN THERAPY SERVICES: CPT | Performed by: ALLERGY & IMMUNOLOGY

## 2021-12-17 NOTE — PROGRESS NOTES
Allergy serums billed to [facility].     Vials billed below:    Vial Color Content                      Vial Size Expiration Date  Red 1:1                                   Molds                                    5 ml 12/17/2022  Red 1:1                                   Trees, Weeds                    5 ml 12/17/2022  Red 1:1                                   Cat, Dog                               5 ml 12/17/2022    Checked by Chan AGRAWAL  30 units billed           Signature  Tere Oliver, RN

## 2021-12-21 NOTE — TELEPHONE ENCOUNTER
Allergy serums received at [facility].     Vials received below:    Vial Color Content                      Vial Size Expiration Date  Red 1:1 Molds 5 mL  12/17/2022  Red 1:1 Trees, Weeds 5 mL  12/17/2022  Red 1:1 Cat, Dog 5 mL  12/17/2022        Signature  Tianna HARRIS MA

## 2022-01-11 ENCOUNTER — ALLIED HEALTH/NURSE VISIT (OUTPATIENT)
Dept: ALLERGY | Facility: CLINIC | Age: 47
End: 2022-01-11
Payer: COMMERCIAL

## 2022-01-11 DIAGNOSIS — J30.1 SEASONAL ALLERGIC RHINITIS DUE TO POLLEN: Primary | ICD-10-CM

## 2022-01-11 PROCEDURE — 99207 PR DROP WITH A PROCEDURE: CPT

## 2022-01-11 PROCEDURE — 95117 IMMUNOTHERAPY INJECTIONS: CPT

## 2022-02-01 ENCOUNTER — ALLIED HEALTH/NURSE VISIT (OUTPATIENT)
Dept: ALLERGY | Facility: CLINIC | Age: 47
End: 2022-02-01
Payer: COMMERCIAL

## 2022-02-01 DIAGNOSIS — J30.1 SEASONAL ALLERGIC RHINITIS DUE TO POLLEN: Primary | ICD-10-CM

## 2022-02-01 PROCEDURE — 95117 IMMUNOTHERAPY INJECTIONS: CPT

## 2022-02-15 ENCOUNTER — ALLIED HEALTH/NURSE VISIT (OUTPATIENT)
Dept: ALLERGY | Facility: CLINIC | Age: 47
End: 2022-02-15
Payer: COMMERCIAL

## 2022-02-15 DIAGNOSIS — J30.1 SEASONAL ALLERGIC RHINITIS DUE TO POLLEN: Primary | ICD-10-CM

## 2022-02-15 PROCEDURE — 95117 IMMUNOTHERAPY INJECTIONS: CPT

## 2022-03-01 ENCOUNTER — OFFICE VISIT (OUTPATIENT)
Dept: ALLERGY | Facility: CLINIC | Age: 47
End: 2022-03-01
Payer: COMMERCIAL

## 2022-03-01 ENCOUNTER — ALLIED HEALTH/NURSE VISIT (OUTPATIENT)
Dept: ALLERGY | Facility: CLINIC | Age: 47
End: 2022-03-01
Payer: COMMERCIAL

## 2022-03-01 VITALS — DIASTOLIC BLOOD PRESSURE: 86 MMHG | HEART RATE: 69 BPM | OXYGEN SATURATION: 99 % | SYSTOLIC BLOOD PRESSURE: 146 MMHG

## 2022-03-01 DIAGNOSIS — J45.30 MILD PERSISTENT ASTHMA WITHOUT COMPLICATION: Primary | ICD-10-CM

## 2022-03-01 DIAGNOSIS — J30.89 ALLERGIC RHINITIS DUE TO MOLD: ICD-10-CM

## 2022-03-01 DIAGNOSIS — J30.81 ALLERGIC RHINITIS DUE TO ANIMALS: ICD-10-CM

## 2022-03-01 DIAGNOSIS — J30.1 SEASONAL ALLERGIC RHINITIS DUE TO POLLEN: ICD-10-CM

## 2022-03-01 DIAGNOSIS — J30.1 SEASONAL ALLERGIC RHINITIS DUE TO POLLEN: Primary | ICD-10-CM

## 2022-03-01 PROCEDURE — 99213 OFFICE O/P EST LOW 20 MIN: CPT | Mod: 25 | Performed by: ALLERGY & IMMUNOLOGY

## 2022-03-01 PROCEDURE — 95117 IMMUNOTHERAPY INJECTIONS: CPT

## 2022-03-01 ASSESSMENT — ASTHMA QUESTIONNAIRES: ACT_TOTALSCORE: 25

## 2022-03-01 NOTE — LETTER
3/1/2022         RE: Reij Carpenter  [address] [facility] [address]        Dear Colleague,    Thank you for referring your patient, Reji Carpenter, to the [facility]. Please see a copy of my visit note below.    Reji Carpenter was seen in the Allergy Clinic at [facility].      Reji Carpenter is a 46 year old Not  or  male who is seen today for a follow-up visit. He began allergen immunotherapy treatment in 6/2017 and reached his maintenance dose in 2/2018. He has no prior history of systemic or significant large local reactions to treatment. He feels that he has had significant improvement in his allergic rhinitis symptoms since beginning treatment. Reji continues to take cetirizine and montelukast daily and also uses triamcinolone nasal spray. He has never tried stopping these medications and isn't sure if he still needs to take them all.    Reji reports that his asthma has been well controlled. He is taking Symbicort twice daily and hasn't needed to use albuterol in several months. He has not experienced any side effects from these medications. He has had no exacerbations, oral steroid use, or urgent care/ED visits in the past year.      Past Medical History:   Diagnosis Date     Allergic rhinitis      Asthma      Hypertension      NONSPECIFIC MEDICAL HISTORY 06/06    ruptured achilles tendon repaired     Family History   Problem Relation Age of Onset     Lipids Father      Other - See Comments Father         parkinson     Hypertension Mother      Cancer Maternal Grandmother         brain     Cancer Paternal Grandmother      Coronary Artery Disease Maternal Grandfather         MI 50s     Coronary Artery Disease Paternal Grandfather         MI 60s     Diabetes No family hx of      C.A.D. No family hx of      Social History     Tobacco Use     Smoking status: Never Smoker     Smokeless tobacco: Never Used   Substance  Use Topics     Alcohol use: No     Comment: occasional     Drug use: No     Social History     Social History Narrative    ENVIRONMENTAL HISTORY: [place] The home is heated with a forced air. They do have central air conditioning. The patient's bedroom is furnished with carpeting in bedroom, allergen mattress cover, allergen pillowcase cover and fabric window coverings.  Pets inside the house include 3 dog(s). There is no history of cockroach or mice infestation. There is/are 0 smokers in the house.  The house does not have a damp basement.          SOCIAL HISTORY:     Reji is employed as a . He lives with his spouse. His spouse works as a nurse.       Past medical, family, and social history were reviewed.    REVIEW OF SYSTEMS:  General: negative for weight gain. negative for weight loss. negative for changes in sleep.   Eyes: negative for itching. negative for redness. negative for tearing/watering. negative for vision changes  Ears: negative for fullness. negative for hearing loss. negative for dizziness.   Nose: negative for snoring.negative for changes in smell. negative for drainage.   Throat: negative for hoarseness. negative for sore throat. negative for trouble swallowing.   Lungs: negative for cough. negative for shortness of breath.negative for wheezing. negative for sputum production.   Cardiovascular: negative for chest pain. negative for swelling of ankles. negative for fast or irregular heartbeat.   Gastrointestinal: negative for nausea. negative for heartburn. negative for acid reflux.   Musculoskeletal: negative for joint pain. negative for joint stiffness. negative for joint swelling.   Neurologic: negative for seizures. negative for fainting. negative for weakness.   Psychiatric: negative for changes in mood. negative for anxiety.   Endocrine: negative for cold intolerance. negative for heat intolerance. negative for tremors.    Hematologic: negative for easy bruising. negative for easy bleeding.  Integumentary: negative for rash. negative for scaling. negative for nail changes.       Current Outpatient Medications:      albuterol (PROAIR HFA/PROVENTIL HFA/VENTOLIN HFA) 108 (90 Base) MCG/ACT inhaler, Inhale 2 puffs into the lungs every 4 hours as needed for shortness of breath / dyspnea or other (cough), Disp: 3 Inhaler, Rfl: 1     amLODIPine (NORVASC) 5 MG tablet, Take 1 tablet (5 mg) by mouth daily, Disp: 90 tablet, Rfl: 3     aspirin (ASA) 81 MG EC tablet, Take 1 tablet (81 mg) by mouth daily, Disp: , Rfl:      budesonide-formoterol (SYMBICORT) 80-4.5 MCG/ACT Inhaler, Inhale 2 puffs into the lungs 2 times daily, Disp: 30.6 g, Rfl: 11     cetirizine (ZYRTEC) 10 MG tablet, Take 1 tablet (10 mg) by mouth daily, Disp: , Rfl: 0     montelukast (SINGULAIR) 10 MG tablet, Take 1 tablet (10 mg) by mouth daily, Disp: 90 tablet, Rfl: 3     ORDER FOR ALLERGEN IMMUNOTHERAPY, Name of Mix: Mix #3  Tree , Weeds Narayan, White  1:20 w/v, HS  0.5 ml Boxelder-Maple  Mix BHR (Boxelder Hard Red) 1:20 w/v, HS  0.5 ml [place], Common 1:20 w/v, HS  0.5 ml Elm, American 1:20 w/v, HS  0.5 ml Lamb's Quarters 1:20 w/v, HS 0.5 ml Plantain, English 1:20 w/v, HS 0.5 ml Ragweed Mixed 1:20 w/v ALK  0.5 ml Sagebrush, Mugwort 1:20 w/v, HS 0.5 ml Diluent: HSA qs to 5ml, Disp: 5 mL, Rfl: PRN     ORDER FOR ALLERGEN IMMUNOTHERAPY, Name of Mix: Mix #1  Cat, Dog Cat Hair, Standardized 10,000 BAU/mL, ALK  2.0 ml Dog Hair Dander, A. P.  1:100 w/v, HS  1.0 ml  Diluent: HSA qs to 5ml, Disp: 5 mL, Rfl: PRN     ORDER FOR ALLERGEN IMMUNOTHERAPY, Name of Mix: Mix #2  Mold Alternaria Tenuis 1:10 w/v, HS  0.5 ml Diluent: HSA qs to 5ml, Disp: 5 mL, Rfl: PRN     simvastatin (ZOCOR) 20 MG tablet, Take 1 tablet (20 mg) by mouth At Bedtime, Disp: 90 tablet, Rfl: 3     triamcinolone (NASACORT) 55 MCG/ACT nasal aerosol, Spray 1 spray into both nostrils daily, Disp: , Rfl:      valsartan  (DIOVAN) 160 MG tablet, Take 1 tablet (160 mg) by mouth daily, Disp: 90 tablet, Rfl: 3  No Known Allergies    EXAM:   BP (!) 146/86 (Cuff Size: Adult Large)   Pulse 69   SpO2 99%   GENERAL APPEARANCE: alert, cooperative and not in distress  SKIN: no rashes, no lesions  HEAD: atraumatic, normocephalic  EYES: lids and lashes normal, conjunctivae and sclerae clear  ENT: no scars or lesions, nasal exam showed no discharge, swelling or lesions noted, tongue midline and normal, soft palate, uvula, and tonsils normal  NECK: no asymmetry, masses, or scars  LUNGS: unlabored respirations, no intercostal retractions or accessory muscle use, clear to auscultation without rales or wheezes  HEART: regular rate and rhythm without murmurs and normal S1 and S2  MUSCULOSKELETAL: no musculoskeletal defects are noted  NEURO: no focal deficits noted  PSYCH: does not appear depressed or anxious      WORKUP:  None    ASSESSMENT/PLAN:  Reji Carpenter is a 46 year old male here for a follow-up visit.    1. Mild persistent asthma without complication - Well controlled, no exacerbations or oral steroid use in the past year.    - continue Symbicort 80-4.5mcg, 2 puffs twice daily  - take 2 to 4 puffs of albuterol HFA every 4 hours as needed    2. Seasonal allergic rhinitis due to pollen - Reji has completed 4 years of immunotherapy treatment at his maintenance dose. He reports having significant improvement in his symptoms. We discussed trying to wean some of his daily medications and monitoring for continued symptom control. We also reviewed the risks and benefits of continued treatment as well as the recommended duration of treatment.    - continue allergen immunotherapy per protocol - anticipate completion in approximately one year  - continue cetirizine - 10mg daily as needed, may consider weaning the medication as tolerated  - continue montelukast - 10mg daily  - continue triamcinolone nasal spray - 1-2 sprays in each nostril  daily    3. Allergic rhinitis due to animals - see above    4. Allergic rhinitis due to mold - see above      Follow-up in 6 months, sooner if needed      Thank you for allowing me to participate in the care of Reji Carpenter.      Rosemarie Posada MD, [facility]  Allergy/Immunology  [facility]      Chart documentation done in part with Dragon Voice Recognition Software. Although reviewed after completion, some word and grammatical errors may remain.      Again, thank you for allowing me to participate in the care of your patient.        Sincerely,        Rosemarie Posada MD

## 2022-03-01 NOTE — PROGRESS NOTES
Reji Carpenter was seen in the Allergy Clinic at [facility].      Reji Carpenter is a 46 year old Not  or  male who is seen today for a follow-up visit. He began allergen immunotherapy treatment in 6/2017 and reached his maintenance dose in 2/2018. He has no prior history of systemic or significant large local reactions to treatment. He feels that he has had significant improvement in his allergic rhinitis symptoms since beginning treatment. Reji continues to take cetirizine and montelukast daily and also uses triamcinolone nasal spray. He has never tried stopping these medications and isn't sure if he still needs to take them all.    Reji reports that his asthma has been well controlled. He is taking Symbicort twice daily and hasn't needed to use albuterol in several months. He has not experienced any side effects from these medications. He has had no exacerbations, oral steroid use, or urgent care/ED visits in the past year.      Past Medical History:   Diagnosis Date     Allergic rhinitis      Asthma      Hypertension      NONSPECIFIC MEDICAL HISTORY 06/06    ruptured achilles tendon repaired     Family History   Problem Relation Age of Onset     Lipids Father      Other - See Comments Father         parkinson     Hypertension Mother      Cancer Maternal Grandmother         brain     Cancer Paternal Grandmother      Coronary Artery Disease Maternal Grandfather         MI 50s     Coronary Artery Disease Paternal Grandfather         MI 60s     Diabetes No family hx of      C.A.D. No family hx of      Social History     Tobacco Use     Smoking status: Never Smoker     Smokeless tobacco: Never Used   Substance Use Topics     Alcohol use: No     Comment: occasional     Drug use: No     Social History     Social History Narrative    ENVIRONMENTAL HISTORY: [place] The home is heated with a forced air. They do have central air  conditioning. The patient's bedroom is furnished with carpeting in bedroom, allergen mattress cover, allergen pillowcase cover and fabric window coverings.  Pets inside the house include 3 dog(s). There is no history of cockroach or mice infestation. There is/are 0 smokers in the house.  The house does not have a damp basement.          SOCIAL HISTORY:     Reji is employed as a . He lives with his spouse. His spouse works as a nurse.       Past medical, family, and social history were reviewed.    REVIEW OF SYSTEMS:  General: negative for weight gain. negative for weight loss. negative for changes in sleep.   Eyes: negative for itching. negative for redness. negative for tearing/watering. negative for vision changes  Ears: negative for fullness. negative for hearing loss. negative for dizziness.   Nose: negative for snoring.negative for changes in smell. negative for drainage.   Throat: negative for hoarseness. negative for sore throat. negative for trouble swallowing.   Lungs: negative for cough. negative for shortness of breath.negative for wheezing. negative for sputum production.   Cardiovascular: negative for chest pain. negative for swelling of ankles. negative for fast or irregular heartbeat.   Gastrointestinal: negative for nausea. negative for heartburn. negative for acid reflux.   Musculoskeletal: negative for joint pain. negative for joint stiffness. negative for joint swelling.   Neurologic: negative for seizures. negative for fainting. negative for weakness.   Psychiatric: negative for changes in mood. negative for anxiety.   Endocrine: negative for cold intolerance. negative for heat intolerance. negative for tremors.   Hematologic: negative for easy bruising. negative for easy bleeding.  Integumentary: negative for rash. negative for scaling. negative for nail changes.       Current Outpatient Medications:      albuterol (PROAIR HFA/PROVENTIL HFA/VENTOLIN HFA) 108 (90 Base) MCG/ACT  inhaler, Inhale 2 puffs into the lungs every 4 hours as needed for shortness of breath / dyspnea or other (cough), Disp: 3 Inhaler, Rfl: 1     amLODIPine (NORVASC) 5 MG tablet, Take 1 tablet (5 mg) by mouth daily, Disp: 90 tablet, Rfl: 3     aspirin (ASA) 81 MG EC tablet, Take 1 tablet (81 mg) by mouth daily, Disp: , Rfl:      budesonide-formoterol (SYMBICORT) 80-4.5 MCG/ACT Inhaler, Inhale 2 puffs into the lungs 2 times daily, Disp: 30.6 g, Rfl: 11     cetirizine (ZYRTEC) 10 MG tablet, Take 1 tablet (10 mg) by mouth daily, Disp: , Rfl: 0     montelukast (SINGULAIR) 10 MG tablet, Take 1 tablet (10 mg) by mouth daily, Disp: 90 tablet, Rfl: 3     ORDER FOR ALLERGEN IMMUNOTHERAPY, Name of Mix: Mix #3  Tree , Weeds Narayan, White  1:20 w/v, HS  0.5 ml Boxelder-Maple  Mix BHR (Boxelder Hard Red) 1:20 w/v, HS  0.5 ml [place], Common 1:20 w/v, HS  0.5 ml Elm, American 1:20 w/v, HS  0.5 ml Lamb's Quarters 1:20 w/v, HS 0.5 ml Plantain, English 1:20 w/v, HS 0.5 ml Ragweed Mixed 1:20 w/v ALK  0.5 ml Sagebrush, Mugwort 1:20 w/v, HS 0.5 ml Diluent: HSA qs to 5ml, Disp: 5 mL, Rfl: PRN     ORDER FOR ALLERGEN IMMUNOTHERAPY, Name of Mix: Mix #1  Cat, Dog Cat Hair, Standardized 10,000 BAU/mL, ALK  2.0 ml Dog Hair Dander, A. P.  1:100 w/v, HS  1.0 ml  Diluent: HSA qs to 5ml, Disp: 5 mL, Rfl: PRN     ORDER FOR ALLERGEN IMMUNOTHERAPY, Name of Mix: Mix #2  Mold Alternaria Tenuis 1:10 w/v, HS  0.5 ml Diluent: HSA qs to 5ml, Disp: 5 mL, Rfl: PRN     simvastatin (ZOCOR) 20 MG tablet, Take 1 tablet (20 mg) by mouth At Bedtime, Disp: 90 tablet, Rfl: 3     triamcinolone (NASACORT) 55 MCG/ACT nasal aerosol, Spray 1 spray into both nostrils daily, Disp: , Rfl:      valsartan (DIOVAN) 160 MG tablet, Take 1 tablet (160 mg) by mouth daily, Disp: 90 tablet, Rfl: 3  No Known Allergies    EXAM:   BP (!) 146/86 (Cuff Size: Adult Large)   Pulse 69   SpO2 99%   GENERAL APPEARANCE: alert, cooperative and not in distress  SKIN: no rashes, no  lesions  HEAD: atraumatic, normocephalic  EYES: lids and lashes normal, conjunctivae and sclerae clear  ENT: no scars or lesions, nasal exam showed no discharge, swelling or lesions noted, tongue midline and normal, soft palate, uvula, and tonsils normal  NECK: no asymmetry, masses, or scars  LUNGS: unlabored respirations, no intercostal retractions or accessory muscle use, clear to auscultation without rales or wheezes  HEART: regular rate and rhythm without murmurs and normal S1 and S2  MUSCULOSKELETAL: no musculoskeletal defects are noted  NEURO: no focal deficits noted  PSYCH: does not appear depressed or anxious      WORKUP:  None    ASSESSMENT/PLAN:  Reji Carpenter is a 46 year old male here for a follow-up visit.    1. Mild persistent asthma without complication - Well controlled, no exacerbations or oral steroid use in the past year.    - continue Symbicort 80-4.5mcg, 2 puffs twice daily  - take 2 to 4 puffs of albuterol HFA every 4 hours as needed    2. Seasonal allergic rhinitis due to pollen - Reji has completed 4 years of immunotherapy treatment at his maintenance dose. He reports having significant improvement in his symptoms. We discussed trying to wean some of his daily medications and monitoring for continued symptom control. We also reviewed the risks and benefits of continued treatment as well as the recommended duration of treatment.    - continue allergen immunotherapy per protocol - anticipate completion in approximately one year  - continue cetirizine - 10mg daily as needed, may consider weaning the medication as tolerated  - continue montelukast - 10mg daily  - continue triamcinolone nasal spray - 1-2 sprays in each nostril daily    3. Allergic rhinitis due to animals - see above    4. Allergic rhinitis due to mold - see above      Follow-up in 6 months, sooner if needed      Thank you for allowing me to participate in the care of Reji Carpenter.      Rosemarie Posada MD,  FAAAAI  Allergy/Immunology  [facility]      Chart documentation done in part with Dragon Voice Recognition Software. Although reviewed after completion, some word and grammatical errors may remain.

## 2022-03-01 NOTE — PATIENT INSTRUCTIONS
If you have any questions regarding your allergies, asthma, or what we discussed during your visit today please call the allergy clinic or contact us via [employer].    [facility] Allergy RN Line: [phone] - call this number with any questions during or after business/clinic hours  [facility] Scheduling Line: [phone]  [facility] Scheduling Line: [phone] - this number is ONLY for scheduling at the [facility] [place] [facility] and should not be used to get in touch with the allergy team    All visits for food challenges, medication/drug allergy testing, and drug challenges MUST be scheduled through the allergy clinic nurse. Please call the nurse at [phone] or send a [employer] message for scheduling. Appointments for these visits that are made through the schedulers or via [employer] may be cancelled or rescheduled.    Clinic Schedule:   [place] - Monday, Tuesday, and Thursday  [address] [place] [id number]    [facility] [place] [facility] - Wednesday  [facility] [place] [address] [place] [address]      If your insurance doesn't cover the Symbicort you can see if Dulera is on your formulary. Otherwise we can continue to give you a paper prescription for you to fax in if needed.

## 2022-03-28 ENCOUNTER — MYC MEDICAL ADVICE (OUTPATIENT)
Dept: ALLERGY | Facility: CLINIC | Age: 47
End: 2022-03-28
Payer: COMMERCIAL

## 2022-03-28 DIAGNOSIS — I10 HYPERTENSION GOAL BP (BLOOD PRESSURE) < 140/90: ICD-10-CM

## 2022-03-29 ENCOUNTER — ALLIED HEALTH/NURSE VISIT (OUTPATIENT)
Dept: ALLERGY | Facility: CLINIC | Age: 47
End: 2022-03-29
Payer: COMMERCIAL

## 2022-03-29 DIAGNOSIS — J30.1 SEASONAL ALLERGIC RHINITIS DUE TO POLLEN: Primary | ICD-10-CM

## 2022-03-29 PROCEDURE — 95117 IMMUNOTHERAPY INJECTIONS: CPT

## 2022-03-29 RX ORDER — AMLODIPINE BESYLATE 5 MG/1
5 TABLET ORAL DAILY
Qty: 90 TABLET | Refills: 1 | Status: SHIPPED | OUTPATIENT
Start: 2022-03-29 | End: 2022-10-12

## 2022-04-01 DIAGNOSIS — E78.5 HYPERLIPIDEMIA LDL GOAL <100: Chronic | ICD-10-CM

## 2022-04-01 DIAGNOSIS — J45.30 MILD PERSISTENT ASTHMA WITHOUT COMPLICATION: ICD-10-CM

## 2022-04-01 DIAGNOSIS — I10 HYPERTENSION GOAL BP (BLOOD PRESSURE) < 140/90: ICD-10-CM

## 2022-04-01 RX ORDER — MONTELUKAST SODIUM 10 MG/1
1 TABLET ORAL DAILY
Qty: 90 TABLET | Refills: 2 | Status: SHIPPED | OUTPATIENT
Start: 2022-04-01 | End: 2022-12-26

## 2022-04-01 RX ORDER — SIMVASTATIN 20 MG
20 TABLET ORAL AT BEDTIME
Qty: 90 TABLET | Refills: 2 | Status: SHIPPED | OUTPATIENT
Start: 2022-04-01 | End: 2023-05-30

## 2022-04-01 RX ORDER — VALSARTAN 160 MG/1
160 TABLET ORAL DAILY
Qty: 90 TABLET | Refills: 1 | Status: SHIPPED | OUTPATIENT
Start: 2022-04-01 | End: 2022-12-12

## 2022-04-01 NOTE — TELEPHONE ENCOUNTER
"Routing refill request to provider for review/approval because:  Diovan-Blood Pressure out of range    Requested Prescriptions   Pending Prescriptions Disp Refills    valsartan (DIOVAN) 160 MG tablet 90 tablet 3     Sig: Take 1 tablet (160 mg) by mouth daily        Angiotensin-II Receptors Failed - 4/1/2022  4:11 PM        Failed - Last blood pressure under 140/90 in past 12 months       BP Readings from Last 3 Encounters:   03/01/22 (!) 146/86   11/24/21 130/82   09/14/21 (!) 151/89                 Passed - Recent (12 mo) or future (30 days) visit within the authorizing provider's specialty     Patient has had an office visit with the authorizing provider or a provider within the authorizing providers department within the previous 12 mos or has a future within next 30 days. See \"Patient Info\" tab in inbasket, or \"Choose Columns\" in Meds & Orders section of the refill encounter.              Passed - Medication is active on med list        Passed - Patient is age 18 or older        Passed - Normal serum creatinine on file in past 12 months     Recent Labs   Lab Test 11/24/21  1216   CR 0.88       Ok to refill medication if creatinine is low          Passed - Normal serum potassium on file in past 12 months       Recent Labs   Lab Test 11/24/21  1216   POTASSIUM 4.0                      Signed Prescriptions Disp Refills    simvastatin (ZOCOR) 20 MG tablet 90 tablet 2     Sig: Take 1 tablet (20 mg) by mouth At Bedtime        Statins Protocol Passed - 4/1/2022  4:11 PM        Passed - LDL on file in past 12 months     Recent Labs   Lab Test 11/24/21  1216   LDL 74               Passed - No abnormal creatine kinase in past 12 months     Recent Labs   Lab Test 11/24/20  1101                   Passed - Recent (12 mo) or future (30 days) visit within the authorizing provider's specialty     Patient has had an office visit with the authorizing provider or a provider within the authorizing providers department within " "the previous 12 mos or has a future within next 30 days. See \"Patient Info\" tab in inbasket, or \"Choose Columns\" in Meds & Orders section of the refill encounter.              Passed - Medication is active on med list        Passed - Patient is age 18 or older           montelukast (SINGULAIR) 10 MG tablet 90 tablet 2     Sig: Take 1 tablet (10 mg) by mouth daily        Leukotriene Inhibitors Protocol Passed - 4/1/2022  4:11 PM        Passed - Patient is age 12 or older     If patient is under 16, ok to refill using age based dosing.           Passed - Asthma control assessment score within normal limits in last 6 months     Please review ACT score.           Passed - Medication is active on med list        Passed - Recent (6 mo) or future (30 days) visit within the authorizing provider's specialty     Patient had office visit in the last 6 months or has a visit in the next 30 days with authorizing provider or within the authorizing provider's specialty.  See \"Patient Info\" tab in inbasket, or \"Choose Columns\" in Meds & Orders section of the refill encounter.                  Eli Nguyễn RN   [facility]   "

## 2022-04-01 NOTE — TELEPHONE ENCOUNTER
Zocor and Singulair prescriptions approved per [facility] Refill Protocol.    Eli Nguyễn RN   [facility]

## 2022-04-25 ENCOUNTER — MYC MEDICAL ADVICE (OUTPATIENT)
Dept: ALLERGY | Facility: CLINIC | Age: 47
End: 2022-04-25
Payer: COMMERCIAL

## 2022-05-03 ENCOUNTER — ALLIED HEALTH/NURSE VISIT (OUTPATIENT)
Dept: ALLERGY | Facility: CLINIC | Age: 47
End: 2022-05-03
Payer: COMMERCIAL

## 2022-05-03 DIAGNOSIS — J30.1 SEASONAL ALLERGIC RHINITIS DUE TO POLLEN: Primary | ICD-10-CM

## 2022-05-03 PROCEDURE — 95117 IMMUNOTHERAPY INJECTIONS: CPT

## 2022-05-03 NOTE — PROGRESS NOTES
Patient presented after waiting 30 minutes with no reaction to allergy injections. Discharged from clinic.    Jennie DODD RN 5/3/2022 11:41 AM

## 2022-06-15 ENCOUNTER — MYC MEDICAL ADVICE (OUTPATIENT)
Dept: ALLERGY | Facility: CLINIC | Age: 47
End: 2022-06-15
Payer: COMMERCIAL

## 2022-06-28 ENCOUNTER — ALLIED HEALTH/NURSE VISIT (OUTPATIENT)
Dept: ALLERGY | Facility: CLINIC | Age: 47
End: 2022-06-28
Payer: COMMERCIAL

## 2022-06-28 DIAGNOSIS — J30.1 SEASONAL ALLERGIC RHINITIS DUE TO POLLEN: Primary | ICD-10-CM

## 2022-06-28 PROCEDURE — 95117 IMMUNOTHERAPY INJECTIONS: CPT

## 2022-06-28 NOTE — PROGRESS NOTES
Patient presented after waiting 30 minutes with no reaction to allergy injections. Discharged from clinic.    Jennie DODD RN 6/28/2022 12:06 PM

## 2022-07-12 ENCOUNTER — ALLIED HEALTH/NURSE VISIT (OUTPATIENT)
Dept: ALLERGY | Facility: CLINIC | Age: 47
End: 2022-07-12
Payer: COMMERCIAL

## 2022-07-12 DIAGNOSIS — J30.89 ALLERGIC RHINITIS DUE TO MOLD: ICD-10-CM

## 2022-07-12 DIAGNOSIS — J30.81 ALLERGIC RHINITIS DUE TO ANIMALS: ICD-10-CM

## 2022-07-12 DIAGNOSIS — J30.1 SEASONAL ALLERGIC RHINITIS DUE TO POLLEN: Primary | ICD-10-CM

## 2022-07-12 DIAGNOSIS — J30.9 ALLERGIC RHINITIS: ICD-10-CM

## 2022-07-12 PROCEDURE — 99207 PR DROP WITH A PROCEDURE: CPT

## 2022-07-12 PROCEDURE — 95117 IMMUNOTHERAPY INJECTIONS: CPT

## 2022-07-12 NOTE — PROGRESS NOTES
Patient presented after waiting 30 minutes with no reaction to allergy injections. Discharged from clinic.    Jennie DODD RN 7/12/2022 12:08 PM

## 2022-08-09 ENCOUNTER — OFFICE VISIT (OUTPATIENT)
Dept: ALLERGY | Facility: CLINIC | Age: 47
End: 2022-08-09

## 2022-08-09 ENCOUNTER — ALLIED HEALTH/NURSE VISIT (OUTPATIENT)
Dept: ALLERGY | Facility: CLINIC | Age: 47
End: 2022-08-09
Payer: COMMERCIAL

## 2022-08-09 VITALS — OXYGEN SATURATION: 98 % | DIASTOLIC BLOOD PRESSURE: 77 MMHG | SYSTOLIC BLOOD PRESSURE: 137 MMHG | HEART RATE: 67 BPM

## 2022-08-09 DIAGNOSIS — J30.1 SEASONAL ALLERGIC RHINITIS DUE TO POLLEN: Primary | ICD-10-CM

## 2022-08-09 DIAGNOSIS — J30.89 ALLERGIC RHINITIS DUE TO MOLD: ICD-10-CM

## 2022-08-09 DIAGNOSIS — J30.9 ALLERGIC RHINITIS: ICD-10-CM

## 2022-08-09 DIAGNOSIS — J30.81 ALLERGIC RHINITIS DUE TO ANIMALS: ICD-10-CM

## 2022-08-09 DIAGNOSIS — J45.30 MILD PERSISTENT ASTHMA WITHOUT COMPLICATION: ICD-10-CM

## 2022-08-09 PROCEDURE — 95117 IMMUNOTHERAPY INJECTIONS: CPT

## 2022-08-09 PROCEDURE — 99213 OFFICE O/P EST LOW 20 MIN: CPT | Mod: 25 | Performed by: ALLERGY & IMMUNOLOGY

## 2022-08-09 RX ORDER — BUDESONIDE AND FORMOTEROL FUMARATE DIHYDRATE 80; 4.5 UG/1; UG/1
2 AEROSOL RESPIRATORY (INHALATION) 2 TIMES DAILY
Qty: 30.6 G | Refills: 2 | Status: SHIPPED | OUTPATIENT
Start: 2022-08-09 | End: 2023-04-25

## 2022-08-09 ASSESSMENT — ENCOUNTER SYMPTOMS
ACTIVITY CHANGE: 0
SHORTNESS OF BREATH: 0
DIARRHEA: 0
HEADACHES: 0
FEVER: 0
EYE DISCHARGE: 0
CHEST TIGHTNESS: 0
NAUSEA: 0
VOMITING: 0
ARTHRALGIAS: 0
EYE ITCHING: 0
COUGH: 0
WHEEZING: 0
MYALGIAS: 0
EYE REDNESS: 0
RHINORRHEA: 0
SINUS PRESSURE: 0
FATIGUE: 0
JOINT SWELLING: 0
FACIAL SWELLING: 0
ADENOPATHY: 0

## 2022-08-09 ASSESSMENT — ASTHMA QUESTIONNAIRES: ACT_TOTALSCORE: 24

## 2022-08-09 NOTE — PATIENT INSTRUCTIONS
If you have any questions regarding your allergies, asthma, or what we discussed during your visit today please call the allergy clinic or contact us via [employer]ridge.    [facility] Allergy RN Line: [phone] - call this number with any questions during or after business/clinic hours  [facility] Scheduling Line: [phone]  [facility] Scheduling Line: [phone] - this number is ONLY for scheduling at the [facility] and should not be used to get in touch with the allergy team    All visits for food challenges, medication/drug allergy testing, and drug challenges MUST be scheduled through the allergy clinic nurse. Please call the nurse at [phone] or send a Tribridge message for scheduling. Appointments for these visits that are made through the schedulers or via Tribridge may be cancelled or rescheduled.    Clinic Schedule:   [place] - Monday, Tuesday, and Thursday  [address] [place] [address]    [facility] - Wednesday  [address] [place] [address]      Plan to stop allergy shots in February/March. Follow-up visit in April.

## 2022-08-09 NOTE — PROGRESS NOTES
Patient presented after waiting 30 minutes with no reaction to allergy injections. Discharged from clinic.    Jennie DODD RN 8/9/2022 4:56 PM

## 2022-08-09 NOTE — LETTER
8/9/2022         RE: Reji Carpenter  [address] [facility] [address]        Dear Colleague,    Thank you for referring your patient, Reji Carpenter, to the [facility]. Please see a copy of my visit note below.    Reji Carpenter was seen in the Allergy Clinic at [facility].      Reji Carpenter is a 47 year old Not  or  male who is seen today for a follow-up visit. He reports that he has been doing well.    He began allergen immunotherapy treatment in 6/2017 and reached his maintenance dose in 2/2018. He has no prior history of systemic or significant large local reactions to treatment. He feels that he has had significant improvement in his allergic rhinitis symptoms since beginning treatment. Reji continues to take cetirizine and montelukast daily and also uses triamcinolone nasal spray. He did try stopping both the cetirizine and montelukast over the past year but felt his symptoms worsened so he resumed both medications.     Reji reports that his asthma has been well controlled. He is taking Symbicort twice daily and hasn't needed to use albuterol in several months. He has not experienced any side effects from these medications. He has had no exacerbations, oral steroid use, or urgent care/ED visits in the past year.      Past Medical History:   Diagnosis Date     Allergic rhinitis      Asthma      Hypertension      NONSPECIFIC MEDICAL HISTORY 06/06    ruptured achilles tendon repaired     Family History   Problem Relation Age of Onset     Lipids Father      Other - See Comments Father         parkinson     Hypertension Mother      Cancer Maternal Grandmother         brain     Cancer Paternal Grandmother      Coronary Artery Disease Maternal Grandfather         MI 50s     Coronary Artery Disease Paternal Grandfather         MI 60s     Diabetes No family hx of      C.A.D. No family hx of      Social History      Tobacco Use     Smoking status: Never Smoker     Smokeless tobacco: Never Used   Substance Use Topics     Alcohol use: No     Comment: occasional     Drug use: No     Social History     Social History Narrative    ENVIRONMENTAL HISTORY: [place] The home is heated with a forced air. They do have central air conditioning. The patient's bedroom is furnished with carpeting in bedroom, allergen mattress cover, allergen pillowcase cover and fabric window coverings.  Pets inside the house include 3 dog(s). There is no history of cockroach or mice infestation. There is/are 0 smokers in the house.  The house does not have a damp basement.          SOCIAL HISTORY:     Reji is employed as a . He lives with his spouse. His spouse works as a nurse.       Past medical, family, and social history were reviewed.    Review of Systems   Constitutional: Negative for activity change, fatigue and fever.   HENT: Negative for congestion, dental problem, ear pain, facial swelling, nosebleeds, postnasal drip, rhinorrhea, sinus pressure and sneezing.    Eyes: Negative for discharge, redness and itching.   Respiratory: Negative for cough, chest tightness, shortness of breath and wheezing.    Cardiovascular: Negative for chest pain.   Gastrointestinal: Negative for diarrhea, nausea and vomiting.   Musculoskeletal: Negative for arthralgias, joint swelling and myalgias.   Skin: Negative for rash.   Neurological: Negative for headaches.   Hematological: Negative for adenopathy.   Psychiatric/Behavioral: Negative for behavioral problems and self-injury.         Current Outpatient Medications:      albuterol (PROAIR HFA/PROVENTIL HFA/VENTOLIN HFA) 108 (90 Base) MCG/ACT inhaler, Inhale 2 puffs into the lungs every 4 hours as needed for shortness of breath / dyspnea or other (cough), Disp: 3 Inhaler, Rfl: 1     amLODIPine (NORVASC) 5 MG tablet, Take 1 tablet (5 mg) by mouth daily, Disp:  90 tablet, Rfl: 1     aspirin (ASA) 81 MG EC tablet, Take 1 tablet (81 mg) by mouth daily, Disp: , Rfl:      budesonide-formoterol (SYMBICORT) 80-4.5 MCG/ACT Inhaler, Inhale 2 puffs into the lungs 2 times daily, Disp: 30.6 g, Rfl: 2     cetirizine (ZYRTEC) 10 MG tablet, Take 1 tablet (10 mg) by mouth daily, Disp: , Rfl: 0     montelukast (SINGULAIR) 10 MG tablet, Take 1 tablet (10 mg) by mouth daily, Disp: 90 tablet, Rfl: 2     ORDER FOR ALLERGEN IMMUNOTHERAPY, Name of Mix: Mix #3  Tree , Weeds Narayan, White  1:20 w/v, HS  0.5 ml Boxelder-Maple  Mix BHR (Boxelder Hard Red) 1:20 w/v, HS  0.5 ml [place], Common 1:20 w/v, HS  0.5 ml Elm, American 1:20 w/v, HS  0.5 ml Lamb's Quarters 1:20 w/v, HS 0.5 ml Plantain, English 1:20 w/v, HS 0.5 ml Ragweed Mixed 1:20 w/v ALK  0.5 ml Sagebrush, Mugwort 1:20 w/v, HS 0.5 ml Diluent: HSA qs to 5ml, Disp: 5 mL, Rfl: PRN     ORDER FOR ALLERGEN IMMUNOTHERAPY, Name of Mix: Mix #1  Cat, Dog Cat Hair, Standardized 10,000 BAU/mL, ALK  2.0 ml Dog Hair Dander, A. P.  1:100 w/v, HS  1.0 ml  Diluent: HSA qs to 5ml, Disp: 5 mL, Rfl: PRN     ORDER FOR ALLERGEN IMMUNOTHERAPY, Name of Mix: Mix #2  Mold Alternaria Tenuis 1:10 w/v, HS  0.5 ml Diluent: HSA qs to 5ml, Disp: 5 mL, Rfl: PRN     simvastatin (ZOCOR) 20 MG tablet, Take 1 tablet (20 mg) by mouth At Bedtime, Disp: 90 tablet, Rfl: 2     triamcinolone (NASACORT) 55 MCG/ACT nasal aerosol, Spray 1 spray into both nostrils daily, Disp: , Rfl:      valsartan (DIOVAN) 160 MG tablet, Take 1 tablet (160 mg) by mouth daily, Disp: 90 tablet, Rfl: 1  No Known Allergies    EXAM:   /77 (BP Location: Right arm, Patient Position: Sitting)   Pulse 67   SpO2 98%   Physical Exam  Vitals and nursing note reviewed.   Constitutional:       Appearance: Normal appearance.   HENT:      Head: Normocephalic and atraumatic.      Right Ear: External ear normal.      Left Ear: External ear normal.      Nose: No mucosal edema or rhinorrhea.      Mouth/Throat:       Mouth: Mucous membranes are moist. No oral lesions.      Pharynx: Oropharynx is clear. Uvula midline. No posterior oropharyngeal erythema.   Eyes:      General: Lids are normal.      Extraocular Movements: Extraocular movements intact.      Conjunctiva/sclera: Conjunctivae normal.   Neck:      Comments: No asymmetry, masses, or scars  Cardiovascular:      Rate and Rhythm: Normal rate and regular rhythm.      Heart sounds: Normal heart sounds, S1 normal and S2 normal.   Pulmonary:      Effort: Pulmonary effort is normal. No respiratory distress.      Breath sounds: Normal breath sounds and air entry.   Musculoskeletal:      Comments: No musculoskeletal defects appreciated   Skin:     General: Skin is warm and dry.      Findings: No lesion or rash.   Neurological:      General: No focal deficit present.      Mental Status: He is alert.   Psychiatric:         Mood and Affect: Mood and affect normal.       WORKUP:  None    ASSESSMENT/PLAN:  Reji Carpenter is a 47 year old male here for a follow-up visit.    1. Mild persistent asthma without complication - Well controlled with low dose ICS/LABA therapy. No exacerbations or oral steroid use in the past year.    - budesonide-formoterol (SYMBICORT) 80-4.5 MCG/ACT Inhaler; Inhale 2 puffs into the lungs 2 times daily  Dispense: 30.6 g; Refill: 2    2. Seasonal allergic rhinitis due to pollen - Reji continues to do well with immunotherapy and notes improvement in his rhinitis symptoms. He continues to take cetirizine and montelukast daily. He has completed over 4 years of treatment at his maintenance dose. We discussed continuing immunotherapy through February/March and stopping treatment at that time to assess his response in the spring.    - continue allergen immunotherapy per protocol - anticipate completion in February/March  - continue cetirizine - 10mg daily  - continue montelukast - 10mg daily    3. Allergic rhinitis due to animals - see above    4. Allergic  rhinitis due to mold - see above      Follow-up in April/May 2023      Thank you for allowing me to participate in the care of Reji Carpenter.      Rosemarie Posada MD, FAAAAI  Allergy/Immunology  [facility]      Chart documentation done in part with Dragon Voice Recognition Software. Although reviewed after completion, some word and grammatical errors may remain.        Again, thank you for allowing me to participate in the care of your patient.        Sincerely,        Rosemarie Posada MD

## 2022-08-09 NOTE — PROGRESS NOTES
Reji Carpenter was seen in the Allergy Clinic at [facility].      Reji Carpenter is a 47 year old Not  or  male who is seen today for a follow-up visit. He reports that he has been doing well.    He began allergen immunotherapy treatment in 6/2017 and reached his maintenance dose in 2/2018. He has no prior history of systemic or significant large local reactions to treatment. He feels that he has had significant improvement in his allergic rhinitis symptoms since beginning treatment. Reji continues to take cetirizine and montelukast daily and also uses triamcinolone nasal spray. He did try stopping both the cetirizine and montelukast over the past year but felt his symptoms worsened so he resumed both medications.     Reji reports that his asthma has been well controlled. He is taking Symbicort twice daily and hasn't needed to use albuterol in several months. He has not experienced any side effects from these medications. He has had no exacerbations, oral steroid use, or urgent care/ED visits in the past year.      Past Medical History:   Diagnosis Date     Allergic rhinitis      Asthma      Hypertension      NONSPECIFIC MEDICAL HISTORY 06/06    ruptured achilles tendon repaired     Family History   Problem Relation Age of Onset     Lipids Father      Other - See Comments Father         parkinson     Hypertension Mother      Cancer Maternal Grandmother         brain     Cancer Paternal Grandmother      Coronary Artery Disease Maternal Grandfather         MI 50s     Coronary Artery Disease Paternal Grandfather         MI 60s     Diabetes No family hx of      C.A.D. No family hx of      Social History     Tobacco Use     Smoking status: Never Smoker     Smokeless tobacco: Never Used   Substance Use Topics     Alcohol use: No     Comment: occasional     Drug use: No     Social History     Social History Narrative    ENVIRONMENTAL HISTORY: [place] The home is heated with a forced air. They do have central air conditioning. The patient's bedroom is furnished with carpeting in bedroom, allergen mattress cover, allergen pillowcase cover and fabric window coverings.  Pets inside the house include 3 dog(s). There is no history of cockroach or mice infestation. There is/are 0 smokers in the house.  The house does not have a damp basement.          SOCIAL HISTORY:     Reji is employed as a . He lives with his spouse. His spouse works as a nurse.       Past medical, family, and social history were reviewed.    Review of Systems   Constitutional: Negative for activity change, fatigue and fever.   HENT: Negative for congestion, dental problem, ear pain, facial swelling, nosebleeds, postnasal drip, rhinorrhea, sinus pressure and sneezing.    Eyes: Negative for discharge, redness and itching.   Respiratory: Negative for cough, chest tightness, shortness of breath and wheezing.    Cardiovascular: Negative for chest pain.   Gastrointestinal: Negative for diarrhea, nausea and vomiting.   Musculoskeletal: Negative for arthralgias, joint swelling and myalgias.   Skin: Negative for rash.   Neurological: Negative for headaches.   Hematological: Negative for adenopathy.   Psychiatric/Behavioral: Negative for behavioral problems and self-injury.         Current Outpatient Medications:      albuterol (PROAIR HFA/PROVENTIL HFA/VENTOLIN HFA) 108 (90 Base) MCG/ACT inhaler, Inhale 2 puffs into the lungs every 4 hours as needed for shortness of breath / dyspnea or other (cough), Disp: 3 Inhaler, Rfl: 1     amLODIPine (NORVASC) 5 MG tablet, Take 1 tablet (5 mg) by mouth daily, Disp: 90 tablet, Rfl: 1     aspirin (ASA) 81 MG EC tablet, Take 1 tablet (81 mg) by mouth daily, Disp: , Rfl:      budesonide-formoterol (SYMBICORT) 80-4.5 MCG/ACT Inhaler, Inhale 2 puffs into the lungs 2 times daily, Disp: 30.6 g, Rfl: 2     cetirizine (ZYRTEC) 10 MG tablet,  Take 1 tablet (10 mg) by mouth daily, Disp: , Rfl: 0     montelukast (SINGULAIR) 10 MG tablet, Take 1 tablet (10 mg) by mouth daily, Disp: 90 tablet, Rfl: 2     ORDER FOR ALLERGEN IMMUNOTHERAPY, Name of Mix: Mix #3  Tree , Weeds Narayan, White  1:20 w/v, HS  0.5 ml Boxelder-Maple  Mix BHR (Boxelder Hard Red) 1:20 w/v, HS  0.5 ml [place], Common 1:20 w/v, HS  0.5 ml Elm, American 1:20 w/v, HS  0.5 ml Lamb's Quarters 1:20 w/v, HS 0.5 ml Plantain, English 1:20 w/v, HS 0.5 ml Ragweed Mixed 1:20 w/v ALK  0.5 ml Sagebrush, Mugwort 1:20 w/v, HS 0.5 ml Diluent: HSA qs to 5ml, Disp: 5 mL, Rfl: PRN     ORDER FOR ALLERGEN IMMUNOTHERAPY, Name of Mix: Mix #1  Cat, Dog Cat Hair, Standardized 10,000 BAU/mL, ALK  2.0 ml Dog Hair Dander, A. P.  1:100 w/v, HS  1.0 ml  Diluent: HSA qs to 5ml, Disp: 5 mL, Rfl: PRN     ORDER FOR ALLERGEN IMMUNOTHERAPY, Name of Mix: Mix #2  Mold Alternaria Tenuis 1:10 w/v, HS  0.5 ml Diluent: HSA qs to 5ml, Disp: 5 mL, Rfl: PRN     simvastatin (ZOCOR) 20 MG tablet, Take 1 tablet (20 mg) by mouth At Bedtime, Disp: 90 tablet, Rfl: 2     triamcinolone (NASACORT) 55 MCG/ACT nasal aerosol, Spray 1 spray into both nostrils daily, Disp: , Rfl:      valsartan (DIOVAN) 160 MG tablet, Take 1 tablet (160 mg) by mouth daily, Disp: 90 tablet, Rfl: 1  No Known Allergies    EXAM:   /77 (BP Location: Right arm, Patient Position: Sitting)   Pulse 67   SpO2 98%   Physical Exam  Vitals and nursing note reviewed.   Constitutional:       Appearance: Normal appearance.   HENT:      Head: Normocephalic and atraumatic.      Right Ear: External ear normal.      Left Ear: External ear normal.      Nose: No mucosal edema or rhinorrhea.      Mouth/Throat:      Mouth: Mucous membranes are moist. No oral lesions.      Pharynx: Oropharynx is clear. Uvula midline. No posterior oropharyngeal erythema.   Eyes:      General: Lids are normal.      Extraocular Movements: Extraocular movements intact.      Conjunctiva/sclera:  Conjunctivae normal.   Neck:      Comments: No asymmetry, masses, or scars  Cardiovascular:      Rate and Rhythm: Normal rate and regular rhythm.      Heart sounds: Normal heart sounds, S1 normal and S2 normal.   Pulmonary:      Effort: Pulmonary effort is normal. No respiratory distress.      Breath sounds: Normal breath sounds and air entry.   Musculoskeletal:      Comments: No musculoskeletal defects appreciated   Skin:     General: Skin is warm and dry.      Findings: No lesion or rash.   Neurological:      General: No focal deficit present.      Mental Status: He is alert.   Psychiatric:         Mood and Affect: Mood and affect normal.       WORKUP:  None    ASSESSMENT/PLAN:  Reji Carpenter is a 47 year old male here for a follow-up visit.    1. Mild persistent asthma without complication - Well controlled with low dose ICS/LABA therapy. No exacerbations or oral steroid use in the past year.    - budesonide-formoterol (SYMBICORT) 80-4.5 MCG/ACT Inhaler; Inhale 2 puffs into the lungs 2 times daily  Dispense: 30.6 g; Refill: 2    2. Seasonal allergic rhinitis due to pollen - Reji continues to do well with immunotherapy and notes improvement in his rhinitis symptoms. He continues to take cetirizine and montelukast daily. He has completed over 4 years of treatment at his maintenance dose. We discussed continuing immunotherapy through February/March and stopping treatment at that time to assess his response in the spring.    - continue allergen immunotherapy per protocol - anticipate completion in February/March  - continue cetirizine - 10mg daily  - continue montelukast - 10mg daily    3. Allergic rhinitis due to animals - see above    4. Allergic rhinitis due to mold - see above      Follow-up in April/May 2023      Thank you for allowing me to participate in the care of Reji Carpenter.      Rosemarie Posada MD, FAAAAI  Allergy/Immunology  [facility]  Pediatric Specialty Clinic      Chart documentation done in part with Dragon Voice Recognition Software. Although reviewed after completion, some word and grammatical errors may remain.

## 2022-08-16 ENCOUNTER — LAB (OUTPATIENT)
Dept: LAB | Facility: CLINIC | Age: 47
End: 2022-08-16
Payer: COMMERCIAL

## 2022-08-16 DIAGNOSIS — Z20.822 CLOSE EXPOSURE TO 2019 NOVEL CORONAVIRUS: ICD-10-CM

## 2022-08-16 LAB — SARS-COV-2 RNA RESP QL NAA+PROBE: POSITIVE

## 2022-08-16 PROCEDURE — U0003 INFECTIOUS AGENT DETECTION BY NUCLEIC ACID (DNA OR RNA); SEVERE ACUTE RESPIRATORY SYNDROME CORONAVIRUS 2 (SARS-COV-2) (CORONAVIRUS DISEASE [COVID-19]), AMPLIFIED PROBE TECHNIQUE, MAKING USE OF HIGH THROUGHPUT TECHNOLOGIES AS DESCRIBED BY CMS-2020-01-R: HCPCS

## 2022-08-16 PROCEDURE — U0005 INFEC AGEN DETEC AMPLI PROBE: HCPCS

## 2022-08-17 ENCOUNTER — TELEPHONE (OUTPATIENT)
Dept: FAMILY MEDICINE | Facility: CLINIC | Age: 47
End: 2022-08-17

## 2022-08-17 ENCOUNTER — VIRTUAL VISIT (OUTPATIENT)
Dept: FAMILY MEDICINE | Facility: CLINIC | Age: 47
End: 2022-08-17
Payer: COMMERCIAL

## 2022-08-17 DIAGNOSIS — U07.1 INFECTION DUE TO 2019 NOVEL CORONAVIRUS: Primary | ICD-10-CM

## 2022-08-17 PROCEDURE — 99214 OFFICE O/P EST MOD 30 MIN: CPT | Mod: 95 | Performed by: FAMILY MEDICINE

## 2022-08-17 NOTE — PROGRESS NOTES
"Reji is a 47 year old who is being evaluated via a billable video visit.      How would you like to obtain your AVS? MyChart  If the video visit is dropped, the invitation should be resent by: Text to cell phone: [phone]  Will anyone else be joining your video visit? No        Assessment & Plan     Infection due to 2019 novel coronavirus    - nirmatrelvir and ritonavir (PAXLOVID) therapy pack; Take 3 tablets by mouth 2 times daily for 5 days    Patient has h/o asthma, HYPERTENSION and dyslipidemia.   He was tested positive for covid yesterday. Recommending to start Paxlovid.  Recommended to hold statin for 5 days, when taking Paxlovid.  Instructed to stay well-hydrated.  If any symptomatic worsening noted, instructed to go to the ER or urgent care.  Patient agrees to the plan  BMI:   Estimated body mass index is 27.67 kg/m  as calculated from the following:    Height as of 11/24/21: 1.84 m (6' 0.44\").    Weight as of 11/24/21: 93.7 kg (206 lb 8 oz).           No follow-ups on file.    Kellie Grover MD  [facility]    Tiff Mendoza is a 47 year old, presenting for the following health issues:  Covid Concern      HPI       COVID-19 Symptom Review  How many days ago did these symptoms start? 1-2 days     Are any of the following symptoms significant for you?    New or worsening difficulty breathing? No    Worsening cough? No    Fever or chills? I do not know    Headache: No    Sore throat: YES    Chest pain: No    Diarrhea: No    Body aches? No    What treatments has patient tried? NONE   Does patient live in a nursing home, group home, or shelter? No  Does patient have a way to get food/medications during quarantined? Yes, I have a friend or family member who can help me.        Review of Systems   GI: NEGATIVE for nausea, abdominal pain, heartburn, or change in bowel habits      Objective         Vitals:  No vitals were obtained today due to virtual visit.    Physical Exam "   GENERAL: Healthy, alert and no distress  EYES: Eyes grossly normal to inspection.  No discharge or erythema, or obvious scleral/conjunctival abnormalities.  RESP: No audible wheeze, cough, or visible cyanosis.  No visible retractions or increased work of breathing.    SKIN: Visible skin clear. No significant rash, abnormal pigmentation or lesions.  NEURO: Cranial nerves grossly intact.  Mentation and speech appropriate for age.  PSYCH: Mentation appears normal, affect normal/bright, judgement and insight intact, normal speech and appearance well-groomed.                Video-Visit Details    Video Start Time: 11:29 AM    Type of service:  Video Visit    Video End Time:11:35 AM    Originating Location (pt. Location): Home    Distant Location (provider location):  [facility]     Platform used for Video Visit: [employer]    .  James.

## 2022-08-17 NOTE — TELEPHONE ENCOUNTER
paxlovid may increase the concentration of the budesonide component of symbicort. He uses 2 puffs twice daily, would you like to consider alternative options, decreasing the dose or monitoring for increased corticosteroid effects?  Thank you  Iram Hernandez [facility]  [address] [place] [id number]  [phone]

## 2022-09-06 ENCOUNTER — ALLIED HEALTH/NURSE VISIT (OUTPATIENT)
Dept: ALLERGY | Facility: CLINIC | Age: 47
End: 2022-09-06
Payer: COMMERCIAL

## 2022-09-06 DIAGNOSIS — J30.1 SEASONAL ALLERGIC RHINITIS DUE TO POLLEN: Primary | ICD-10-CM

## 2022-09-06 DIAGNOSIS — J30.9 ALLERGIC RHINITIS: ICD-10-CM

## 2022-09-06 DIAGNOSIS — J30.81 ALLERGIC RHINITIS DUE TO ANIMALS: ICD-10-CM

## 2022-09-06 DIAGNOSIS — J30.89 ALLERGIC RHINITIS DUE TO MOLD: ICD-10-CM

## 2022-09-06 PROCEDURE — 95117 IMMUNOTHERAPY INJECTIONS: CPT

## 2022-09-06 NOTE — PROGRESS NOTES
Patient presented after waiting 30 minutes with no reaction to allergy injections. Discharged from clinic.    Miracle Johnson, BSN, RN

## 2022-10-10 DIAGNOSIS — I10 HYPERTENSION GOAL BP (BLOOD PRESSURE) < 140/90: ICD-10-CM

## 2022-10-12 RX ORDER — AMLODIPINE BESYLATE 5 MG/1
5 TABLET ORAL DAILY
Qty: 90 TABLET | Refills: 0 | Status: SHIPPED | OUTPATIENT
Start: 2022-10-12 | End: 2023-01-20

## 2022-10-25 ENCOUNTER — ALLIED HEALTH/NURSE VISIT (OUTPATIENT)
Dept: ALLERGY | Facility: CLINIC | Age: 47
End: 2022-10-25
Payer: COMMERCIAL

## 2022-10-25 ENCOUNTER — TELEPHONE (OUTPATIENT)
Dept: ALLERGY | Facility: CLINIC | Age: 47
End: 2022-10-25

## 2022-10-25 DIAGNOSIS — J30.81 NON-SEASONAL ALLERGIC RHINITIS DUE TO ANIMAL HAIR AND DANDER: ICD-10-CM

## 2022-10-25 DIAGNOSIS — J30.89 ALLERGIC RHINITIS DUE TO MOLD: ICD-10-CM

## 2022-10-25 DIAGNOSIS — J30.1 SEASONAL ALLERGIC RHINITIS DUE TO POLLEN: Primary | ICD-10-CM

## 2022-10-25 DIAGNOSIS — J30.1 SEASONAL ALLERGIC RHINITIS DUE TO POLLEN: ICD-10-CM

## 2022-10-25 DIAGNOSIS — J30.81 ALLERGIC RHINITIS DUE TO ANIMALS: ICD-10-CM

## 2022-10-25 PROCEDURE — 95117 IMMUNOTHERAPY INJECTIONS: CPT

## 2022-10-25 NOTE — TELEPHONE ENCOUNTER
Please advise new dosing orders, building schedule, and date which orders are valid through.  Patient's last shot was 10-, dose was 0.4ml red, next appointment scheduled for 11-8-2022 which will be 14 days.    PATIENT HAS NEW VIALS     New orders will be added to immunotherapy flowsheet once they are received.     Jennie DODD RN

## 2022-10-25 NOTE — TELEPHONE ENCOUNTER
ALLERGY SOLUTION RE-ORDER REQUEST    Reji Carpenter 1975 MRN: [id number]    DATE NEEDED:  11-7-2022    Vial Color Content    Vial Size  Red 1:1 Molds    5 mL  Red 1:1 Trees, Weeds   5 mL  Red 1:1 Cat, Dog    5 mL      Serum reorder consent signed and patient/parent was advised that new serums would be ordered through the pharmacy and billed to their insurance company when they arrive in clinic. Yes    Shot Clinic Location:  [place]  Ship to Location: [facility]  Serum billed to:  [facility]      Requester Signature  Jennie DODD RN

## 2022-10-25 NOTE — TELEPHONE ENCOUNTER
Routing refill request to provider for review/approval because:  The original prescription was discontinued on 8/17/2022 by Kellie Grover MD.      change for Cat antigen.  Please update prescription to reflect Standardized Cat Hair- 10,000 BAU/ml HS (Violette Celeste).    Tere DODD RN  Specialty/Allergy Clinic

## 2022-10-28 DIAGNOSIS — J30.81 NON-SEASONAL ALLERGIC RHINITIS DUE TO ANIMAL HAIR AND DANDER: Primary | ICD-10-CM

## 2022-10-28 DIAGNOSIS — J30.89 ALLERGIC RHINITIS DUE TO MOLD: ICD-10-CM

## 2022-10-28 DIAGNOSIS — J30.1 SEASONAL ALLERGIC RHINITIS DUE TO POLLEN: ICD-10-CM

## 2022-10-28 PROCEDURE — 95165 ANTIGEN THERAPY SERVICES: CPT | Performed by: ALLERGY & IMMUNOLOGY

## 2022-10-28 NOTE — PROGRESS NOTES
Allergy serums billed to [facility].     Vials billed below:    Vial Color Content                      Vial Size Expiration Date  Red 1:1 Molds 5mL  10/28/23  Red 1:1 Trees, Weeds 5mL  10/28/23  Red 1:1 Cat, Dog 5mL  10/28/23      Billed 30 units    Checked by Andrew Neal / LPN        Signature  Andrew Neal LPN

## 2022-10-30 ENCOUNTER — HEALTH MAINTENANCE LETTER (OUTPATIENT)
Age: 47
End: 2022-10-30

## 2022-11-01 NOTE — TELEPHONE ENCOUNTER
Allergy serums received at [place].     Vials received below:    Vial Color Content                      Vial Size Expiration Date  Red 1:1 Molds 5mL  10/28/2023  Red 1:1 Trees, Weeds 5mL  10/28/2023  Red 1:1 Cat, Dog 5mL  10/28/2023      Signature  Miracle Johnson RN

## 2022-11-04 ENCOUNTER — IMMUNIZATION (OUTPATIENT)
Dept: FAMILY MEDICINE | Facility: CLINIC | Age: 47
End: 2022-11-04
Payer: COMMERCIAL

## 2022-11-04 DIAGNOSIS — Z23 NEED FOR PROPHYLACTIC VACCINATION AND INOCULATION AGAINST INFLUENZA: Primary | ICD-10-CM

## 2022-11-04 PROCEDURE — 90686 IIV4 VACC NO PRSV 0.5 ML IM: CPT

## 2022-11-04 PROCEDURE — 99207 PR NO CHARGE NURSE ONLY: CPT

## 2022-11-04 PROCEDURE — 90471 IMMUNIZATION ADMIN: CPT

## 2022-11-08 ENCOUNTER — ALLIED HEALTH/NURSE VISIT (OUTPATIENT)
Dept: ALLERGY | Facility: CLINIC | Age: 47
End: 2022-11-08
Payer: COMMERCIAL

## 2022-11-08 DIAGNOSIS — J30.81 NON-SEASONAL ALLERGIC RHINITIS DUE TO ANIMAL HAIR AND DANDER: Primary | ICD-10-CM

## 2022-11-08 DIAGNOSIS — J30.9 ALLERGIC RHINITIS: ICD-10-CM

## 2022-11-08 DIAGNOSIS — J30.81 ALLERGIC RHINITIS DUE TO ANIMALS: ICD-10-CM

## 2022-11-08 DIAGNOSIS — J30.89 ALLERGIC RHINITIS DUE TO MOLD: ICD-10-CM

## 2022-11-08 DIAGNOSIS — J30.1 SEASONAL ALLERGIC RHINITIS DUE TO POLLEN: ICD-10-CM

## 2022-11-08 PROCEDURE — 95117 IMMUNOTHERAPY INJECTIONS: CPT

## 2022-11-08 NOTE — PROGRESS NOTES
Reji Carpenter presents to clinic today at the request of Rosemarie Posada MD (ordering provider) for Allergy Immunotherapy injection(s).       This service provided today was under the care of Stephanie Noe MD; the supervising provider of the day; who was available if needed.      Patient presented after waiting 30 minutes with no reaction to  injections. Discharged from clinic.    Jennie DODD RN

## 2022-11-22 ENCOUNTER — ALLIED HEALTH/NURSE VISIT (OUTPATIENT)
Dept: ALLERGY | Facility: CLINIC | Age: 47
End: 2022-11-22
Payer: COMMERCIAL

## 2022-11-22 DIAGNOSIS — J30.89 ALLERGIC RHINITIS DUE TO MOLD: ICD-10-CM

## 2022-11-22 DIAGNOSIS — J30.81 NON-SEASONAL ALLERGIC RHINITIS DUE TO ANIMAL HAIR AND DANDER: Primary | ICD-10-CM

## 2022-11-22 DIAGNOSIS — J30.1 SEASONAL ALLERGIC RHINITIS DUE TO POLLEN: ICD-10-CM

## 2022-11-22 DIAGNOSIS — J30.9 ALLERGIC RHINITIS: ICD-10-CM

## 2022-11-22 PROCEDURE — 95125 IMMUNOTHERAPY 2/> INJECTIONS: CPT

## 2022-11-22 NOTE — PROGRESS NOTES
Reji Carpenter presents to clinic today at the request of Rosemarie Posada MD (ordering provider) for Allergy Immunotherapy injection(s).       This service provided today was under the care of Wade Garnica MD; the supervising provider of the day; who was available if needed.      Patient presented after waiting 30 minutes with no reaction to  injections. Discharged from clinic.    Jennie DODD RN

## 2022-12-06 ENCOUNTER — ALLIED HEALTH/NURSE VISIT (OUTPATIENT)
Dept: ALLERGY | Facility: CLINIC | Age: 47
End: 2022-12-06
Payer: COMMERCIAL

## 2022-12-06 DIAGNOSIS — J30.81 NON-SEASONAL ALLERGIC RHINITIS DUE TO ANIMAL HAIR AND DANDER: Primary | ICD-10-CM

## 2022-12-06 DIAGNOSIS — J30.9 ALLERGIC RHINITIS: ICD-10-CM

## 2022-12-06 DIAGNOSIS — J30.1 SEASONAL ALLERGIC RHINITIS DUE TO POLLEN: ICD-10-CM

## 2022-12-06 DIAGNOSIS — J30.89 ALLERGIC RHINITIS DUE TO MOLD: ICD-10-CM

## 2022-12-06 PROCEDURE — 95117 IMMUNOTHERAPY INJECTIONS: CPT

## 2022-12-06 NOTE — PROGRESS NOTES
Reji Carpenter presents to clinic today at the request of Rosemarie Posada MD (ordering provider) for Allergy Immunotherapy injection(s).       This service provided today was under the care of Eran Donnelly MD ; the supervising provider of the day; who was available if needed.      Patient presented after waiting 30 minutes with no reaction to  injections. Discharged from clinic.    Jennie DODD RN

## 2022-12-25 DIAGNOSIS — J45.30 MILD PERSISTENT ASTHMA WITHOUT COMPLICATION: ICD-10-CM

## 2022-12-26 RX ORDER — MONTELUKAST SODIUM 10 MG/1
TABLET ORAL
Qty: 90 TABLET | Refills: 1 | Status: SHIPPED | OUTPATIENT
Start: 2022-12-26 | End: 2023-07-17

## 2023-01-03 ENCOUNTER — ALLIED HEALTH/NURSE VISIT (OUTPATIENT)
Dept: ALLERGY | Facility: CLINIC | Age: 48
End: 2023-01-03
Payer: COMMERCIAL

## 2023-01-03 DIAGNOSIS — J30.89 ALLERGIC RHINITIS DUE TO MOLD: ICD-10-CM

## 2023-01-03 DIAGNOSIS — J30.9 ALLERGIC RHINITIS: ICD-10-CM

## 2023-01-03 DIAGNOSIS — J30.81 NON-SEASONAL ALLERGIC RHINITIS DUE TO ANIMAL HAIR AND DANDER: Primary | ICD-10-CM

## 2023-01-03 DIAGNOSIS — J30.1 SEASONAL ALLERGIC RHINITIS DUE TO POLLEN: ICD-10-CM

## 2023-01-03 PROCEDURE — 95117 IMMUNOTHERAPY INJECTIONS: CPT

## 2023-01-03 NOTE — PROGRESS NOTES
Reji Carpenter presents to clinic today at the request of Rosemarie Posada MD (ordering provider) for Allergy Immunotherapy injection(s).       This service provided today was under the care of Rosemarie Posada MD; the supervising provider of the day; who was available if needed.      Patient presented after waiting 30 minutes with no reaction to  injections. Discharged from clinic.    Jennie DODD RN

## 2023-01-26 ENCOUNTER — LAB (OUTPATIENT)
Dept: FAMILY MEDICINE | Facility: CLINIC | Age: 48
End: 2023-01-26
Payer: COMMERCIAL

## 2023-01-26 DIAGNOSIS — Z12.11 SCREEN FOR COLON CANCER: ICD-10-CM

## 2023-01-31 ENCOUNTER — ALLIED HEALTH/NURSE VISIT (OUTPATIENT)
Dept: ALLERGY | Facility: CLINIC | Age: 48
End: 2023-01-31
Payer: COMMERCIAL

## 2023-01-31 DIAGNOSIS — J30.81 ALLERGIC RHINITIS DUE TO ANIMALS: ICD-10-CM

## 2023-01-31 DIAGNOSIS — J30.1 SEASONAL ALLERGIC RHINITIS DUE TO POLLEN: ICD-10-CM

## 2023-01-31 DIAGNOSIS — J30.81 NON-SEASONAL ALLERGIC RHINITIS DUE TO ANIMAL HAIR AND DANDER: Primary | ICD-10-CM

## 2023-01-31 DIAGNOSIS — J30.89 ALLERGIC RHINITIS DUE TO MOLD: ICD-10-CM

## 2023-01-31 PROCEDURE — 95117 IMMUNOTHERAPY INJECTIONS: CPT

## 2023-02-07 ENCOUNTER — MYC MEDICAL ADVICE (OUTPATIENT)
Dept: FAMILY MEDICINE | Facility: CLINIC | Age: 48
End: 2023-02-07
Payer: COMMERCIAL

## 2023-02-07 DIAGNOSIS — Z12.11 SCREEN FOR COLON CANCER: Primary | ICD-10-CM

## 2023-02-08 NOTE — TELEPHONE ENCOUNTER
Please see MyChart message and advise.    Patient is requesting an order to be placed for a colonoscopy for routine screening in [facility] in [place] ([facility]). Patient is not experiencing any symptoms.     Order pended below.    ALEN Mahoney RN  [facility]

## 2023-02-28 ENCOUNTER — ALLIED HEALTH/NURSE VISIT (OUTPATIENT)
Dept: ALLERGY | Facility: CLINIC | Age: 48
End: 2023-02-28
Payer: COMMERCIAL

## 2023-02-28 DIAGNOSIS — J30.1 SEASONAL ALLERGIC RHINITIS DUE TO POLLEN: ICD-10-CM

## 2023-02-28 DIAGNOSIS — J30.89 ALLERGIC RHINITIS DUE TO MOLD: ICD-10-CM

## 2023-02-28 DIAGNOSIS — J30.81 ALLERGIC RHINITIS DUE TO ANIMALS: ICD-10-CM

## 2023-02-28 DIAGNOSIS — J30.81 NON-SEASONAL ALLERGIC RHINITIS DUE TO ANIMAL HAIR AND DANDER: Primary | ICD-10-CM

## 2023-02-28 PROCEDURE — 95117 IMMUNOTHERAPY INJECTIONS: CPT

## 2023-02-28 NOTE — PROGRESS NOTES
Reji Carpenter presents to clinic today at the request of Rosemarie Posada MD (ordering provider) for Allergy Immunotherapy injection(s).       This service provided today was under the care of Rosemarie Posada MD; the supervising provider of the day; who was available if needed.      Patient presented after waiting 30 minutes with no reaction to  injections. Discharged from clinic.    Luz Maria Ruiz RN

## 2023-03-03 ENCOUNTER — TRANSFERRED RECORDS (OUTPATIENT)
Dept: HEALTH INFORMATION MANAGEMENT | Facility: CLINIC | Age: 48
End: 2023-03-03
Payer: COMMERCIAL

## 2023-03-13 DIAGNOSIS — I10 HYPERTENSION GOAL BP (BLOOD PRESSURE) < 140/90: ICD-10-CM

## 2023-03-13 RX ORDER — VALSARTAN 160 MG/1
TABLET ORAL
Qty: 90 TABLET | Refills: 0 | Status: SHIPPED | OUTPATIENT
Start: 2023-03-13 | End: 2023-06-12

## 2023-04-04 ENCOUNTER — ALLIED HEALTH/NURSE VISIT (OUTPATIENT)
Dept: ALLERGY | Facility: CLINIC | Age: 48
End: 2023-04-04
Payer: COMMERCIAL

## 2023-04-04 DIAGNOSIS — J30.89 ALLERGIC RHINITIS DUE TO MOLD: ICD-10-CM

## 2023-04-04 DIAGNOSIS — J30.1 SEASONAL ALLERGIC RHINITIS DUE TO POLLEN: ICD-10-CM

## 2023-04-04 DIAGNOSIS — J30.9 ALLERGIC RHINITIS: ICD-10-CM

## 2023-04-04 DIAGNOSIS — J30.81 NON-SEASONAL ALLERGIC RHINITIS DUE TO ANIMAL HAIR AND DANDER: Primary | ICD-10-CM

## 2023-04-04 PROCEDURE — 95117 IMMUNOTHERAPY INJECTIONS: CPT

## 2023-04-08 ENCOUNTER — HEALTH MAINTENANCE LETTER (OUTPATIENT)
Age: 48
End: 2023-04-08

## 2023-04-25 ENCOUNTER — OFFICE VISIT (OUTPATIENT)
Dept: ALLERGY | Facility: CLINIC | Age: 48
End: 2023-04-25
Payer: COMMERCIAL

## 2023-04-25 ENCOUNTER — ALLIED HEALTH/NURSE VISIT (OUTPATIENT)
Dept: ALLERGY | Facility: CLINIC | Age: 48
End: 2023-04-25
Payer: COMMERCIAL

## 2023-04-25 VITALS
SYSTOLIC BLOOD PRESSURE: 136 MMHG | BODY MASS INDEX: 27.39 KG/M2 | OXYGEN SATURATION: 100 % | DIASTOLIC BLOOD PRESSURE: 79 MMHG | WEIGHT: 204.4 LBS | HEART RATE: 61 BPM

## 2023-04-25 DIAGNOSIS — J30.89 ALLERGIC RHINITIS DUE TO MOLD: ICD-10-CM

## 2023-04-25 DIAGNOSIS — J45.30 MILD PERSISTENT ASTHMA WITHOUT COMPLICATION: Primary | ICD-10-CM

## 2023-04-25 DIAGNOSIS — J30.9 ALLERGIC RHINITIS: ICD-10-CM

## 2023-04-25 DIAGNOSIS — J30.1 SEASONAL ALLERGIC RHINITIS DUE TO POLLEN: ICD-10-CM

## 2023-04-25 DIAGNOSIS — J30.81 NON-SEASONAL ALLERGIC RHINITIS DUE TO ANIMAL HAIR AND DANDER: Primary | ICD-10-CM

## 2023-04-25 DIAGNOSIS — J30.81 ALLERGIC RHINITIS DUE TO ANIMALS: ICD-10-CM

## 2023-04-25 LAB
FEF 25/75: NORMAL
FEV-1: NORMAL
FEV1/FVC: NORMAL
FVC: NORMAL

## 2023-04-25 PROCEDURE — 95117 IMMUNOTHERAPY INJECTIONS: CPT

## 2023-04-25 PROCEDURE — 94010 BREATHING CAPACITY TEST: CPT | Performed by: ALLERGY & IMMUNOLOGY

## 2023-04-25 PROCEDURE — 99214 OFFICE O/P EST MOD 30 MIN: CPT | Mod: 25 | Performed by: ALLERGY & IMMUNOLOGY

## 2023-04-25 RX ORDER — BUDESONIDE AND FORMOTEROL FUMARATE DIHYDRATE 80; 4.5 UG/1; UG/1
2 AEROSOL RESPIRATORY (INHALATION) 2 TIMES DAILY
Qty: 30.6 G | Refills: 3 | Status: SHIPPED | OUTPATIENT
Start: 2023-04-25 | End: 2023-08-23

## 2023-04-25 ASSESSMENT — ENCOUNTER SYMPTOMS
NAUSEA: 0
COUGH: 0
FACIAL SWELLING: 0
EYE DISCHARGE: 0
EYE REDNESS: 0
FEVER: 0
ARTHRALGIAS: 0
WHEEZING: 0
ACTIVITY CHANGE: 0
ADENOPATHY: 0
DIARRHEA: 0
RHINORRHEA: 0
JOINT SWELLING: 0
FATIGUE: 0
SINUS PRESSURE: 0
SHORTNESS OF BREATH: 0
CHEST TIGHTNESS: 0
EYE ITCHING: 0
HEADACHES: 0
MYALGIAS: 0
VOMITING: 0

## 2023-04-25 ASSESSMENT — ASTHMA QUESTIONNAIRES: ACT_TOTALSCORE: 25

## 2023-04-25 NOTE — PROGRESS NOTES
Reji Carpenter was seen in the Allergy Clinic at [facility].      Reji Carpenter is a 47 year old Not  or  male who is seen today for a follow-up visit.    Started SCIT in 6/2017 and reached maintenance dose in 2/2018. No history of systemic or significant large local reactions to treatment. Reports having significant improvement in his symptoms since starting treatment. Does continue to take cetirizine and montelukast daily along with using triamcinolone nasal spray.    Asthma has been well controlled. Continues to take Symbicort twice daily and hasn't used albuterol in some time. No exacerbations, oral steroid use, or urgent care/ED visits in the past year.    Past Medical History:   Diagnosis Date     Allergic rhinitis      Asthma      Hypertension      NONSPECIFIC MEDICAL HISTORY 06/06    ruptured achilles tendon repaired     Family History   Problem Relation Age of Onset     Lipids Father      Other - See Comments Father         parkinson     Hypertension Mother      Cancer Maternal Grandmother         brain     Cancer Paternal Grandmother      Coronary Artery Disease Maternal Grandfather         MI 50s     Coronary Artery Disease Paternal Grandfather         MI 60s     Diabetes No family hx of      C.A.D. No family hx of      Social History     Tobacco Use     Smoking status: Never     Smokeless tobacco: Never   Substance Use Topics     Alcohol use: No     Comment: occasional     Drug use: No     Social History     Social History Narrative    ENVIRONMENTAL HISTORY: [place] The home is heated with a forced air. They do have central air conditioning. The patient's bedroom is furnished with carpeting in bedroom, allergen mattress cover, allergen pillowcase cover and fabric window coverings.  Pets inside the house include 3 dog(s). There is no history of cockroach or mice infestation. There is/are 0 smokers in the house.   The house does not have a damp basement.          SOCIAL HISTORY:     Reji is employed as a . He lives with his spouse. His spouse works as a nurse.       Past medical, family, and social history were reviewed.    Review of Systems   Constitutional: Negative for activity change, fatigue and fever.   HENT: Negative for congestion, dental problem, ear pain, facial swelling, nosebleeds, postnasal drip, rhinorrhea, sinus pressure and sneezing.    Eyes: Negative for discharge, redness and itching.   Respiratory: Negative for cough, chest tightness, shortness of breath and wheezing.    Cardiovascular: Negative for chest pain.   Gastrointestinal: Negative for diarrhea, nausea and vomiting.   Musculoskeletal: Negative for arthralgias, joint swelling and myalgias.   Skin: Negative for rash.   Allergic/Immunologic: Positive for environmental allergies.   Neurological: Negative for headaches.   Hematological: Negative for adenopathy.   Psychiatric/Behavioral: Negative for behavioral problems and self-injury.         Current Outpatient Medications:      albuterol (PROAIR HFA/PROVENTIL HFA/VENTOLIN HFA) 108 (90 Base) MCG/ACT inhaler, Inhale 2 puffs into the lungs every 4 hours as needed for shortness of breath / dyspnea or other (cough), Disp: 3 Inhaler, Rfl: 1     amLODIPine (NORVASC) 5 MG tablet, TAKE 1 TABLET BY MOUTH ONCE DAILY., Disp: 90 tablet, Rfl: 0     aspirin (ASA) 81 MG EC tablet, Take 1 tablet (81 mg) by mouth daily, Disp: , Rfl:      budesonide-formoterol (SYMBICORT) 80-4.5 MCG/ACT Inhaler, Inhale 2 puffs into the lungs 2 times daily, Disp: 30.6 g, Rfl: 3     cetirizine (ZYRTEC) 10 MG tablet, Take 1 tablet (10 mg) by mouth daily, Disp: , Rfl: 0     montelukast (SINGULAIR) 10 MG tablet, TAKE 1 TABLET(10 MG) BY MOUTH DAILY, Disp: 90 tablet, Rfl: 1     ORDER FOR ALLERGEN IMMUNOTHERAPY, Name of Mix: Mix #3  Tree , Weeds Narayan, White  1:20 w/v, HS  0.5 ml Boxelder-Maple  Mix BHR (Boxelder Hard Red) 1:20  w/v, HS  0.5 ml Lares, Common 1:20 w/v, HS  0.5 ml Elm, American 1:20 w/v, HS  0.5 ml Lamb's Quarters 1:20 w/v, HS 0.5 ml Plantain, English 1:20 w/v, HS 0.5 ml Ragweed Mixed 1:20 w/v ALK  0.5 ml Sagebrush, Mugwort 1:20 w/v, HS 0.5 ml Diluent: HSA qs to 5ml, Disp: 5 mL, Rfl: PRN     ORDER FOR ALLERGEN IMMUNOTHERAPY, Name of Mix: Mix #1  Cat, Dog Cat Hair, Standardized A.P. 10,000 BAU/mL, HS  2.0 ml  Dog Hair Dander, A. P.  1:100 w/v, HS  1.0 ml  Diluent: HSA qs to 5ml, Disp: 5 mL, Rfl: PRN     ORDER FOR ALLERGEN IMMUNOTHERAPY, Name of Mix: Mix #2  Mold Alternaria Tenuis 1:10 w/v, HS  0.5 ml Diluent: HSA qs to 5ml, Disp: 5 mL, Rfl: PRN     simvastatin (ZOCOR) 20 MG tablet, Take 1 tablet (20 mg) by mouth At Bedtime, Disp: 90 tablet, Rfl: 2     triamcinolone (NASACORT) 55 MCG/ACT nasal aerosol, Spray 1 spray into both nostrils daily, Disp: , Rfl:      valsartan (DIOVAN) 160 MG tablet, TAKE 1 TABLET(160 MG) BY MOUTH DAILY, Disp: 90 tablet, Rfl: 0  No Known Allergies    EXAM:   /79 (BP Location: Right arm, Patient Position: Sitting, Cuff Size: Adult Large)   Pulse 61   Wt 92.7 kg (204 lb 6.4 oz)   SpO2 100%   BMI 27.39 kg/m    Physical Exam  Vitals and nursing note reviewed.   Constitutional:       Appearance: Normal appearance.   HENT:      Head: Normocephalic and atraumatic.      Right Ear: External ear normal.      Left Ear: External ear normal.      Nose: No mucosal edema or rhinorrhea.      Mouth/Throat:      Mouth: Mucous membranes are moist. No oral lesions.      Pharynx: Oropharynx is clear. Uvula midline. No posterior oropharyngeal erythema.   Eyes:      General: Lids are normal.      Extraocular Movements: Extraocular movements intact.      Conjunctiva/sclera: Conjunctivae normal.   Neck:      Comments: No asymmetry, masses, or scars  Cardiovascular:      Rate and Rhythm: Normal rate and regular rhythm.      Heart sounds: Normal heart sounds, S1 normal and S2 normal.   Pulmonary:      Effort:  Pulmonary effort is normal. No respiratory distress.      Breath sounds: Normal breath sounds and air entry.   Musculoskeletal:      Comments: No musculoskeletal defects appreciated   Skin:     General: Skin is warm and dry.      Findings: No lesion or rash.   Neurological:      General: No focal deficit present.      Mental Status: He is alert.   Psychiatric:         Mood and Affect: Mood and affect normal.           WORKUP:  Spirometry    SPIROMETRY       FVC 6.11L (110% of predicted).     FEV1 4.81L (111% of predicted).     FEV1/FVC 79%      I have reviewed and interpreted these results. Testing meets criteria for acceptability and reproducibility. Values are consistent with normal lung function.    Asthma Control Test (ACT) total score: 25     ASSESSMENT/PLAN:  Reji Carpenter is a 47 year old male here for a follow-up visit.    1. Mild persistent asthma without complication - Well controlled on low dose ICS/LABA therapy, no exacerbations or oral steroid use in the past year. Spirometry is normal.    - Spirometry, Breathing Capacity: Normal Order, Clinic Performed  - budesonide-formoterol (SYMBICORT) 80-4.5 MCG/ACT Inhaler; Inhale 2 puffs into the lungs 2 times daily  Dispense: 30.6 g; Refill: 3    2. Seasonal allergic rhinitis due to pollen - Completed 5 years of immunotherapy treatment at maintenance dose. Overall notes significant improvement in his symptoms. Reviewed recommendations for the duration of treatment and he plans to discontinue at this time.    - plan to discontinue immunotherapy treatment  - continue cetirizine - 10mg daily as needed  - continue montelukast - 10mg daily  - continue triamcinolone nasal spray - 1 to 2 sprays in each nostril daily    3. Allergic rhinitis due to animals - see above    4. Allergic rhinitis due to mold - see above      Follow-up in 6-12 months, sooner if needed      Thank you for allowing me to participate in the care of Reji Carpenter.      Rosemarie Posada  MD, [place]  Allergy/Immunology  [facility]      Chart documentation done in part with Dragon Voice Recognition Software. Although reviewed after completion, some word and grammatical errors may remain.

## 2023-04-25 NOTE — LETTER
4/25/2023         RE: Reji Carpenter  [address] [facility] [address]        Dear Colleague,    Thank you for referring your patient, Reji Carpenter, to the [facility]. Please see a copy of my visit note below.    Reji Carpenter was seen in the Allergy Clinic at [facility].      Reji Carpenter is a 47 year old Not  or  male who is seen today for a follow-up visit.    Started SCIT in 6/2017 and reached maintenance dose in 2/2018. No history of systemic or significant large local reactions to treatment. Reports having significant improvement in his symptoms since starting treatment. Does continue to take cetirizine and montelukast daily along with using triamcinolone nasal spray.    Asthma has been well controlled. Continues to take Symbicort twice daily and hasn't used albuterol in some time. No exacerbations, oral steroid use, or urgent care/ED visits in the past year.    Past Medical History:   Diagnosis Date     Allergic rhinitis      Asthma      Hypertension      NONSPECIFIC MEDICAL HISTORY 06/06    ruptured achilles tendon repaired     Family History   Problem Relation Age of Onset     Lipids Father      Other - See Comments Father         parkinson     Hypertension Mother      Cancer Maternal Grandmother         brain     Cancer Paternal Grandmother      Coronary Artery Disease Maternal Grandfather         MI 50s     Coronary Artery Disease Paternal Grandfather         MI 60s     Diabetes No family hx of      C.A.D. No family hx of      Social History     Tobacco Use     Smoking status: Never     Smokeless tobacco: Never   Substance Use Topics     Alcohol use: No     Comment: occasional     Drug use: No     Social History     Social History Narrative    ENVIRONMENTAL HISTORY: [place] The home is heated with a forced air. They do have central air conditioning. The patient's  bedroom is furnished with carpeting in bedroom, allergen mattress cover, allergen pillowcase cover and fabric window coverings.  Pets inside the house include 3 dog(s). There is no history of cockroach or mice infestation. There is/are 0 smokers in the house.  The house does not have a damp basement.          SOCIAL HISTORY:     Reji is employed as a . He lives with his spouse. His spouse works as a nurse.       Past medical, family, and social history were reviewed.    Review of Systems   Constitutional: Negative for activity change, fatigue and fever.   HENT: Negative for congestion, dental problem, ear pain, facial swelling, nosebleeds, postnasal drip, rhinorrhea, sinus pressure and sneezing.    Eyes: Negative for discharge, redness and itching.   Respiratory: Negative for cough, chest tightness, shortness of breath and wheezing.    Cardiovascular: Negative for chest pain.   Gastrointestinal: Negative for diarrhea, nausea and vomiting.   Musculoskeletal: Negative for arthralgias, joint swelling and myalgias.   Skin: Negative for rash.   Allergic/Immunologic: Positive for environmental allergies.   Neurological: Negative for headaches.   Hematological: Negative for adenopathy.   Psychiatric/Behavioral: Negative for behavioral problems and self-injury.         Current Outpatient Medications:      albuterol (PROAIR HFA/PROVENTIL HFA/VENTOLIN HFA) 108 (90 Base) MCG/ACT inhaler, Inhale 2 puffs into the lungs every 4 hours as needed for shortness of breath / dyspnea or other (cough), Disp: 3 Inhaler, Rfl: 1     amLODIPine (NORVASC) 5 MG tablet, TAKE 1 TABLET BY MOUTH ONCE DAILY., Disp: 90 tablet, Rfl: 0     aspirin (ASA) 81 MG EC tablet, Take 1 tablet (81 mg) by mouth daily, Disp: , Rfl:      budesonide-formoterol (SYMBICORT) 80-4.5 MCG/ACT Inhaler, Inhale 2 puffs into the lungs 2 times daily, Disp: 30.6 g, Rfl: 3     cetirizine (ZYRTEC) 10 MG tablet, Take 1 tablet (10 mg) by mouth daily, Disp: , Rfl:  0     montelukast (SINGULAIR) 10 MG tablet, TAKE 1 TABLET(10 MG) BY MOUTH DAILY, Disp: 90 tablet, Rfl: 1     ORDER FOR ALLERGEN IMMUNOTHERAPY, Name of Mix: Mix #3  Tree , Weeds Narayan, White  1:20 w/v, HS  0.5 ml Boxelder-Maple  Mix BHR (Boxelder Hard Red) 1:20 w/v, HS  0.5 ml [place], Common 1:20 w/v, HS  0.5 ml Elm, American 1:20 w/v, HS  0.5 ml Lamb's Quarters 1:20 w/v, HS 0.5 ml Plantain, English 1:20 w/v, HS 0.5 ml Ragweed Mixed 1:20 w/v ALK  0.5 ml Sagebrush, Mugwort 1:20 w/v, HS 0.5 ml Diluent: HSA qs to 5ml, Disp: 5 mL, Rfl: PRN     ORDER FOR ALLERGEN IMMUNOTHERAPY, Name of Mix: Mix #1  Cat, Dog Cat Hair, Standardized A.P. 10,000 BAU/mL, HS  2.0 ml  Dog Hair Dander, A. P.  1:100 w/v, HS  1.0 ml  Diluent: HSA qs to 5ml, Disp: 5 mL, Rfl: PRN     ORDER FOR ALLERGEN IMMUNOTHERAPY, Name of Mix: Mix #2  Mold Alternaria Tenuis 1:10 w/v, HS  0.5 ml Diluent: HSA qs to 5ml, Disp: 5 mL, Rfl: PRN     simvastatin (ZOCOR) 20 MG tablet, Take 1 tablet (20 mg) by mouth At Bedtime, Disp: 90 tablet, Rfl: 2     triamcinolone (NASACORT) 55 MCG/ACT nasal aerosol, Spray 1 spray into both nostrils daily, Disp: , Rfl:      valsartan (DIOVAN) 160 MG tablet, TAKE 1 TABLET(160 MG) BY MOUTH DAILY, Disp: 90 tablet, Rfl: 0  No Known Allergies    EXAM:   /79 (BP Location: Right arm, Patient Position: Sitting, Cuff Size: Adult Large)   Pulse 61   Wt 92.7 kg (204 lb 6.4 oz)   SpO2 100%   BMI 27.39 kg/m    Physical Exam  Vitals and nursing note reviewed.   Constitutional:       Appearance: Normal appearance.   HENT:      Head: Normocephalic and atraumatic.      Right Ear: External ear normal.      Left Ear: External ear normal.      Nose: No mucosal edema or rhinorrhea.      Mouth/Throat:      Mouth: Mucous membranes are moist. No oral lesions.      Pharynx: Oropharynx is clear. Uvula midline. No posterior oropharyngeal erythema.   Eyes:      General: Lids are normal.      Extraocular Movements: Extraocular movements intact.       Conjunctiva/sclera: Conjunctivae normal.   Neck:      Comments: No asymmetry, masses, or scars  Cardiovascular:      Rate and Rhythm: Normal rate and regular rhythm.      Heart sounds: Normal heart sounds, S1 normal and S2 normal.   Pulmonary:      Effort: Pulmonary effort is normal. No respiratory distress.      Breath sounds: Normal breath sounds and air entry.   Musculoskeletal:      Comments: No musculoskeletal defects appreciated   Skin:     General: Skin is warm and dry.      Findings: No lesion or rash.   Neurological:      General: No focal deficit present.      Mental Status: He is alert.   Psychiatric:         Mood and Affect: Mood and affect normal.           WORKUP:  Spirometry    SPIROMETRY       FVC 6.11L (110% of predicted).     FEV1 4.81L (111% of predicted).     FEV1/FVC 79%      I have reviewed and interpreted these results. Testing meets criteria for acceptability and reproducibility. Values are consistent with normal lung function.    Asthma Control Test (ACT) total score: 25     ASSESSMENT/PLAN:  Reji Carpenter is a 47 year old male here for a follow-up visit.    1. Mild persistent asthma without complication - Well controlled on low dose ICS/LABA therapy, no exacerbations or oral steroid use in the past year. Spirometry is normal.    - Spirometry, Breathing Capacity: Normal Order, Clinic Performed  - budesonide-formoterol (SYMBICORT) 80-4.5 MCG/ACT Inhaler; Inhale 2 puffs into the lungs 2 times daily  Dispense: 30.6 g; Refill: 3    2. Seasonal allergic rhinitis due to pollen - Completed 5 years of immunotherapy treatment at maintenance dose. Overall notes significant improvement in his symptoms. Reviewed recommendations for the duration of treatment and he plans to discontinue at this time.    - plan to discontinue immunotherapy treatment  - continue cetirizine - 10mg daily as needed  - continue montelukast - 10mg daily  - continue triamcinolone nasal spray - 1 to 2 sprays in each nostril  daily    3. Allergic rhinitis due to animals - see above    4. Allergic rhinitis due to mold - see above      Follow-up in 6-12 months, sooner if needed      Thank you for allowing me to participate in the care of Reji Carpenter.      Rosemarie Posada MD, [facility]  Allergy/Immunology  [facility]      Chart documentation done in part with Dragon Voice Recognition Software. Although reviewed after completion, some word and grammatical errors may remain.      Again, thank you for allowing me to participate in the care of your patient.        Sincerely,        Rosemarie Posada MD

## 2023-05-23 ENCOUNTER — ALLIED HEALTH/NURSE VISIT (OUTPATIENT)
Dept: ALLERGY | Facility: CLINIC | Age: 48
End: 2023-05-23
Payer: COMMERCIAL

## 2023-05-23 DIAGNOSIS — J30.81 ALLERGIC RHINITIS DUE TO ANIMALS: ICD-10-CM

## 2023-05-23 DIAGNOSIS — J30.89 ALLERGIC RHINITIS DUE TO MOLD: ICD-10-CM

## 2023-05-23 DIAGNOSIS — J30.1 SEASONAL ALLERGIC RHINITIS DUE TO POLLEN: Primary | ICD-10-CM

## 2023-05-23 DIAGNOSIS — J30.9 ALLERGIC RHINITIS: ICD-10-CM

## 2023-05-23 PROCEDURE — 95117 IMMUNOTHERAPY INJECTIONS: CPT

## 2023-05-23 NOTE — PROGRESS NOTES
Reji Carpenter presents to clinic today at the request of Rosemarie Posada MD (ordering provider) for Allergy Immunotherapy injection(s).       This service provided today was under the care of Rosemarie Posada MD; the supervising provider of the day; who was available if needed.      Patient presented after waiting 30 minutes with no reaction to  injections. Discharged from clinic.    Jennie DODD RN, BSN

## 2023-05-23 NOTE — PROGRESS NOTES
Patient presented after waiting 30 minutes with no reaction to allergy injections. Discharged from clinic.    Adan Gordon RN....3/7/2019 9:44 AM     
“Patient's name, , procedure and correct site were confirmed during the [place] Timeout.”

## 2023-07-17 DIAGNOSIS — J45.30 MILD PERSISTENT ASTHMA WITHOUT COMPLICATION: ICD-10-CM

## 2023-07-17 RX ORDER — MONTELUKAST SODIUM 10 MG/1
TABLET ORAL
Qty: 90 TABLET | Refills: 0 | Status: SHIPPED | OUTPATIENT
Start: 2023-07-17 | End: 2023-08-16

## 2023-08-14 ASSESSMENT — ENCOUNTER SYMPTOMS
DIZZINESS: 0
HEMATURIA: 0
MYALGIAS: 0
COUGH: 0
DYSURIA: 0
HEADACHES: 0
SORE THROAT: 0
CHILLS: 0
WEAKNESS: 0
PALPITATIONS: 0
ARTHRALGIAS: 1
ABDOMINAL PAIN: 0
CONSTIPATION: 0
FREQUENCY: 0
SHORTNESS OF BREATH: 0
PARESTHESIAS: 0
NERVOUS/ANXIOUS: 0
NAUSEA: 0
HEARTBURN: 0
EYE PAIN: 0
DIARRHEA: 0
JOINT SWELLING: 1
FEVER: 0
HEMATOCHEZIA: 0

## 2023-08-16 ENCOUNTER — OFFICE VISIT (OUTPATIENT)
Dept: FAMILY MEDICINE | Facility: CLINIC | Age: 48
End: 2023-08-16
Payer: COMMERCIAL

## 2023-08-16 ENCOUNTER — ANCILLARY PROCEDURE (OUTPATIENT)
Dept: GENERAL RADIOLOGY | Facility: CLINIC | Age: 48
End: 2023-08-16
Attending: FAMILY MEDICINE
Payer: COMMERCIAL

## 2023-08-16 VITALS
DIASTOLIC BLOOD PRESSURE: 82 MMHG | WEIGHT: 202.4 LBS | SYSTOLIC BLOOD PRESSURE: 136 MMHG | BODY MASS INDEX: 27.41 KG/M2 | HEART RATE: 64 BPM | OXYGEN SATURATION: 99 % | TEMPERATURE: 98 F | HEIGHT: 72 IN

## 2023-08-16 DIAGNOSIS — I10 HYPERTENSION GOAL BP (BLOOD PRESSURE) < 140/90: ICD-10-CM

## 2023-08-16 DIAGNOSIS — E78.5 HYPERLIPIDEMIA LDL GOAL <100: Chronic | ICD-10-CM

## 2023-08-16 DIAGNOSIS — J45.30 MILD PERSISTENT ASTHMA WITHOUT COMPLICATION: ICD-10-CM

## 2023-08-16 DIAGNOSIS — Z12.5 SCREENING FOR PROSTATE CANCER: ICD-10-CM

## 2023-08-16 DIAGNOSIS — Z13.1 SCREENING FOR DIABETES MELLITUS: ICD-10-CM

## 2023-08-16 DIAGNOSIS — R53.83 FATIGUE, UNSPECIFIED TYPE: ICD-10-CM

## 2023-08-16 DIAGNOSIS — Z00.00 ROUTINE GENERAL MEDICAL EXAMINATION AT A HEALTH CARE FACILITY: Primary | ICD-10-CM

## 2023-08-16 DIAGNOSIS — M79.671 RIGHT FOOT PAIN: ICD-10-CM

## 2023-08-16 LAB
ALBUMIN SERPL BCG-MCNC: 5.3 G/DL (ref 3.5–5.2)
ALP SERPL-CCNC: 85 U/L (ref 40–129)
ALT SERPL W P-5'-P-CCNC: 37 U/L (ref 0–70)
ANION GAP SERPL CALCULATED.3IONS-SCNC: 13 MMOL/L (ref 7–15)
AST SERPL W P-5'-P-CCNC: 35 U/L (ref 0–45)
BASOPHILS # BLD AUTO: 0 10E3/UL (ref 0–0.2)
BASOPHILS NFR BLD AUTO: 1 %
BILIRUB SERPL-MCNC: 0.8 MG/DL
BUN SERPL-MCNC: 12.9 MG/DL (ref 6–20)
CALCIUM SERPL-MCNC: 9.8 MG/DL (ref 8.6–10)
CHLORIDE SERPL-SCNC: 102 MMOL/L (ref 98–107)
CHOLEST SERPL-MCNC: 167 MG/DL
CREAT SERPL-MCNC: 0.96 MG/DL (ref 0.67–1.17)
DEPRECATED HCO3 PLAS-SCNC: 24 MMOL/L (ref 22–29)
EOSINOPHIL # BLD AUTO: 0.3 10E3/UL (ref 0–0.7)
EOSINOPHIL NFR BLD AUTO: 6 %
ERYTHROCYTE [DISTWIDTH] IN BLOOD BY AUTOMATED COUNT: 11.9 % (ref 10–15)
GFR SERPL CREATININE-BSD FRML MDRD: >90 ML/MIN/1.73M2
GLUCOSE SERPL-MCNC: 84 MG/DL (ref 70–99)
HBA1C MFR BLD: 5.3 % (ref 0–5.6)
HCT VFR BLD AUTO: 42.6 % (ref 40–53)
HDLC SERPL-MCNC: 50 MG/DL
HGB BLD-MCNC: 14.9 G/DL (ref 13.3–17.7)
IMM GRANULOCYTES # BLD: 0 10E3/UL
IMM GRANULOCYTES NFR BLD: 0 %
LDLC SERPL CALC-MCNC: 89 MG/DL
LYMPHOCYTES # BLD AUTO: 1.7 10E3/UL (ref 0.8–5.3)
LYMPHOCYTES NFR BLD AUTO: 29 %
MCH RBC QN AUTO: 32.1 PG (ref 26.5–33)
MCHC RBC AUTO-ENTMCNC: 35 G/DL (ref 31.5–36.5)
MCV RBC AUTO: 92 FL (ref 78–100)
MONOCYTES # BLD AUTO: 0.6 10E3/UL (ref 0–1.3)
MONOCYTES NFR BLD AUTO: 10 %
NEUTROPHILS # BLD AUTO: 3.1 10E3/UL (ref 1.6–8.3)
NEUTROPHILS NFR BLD AUTO: 54 %
NONHDLC SERPL-MCNC: 117 MG/DL
PLATELET # BLD AUTO: 291 10E3/UL (ref 150–450)
POTASSIUM SERPL-SCNC: 4.3 MMOL/L (ref 3.4–5.3)
PROT SERPL-MCNC: 8 G/DL (ref 6.4–8.3)
PSA SERPL DL<=0.01 NG/ML-MCNC: 1.11 NG/ML (ref 0–2.5)
RBC # BLD AUTO: 4.64 10E6/UL (ref 4.4–5.9)
SODIUM SERPL-SCNC: 139 MMOL/L (ref 136–145)
TRIGL SERPL-MCNC: 141 MG/DL
WBC # BLD AUTO: 5.6 10E3/UL (ref 4–11)

## 2023-08-16 PROCEDURE — 99213 OFFICE O/P EST LOW 20 MIN: CPT | Mod: 25 | Performed by: FAMILY MEDICINE

## 2023-08-16 PROCEDURE — 73630 X-RAY EXAM OF FOOT: CPT | Mod: TC | Performed by: RADIOLOGY

## 2023-08-16 PROCEDURE — 85025 COMPLETE CBC W/AUTO DIFF WBC: CPT | Performed by: FAMILY MEDICINE

## 2023-08-16 PROCEDURE — 99396 PREV VISIT EST AGE 40-64: CPT | Performed by: FAMILY MEDICINE

## 2023-08-16 PROCEDURE — 83036 HEMOGLOBIN GLYCOSYLATED A1C: CPT | Performed by: FAMILY MEDICINE

## 2023-08-16 PROCEDURE — G0103 PSA SCREENING: HCPCS | Performed by: FAMILY MEDICINE

## 2023-08-16 PROCEDURE — 36415 COLL VENOUS BLD VENIPUNCTURE: CPT | Performed by: FAMILY MEDICINE

## 2023-08-16 PROCEDURE — 80053 COMPREHEN METABOLIC PANEL: CPT | Performed by: FAMILY MEDICINE

## 2023-08-16 PROCEDURE — 80061 LIPID PANEL: CPT | Performed by: FAMILY MEDICINE

## 2023-08-16 RX ORDER — VALSARTAN 160 MG/1
160 TABLET ORAL DAILY
Qty: 90 TABLET | Refills: 3 | Status: SHIPPED | OUTPATIENT
Start: 2023-08-16 | End: 2024-08-06

## 2023-08-16 RX ORDER — AMLODIPINE BESYLATE 5 MG/1
5 TABLET ORAL DAILY
Qty: 90 TABLET | Refills: 3 | Status: SHIPPED | OUTPATIENT
Start: 2023-08-16 | End: 2024-07-09

## 2023-08-16 RX ORDER — ALBUTEROL SULFATE 90 UG/1
2 AEROSOL, METERED RESPIRATORY (INHALATION) EVERY 4 HOURS PRN
Qty: 18 G | Refills: 3 | Status: SHIPPED | OUTPATIENT
Start: 2023-08-16

## 2023-08-16 RX ORDER — SIMVASTATIN 20 MG
20 TABLET ORAL AT BEDTIME
Qty: 90 TABLET | Refills: 3 | Status: SHIPPED | OUTPATIENT
Start: 2023-08-16 | End: 2024-09-11

## 2023-08-16 RX ORDER — MONTELUKAST SODIUM 10 MG/1
10 TABLET ORAL DAILY
Qty: 90 TABLET | Refills: 3 | Status: SHIPPED | OUTPATIENT
Start: 2023-08-16 | End: 2024-09-11

## 2023-08-16 ASSESSMENT — ENCOUNTER SYMPTOMS
DYSURIA: 0
SORE THROAT: 0
CONSTIPATION: 0
SHORTNESS OF BREATH: 0
NAUSEA: 0
HEMATURIA: 0
HEMATOCHEZIA: 0
FEVER: 0
WEAKNESS: 0
FREQUENCY: 0
DIZZINESS: 0
PARESTHESIAS: 0
COUGH: 0
HEADACHES: 0
DIARRHEA: 0
MYALGIAS: 0
HEARTBURN: 0
ABDOMINAL PAIN: 0
NERVOUS/ANXIOUS: 0
EYE PAIN: 0
CHILLS: 0
PALPITATIONS: 0
ARTHRALGIAS: 1
JOINT SWELLING: 1

## 2023-08-16 NOTE — PROGRESS NOTES
SUBJECTIVE:   CC: Reji is an 48 year old who presents for preventative health visit.       8/16/2023     3:22 PM   Additional Questions   Roomed by An V.         8/16/2023     3:22 PM   Patient Reported Additional Medications   Patient reports taking the following new medications none       Healthy Habits:     Getting at least 3 servings of Calcium per day:  NO    Bi-annual eye exam:  NO    Dental care twice a year:  Yes    Sleep apnea or symptoms of sleep apnea:  None    Diet:  Regular (no restrictions)    Frequency of exercise:  2-3 days/week    Duration of exercise:  15-30 minutes    Taking medications regularly:  Yes    Barriers to taking medications:  None    Medication side effects:  None    Additional concerns today:  Yes      Today's PHQ-2 Score:       8/16/2023     3:14 PM   PHQ-2 ( 1999 [employer])   Q1: Little interest or pleasure in doing things 0   Q2: Feeling down, depressed or hopeless 0   PHQ-2 Score 0   Q1: Little interest or pleasure in doing things Not at all   Q2: Feeling down, depressed or hopeless Not at all   PHQ-2 Score 0                 -------------------------------------      Social History     Tobacco Use    Smoking status: Never    Smokeless tobacco: Never   Substance Use Topics    Alcohol use: Yes     Comment: occasional             8/14/2023     9:46 AM   Alcohol Use   Prescreen: >3 drinks/day or >7 drinks/week? No       Last PSA:   PSA   Date Value Ref Range Status   08/06/2020 1.38 0 - 4 ug/L Final     Comment:     Assay Method:  Chemiluminescence using Siemens Vista analyzer     Prostate Specific Antigen Screen   Date Value Ref Range Status   11/24/2021 1.18 0.00 - 4.00 ug/L Final       Reviewed orders with patient. Reviewed health maintenance and updated orders accordingly - Yes       Reviewed and updated as needed this visit by clinical staff   Tobacco  Allergies  Meds              Reviewed and updated as needed this visit by Provider                     Review of Systems  "  Constitutional:  Negative for chills and fever.   HENT:  Negative for congestion, ear pain, hearing loss and sore throat.    Eyes:  Negative for pain and visual disturbance.   Respiratory:  Negative for cough and shortness of breath.    Cardiovascular:  Negative for chest pain, palpitations and peripheral edema.   Gastrointestinal:  Negative for abdominal pain, constipation, diarrhea, heartburn, hematochezia and nausea.   Genitourinary:  Negative for dysuria, frequency, genital sores, hematuria, impotence, penile discharge and urgency.   Musculoskeletal:  Positive for arthralgias and joint swelling. Negative for myalgias.   Skin:  Negative for rash.   Neurological:  Negative for dizziness, weakness, headaches and paresthesias.   Psychiatric/Behavioral:  Negative for mood changes. The patient is not nervous/anxious.      Hurt big toe 8 days ago, playing basketball    To [place] in 3 weeks    Desk work    Lift wts twice a week    Walk dog twice a day    Hardly ever needed albuterol     Still on cetirizine and montelukast    Done with allergy shots    12      OBJECTIVE:   BP (!) 144/97   Pulse 64   Temp 98  F (36.7  C) (Oral)   Ht 1.835 m (6' 0.24\")   Wt 91.8 kg (202 lb 6.4 oz)   SpO2 99%   BMI 27.27 kg/m      Physical Exam  GENERAL: healthy, alert and no distress  EYES: Eyes grossly normal to inspection, PERRL and conjunctivae and sclerae normal  HENT: ear canals and TM's normal, nose and mouth without ulcers or lesions  NECK: no adenopathy, no asymmetry, masses, or scars and thyroid normal to palpation  RESP: lungs clear to auscultation - no rales, rhonchi or wheezes  CV: regular rate and rhythm, normal S1 S2, no S3 or S4, no murmur, click or rub, no peripheral edema and peripheral pulses strong  ABDOMEN: soft, nontender, no hepatosplenomegaly, no masses and bowel sounds normal  MS: no gross musculoskeletal defects noted, no edema  MS: he had mild tenderness on the right 1st mtp joint where the basketball " injury was.  No redness/ warmth/ swelling.  Toe proper okay.    SKIN: no suspicious lesions or rashes  NEURO: Normal strength and tone, mentation intact and speech normal  PSYCH: mentation appears normal, affect normal/bright    Diagnostic Test Results:  Labs reviewed in Epic    Xray foot showed no fx        ASSESSMENT/PLAN:   Reji was seen today for physical.    Diagnoses and all orders for this visit:    Routine general medical examination at a health care facility    Hypertension goal BP (blood pressure) < 140/90  -     amLODIPine (NORVASC) 5 MG tablet; Take 1 tablet (5 mg) by mouth daily  -     valsartan (DIOVAN) 160 MG tablet; Take 1 tablet (160 mg) by mouth daily    Mild persistent asthma without complication  -     montelukast (SINGULAIR) 10 MG tablet; Take 1 tablet (10 mg) by mouth daily  -     albuterol (PROAIR HFA/PROVENTIL HFA/VENTOLIN HFA) 108 (90 Base) MCG/ACT inhaler; Inhale 2 puffs into the lungs every 4 hours as needed for shortness of breath or other (cough)    Hyperlipidemia LDL goal <100  -     simvastatin (ZOCOR) 20 MG tablet; Take 1 tablet (20 mg) by mouth At Bedtime  -     Comprehensive metabolic panel; Future  -     Lipid panel reflex to direct LDL Fasting; Future  -     Comprehensive metabolic panel  -     Lipid panel reflex to direct LDL Fasting    Right foot pain  -     XR Foot Right G/E 3 Views    Screening for prostate cancer  -     Prostate Specific Antigen Screen; Future  -     Prostate Specific Antigen Screen    Screening for diabetes mellitus  -     Hemoglobin A1c; Future  -     Hemoglobin A1c    Fatigue, unspecified type  -     CBC with Platelets & Differential; Future  -     CBC with Platelets & Differential    Patient in good general health  Keep working on healthy diet/exercise   Refill meds   Check labs fasting  No std concern  He is up to date colon screening  Foot should heal up fine, follow up prn      Patient has been advised of split billing requirements and indicates  "understanding: Yes      COUNSELING:   Reviewed preventive health counseling, as reflected in patient instructions       Regular exercise       Healthy diet/nutrition       Safe sex practices/STD prevention       Colorectal cancer screening       Prostate cancer screening      BMI:   Estimated body mass index is 27.27 kg/m  as calculated from the following:    Height as of this encounter: 1.835 m (6' 0.24\").    Weight as of this encounter: 91.8 kg (202 lb 6.4 oz).   Weight management plan: Discussed healthy diet and exercise guidelines      He reports that he has never smoked. He has never used smokeless tobacco.            Wili Kaplan MD  [facility]  "

## 2023-08-16 NOTE — PATIENT INSTRUCTIONS
We will send you lab results    Keep working on healthy diet/exercise      Stay on same medications    Call/ return to clinic as needed           Preventive Health Recommendations  Male Ages 40 to 49    Yearly exam:             See your health care provider every year in order to  o   Review health changes.   o   Discuss preventive care.    o   Review your medicines if your doctor has prescribed any.  You should be tested each year for STDs (sexually transmitted diseases) if you re at risk.   Have a cholesterol test every 5 years.   Have a colonoscopy (test for colon cancer) if someone in your family has had colon cancer or polyps before age 50.   After age 45, have a diabetes test (fasting glucose). If you are at risk for diabetes, you should have this test every 3 years.    Talk with your health care provider about whether or not a prostate cancer screening test (PSA) is right for you.    Shots: Get a flu shot each year. Get a tetanus shot every 10 years.     Nutrition:  Eat at least 5 servings of fruits and vegetables daily.   Eat whole-grain bread, whole-wheat pasta and brown rice instead of white grains and rice.   Get adequate Calcium and Vitamin D.     Lifestyle  Exercise for at least 150 minutes a week (30 minutes a day, 5 days a week). This will help you control your weight and prevent disease.   Limit alcohol to one drink per day.   No smoking.   Wear sunscreen to prevent skin cancer.   See your dentist every six months for an exam and cleaning.

## 2023-08-21 ENCOUNTER — TELEPHONE (OUTPATIENT)
Dept: ALLERGY | Facility: CLINIC | Age: 48
End: 2023-08-21
Payer: COMMERCIAL

## 2023-08-21 NOTE — TELEPHONE ENCOUNTER
[facility] Prior Authorization Team Request    Medication SYMBICORT 80-40.5MCG/ACT  Dosing: inhale 2 puffs into the lungs twice a day  Qty: 30.6  Day Supply: 90  NDC (required for Medicaid members):[address]-65    Insurance   BIN: [id number]  PCN: [facility]  Grp:   ID [id number]    CoverMyMeds Key (if applicable):     Additional documentation: need a prior authorization        Filling Pharmacy: [facility]  Phone Number: [phone]   Contact:    Pharmacy NPI (required for Medicaid members):

## 2023-08-23 ENCOUNTER — MYC MEDICAL ADVICE (OUTPATIENT)
Dept: ALLERGY | Facility: CLINIC | Age: 48
End: 2023-08-23
Payer: COMMERCIAL

## 2023-08-23 DIAGNOSIS — J45.30 MILD PERSISTENT ASTHMA WITHOUT COMPLICATION: ICD-10-CM

## 2023-08-23 RX ORDER — BUDESONIDE AND FORMOTEROL FUMARATE DIHYDRATE 80; 4.5 UG/1; UG/1
2 AEROSOL RESPIRATORY (INHALATION) 2 TIMES DAILY
Qty: 10.2 G | Refills: 0 | Status: SHIPPED | OUTPATIENT
Start: 2023-08-23 | End: 2024-06-14

## 2023-08-23 NOTE — TELEPHONE ENCOUNTER
Central Prior Authorization Team  Phone: [phone]    PA Initiation    Medication: BUDESONIDE-FORMOTEROL FUMARATE 80-4.5 MCG/ACT IN AERO  Insurance Company: [employer] - Phone [phone] Fax [phone]  Pharmacy Filling the Rx:    Filling Pharmacy Phone: [phone]  Filling Pharmacy Fax:    Start Date: 8/23/2023

## 2023-08-23 NOTE — TELEPHONE ENCOUNTER
Called patient and informed him that Symbicort is covered by his  insurance. Contact pharmacy to for .  Vesna Perez CMA

## 2023-08-23 NOTE — TELEPHONE ENCOUNTER
Prior Authorization Not Needed per Insurance    Medication: Symbicort 80-4.5 MCG/ACT is covered under patients formulary plan.  Brand symbicort is preferred and covered by the plan. Pharmacy has received a paid claim for brand.      Pharmacy Notified:    Patient Notified:      Please close encounter when finished.    Thank you,  Central Prior Authorization Team  [phone]

## 2023-12-14 ENCOUNTER — OFFICE VISIT (OUTPATIENT)
Dept: FAMILY MEDICINE | Facility: CLINIC | Age: 48
End: 2023-12-14
Payer: COMMERCIAL

## 2023-12-14 VITALS
WEIGHT: 205.38 LBS | OXYGEN SATURATION: 98 % | BODY MASS INDEX: 27.82 KG/M2 | RESPIRATION RATE: 16 BRPM | HEART RATE: 63 BPM | SYSTOLIC BLOOD PRESSURE: 138 MMHG | DIASTOLIC BLOOD PRESSURE: 88 MMHG | TEMPERATURE: 97.6 F | HEIGHT: 72 IN

## 2023-12-14 DIAGNOSIS — L72.3 SEBACEOUS CYST: Primary | ICD-10-CM

## 2023-12-14 DIAGNOSIS — I10 HYPERTENSION GOAL BP (BLOOD PRESSURE) < 140/90: ICD-10-CM

## 2023-12-14 PROCEDURE — 99213 OFFICE O/P EST LOW 20 MIN: CPT | Performed by: FAMILY MEDICINE

## 2023-12-14 ASSESSMENT — PAIN SCALES - GENERAL: PAINLEVEL: NO PAIN (0)

## 2023-12-14 ASSESSMENT — ASTHMA QUESTIONNAIRES: ACT_TOTALSCORE: 25

## 2023-12-14 NOTE — PROGRESS NOTES
"      Tiff Mendoza is a 48 year old, presenting for the following health issues:  Derm Problem (Check growth on back/)        12/14/2023     8:12 AM   Additional Questions   Roomed by Roseann Garber       History of Present Illness       Reason for visit:  Back growth  Symptom onset:  1-2 weeks ago  Symptom intensity:  Mild  Symptom progression:  Staying the same  Had these symptoms before:  No    He eats 2-3 servings of fruits and vegetables daily.He consumes 0 sweetened beverage(s) daily.He exercises with enough effort to increase his heart rate 20 to 29 minutes per day.  He exercises with enough effort to increase his heart rate 7 days per week.   He is taking medications regularly.             Review of Systems   Has been over a year since saw dermatology    Noticed this one a couple weeks ago          Objective    /88 (BP Location: Right arm, Patient Position: Chair, Cuff Size: Adult Regular)   Pulse 63   Temp 97.6  F (36.4  C) (Temporal)   Resp 16   Ht 1.835 m (6' 0.24\")   Wt 93.2 kg (205 lb 6 oz)   SpO2 98%   BMI 27.67 kg/m    Body mass index is 27.67 kg/m .  Physical Exam     Full physical not done     Mentation and affect are fine    No tremor of speech or extremity    Patient has a dime size bump present right low back near beltline.  A bit red but no obvious infection.  Almost comes to a head    When pressed, no discharge but discussed in detail.     With consent, wiped with alcohol pad and then used sterile needle to poke area and were then able to express sebum.  No pus/ odor.     ASSESSMENT / PLAN:  (L72.3) Sebaceous cyst  (primary encounter diagnosis)  Comment: we were able to express most of the contents out.  Now is mostly flat.   Plan: follow up prn.  May reaccumulate.    (I10) Hypertension goal BP (blood pressure) < 140/90  Comment: at goal   Plan: no change in meds       I reviewed the patient's medications, allergies, medical history, family history, and social " history.    Wili Kaplan MD

## 2024-02-06 ENCOUNTER — TELEPHONE (OUTPATIENT)
Dept: OTOLARYNGOLOGY | Facility: CLINIC | Age: 49
End: 2024-02-06
Payer: COMMERCIAL

## 2024-02-08 NOTE — TELEPHONE ENCOUNTER
FUTURE VISIT INFORMATION      FUTURE VISIT INFORMATION:  Date: 2/26/24  Time: 3:15pm  Location: ,[place]  REFERRAL INFORMATION:  Referring provider:    Referring providers clinic:    Reason for visit/diagnosis  Self referred per [place]. Wife works in the OR with [employer]     RECORDS REQUESTED FROM:       No rec

## 2024-02-26 ENCOUNTER — TELEPHONE (OUTPATIENT)
Dept: OTOLARYNGOLOGY | Facility: CLINIC | Age: 49
End: 2024-02-26

## 2024-02-26 ENCOUNTER — OFFICE VISIT (OUTPATIENT)
Dept: OTOLARYNGOLOGY | Facility: CLINIC | Age: 49
End: 2024-02-26
Payer: COMMERCIAL

## 2024-02-26 ENCOUNTER — PRE VISIT (OUTPATIENT)
Dept: OTOLARYNGOLOGY | Facility: CLINIC | Age: 49
End: 2024-02-26

## 2024-02-26 VITALS — HEART RATE: 63 BPM | WEIGHT: 199.7 LBS | HEIGHT: 72 IN | BODY MASS INDEX: 27.05 KG/M2 | OXYGEN SATURATION: 99 %

## 2024-02-26 DIAGNOSIS — J32.4 CHRONIC PANSINUSITIS: Primary | ICD-10-CM

## 2024-02-26 PROCEDURE — 31231 NASAL ENDOSCOPY DX: CPT | Performed by: OTOLARYNGOLOGY

## 2024-02-26 PROCEDURE — 99202 OFFICE O/P NEW SF 15 MIN: CPT | Mod: 25 | Performed by: OTOLARYNGOLOGY

## 2024-02-26 PROCEDURE — 99000 SPECIMEN HANDLING OFFICE-LAB: CPT | Performed by: PATHOLOGY

## 2024-02-26 PROCEDURE — 87070 CULTURE OTHR SPECIMN AEROBIC: CPT | Performed by: OTOLARYNGOLOGY

## 2024-02-26 RX ORDER — PREDNISONE 20 MG/1
TABLET ORAL
Qty: 21 TABLET | Refills: 0 | Status: SHIPPED | OUTPATIENT
Start: 2024-02-26 | End: 2024-03-11

## 2024-02-26 RX ORDER — DOXYCYCLINE HYCLATE 100 MG
100 TABLET ORAL 2 TIMES DAILY
Qty: 28 TABLET | Refills: 0 | Status: SHIPPED | OUTPATIENT
Start: 2024-02-26 | End: 2024-03-11

## 2024-02-26 ASSESSMENT — PAIN SCALES - GENERAL: PAINLEVEL: NO PAIN (0)

## 2024-02-26 NOTE — PROGRESS NOTES
HISTORY OF PRESENT ILLNESS:   Reji Carpenter is a 48 year old year old male who presents today for evaluation of his nose.  He has had issues with recurrent sinusitis, nasal obstruction, asthma.  He has had previous surgery on his nose.  Diminished sense of smell.  He has used nasal steroid sprays with consistency.  He does get discolored nasal drainage.    PHYSICAL EXAMINATION:  In no acute distress. Normal mood, normal affect, alert, and appropriate. Head is normocephalic. Cranial nerve VII is House-Backmann I out of VI bilaterally. Breathing without difficulty or stridor. Eyes are anicteric.     Examination of the nose demonstrates turbinate hypertrophy    Verbal consent is obtained and nasal endoscopy is performed.  Patient does have evidence of bilateral nasal polyps.  Some thick mucus in the middle meatus/ethmoid cavity bilaterally.  Patent maxillary antrostomies.      ASSESSMENT/PLAN:   Reji Carpenter is a 48 year old year old male who presents today for evaluation of chronic nasal obstruction and recurrent sinusitis.  Evidence of previous sinus surgery and nasal polyposis.  At this point we will treat him medically, get a CT scan and follow-up after.    25 minutes spent with the patient and on chart review on the date of the visit.    Scribe Disclosure:   I, Mariama Lizarraga, am serving as a scribe; to document services personally performed by Christ Dunne MD -based on data collection and the provider's statements to me.     Provider Disclosure:  I agree with above History, Review of Systems, Physical exam and Plan.  I have reviewed the content of the documentation and have edited it as needed. I have personally performed the services documented here and the documentation accurately represents those services and the decisions I have made.      Electronically signed by:  Christ Dunne MD        Scope polyps

## 2024-02-26 NOTE — PATIENT INSTRUCTIONS
You were seen in the ENT Clinic today by Dr. Dunne. If you have any questions or concerns after your appointment, please contact us (see below)     2.   Please return to the clinic in 1 month with CT.              How to Contact Us:  Send a "[employer] message to your provider. Our team will respond to you via "[employer]. Occasionally, we will need to call you to get further information.  For urgent matters (Monday-Friday), call the ENT Clinic: [phone] and speak with a call center team member - they will route your call appropriately.   If you'd like to speak directly with a nurse, please find our contact information below. We do our best to check voicemail frequently throughout the day, and will work to call you back within 1-2 days. For urgent matters, please use the general clinic phone numbers listed above.        Lucero JONES RN  ENT RN Care Coordinator  Direct: [phone]  Shana LUCIANO LPN  Direct: [phone]           [facility]  Department of Otolaryngology

## 2024-02-26 NOTE — LETTER
2/26/2024       RE: Reji Carpenter  [address] [facility] [address]     Dear Colleague,    Thank you for referring your patient, Reji Carpenter, to the [facility] EAR NOSE AND THROAT CLINIC [place] at [facility]. Please see a copy of my visit note below.    HISTORY OF PRESENT ILLNESS:   Reji Carpenter is a 48 year old year old male who presents today for evaluation of his nose.  He has had issues with recurrent sinusitis, nasal obstruction, asthma.  He has had previous surgery on his nose.  Diminished sense of smell.  He has used nasal steroid sprays with consistency.  He does get discolored nasal drainage.    PHYSICAL EXAMINATION:  In no acute distress. Normal mood, normal affect, alert, and appropriate. Head is normocephalic. Cranial nerve VII is House-Backmann I out of VI bilaterally. Breathing without difficulty or stridor. Eyes are anicteric.     Examination of the nose demonstrates turbinate hypertrophy    Verbal consent is obtained and nasal endoscopy is performed.  Patient does have evidence of bilateral nasal polyps.  Some thick mucus in the middle meatus/ethmoid cavity bilaterally.  Patent maxillary antrostomies.      ASSESSMENT/PLAN:   Reji Carpenter is a 48 year old year old male who presents today for evaluation of chronic nasal obstruction and recurrent sinusitis.  Evidence of previous sinus surgery and nasal polyposis.  At this point we will treat him medically, get a CT scan and follow-up after.    25 minutes spent with the patient and on chart review on the date of the visit.    Scribe Disclosure:   I, Mariama Lizarraga, am serving as a scribe; to document services personally performed by Christ Dunne MD -based on data collection and the provider's statements to me.     Provider Disclosure:  I agree with above History, Review of Systems, Physical exam and Plan.  I have reviewed the content of the documentation and  have edited it as needed. I have personally performed the services documented here and the documentation accurately represents those services and the decisions I have made.      Electronically signed by:  Christ Dunne MD        Scope polyps      Again, thank you for allowing me to participate in the care of your patient.      Sincerely,    Christ Dunne MD

## 2024-02-26 NOTE — NURSING NOTE
"Chief Complaint   Patient presents with    Consult   Pulse 63, height 1.835 m (6' 0.25\"), weight 90.6 kg (199 lb 11.2 oz), SpO2 99%. Jairo Daley, EMT    "

## 2024-02-28 LAB — BACTERIA SPEC CULT: NO GROWTH

## 2024-04-08 ENCOUNTER — ANCILLARY PROCEDURE (OUTPATIENT)
Dept: CT IMAGING | Facility: CLINIC | Age: 49
End: 2024-04-08
Attending: OTOLARYNGOLOGY
Payer: COMMERCIAL

## 2024-04-08 ENCOUNTER — TELEPHONE (OUTPATIENT)
Dept: OTOLARYNGOLOGY | Facility: CLINIC | Age: 49
End: 2024-04-08

## 2024-04-08 ENCOUNTER — OFFICE VISIT (OUTPATIENT)
Dept: OTOLARYNGOLOGY | Facility: CLINIC | Age: 49
End: 2024-04-08
Payer: COMMERCIAL

## 2024-04-08 VITALS
SYSTOLIC BLOOD PRESSURE: 142 MMHG | WEIGHT: 200 LBS | DIASTOLIC BLOOD PRESSURE: 83 MMHG | BODY MASS INDEX: 27.09 KG/M2 | HEIGHT: 72 IN | HEART RATE: 63 BPM

## 2024-04-08 DIAGNOSIS — J32.4 CHRONIC PANSINUSITIS: ICD-10-CM

## 2024-04-08 DIAGNOSIS — J33.9 NASAL POLYP: Primary | ICD-10-CM

## 2024-04-08 PROCEDURE — 99213 OFFICE O/P EST LOW 20 MIN: CPT | Performed by: OTOLARYNGOLOGY

## 2024-04-08 PROCEDURE — 70486 CT MAXILLOFACIAL W/O DYE: CPT | Mod: GC | Performed by: RADIOLOGY

## 2024-04-08 NOTE — LETTER
4/8/2024       RE: Reji Carpenter  [address] [facility] [address]     Dear Colleague,    Thank you for referring your patient, Reji Carpenter, to the [facility] EAR NOSE AND THROAT CLINIC [place] at [facility]. Please see a copy of my visit note below.    Reji is back to see us today.  He feels that his nasal symptoms are much improved after the prednisone and antibiotics.  He is doing the fluticasone nasal spray twice a day.  He is able to smell and his breathing is improved.  Today we reviewed his CT scan with him which continues to demonstrate nasal polyposis as was seen on previous examination.  At this point we discussed options.  Plan will be to continue fluticasone, subscribe Dupixent and see him back in 2 months.  20 minutes is spent with the patient, on chart review and on documentation on the date of the visit.    Christ Dunne MD

## 2024-04-08 NOTE — TELEPHONE ENCOUNTER
PA Initiation    Medication: DUPILUMAB 300 MG/2ML SC SOPN  Insurance Company: [employer] [place] - Phone [phone] Fax [phone]  Pharmacy Filling the Rx: [facility] - [place], [place] - [facility] [address]  Filling Pharmacy Phone: [phone]  Filling Pharmacy Fax: [phone]  Start Date: 4/8/2024  OMID (Key: [id number])        Thank You,     Keyana Johnson CPhT  Specialty Pharmacy Clinic [facility] Specialty  [email or web address]  [email or web address]  Phone: [phone]  Fax: [phone]

## 2024-04-08 NOTE — NURSING NOTE
Dupixent paperwork completed and faxed to [employer] program. Reviewed with patient the administration schedule and demonstrated how to do the injection. Patient advised that they will be contacted by the pharmacy to set up delivery of the medication once approved. Prescription order placed with [facility]. Patient encouraged to call with questions once medication received. Follow up appointment scheduled with provider.    GANGA CARLIN LPN on 4/8/2024 at 2:53 PM

## 2024-04-08 NOTE — PATIENT INSTRUCTIONS
You were seen in the ENT Clinic today by Dr. Dunne. If you have any questions or concerns after your appointment, please contact us (see below)      2.   Please return to clinic in 2 months.     -We will send the prescription for Dupixent today. This needs to go through prior authorization. You will be contacted by telephone from [facility] (Phone number is [phone]) as well as [facility] (Phone number is [phone]).        How to Contact Us:  Send a [employer] message to your provider. Our team will respond to you via [employer]. Occasionally, we will need to call you to get further information.  For urgent matters (Monday-Friday), call the ENT Clinic: [phone] and speak with a call center team member - they will route your call appropriately.   If you'd like to speak directly with a nurse, please find our contact information below. We do our best to check voicemail frequently throughout the day, and will work to call you back within 1-2 days. For urgent matters, please use the general clinic phone numbers listed above.      Lucero JONES RN  ENT RN Care Coordinator  Direct: [phone]  Shana LUCIANO LPN  Direct: [phone]

## 2024-04-08 NOTE — PROGRESS NOTES
Reji is back to see us today.  He feels that his nasal symptoms are much improved after the prednisone and antibiotics.  He is doing the fluticasone nasal spray twice a day.  He is able to smell and his breathing is improved.  Today we reviewed his CT scan with him which continues to demonstrate nasal polyposis as was seen on previous examination.  At this point we discussed options.  Plan will be to continue fluticasone, subscribe Dupixent and see him back in 2 months.  20 minutes is spent with the patient, on chart review and on documentation on the date of the visit.    Christ Dunne MD

## 2024-04-09 NOTE — TELEPHONE ENCOUNTER
Prior Authorization Approval    Medication: DUPILUMAB 300 MG/2ML SC SOPN  Authorization Effective Date: 3/10/2024  Authorization Expiration Date: 10/9/2024  Approved Dose/Quantity: 4 ML / 28 day  Reference #: OMID (Key: [id number])   Insurance Company: [employer] [place] - Phone [phone] Fax [phone]  Expected CoPay: $ 0  CoPay Card Available: Other (see comments)    Financial Assistance Needed: Dupixent MyWay form faxed at appt 4/8/2024  Which Pharmacy is filling the prescription: [facility] - [place], [place] - [address]  Pharmacy Notified: yes - New spec pt  Patient Notified: yes - MyChart        Thank You,     Keyana Johnson [facility]  Specialty Pharmacy Clinic [facility] Specialty  [email or web address]  [email or web address]  Phone: [phone]  Fax: [phone]

## 2024-05-23 ENCOUNTER — OFFICE VISIT (OUTPATIENT)
Dept: FAMILY MEDICINE | Facility: CLINIC | Age: 49
End: 2024-05-23
Payer: COMMERCIAL

## 2024-05-23 VITALS
HEART RATE: 65 BPM | DIASTOLIC BLOOD PRESSURE: 84 MMHG | SYSTOLIC BLOOD PRESSURE: 136 MMHG | HEIGHT: 72 IN | BODY MASS INDEX: 26.68 KG/M2 | OXYGEN SATURATION: 100 % | WEIGHT: 197 LBS | TEMPERATURE: 97.9 F | RESPIRATION RATE: 16 BRPM

## 2024-05-23 DIAGNOSIS — Q38.6 FORDYCE SPOTS: Primary | ICD-10-CM

## 2024-05-23 PROCEDURE — 99213 OFFICE O/P EST LOW 20 MIN: CPT | Performed by: FAMILY MEDICINE

## 2024-05-23 NOTE — PROGRESS NOTES
Assessment & Plan   Problem List Items Addressed This Visit    None  Visit Diagnoses       Mauro spots    -  Primary           Reassured, no action required       BMI  Estimated body mass index is 26.72 kg/m  as calculated from the following:    Height as of this encounter: 1.829 m (6').    Weight as of this encounter: 89.4 kg (197 lb).             Tiff Mendoza is a 48 year old, presenting for the following health issues:  Spot on Scrotum        5/23/2024     9:36 AM   Additional Questions   Roomed by Jeannie JONES CMA     History of Present Illness       Reason for visit:  Spot on Scrotum    He eats 2-3 servings of fruits and vegetables daily.He consumes 0 sweetened beverage(s) daily.He exercises with enough effort to increase his heart rate 30 to 60 minutes per day.  He exercises with enough effort to increase his heart rate 7 days per week.   He is taking medications regularly.         Patient states he noticed the spot on his scrotum last weekend. It was causing some pain but now it is fine. Round, raised and white in color.            Objective    /84 (BP Location: Right arm, Patient Position: Chair, Cuff Size: Adult Large)   Pulse 65   Temp 97.9  F (36.6  C) (Oral)   Resp 16   Ht 1.829 m (6')   Wt 89.4 kg (197 lb)   SpO2 100%   BMI 26.72 kg/m    Body mass index is 26.72 kg/m .  Physical Exam   Gen NAD  1mm mauro spot left scrotum            Signed Electronically by: PANCHO MONDRAGON DO

## 2024-06-03 ENCOUNTER — OFFICE VISIT (OUTPATIENT)
Dept: OTOLARYNGOLOGY | Facility: CLINIC | Age: 49
End: 2024-06-03
Payer: COMMERCIAL

## 2024-06-03 VITALS
BODY MASS INDEX: 27.09 KG/M2 | OXYGEN SATURATION: 99 % | HEIGHT: 72 IN | DIASTOLIC BLOOD PRESSURE: 91 MMHG | WEIGHT: 200 LBS | SYSTOLIC BLOOD PRESSURE: 146 MMHG | HEART RATE: 60 BPM

## 2024-06-03 DIAGNOSIS — J32.4 CHRONIC PANSINUSITIS: Primary | ICD-10-CM

## 2024-06-03 PROCEDURE — 99213 OFFICE O/P EST LOW 20 MIN: CPT | Mod: 25 | Performed by: OTOLARYNGOLOGY

## 2024-06-03 PROCEDURE — 31231 NASAL ENDOSCOPY DX: CPT | Performed by: OTOLARYNGOLOGY

## 2024-06-03 ASSESSMENT — PAIN SCALES - GENERAL: PAINLEVEL: NO PAIN (0)

## 2024-06-03 NOTE — PROGRESS NOTES
HISTORY OF PRESENT ILLNESS:   Reji Carpenter is a 48 year old year old male who presents today for follow up. Feels his nose is stable. Able to smell things and his breathing is improved. Has recently started on Dupixent and has recently had his second dose.  Not having any symptoms of sinus infection.    PHYSICAL EXAMINATION:  In no acute distress. Normal mood, normal affect, alert, and appropriate. Head is normocephalic. Cranial nerve VII is House-Backmann I out of VI bilaterally. Breathing without difficulty or stridor. Eyes are anicteric.     Nose is sprayed with lidocaine and nasal endoscopy is performed after verbal consent.  Both sides of the nose show healthy mucosa without any polyps or purulence in the middle meatus.    ASSESSMENT/PLAN:   Reji Carpenter is a 48 year old year old male who presents today for follow up on his recurrent nasal polyposis. Responding well to the Dupixent.  Will see him again in 6 months.     FOLLOW UP: 6 months    Scribe Disclosure:   I, Mariama Lizarraga, am serving as a scribe; to document services personally performed by Christ Dunne MD -based on data collection and the provider's statements to me.     Provider Disclosure:  I agree with above History, Review of Systems, Physical exam and Plan.  I have reviewed the content of the documentation and have edited it as needed. I have personally performed the services documented here and the documentation accurately represents those services and the decisions I have made.      Electronically signed by:  Christ Dunne MD

## 2024-06-03 NOTE — NURSING NOTE
Chief Complaint   Patient presents with    RECHECK   Blood pressure (!) 146/91, pulse 60, height 1.829 m (6'), weight 90.7 kg (200 lb), SpO2 99%. Jairo Daley, EMT

## 2024-06-03 NOTE — LETTER
6/3/2024       RE: Reji Carpenter  [address] [facility] [address]     Dear Colleague,    Thank you for referring your patient, Reji Carpenter, to the [facility] EAR NOSE AND THROAT CLINIC [place] at [facility]. Please see a copy of my visit note below.    HISTORY OF PRESENT ILLNESS:   Reji Carpenter is a 48 year old year old male who presents today for follow up. Feels his nose is stable. Able to smell things and his breathing is improved. Has recently started on Dupixent and has recently had his second dose.  Not having any symptoms of sinus infection.    PHYSICAL EXAMINATION:  In no acute distress. Normal mood, normal affect, alert, and appropriate. Head is normocephalic. Cranial nerve VII is House-Backmann I out of VI bilaterally. Breathing without difficulty or stridor. Eyes are anicteric.     Nose is sprayed with lidocaine and nasal endoscopy is performed after verbal consent.  Both sides of the nose show healthy mucosa without any polyps or purulence in the middle meatus.    ASSESSMENT/PLAN:   Reji Carpenter is a 48 year old year old male who presents today for follow up on his recurrent nasal polyposis. Responding well to the Dupixent.  Will see him again in 6 months.     FOLLOW UP: 6 months    Scribe Disclosure:   I, Mariama Lizarraga, am serving as a scribe; to document services personally performed by Christ Dunne MD -based on data collection and the provider's statements to me.     Provider Disclosure:  I agree with above History, Review of Systems, Physical exam and Plan.  I have reviewed the content of the documentation and have edited it as needed. I have personally performed the services documented here and the documentation accurately represents those services and the decisions I have made.      Electronically signed by:  Christ Dunne MD

## 2024-06-14 DIAGNOSIS — J45.30 MILD PERSISTENT ASTHMA WITHOUT COMPLICATION: ICD-10-CM

## 2024-06-14 RX ORDER — BUDESONIDE AND FORMOTEROL FUMARATE DIHYDRATE 80; 4.5 UG/1; UG/1
2 AEROSOL RESPIRATORY (INHALATION) 2 TIMES DAILY
Qty: 10.2 G | Refills: 5 | Status: SHIPPED | OUTPATIENT
Start: 2024-06-14

## 2024-06-14 NOTE — TELEPHONE ENCOUNTER
Printed prescription and signed it, placed in team basket    Please fax    Thanks    Wili Kaplan MD

## 2024-06-14 NOTE — TELEPHONE ENCOUNTER
"[facility]" pharmacy is calling and patient is requesting a refill on his Symbicort sent to "[facility]" in [place], will need to send faxed script.    fax number is [phone].    Geno LOWRY RN  Triage Nurse  [facility]

## 2024-07-09 DIAGNOSIS — I10 HYPERTENSION GOAL BP (BLOOD PRESSURE) < 140/90: ICD-10-CM

## 2024-07-09 RX ORDER — AMLODIPINE BESYLATE 5 MG/1
5 TABLET ORAL DAILY
Qty: 90 TABLET | Refills: 1 | Status: SHIPPED | OUTPATIENT
Start: 2024-07-09

## 2024-07-17 ENCOUNTER — PATIENT OUTREACH (OUTPATIENT)
Dept: CARE COORDINATION | Facility: CLINIC | Age: 49
End: 2024-07-17
Payer: COMMERCIAL

## 2024-07-31 ENCOUNTER — PATIENT OUTREACH (OUTPATIENT)
Dept: CARE COORDINATION | Facility: CLINIC | Age: 49
End: 2024-07-31
Payer: COMMERCIAL

## 2024-08-06 DIAGNOSIS — I10 HYPERTENSION GOAL BP (BLOOD PRESSURE) < 140/90: ICD-10-CM

## 2024-08-06 RX ORDER — VALSARTAN 160 MG/1
160 TABLET ORAL DAILY
Qty: 90 TABLET | Refills: 1 | Status: SHIPPED | OUTPATIENT
Start: 2024-08-06

## 2024-09-03 DIAGNOSIS — J33.9 NASAL POLYP: ICD-10-CM

## 2024-09-03 DIAGNOSIS — J32.4 CHRONIC PANSINUSITIS: ICD-10-CM

## 2024-09-06 NOTE — TELEPHONE ENCOUNTER
DUPIXENT 300MG/2ML SOPN       Last Written Prescription Date:  4-8-24  Last Fill Quantity: 12ml,   # refills: 1  Last Office Visit : 6-3-24  Future Office visit:  none    Routing refill request to provider for review/approval because:  Med not on protocol   suicidal thoughts

## 2024-09-09 ENCOUNTER — MYC MEDICAL ADVICE (OUTPATIENT)
Dept: OTOLARYNGOLOGY | Facility: CLINIC | Age: 49
End: 2024-09-09
Payer: COMMERCIAL

## 2024-09-09 NOTE — TELEPHONE ENCOUNTER
Left Voicemail (1st Attempt) for the patient to call back and schedule the following:    Appointment type: Return Rhino  Provider: Any Rhinologist  Return date: Patient convenience  Specialty phone number: [phone]  Additional appointment(s) needed: No  Additonal Notes: No

## 2024-09-11 DIAGNOSIS — J45.30 MILD PERSISTENT ASTHMA WITHOUT COMPLICATION: ICD-10-CM

## 2024-09-11 DIAGNOSIS — E78.5 HYPERLIPIDEMIA LDL GOAL <100: Chronic | ICD-10-CM

## 2024-09-11 RX ORDER — MONTELUKAST SODIUM 10 MG/1
10 TABLET ORAL DAILY
Qty: 90 TABLET | Refills: 0 | Status: SHIPPED | OUTPATIENT
Start: 2024-09-11

## 2024-09-11 RX ORDER — SIMVASTATIN 20 MG
20 TABLET ORAL AT BEDTIME
Qty: 90 TABLET | Refills: 0 | Status: SHIPPED | OUTPATIENT
Start: 2024-09-11

## 2024-09-12 ENCOUNTER — MYC MEDICAL ADVICE (OUTPATIENT)
Dept: OTOLARYNGOLOGY | Facility: CLINIC | Age: 49
End: 2024-09-12
Payer: COMMERCIAL

## 2024-09-12 NOTE — TELEPHONE ENCOUNTER
Left Voicemail (2nd Attempt) for the patient to call back and schedule the following:    Appointment type: Return Rhino  Provider: Any Rhinologist  Return date: Patient convenience  Specialty phone number: [phone]  Additional appointment(s) needed: No  Additonal Notes: No

## 2024-09-17 DIAGNOSIS — J33.9 NASAL POLYP: ICD-10-CM

## 2024-09-17 DIAGNOSIS — J32.4 CHRONIC PANSINUSITIS: ICD-10-CM

## 2024-09-17 NOTE — TELEPHONE ENCOUNTER
Left Voicemail (2nd Attempt) for the patient to call back and schedule the following:    Appointment type: Return Rhino  Provider: Any Rhinologist  Return date: Patient convenience  Specialty phone number: [phone]  Additional appointment(s) needed: No  Additonal Notes: No

## 2024-09-19 ENCOUNTER — TELEPHONE (OUTPATIENT)
Dept: OTOLARYNGOLOGY | Facility: CLINIC | Age: 49
End: 2024-09-19

## 2024-09-19 RX ORDER — DUPILUMAB 300 MG/2ML
INJECTION, SOLUTION SUBCUTANEOUS
Qty: 12 ML | Refills: 1 | OUTPATIENT
Start: 2024-09-19

## 2024-09-19 NOTE — TELEPHONE ENCOUNTER
Patient no longer under provider care. Patient will need to be seen by new provider to continue to receive medication.    GANGA CARLIN LPN on 9/19/2024 at 3:48 PM

## 2024-09-19 NOTE — TELEPHONE ENCOUNTER
PA Initiation    Medication: DUPIXENT 300 MG/2ML SC SOPN  Insurance Company: [employer] [place] - Phone [phone] Fax [phone]  Pharmacy Filling the Rx: [facility] - [place], [place] - [facility] [address]  Filling Pharmacy Phone: [phone]  Filling Pharmacy Fax: [phone]  Start Date: 9/19/2024  OMID (Key: [id number])        Thank You,     Juana Johnson CPhT  Specialty Pharmacy Clinic [facility] Specialty  [email or web address]  [email or web address]  Phone: [phone]  Fax: [phone]

## 2024-09-19 NOTE — TELEPHONE ENCOUNTER
Prior Authorization Approval    Medication: DUPIXENT 300 MG/2ML SC SOPN  Authorization Effective Date: 8/20/2024  Authorization Expiration Date: 9/19/2025  Approved Dose/Quantity: 4 ML per 28 days  Reference #: OMID (Key: [id number])   Insurance Company: [employer] [place] - Phone [phone] Fax [phone]  Expected CoPay: $    CoPay Card Available: Other (see comments)    Financial Assistance Needed: [email or web address]-my-way  Which Pharmacy is filling the prescription: [facility] - [place], [place] - [address]  Pharmacy Notified: renewal  Patient Notified: renewal    Patient following with Dr. Christ Dunne at [facility], [address], [place] [address]        Thank You,     Juana Johnson [facility]  Specialty Pharmacy Clinic [facility] Specialty  [email or web address]  [email or web address]  Phone: [phone]  Fax: [phone]

## 2024-10-17 ENCOUNTER — TRANSFERRED RECORDS (OUTPATIENT)
Dept: HEALTH INFORMATION MANAGEMENT | Facility: CLINIC | Age: 49
End: 2024-10-17
Payer: COMMERCIAL

## 2024-10-29 SDOH — HEALTH STABILITY: PHYSICAL HEALTH: ON AVERAGE, HOW MANY DAYS PER WEEK DO YOU ENGAGE IN MODERATE TO STRENUOUS EXERCISE (LIKE A BRISK WALK)?: 5 DAYS

## 2024-10-29 SDOH — HEALTH STABILITY: PHYSICAL HEALTH: ON AVERAGE, HOW MANY MINUTES DO YOU ENGAGE IN EXERCISE AT THIS LEVEL?: 20 MIN

## 2024-10-29 ASSESSMENT — ASTHMA QUESTIONNAIRES
ACT_TOTALSCORE: 24
QUESTION_1 LAST FOUR WEEKS HOW MUCH OF THE TIME DID YOUR ASTHMA KEEP YOU FROM GETTING AS MUCH DONE AT WORK, SCHOOL OR AT HOME: NONE OF THE TIME
QUESTION_2 LAST FOUR WEEKS HOW OFTEN HAVE YOU HAD SHORTNESS OF BREATH: NOT AT ALL
QUESTION_5 LAST FOUR WEEKS HOW WOULD YOU RATE YOUR ASTHMA CONTROL: COMPLETELY CONTROLLED
ACT_TOTALSCORE: 24
QUESTION_4 LAST FOUR WEEKS HOW OFTEN HAVE YOU USED YOUR RESCUE INHALER OR NEBULIZER MEDICATION (SUCH AS ALBUTEROL): NOT AT ALL
QUESTION_3 LAST FOUR WEEKS HOW OFTEN DID YOUR ASTHMA SYMPTOMS (WHEEZING, COUGHING, SHORTNESS OF BREATH, CHEST TIGHTNESS OR PAIN) WAKE YOU UP AT NIGHT OR EARLIER THAN USUAL IN THE MORNING: ONCE OR TWICE

## 2024-10-29 ASSESSMENT — SOCIAL DETERMINANTS OF HEALTH (SDOH): HOW OFTEN DO YOU GET TOGETHER WITH FRIENDS OR RELATIVES?: ONCE A WEEK

## 2024-10-29 NOTE — TELEPHONE ENCOUNTER
Pharmacy fax received stating that Qvar is not covered under patient insurance. Formulary options looks like advair, arnuity, breo, dulera, flovent and symbicort. Routing to provider to see if formulary options are fine or to start PA?      Ana Roman MA     no

## 2024-10-30 ENCOUNTER — MYC REFILL (OUTPATIENT)
Dept: FAMILY MEDICINE | Facility: CLINIC | Age: 49
End: 2024-10-30

## 2024-10-30 ENCOUNTER — OFFICE VISIT (OUTPATIENT)
Dept: FAMILY MEDICINE | Facility: CLINIC | Age: 49
End: 2024-10-30
Payer: COMMERCIAL

## 2024-10-30 VITALS
BODY MASS INDEX: 26.82 KG/M2 | WEIGHT: 198 LBS | OXYGEN SATURATION: 99 % | RESPIRATION RATE: 19 BRPM | SYSTOLIC BLOOD PRESSURE: 128 MMHG | DIASTOLIC BLOOD PRESSURE: 85 MMHG | HEART RATE: 75 BPM | HEIGHT: 72 IN | TEMPERATURE: 98.3 F

## 2024-10-30 DIAGNOSIS — J45.30 MILD PERSISTENT ASTHMA WITHOUT COMPLICATION: ICD-10-CM

## 2024-10-30 DIAGNOSIS — I10 HYPERTENSION GOAL BP (BLOOD PRESSURE) < 140/90: ICD-10-CM

## 2024-10-30 DIAGNOSIS — Z00.00 ROUTINE GENERAL MEDICAL EXAMINATION AT A HEALTH CARE FACILITY: Primary | ICD-10-CM

## 2024-10-30 DIAGNOSIS — E78.5 HYPERLIPIDEMIA LDL GOAL <100: ICD-10-CM

## 2024-10-30 DIAGNOSIS — R82.90 ABNORMAL URINALYSIS: ICD-10-CM

## 2024-10-30 LAB
ALBUMIN UR-MCNC: NEGATIVE MG/DL
APPEARANCE UR: CLEAR
BILIRUB UR QL STRIP: NEGATIVE
COLOR UR AUTO: YELLOW
GLUCOSE UR STRIP-MCNC: NEGATIVE MG/DL
HGB UR QL STRIP: NEGATIVE
KETONES UR STRIP-MCNC: ABNORMAL MG/DL
LEUKOCYTE ESTERASE UR QL STRIP: NEGATIVE
NITRATE UR QL: NEGATIVE
PH UR STRIP: 5.5 [PH] (ref 5–7)
RBC #/AREA URNS AUTO: ABNORMAL /HPF
SP GR UR STRIP: 1.01 (ref 1–1.03)
SQUAMOUS #/AREA URNS AUTO: ABNORMAL /LPF
UROBILINOGEN UR STRIP-ACNC: 0.2 E.U./DL
WBC #/AREA URNS AUTO: ABNORMAL /HPF

## 2024-10-30 PROCEDURE — 82043 UR ALBUMIN QUANTITATIVE: CPT | Performed by: FAMILY MEDICINE

## 2024-10-30 PROCEDURE — 82570 ASSAY OF URINE CREATININE: CPT | Performed by: FAMILY MEDICINE

## 2024-10-30 PROCEDURE — 81001 URINALYSIS AUTO W/SCOPE: CPT | Performed by: FAMILY MEDICINE

## 2024-10-30 PROCEDURE — 99396 PREV VISIT EST AGE 40-64: CPT | Performed by: FAMILY MEDICINE

## 2024-10-30 PROCEDURE — 99213 OFFICE O/P EST LOW 20 MIN: CPT | Mod: 25 | Performed by: FAMILY MEDICINE

## 2024-10-30 RX ORDER — VALSARTAN 160 MG/1
160 TABLET ORAL DAILY
Qty: 90 TABLET | Refills: 3 | Status: SHIPPED | OUTPATIENT
Start: 2024-10-30

## 2024-10-30 RX ORDER — AMLODIPINE BESYLATE 5 MG/1
5 TABLET ORAL DAILY
Qty: 90 TABLET | Refills: 3 | Status: SHIPPED | OUTPATIENT
Start: 2024-10-30

## 2024-10-30 RX ORDER — MONTELUKAST SODIUM 10 MG/1
10 TABLET ORAL DAILY
Qty: 90 TABLET | Refills: 3 | Status: SHIPPED | OUTPATIENT
Start: 2024-10-30

## 2024-10-30 RX ORDER — ALBUTEROL SULFATE 90 UG/1
2 INHALANT RESPIRATORY (INHALATION) EVERY 4 HOURS PRN
Qty: 18 G | Refills: 3 | Status: SHIPPED | OUTPATIENT
Start: 2024-10-30

## 2024-10-30 RX ORDER — BUDESONIDE AND FORMOTEROL FUMARATE DIHYDRATE 80; 4.5 UG/1; UG/1
2 AEROSOL RESPIRATORY (INHALATION) 2 TIMES DAILY
Qty: 10.2 G | Refills: 11 | Status: SHIPPED | OUTPATIENT
Start: 2024-10-30 | End: 2024-10-31

## 2024-10-30 RX ORDER — SIMVASTATIN 20 MG
20 TABLET ORAL AT BEDTIME
Qty: 90 TABLET | Refills: 3 | Status: SHIPPED | OUTPATIENT
Start: 2024-10-30

## 2024-10-30 RX ORDER — BUDESONIDE AND FORMOTEROL FUMARATE DIHYDRATE 80; 4.5 UG/1; UG/1
2 AEROSOL RESPIRATORY (INHALATION) 2 TIMES DAILY
Qty: 10.2 G | Refills: 0 | Status: SHIPPED | OUTPATIENT
Start: 2024-10-30 | End: 2024-10-30

## 2024-10-30 RX ORDER — BUDESONIDE AND FORMOTEROL FUMARATE DIHYDRATE 80; 4.5 UG/1; UG/1
2 AEROSOL RESPIRATORY (INHALATION) 2 TIMES DAILY
Qty: 10.2 G | Refills: 11 | Status: CANCELLED | OUTPATIENT
Start: 2024-10-30

## 2024-10-30 NOTE — PATIENT INSTRUCTIONS
Keep working on healthy diet/exercise     Call/ mychart with problems /questions    Stay on same medications, but could go off dupixent to see if symptoms worsen or not               Patient Education   Preventive Care Advice   This is general advice given by our system to help you stay healthy. However, your care team may have specific advice just for you. Please talk to your care team about your preventive care needs.  Nutrition  Eat 5 or more servings of fruits and vegetables each day.  Try wheat bread, brown rice and whole grain pasta (instead of white bread, rice, and pasta).  Get enough calcium and vitamin D. Check the label on foods and aim for 100% of the RDA (recommended daily allowance).  Lifestyle  Exercise at least 150 minutes each week  (30 minutes a day, 5 days a week).  Do muscle strengthening activities 2 days a week. These help control your weight and prevent disease.  No smoking.  Wear sunscreen to prevent skin cancer.  Have a dental exam and cleaning every 6 months.  Yearly exams  See your health care team every year to talk about:  Any changes in your health.  Any medicines your care team has prescribed.  Preventive care, family planning, and ways to prevent chronic diseases.  Shots (vaccines)   HPV shots (up to age 26), if you've never had them before.  Hepatitis B shots (up to age 59), if you've never had them before.  COVID-19 shot: Get this shot when it's due.  Flu shot: Get a flu shot every year.  Tetanus shot: Get a tetanus shot every 10 years.  Pneumococcal, hepatitis A, and RSV shots: Ask your care team if you need these based on your risk.  Shingles shot (for age 50 and up)  General health tests  Diabetes screening:  Starting at age 35, Get screened for diabetes at least every 3 years.  If you are younger than age 35, ask your care team if you should be screened for diabetes.  Cholesterol test: At age 39, start having a cholesterol test every 5 years, or more often if advised.  Bone  density scan (DEXA): At age 50, ask your care team if you should have this scan for osteoporosis (brittle bones).  Hepatitis C: Get tested at least once in your life.  STIs (sexually transmitted infections)  Before age 24: Ask your care team if you should be screened for STIs.  After age 24: Get screened for STIs if you're at risk. You are at risk for STIs (including HIV) if:  You are sexually active with more than one person.  You don't use condoms every time.  You or a partner was diagnosed with a sexually transmitted infection.  If you are at risk for HIV, ask about PrEP medicine to prevent HIV.  Get tested for HIV at least once in your life, whether you are at risk for HIV or not.  Cancer screening tests  Cervical cancer screening: If you have a cervix, begin getting regular cervical cancer screening tests starting at age 21.  Breast cancer scan (mammogram): If you've ever had breasts, begin having regular mammograms starting at age 40. This is a scan to check for breast cancer.  Colon cancer screening: It is important to start screening for colon cancer at age 45.  Have a colonoscopy test every 10 years (or more often if you're at risk) Or, ask your provider about stool tests like a FIT test every year or Cologuard test every 3 years.  To learn more about your testing options, visit:   .  For help making a decision, visit:   https://bit.ly/bh25137.  Prostate cancer screening test: If you have a prostate, ask your care team if a prostate cancer screening test (PSA) at age 55 is right for you.  Lung cancer screening: If you are a current or former smoker ages 50 to 80, ask your care team if ongoing lung cancer screenings are right for you.  For informational purposes only. Not to replace the advice of your health care provider. Copyright   2023 [employer]. All rights reserved. Clinically reviewed by the [facility] Transitions Program. [employer] [id number] - REV 01/24.

## 2024-10-30 NOTE — PROGRESS NOTES
Preventive Care Visit  [facility]  Wili Kaplan MD, Family Medicine  Oct 30, 2024          Tiff Mendoza is a 49 year old, presenting for the following:  Physical        10/30/2024     1:40 PM   Additional Questions   Roomed by jj TRACY  Feeling well    Just had labs done for life insurance    May be buying a new house soon      Some lab abnormalities on life insurance testing     No family history of kidney issues    Urinating fine    Urine looks normal    Stream okay    Twice at night to urinat   Health Care Directive  Patient does not have a Health Care Directive: Patient states has Advance Directive and will bring in a copy to clinic.      10/29/2024   General Health   How would you rate your overall physical health? Good   Feel stress (tense, anxious, or unable to sleep) Not at all            10/29/2024   Nutrition   Three or more servings of calcium each day? (!) NO   Diet: Low fat/cholesterol   How many servings of fruit and vegetables per day? (!) 2-3   How many sweetened beverages each day? 0-1            10/29/2024   Exercise   Days per week of moderate/strenous exercise 5 days   Average minutes spent exercising at this level 20 min            10/29/2024   Social Factors   Frequency of gathering with friends or relatives Once a week   Worry food won't last until get money to buy more No   Food not last or not have enough money for food? No   Do you have housing? (Housing is defined as stable permanent housing and does not include staying ouside in a car, in a tent, in an abandoned building, in an overnight shelter, or couch-surfing.) Yes   Are you worried about losing your housing? No   Lack of transportation? No   Unable to get utilities (heat,electricity)? No            10/29/2024   Dental   Dentist two times every year? Yes            10/29/2024   TB Screening   Were you born outside of the [place]? No              Today's PHQ-2 Score:       2/26/2024     3:07 PM  "  PHQ-2 ( 1999 [employer])   Q1: Little interest or pleasure in doing things 0   Q2: Feeling down, depressed or hopeless 0   PHQ-2 Score 0         10/29/2024   Substance Use   Alcohol more than 3/day or more than 7/wk No   Do you use any other substances recreationally? No        Social History     Tobacco Use    Smoking status: Never     Passive exposure: Never    Smokeless tobacco: Never   Vaping Use    Vaping status: Never Used   Substance Use Topics    Alcohol use: Yes     Comment: occasional    Drug use: No             10/29/2024   One time HIV Screening   Previous HIV test? Yes          10/29/2024   STI Screening   New sexual partner(s) since last STI/HIV test? No      ASCVD Risk   The 10-year ASCVD risk score (Rhett HERRERA, et al., 2019) is: 3.5%    Values used to calculate the score:      Age: 49 years      Sex: Male      Is Non- : No      Diabetic: No      Tobacco smoker: No      Systolic Blood Pressure: 145 mmHg      Is BP treated: Yes      HDL Cholesterol: 50 mg/dL      Total Cholesterol: 167 mg/dL       Reviewed and updated as needed this visit by Provider                     Walk dogs bid    1/2 hour total    Weights on weekend             Objective    Exam  BP (!) 145/91 (BP Location: Right arm, Patient Position: Chair, Cuff Size: Adult Large)   Pulse 75   Temp 98.3  F (36.8  C) (Temporal)   Resp 19   Ht 1.83 m (6' 0.05\")   Wt 89.8 kg (198 lb)   SpO2 99%   BMI 26.82 kg/m     Estimated body mass index is 26.82 kg/m  as calculated from the following:    Height as of this encounter: 1.83 m (6' 0.05\").    Weight as of this encounter: 89.8 kg (198 lb).    Physical Exam  GENERAL: alert and no distress  EYES: Eyes grossly normal to inspection, PERRL and conjunctivae and sclerae normal  HENT: ear canals and TM's normal, nose and mouth without ulcers or lesions  NECK: no adenopathy, no asymmetry, masses, or scars  RESP: lungs clear to auscultation - no rales, rhonchi or " wheezes  CV: regular rate and rhythm, normal S1 S2, no S3 or S4, no murmur, click or rub, no peripheral edema  ABDOMEN: soft, nontender, no hepatosplenomegaly, no masses and bowel sounds normal  MS: no gross musculoskeletal defects noted, no edema  SKIN: a few scattered moles/ lentigos on face/ scalp.    NEURO: Normal strength and tone, mentation intact and speech normal  PSYCH: mentation appears normal, affect normal/bright    1. Routine general medical examination at a health care facility    2. Hypertension goal BP (blood pressure) < 140/90    3. Hyperlipidemia LDL goal <100    4. Abnormal urinalysis    5. Mild persistent asthma without complication      Of note patient just had extensive labs done for life insurance.  See copy in chart.  The abnormal ones were related to urine, showing a bit of wbc and rbc on urine.  However, we did urinalysis here and there were no wbc or rbc on the microscopy.   Gave patient a copy of this to submit to the life insurance evaluator.  Also prudent to do another lab appointment in a few weeks to recheck again to make sure urine okay.  He agreed and will schedule.  Up to date on colonoscopy.  Keep working on healthy diet/exercise.  He may try to go off dupixent after current supply runs out and see how he does off of it.   Refill other meds. Blood pressure okay on recheck.   Cholesterol well controlled.  No std concern.    Signed Electronically by: Wili Kaplan MD

## 2024-10-31 ENCOUNTER — MYC MEDICAL ADVICE (OUTPATIENT)
Dept: FAMILY MEDICINE | Facility: CLINIC | Age: 49
End: 2024-10-31
Payer: COMMERCIAL

## 2024-10-31 DIAGNOSIS — J45.30 MILD PERSISTENT ASTHMA WITHOUT COMPLICATION: ICD-10-CM

## 2024-10-31 LAB
CREAT UR-MCNC: 94.7 MG/DL
MICROALBUMIN UR-MCNC: <12 MG/L
MICROALBUMIN/CREAT UR: NORMAL MG/G{CREAT}

## 2024-10-31 RX ORDER — BUDESONIDE AND FORMOTEROL FUMARATE DIHYDRATE 80; 4.5 UG/1; UG/1
2 AEROSOL RESPIRATORY (INHALATION) 2 TIMES DAILY
Qty: 10.2 G | Refills: 0 | Status: SHIPPED | OUTPATIENT
Start: 2024-10-31

## 2024-10-31 RX ORDER — DILTIAZEM HYDROCHLORIDE 60 MG/1
2 TABLET, FILM COATED ORAL
Qty: 10.2 G | Refills: 11 | Status: SHIPPED | OUTPATIENT
Start: 2024-10-31

## 2024-10-31 NOTE — TELEPHONE ENCOUNTER
"Current Medication shows \"Local Print\" on 10/30/24 for Symbicort inhaler.     See ImmuVen message.    Niad LOWRY  [facility]     "

## 2024-10-31 NOTE — TELEPHONE ENCOUNTER
Pt requesting medication be transferred to a new pharmacy. Assisted in transferring medication. Medication needed to be ordered for 30 days to be paid by insurance. Prescription updated to a 30 day. Thanks    Dora Bess RN  [facility]

## 2024-11-26 ENCOUNTER — OFFICE VISIT (OUTPATIENT)
Dept: FAMILY MEDICINE | Facility: CLINIC | Age: 49
End: 2024-11-26
Payer: COMMERCIAL

## 2024-11-26 VITALS
DIASTOLIC BLOOD PRESSURE: 78 MMHG | HEIGHT: 72 IN | OXYGEN SATURATION: 100 % | RESPIRATION RATE: 18 BRPM | SYSTOLIC BLOOD PRESSURE: 123 MMHG | TEMPERATURE: 97 F | BODY MASS INDEX: 25.78 KG/M2 | WEIGHT: 190.38 LBS | HEART RATE: 59 BPM

## 2024-11-26 DIAGNOSIS — H69.90 DYSFUNCTION OF EUSTACHIAN TUBE, UNSPECIFIED LATERALITY: ICD-10-CM

## 2024-11-26 DIAGNOSIS — J01.90 ACUTE SINUSITIS WITH SYMPTOMS > 10 DAYS: ICD-10-CM

## 2024-11-26 DIAGNOSIS — J31.0 CHRONIC RHINITIS: Primary | ICD-10-CM

## 2024-11-26 PROCEDURE — 99213 OFFICE O/P EST LOW 20 MIN: CPT | Performed by: FAMILY MEDICINE

## 2024-11-26 RX ORDER — PSEUDOEPHEDRINE HCL 120 MG/1
TABLET, FILM COATED, EXTENDED RELEASE ORAL
Qty: 30 TABLET | Refills: 1 | Status: SHIPPED | OUTPATIENT
Start: 2024-11-26

## 2024-11-26 ASSESSMENT — PAIN SCALES - GENERAL: PAINLEVEL_OUTOF10: NO PAIN (0)

## 2024-11-26 NOTE — PATIENT INSTRUCTIONS
One more  round of antibiotics    Stay well hydrated    Use the nasacort daily for next few weeks    Sudafed/ pseudoephedrine  as needed     Could see ENT if needed

## 2024-11-26 NOTE — PROGRESS NOTES
"      Tiff Mendoza is a 49 year old, presenting for the following health issues:  Ear Problem        11/26/2024     9:21 AM   Additional Questions   Roomed by Roseann Garber     History of Present Illness       Reason for visit:  Ear pain  Symptoms include:  Never popped after flight 2 weeks ago, had sinus infection at time of flight  Symptom intensity:  Mild  Symptom progression:  Staying the same  Had these symptoms before:  No   He is taking medications regularly.      During  trip got sick      Fever, bad sinus infection    Just finished 10 days augmentin    Feels plugged right side    No pain    Poor hearing    Some ear pain with flying    Was hard to breathe through nose    Takes cetirizine  chronically          Objective    /78 (BP Location: Right arm, Patient Position: Chair, Cuff Size: Adult Regular)   Pulse 59   Temp 97  F (36.1  C) (Temporal)   Resp 18   Ht 1.83 m (6' 0.05\")   Wt 86.4 kg (190 lb 6 oz)   SpO2 100%   BMI 25.79 kg/m    Body mass index is 25.79 kg/m .  Physical Exam  Constitutional:       Appearance: Normal appearance.   HENT:      Head: Normocephalic and atraumatic.      Right Ear: Tympanic membrane, ear canal and external ear normal.      Left Ear: Tympanic membrane, ear canal and external ear normal.      Nose:      Comments: Some nasal mucosa swelling     Mouth/Throat:      Mouth: Mucous membranes are moist.      Pharynx: Oropharynx is clear.   Eyes:      Conjunctiva/sclera: Conjunctivae normal.   Cardiovascular:      Rate and Rhythm: Normal rate and regular rhythm.      Heart sounds: Normal heart sounds.   Pulmonary:      Effort: Pulmonary effort is normal. No respiratory distress.      Breath sounds: Normal breath sounds.   Neurological:      Mental Status: He is alert.          ASSESSMENT / PLAN:  (J31.0) Chronic rhinitis  (primary encounter diagnosis)  Comment: patient to resume nasacort, stay on for a few weeks  Plan: as above     (J01.90) Acute sinusitis with " symptoms > 10 days  Comment: one more round of antibiotics   Plan: amoxicillin-clavulanate (AUGMENTIN) 875-125 MG         tablet             (H69.90) Dysfunction of Eustachian tube, unspecified laterality  Comment: use sudafed prn; warned of side effects   Plan: pseudoePHEDrine (SUDAFED) 120 MG 12 hr tablet             Call if symptoms not resolving; could see ENT    Be seen promptly if symptoms acutely worsen       I reviewed the patient's medications, allergies, medical history, family history, and social history.    Wili Kaplan MD          Signed Electronically by: Wili Kaplan MD

## 2025-06-20 DIAGNOSIS — J45.30 MILD PERSISTENT ASTHMA WITHOUT COMPLICATION: ICD-10-CM

## 2025-06-21 RX ORDER — DILTIAZEM HYDROCHLORIDE 60 MG/1
2 TABLET, FILM COATED ORAL 2 TIMES DAILY
Qty: 30.6 G | Refills: 0 | Status: SHIPPED | OUTPATIENT
Start: 2025-06-21

## 2025-08-20 ENCOUNTER — TELEPHONE (OUTPATIENT)
Dept: SURGERY | Facility: AMBULATORY SURGERY CENTER | Age: 50
End: 2025-08-20
Payer: COMMERCIAL